# Patient Record
Sex: MALE | Race: WHITE | Employment: FULL TIME | URBAN - METROPOLITAN AREA
[De-identification: names, ages, dates, MRNs, and addresses within clinical notes are randomized per-mention and may not be internally consistent; named-entity substitution may affect disease eponyms.]

---

## 2017-01-30 PROBLEM — G60.9 IDIOPATHIC PERIPHERAL NEUROPATHY: Status: ACTIVE | Noted: 2017-01-30

## 2017-01-30 PROBLEM — Z00.00 MEDICARE ANNUAL WELLNESS VISIT, SUBSEQUENT: Status: ACTIVE | Noted: 2017-01-30

## 2017-04-24 ENCOUNTER — HOSPITAL ENCOUNTER (OUTPATIENT)
Dept: SURGERY | Age: 82
Discharge: HOME | End: 2017-04-24
Payer: MEDICARE

## 2017-04-24 ENCOUNTER — HOSPITAL ENCOUNTER (OUTPATIENT)
Dept: PHYSICAL THERAPY | Age: 82
Discharge: HOME | End: 2017-04-24
Payer: MEDICARE

## 2017-04-24 VITALS
RESPIRATION RATE: 12 BRPM | HEART RATE: 61 BPM | DIASTOLIC BLOOD PRESSURE: 56 MMHG | HEIGHT: 71 IN | OXYGEN SATURATION: 97 % | WEIGHT: 203 LBS | BODY MASS INDEX: 28.42 KG/M2 | TEMPERATURE: 96 F | SYSTOLIC BLOOD PRESSURE: 101 MMHG

## 2017-04-24 LAB
ANION GAP BLD CALC-SCNC: 8 MMOL/L (ref 7–16)
APPEARANCE UR: CLEAR
APTT PPP: 29.4 SEC (ref 23.5–31.7)
BACTERIA SPEC CULT: ABNORMAL
BASOPHILS # BLD AUTO: 0 K/UL (ref 0–0.2)
BASOPHILS # BLD: 1 % (ref 0–2)
BILIRUB UR QL: NEGATIVE
BUN SERPL-MCNC: 22 MG/DL (ref 8–23)
CALCIUM SERPL-MCNC: 9.2 MG/DL (ref 8.3–10.4)
CHLORIDE SERPL-SCNC: 104 MMOL/L (ref 98–107)
CO2 SERPL-SCNC: 29 MMOL/L (ref 21–32)
COLOR UR: YELLOW
CREAT SERPL-MCNC: 0.96 MG/DL (ref 0.8–1.5)
DIFFERENTIAL METHOD BLD: ABNORMAL
EOSINOPHIL # BLD: 0.2 K/UL (ref 0–0.8)
EOSINOPHIL NFR BLD: 3 % (ref 0.5–7.8)
ERYTHROCYTE [DISTWIDTH] IN BLOOD BY AUTOMATED COUNT: 12.9 % (ref 11.9–14.6)
GLUCOSE SERPL-MCNC: 66 MG/DL (ref 65–100)
GLUCOSE UR STRIP.AUTO-MCNC: NEGATIVE MG/DL
HCT VFR BLD AUTO: 43.9 % (ref 41.1–50.3)
HGB BLD-MCNC: 14.9 G/DL (ref 13.6–17.2)
HGB UR QL STRIP: NEGATIVE
IMM GRANULOCYTES # BLD: 0 K/UL (ref 0–0.5)
IMM GRANULOCYTES NFR BLD AUTO: 0.3 % (ref 0–5)
INR PPP: 1.1 (ref 0.9–1.2)
KETONES UR QL STRIP.AUTO: NEGATIVE MG/DL
LEUKOCYTE ESTERASE UR QL STRIP.AUTO: NEGATIVE
LYMPHOCYTES # BLD AUTO: 20 % (ref 13–44)
LYMPHOCYTES # BLD: 1.3 K/UL (ref 0.5–4.6)
MCH RBC QN AUTO: 32.6 PG (ref 26.1–32.9)
MCHC RBC AUTO-ENTMCNC: 33.9 G/DL (ref 31.4–35)
MCV RBC AUTO: 96.1 FL (ref 79.6–97.8)
MONOCYTES # BLD: 1.3 K/UL (ref 0.1–1.3)
MONOCYTES NFR BLD AUTO: 20 % (ref 4–12)
NEUTS SEG # BLD: 3.7 K/UL (ref 1.7–8.2)
NEUTS SEG NFR BLD AUTO: 56 % (ref 43–78)
NITRITE UR QL STRIP.AUTO: NEGATIVE
PH UR STRIP: 6 [PH] (ref 5–9)
PLATELET # BLD AUTO: 130 K/UL (ref 150–450)
PMV BLD AUTO: 10.6 FL (ref 10.8–14.1)
POTASSIUM SERPL-SCNC: 4.9 MMOL/L (ref 3.5–5.1)
PROT UR STRIP-MCNC: NEGATIVE MG/DL
PROTHROMBIN TIME: 11.7 SEC (ref 9.6–12)
RBC # BLD AUTO: 4.57 M/UL (ref 4.23–5.67)
SERVICE CMNT-IMP: ABNORMAL
SODIUM SERPL-SCNC: 141 MMOL/L (ref 136–145)
SP GR UR REFRACTOMETRY: 1.02 (ref 1–1.02)
UROBILINOGEN UR QL STRIP.AUTO: 1 EU/DL (ref 0.2–1)
WBC # BLD AUTO: 6.5 K/UL (ref 4.3–11.1)

## 2017-04-24 PROCEDURE — G8980 MOBILITY D/C STATUS: HCPCS

## 2017-04-24 PROCEDURE — G8979 MOBILITY GOAL STATUS: HCPCS

## 2017-04-24 PROCEDURE — 93005 ELECTROCARDIOGRAM TRACING: CPT | Performed by: ANESTHESIOLOGY

## 2017-04-24 PROCEDURE — G8978 MOBILITY CURRENT STATUS: HCPCS

## 2017-04-24 PROCEDURE — 85025 COMPLETE CBC W/AUTO DIFF WBC: CPT | Performed by: PHYSICIAN ASSISTANT

## 2017-04-24 PROCEDURE — 81003 URINALYSIS AUTO W/O SCOPE: CPT | Performed by: PHYSICIAN ASSISTANT

## 2017-04-24 PROCEDURE — 87641 MR-STAPH DNA AMP PROBE: CPT | Performed by: PHYSICIAN ASSISTANT

## 2017-04-24 PROCEDURE — 85610 PROTHROMBIN TIME: CPT | Performed by: PHYSICIAN ASSISTANT

## 2017-04-24 PROCEDURE — 97161 PT EVAL LOW COMPLEX 20 MIN: CPT

## 2017-04-24 PROCEDURE — 36415 COLL VENOUS BLD VENIPUNCTURE: CPT | Performed by: PHYSICIAN ASSISTANT

## 2017-04-24 PROCEDURE — 80048 BASIC METABOLIC PNL TOTAL CA: CPT | Performed by: PHYSICIAN ASSISTANT

## 2017-04-24 PROCEDURE — 85730 THROMBOPLASTIN TIME PARTIAL: CPT | Performed by: PHYSICIAN ASSISTANT

## 2017-04-24 NOTE — PERIOP NOTES
Patient verified name, , and surgery as listed in [facility]. Type III surgery, walk in assessment complete. Labs per surgeon: cbc,bmp,PT/PTT and UA ; results within limits per anesthesia protocol. Labs per anesthesia protocol: type & screen DOS  EKG:performed per anesthesia protocol, results within protocol. Pt with hx MI 2 days s/p CATRINA  required 1 bare metal stent placed to mid right coronary artery. Pt never followed up with [facility] after discharge from hospital . Pt's PCP (Dr. Shahida Rivas) at [facility] has been prescribing plavix since that time and monitoring pt. Last EKG in EMR dated 14. Informed pt that he will need cardiology eval prior to surgery due to his history and no cardiology follow up since. Contacted Dr. Ewa Melchor' office, left detailed voicemail message for [facility] reporting pt's history and need for cardiology eval prior to DOS. Hibiclens and instructions given per hospital policy. Nasal Swab collected per MD order and instructions for Mupirocin nasal ointment if required. Patient provided with handouts including Guide to Surgery, Pain Management, Hand Hygiene, Blood Transfusion Education, and [facility] Brochure. Patient answered medical/surgical history questions at their best of ability. All prior to admission medications documented in Connect [facility]. Original medication prescription bottle  visualized during patient appointment. Patient instructed to hold all vitamins 7 days prior to surgery and NSAIDS 5 days prior to surgery. Medications to be held prior to surgery plavix    Patient instructed to continue previous medications as prescribed prior to surgery and to take the following medications the day of surgery according to anesthesia guidelines with a small sip of water: aspirin, proscar, atrovastatin. Patient taught back and verbalized understanding.

## 2017-04-24 NOTE — ADVANCED PRACTICE NURSE
Total Joint Surgery Preoperative Chart Review      Patient ID:  Carlee Alva  [id number]  89 y.o.  1935  Surgeon: Dr. Donne Bloch  Date of Surgery: 5/16/2017  Procedure: Total Left Knee Arthroplasty  Primary Care Physician: Sarah Sanchez [phone]  Specialty Physician(s):      Subjective:   Carlee Alva is a 81 y.o. WHITE OR  male who presents for preoperative evaluation for Total Left Knee arthroplasty. This is a preoperative chart review note based on data collected by the nurse at the surgical Pre-Assessment visit. Past Medical History:   Diagnosis Date    Arthritis     osteo    Chronic pain     left hip/back/LLE    Colon cancer ([address] 6/23/2015    Diabetes ([address] 2006    Type 2. diet controlled. Pt does not monitor blood sugar. A1C=5.7 Jan 2017.  Dyslipidemia 6/23/2015    GERD (gastroesophageal reflux disease)     controlled w/med    Hx of bladder cancer 6/23/2015    Hypertension     controlled on medication    NSTEMI (non-ST elevation myocardial infarction) ([address] 01/10/2014    MI. heart cath: preserved LVSF. EF =65%. mild to mod LAD and circumflex disease. 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      Thrombocytopenia, unspecified ([facility] 1/11/2014      Past Surgical History:   Procedure Laterality Date    ABDOMEN SURGERY PROC UNLISTED  1991    EXP Lap    HX COLECTOMY  1997    Colon Resection    HX COLONOSCOPY  2014    HX HEART CATHETERIZATION  01/10/2014    s/p bare metal stent to mid-right CA    HX HEENT  as child    T&A    HX LUMBAR FUSION  1990 x 2  2012, 2013    lumbar fusions- x 4 total surgeries    HX MOHS PROCEDURES Left 1990    HX ORTHOPAEDIC Left 2013    left hip replaced    HX UROLOGICAL  2002    Turp     Family History   Problem Relation Age of Onset    Heart Disease Mother    Carlos Yeager Cancer Father     Lung Disease Sister     Heart Disease Brother     Cancer Brother      pancreatic    Parkinson's Disease Brother     Malignant Hyperthermia Neg Hx     Pseudocholinesterase Deficiency Neg Hx     Delayed Awakening Neg Hx     Post-op Nausea/Vomiting Neg Hx     Emergence Delirium Neg Hx     Post-op Cognitive Dysfunction Neg Hx     Other Neg Hx       Social History   Substance Use Topics    Smoking status: Former Smoker     Packs/day: 1.50     Years: 28.00    Smokeless tobacco: Never Used      Comment: quit 1982    Alcohol use No       Prior to Admission medications    Medication Sig Start Date End Date Taking? Authorizing Provider   aspirin delayed-release 81 mg tablet Take 81 mg by mouth daily. Per anesthesia protocol:instructed to take am of surgery. Indications: myocardial infarction prevention   Yes Historical Provider   clopidogrel (PLAVIX) 75 mg tab Take 1 Tab by mouth daily. Patient taking differently: Take 75 mg by mouth daily. Per anesthesia protocol: pt instructed to stop med 7 days prior to day of surgery. Indications: Myocardial Reinfarction Prevention 1/30/17  Yes Fabiola Flanagan MD   lisinopril (PRINIVIL, ZESTRIL) 5 mg tablet Take 1 Tab by mouth daily. Patient taking differently: Take 5 mg by mouth nightly. 1/30/17  Yes Fabiola Flanagan MD   Dexlansoprazole [facility]) 60 mg CpDB Take 1 Cap by mouth daily. Patient taking differently: Take 60 mg by mouth nightly. Indications: gastroesophageal reflux disease 1/30/17  Yes Fabiola Flanagan MD   finasteride (PROSCAR) 5 mg tablet Take 1 Tab by mouth daily. Patient taking differently: Take 5 mg by mouth daily. Per anesthesia protocol:instructed to take am of surgery. Indications: SYMPTOMATIC BENIGN PROSTATIC HYPERPLASIA 1/30/17  Yes Fabiola Flanagan MD   atorvastatin (LIPITOR) 40 mg tablet Take 1 Tab by mouth daily. Patient taking differently: Take 40 mg by mouth daily.  Per anesthesia protocol:instructed to take am of surgery. Indications: hypercholesterolemia 1/30/17  Yes Roz Mccloud MD   niacin ER (NIASPAN) 500 mg tablet Take 4 Tabs by mouth nightly. 1/30/17  Yes Roz Mccloud MD   metoprolol succinate (TOPROL-XL) 25 mg XL tablet Take 1 Tab by mouth daily. Bhargavi Sanz MD [facility]# [id number] [facility] [id number]  Patient taking differently: Take 25 mg by mouth nightly. 1/30/17  Yes Roz Mccloud MD   cholecalciferol, vitamin D3, (VITAMIN D-3) 2,000 unit Tab Take  by mouth nightly. Yes Historical Provider   triamcinolone acetonide (KENALOG) 0.1 % topical cream Apply  to affected area two (2) times a day. use thin layer 1/30/17   Roz Mccloud MD   hydrocortisone (ANUSOL-HC) 25 mg supp Insert 1 Suppository into rectum every twelve (12) hours. 3/23/16   Roz Mccloud MD     No Known Allergies       Objective:     Physical Exam:   Patient Vitals for the past 24 hrs:   Temp Pulse Resp BP SpO2   04/24/17 1405 96 °F (35.6 °C) 61 12 101/56 97 %   04/24/17 1200 - - - - 99 %       ECG:    EKG Results     None          Data Review:   Labs:   Recent Results (from the past 24 hour(s))   CBC WITH AUTOMATED DIFF    Collection Time: 04/24/17 12:22 PM   Result Value Ref Range    WBC 6.5 4.3 - 11.1 K/uL    RBC 4.57 4.23 - 5.67 M/uL    HGB 14.9 13.6 - 17.2 g/dL    HCT 43.9 41.1 - 50.3 %    MCV 96.1 79.6 - 97.8 FL    MCH 32.6 26.1 - 32.9 PG    MCHC 33.9 31.4 - 35.0 g/dL    RDW 12.9 11.9 - 14.6 %    PLATELET 328 (L) 954 - 450 K/uL    MPV 10.6 (L) 10.8 - 14.1 FL    DF AUTOMATED      NEUTROPHILS 56 43 - 78 %    LYMPHOCYTES 20 13 - 44 %    MONOCYTES 20 (H) 4.0 - 12.0 %    EOSINOPHILS 3 0.5 - 7.8 %    BASOPHILS 1 0.0 - 2.0 %    IMMATURE GRANULOCYTES 0.3 0.0 - 5.0 %    ABS. NEUTROPHILS 3.7 1.7 - 8.2 K/UL    ABS. LYMPHOCYTES 1.3 0.5 - 4.6 K/UL    ABS. MONOCYTES 1.3 0.1 - 1.3 K/UL    ABS. EOSINOPHILS 0.2 0.0 - 0.8 K/UL    ABS. BASOPHILS 0.0 0.0 - 0.2 K/UL    ABS. IMM.  GRANS. 0.0 0.0 - 0.5 K/UL   PROTHROMBIN TIME + INR    Collection Time: 04/24/17 12:22 PM   Result Value Ref Range    Prothrombin time 11.7 9.6 - 12.0 sec    INR 1.1 0.9 - 1.2     PTT    Collection Time: 04/24/17 12:22 PM   Result Value Ref Range    aPTT 29.4 23.5 - 47.1 SEC   METABOLIC PANEL, BASIC    Collection Time: 04/24/17 12:22 PM   Result Value Ref Range    Sodium 141 136 - 145 mmol/L    Potassium 4.9 3.5 - 5.1 mmol/L    Chloride 104 98 - 107 mmol/L    CO2 29 21 - 32 mmol/L    Anion gap 8 7 - 16 mmol/L    Glucose 66 65 - 100 mg/dL    BUN 22 8 - 23 MG/DL    Creatinine 0.96 0.8 - 1.5 MG/DL    GFR est AA >60 >60 ml/min/1.73m2    GFR est non-AA >60 >60 ml/min/1.73m2    Calcium 9.2 8.3 - 10.4 MG/DL   URINALYSIS W/ RFLX MICROSCOPIC    Collection Time: 04/24/17 12:22 PM   Result Value Ref Range    Color YELLOW      Appearance CLEAR      Specific gravity 1.017 1.001 - 1.023      pH (UA) 6.0 5.0 - 9.0      Protein NEGATIVE  NEG mg/dL    Glucose NEGATIVE  mg/dL    Ketone NEGATIVE  NEG mg/dL    Bilirubin NEGATIVE  NEG      Blood NEGATIVE  NEG      Urobilinogen 1.0 0.2 - 1.0 EU/dL    Nitrites NEGATIVE  NEG      Leukocyte Esterase NEGATIVE  NEG     MSSA/MRSA SC BY PCR, NASAL SWAB    Collection Time: 04/24/17  1:50 PM   Result Value Ref Range    Special Requests: NASAL O2      Culture result: (A)       MRSA target DNA not detected, SA target DNA detected. A MRSA negative, SA positive test result does not preclude MRSA nasal colonization.          Problem List:  )  Patient Active Problem List   Diagnosis Code    Essential hypertension I10    Arthritis M19.90    GERD (gastroesophageal reflux disease) K21.9    Unspecified adverse effect of anesthesia T41.45XA    Spinal stenosis, lumbar region, with neurogenic claudication M48.06    Scoliosis (and kyphoscoliosis), idiopathic M41.20    Status post lumbar spinal fusion Z98.1    Osteoarthritis of left hip M16.12    BPH (benign prostatic hyperplasia) N40.0    HLD (hyperlipidemia) E78.5  Former smoker Z87.891    H/O chest pain Z87.898    History of normal resting EKG [id number]    History of colon cancer Z85.038    History of bladder cancer Z85.51    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.649    NSTEMI (non-ST elevation myocardial infarction) ([facility] I21.4    Thrombocytopenia, unspecified (HCC) D69.6    Dyslipidemia E78.5    Hx of bladder cancer Z85.51    Controlled type 2 diabetes mellitus without complication (HCC) A68.0    Chronic pain G89.29    Lumbar radiculopathy M54.16    Chronic pain of left knee M25.562, G89.29    Medicare annual wellness visit, subsequent Z00.00    Idiopathic peripheral neuropathy G60.9       Total Joint Surgery Pre-Assessment Recommendations:           Recommend continuous saturation monitoring hours of sleep, during hospitalization.         Signed By: Ronald Lott NP-C    April 24, 2017

## 2017-04-24 NOTE — PROGRESS NOTES
17 1200   Oxygen Therapy   O2 Sat (%) 99 %   Pulse via Oximetry 66 beats per minute   O2 Device Room air   Pre-Treatment   Breath Sounds Bilateral Clear   Pre FEV1 (liters) 3 liters   % Predicted 107   Incentive Spirometry Treatment   Actual Volume (ml) 3000 ml     Initial respiratory Assessment completed with pt. Pt was interviewed and evaluated in Joint camp prior to surgery. Patient ID:  Brittany Carvajal  [id number]  20 y.o.  1935  Surgeon: Dr. Maritza Arias  Date of Surgery: 2017  Procedure: Total knee Arthroplasty  Primary Care Physician: Frankie Oro [phone]  Specialists:                                  Pt instructed in the use of Incentive Spirometry. Pt instructed to bring Incentive Spirometer back on date of surgery & to start using Is upon return to pt room. Pt taught proper cough technique      History of smokin.5 ppd for 28 years     Quit date:    Secondhand smoke:FATHER      Past procedures with Oxygen desaturation:DELAYED AWAKENING AFTER LONG SURGERY IN     Past Medical History:   Diagnosis Date    Arthritis     osteo    Chronic pain     left hip/back/LLE    Colon cancer ([address] 2015    Diabetes ([address]     Type 2. diet controlled. Pt does not monitor blood sugar. A1C=5.2017.  Dyslipidemia 2015    GERD (gastroesophageal reflux disease)     controlled w/med    Hx of bladder cancer 2015    Hypertension     controlled on medication    NSTEMI (non-ST elevation myocardial infarction) ([address] 01/10/2014    MI. heart cath: preserved LVSF. EF =65%. mild to mod LAD and circumflex disease.  Osteoarthritis of left hip 2013    S/P prosthetic total arthroplasty of the hip 2014    Stented coronary artery 01/10/2014    mid right coronary artery - Medtronic bare metal stent.      Thrombocytopenia, unspecified ([address] 2014 Respiratory history: HX OF ATELECTASIS ON CT 2012                                   DENIES SOB                                  Respiratory meds:                                                         FAMILY PRESENT:                 NO                                        PAST SLEEP STUDY:            NO  HX OF RITA:                             NO                                     RITA assessment:                                               SLEEPS ON SIDE                                                    PHYSICAL EXAM   Body mass index is 28.31 kg/(m^2).    Visit Vitals    /56 (BP 1 Location: Left arm, BP Patient Position: Sitting)    Pulse 61    Temp 96 °F (35.6 °C)    Resp 12    Ht 5' 11\" (1.803 m)    Wt 92.1 kg (203 lb)    SpO2 97%    BMI 28.31 kg/m2     Neck circumference:  44.5    cm    Loud snoring:YES  - ACCORDING WIFE    Witnessed apnea or wakening gasping or choking:,DENIES,    Awakens with headaches:DENIES    Morning or daytime tiredness/ sleepiness:  TIRED -NAPS FOR 30 MIN AFTER EATING BREAKFAST    Dry mouth or sore throat in morning:YES    Freidman stage:3    SACS score:25    STOP/BAN       Sleepiness Scale:     Sitting and reading 1    Watching TV 2    Sitting inactive in a public place 1    As a passenger in a car for an hour without a break 1    Lying down to rest in the afternoon when circumstances Permits 1    Sitting and talking to someone 1    Sitting quietly after lunch without alcohol 1    In a car, while stopped for a few minutes 1    Total :  9                           CPAP:NA        AGGRESSIVE IS       CONT SAT HS        Referrals:    Pt. Phone Number:

## 2017-04-24 NOTE — PROGRESS NOTES
Karen Rodrigues  : [birth year](50 y.o.) [address] at [facility]  [address], [address]  [phone]       Physical Therapy Prehab Plan of Treatment and Evaluation Summary:2017    ICD-10: Treatment Diagnosis:   · Pain in Left Knee (M25.562)  · Stiffness of Left Knee, Not elsewhere classified (M25.662)  · Difficulty in walking, Not elsewhere classified (R26.2)  Precautions/Allergies:   Review of patient's allergies indicates no known allergies. MEDICAL/REFERRING DIAGNOSIS:  Unilateral primary osteoarthritis, left knee [M17.12]  REFERRING PHYSICIAN: Vick Cespedes,*  DATE OF SURGERY: 17   Assessment:   Comments:  Pt. Reports left arlet 2 years ago. He plans to go home with wife and son to assist.  Left ankle dorsiflexion is somewhat limited. PROBLEM LIST (Impacting functional limitations):  Mr. Alex eRddy presents with the following left lower extremity(s) problems:  1. Strength  2. Range of Motion  3. Home Exercise Program  4. Pain   INTERVENTIONS PLANNED:  1. Home Exercise Program  2. Educational Discussion     TREATMENT PLAN: Effective Dates: 2017 TO 2017. Frequency/Duration: Patient to continue to perform home exercise program at least twice per day up until his surgery. GOALS: (Goals have been discussed and agreed upon with patient.)  Discharge Goals: Time Frame: 1 Day  1. Patient will demonstrate independence with a home exercise program designed to increase strength, range of motion and pain control to minimize functional deficits and optimize patient for total joint replacement. Rehabilitation Potential For Stated Goals: Good  Regarding [facility] therapy, I certify that the treatment plan above will be carried out by a therapist or under their direction.   Thank you for this referral,  Ellis Esparza, PT               HISTORY:   Present Symptoms:  Pain Intensity 1:  (8 at worst)  Pain Location 1: Knee   History of Present Injury/Illness (Reason for Referral):  Medical/Referring Diagnosis: Unilateral primary osteoarthritis, left knee [M17.12]   Past Medical History/Comorbidities:   Mr. 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 He also has no past medical history of Aneurysm ([address] Arrhythmia; Asthma; Autoimmune disease ([address] Chronic kidney disease; Chronic obstructive pulmonary disease ([address] Coagulation disorder ([address] Difficult intubation; Endocarditis; Heart failure ([address] Liver disease; Malignant hyperthermia due to anesthesia; Morbid obesity ([address] Nausea & vomiting; Pseudocholinesterase deficiency; Psychiatric disorder; PUD (peptic ulcer disease); Rheumatic fever; Seizures ([address] Sleep apnea; Stroke [facility]); Thromboembolus ([address] or Thyroid disease. Mr. Gutierrez Yung  has a past surgical history that includes abdomen surgery proc unlisted (1991); lumbar fusion (1990 x 2  2012, 2013); urological (2002); heent (as child); mohs procedure (Left, 1990); orthopaedic (Left, 2013); colectomy (1997); and colonoscopy (2014).   Social History/Living Environment:   Home Environment: Private residence  # Steps to Enter: 0  Rails to Enter: No  One/Two Story Residence: One story (3 steps with bilateral rails)  Living Alone: No  Support Systems: Child(linda), Spouse/Significant Other/Partner  Patient Expects to be Discharged to[de-identified] Private residence  Current DME Used/Available at Home: Commode, bedside, Walker, rolling, Cane, straight  Tub or Shower Type: Tub/Shower combination  Work/Activity:   for [employer]  Dominant Side:  RIGHT  Current Medications:  See Pre-assessment nursing note   Number of Personal Factors/Comorbidities that affect the Plan of Care: 1-2: MODERATE COMPLEXITY   EXAMINATION:   ADLs (Current Functional Status):   Ambulation:  [x] Independent  [] Walk Indoors Only  [] Walk Outdoors  [] Use Assistive Device  [] Use Wheelchair Only Dressing:  [x] Independent  Requires Assistance from Someone for:  [] Sock/Shoes  [] Pants  [] Everything   Bathing/Showering:   [x] Independent  [] Requires Assistance from Someone  [] Sponge Bath Only Household Activities:  [x] Routine house and yard work  [] Light Housework Only  [] None   Observation/Orthostatic Postural Assessment:   Exceptions to WDLRounded shoulders, Genu varus left  ROM/Flexibility:   Gross Assessment: Yes  AROM: Within functional limits (right LE)                LLE Assessment  LLE Assessment (WDL): Exception to WDL  LLE AROM  L Knee Flexion: 96  L Knee Extension: 3  L Ankle Dorsiflexion: 0          Strength:   Gross Assessment: Yes  Strength: Generally decreased, functional (right LE)       LLE Strength  L Knee Flexion: 4  L Knee Extension: 4          Functional Mobility:    Gross Assessment: Yes    Gait Description (WDL): Exceptions to WDL  Stand to Sit: Independent, Additional time  Sit to Stand: Independent, Additional time  Distance (ft): 250 Feet (ft)  Ambulation - Level of Assistance: Independent  Speed/Mary: Delayed  Stance: Left decreased  Gait Abnormalities: Antalgic; Other (limited ankle DF)          Balance:    Sitting: Intact  Standing: Intact   Body Structures Involved:  1. Bones  2. Joints  3. Muscles  4. Ligaments Body Functions Affected:  1. Movement Related Activities and Participation Affected:  1. Mobility   Number of elements that affect the Plan of Care: 1-2: LOW COMPLEXITY   CLINICAL PRESENTATION:   Presentation: Stable and uncomplicated: LOW COMPLEXITY   CLINICAL DECISION MAKING:   Outcome Measure:    Tool Used: Lower Extremity Functional Scale (LEFS)  Score:  Initial: 37/80 Most Recent: X/80 (Date: -- )   Interpretation of Score: 20 questions each scored on a 5 point scale with 0 representing \"extreme difficulty or unable to perform\" and 4 representing \"no difficulty\". The lower the score, the greater the functional disability. 80/80 represents no disability. Minimal detectable change is 9 points. Score 80 79-65 64-49 48-33 32-17 16-1 0   Modifier CH CI CJ CK CL CM CN     ? Mobility - Walking and Moving Around:     - CURRENT STATUS: CK - 40%-59% impaired, limited or restricted    - GOAL STATUS: CK - 40%-59% impaired, limited or restricted    - D/C STATUS:  CK - 40%-59% impaired, limited or restricted  Medical Necessity:   · Mr. Sy Cavazos is expected to optimize his lower extremity strength and ROM in preparation for joint replacement surgery. Reason for Services/Other Comments:  · Achieve baseline assesment of musculoskeletal system, functional mobility and home environment. , educate in PT HEP in preparation for surgery, educate in hospital plan of care. Use of outcome tool(s) and clinical judgement create a POC that gives a: Clear prediction of patient's progress: LOW COMPLEXITY   TREATMENT:   Treatment/Session Assessment:  Patient was instructed in PT- HEP to increase strength and ROM in LEs. Answered all questions. · Post session pain:  No complaints  · Compliance with Program/Exercises: compliant most of the time.   Total Treatment Duration:  PT Patient Time In/Time Out  Time In: 1220  Time Out: [address], PT

## 2017-04-24 NOTE — PERIOP NOTES
Lab results within limits per anesthesia protocol; OK for surgery. Recent Results (from the past 12 hour(s))   CBC WITH AUTOMATED DIFF    Collection Time: 04/24/17 12:22 PM   Result Value Ref Range    WBC 6.5 4.3 - 11.1 K/uL    RBC 4.57 4.23 - 5.67 M/uL    HGB 14.9 13.6 - 17.2 g/dL    HCT 43.9 41.1 - 50.3 %    MCV 96.1 79.6 - 97.8 FL    MCH 32.6 26.1 - 32.9 PG    MCHC 33.9 31.4 - 35.0 g/dL    RDW 12.9 11.9 - 14.6 %    PLATELET 888 (L) 567 - 450 K/uL    MPV 10.6 (L) 10.8 - 14.1 FL    DF AUTOMATED      NEUTROPHILS 56 43 - 78 %    LYMPHOCYTES 20 13 - 44 %    MONOCYTES 20 (H) 4.0 - 12.0 %    EOSINOPHILS 3 0.5 - 7.8 %    BASOPHILS 1 0.0 - 2.0 %    IMMATURE GRANULOCYTES 0.3 0.0 - 5.0 %    ABS. NEUTROPHILS 3.7 1.7 - 8.2 K/UL    ABS. LYMPHOCYTES 1.3 0.5 - 4.6 K/UL    ABS. MONOCYTES 1.3 0.1 - 1.3 K/UL    ABS. EOSINOPHILS 0.2 0.0 - 0.8 K/UL    ABS. BASOPHILS 0.0 0.0 - 0.2 K/UL    ABS. IMM.  GRANS. 0.0 0.0 - 0.5 K/UL   PROTHROMBIN TIME + INR    Collection Time: 04/24/17 12:22 PM   Result Value Ref Range    Prothrombin time 11.7 9.6 - 12.0 sec    INR 1.1 0.9 - 1.2     PTT    Collection Time: 04/24/17 12:22 PM   Result Value Ref Range    aPTT 29.4 23.5 - 86.4 SEC   METABOLIC PANEL, BASIC    Collection Time: 04/24/17 12:22 PM   Result Value Ref Range    Sodium 141 136 - 145 mmol/L    Potassium 4.9 3.5 - 5.1 mmol/L    Chloride 104 98 - 107 mmol/L    CO2 29 21 - 32 mmol/L    Anion gap 8 7 - 16 mmol/L    Glucose 66 65 - 100 mg/dL    BUN 22 8 - 23 MG/DL    Creatinine 0.96 0.8 - 1.5 MG/DL    GFR est AA >60 >60 ml/min/1.73m2    GFR est non-AA >60 >60 ml/min/1.73m2    Calcium 9.2 8.3 - 10.4 MG/DL   URINALYSIS W/ RFLX MICROSCOPIC    Collection Time: 04/24/17 12:22 PM   Result Value Ref Range    Color YELLOW      Appearance CLEAR      Specific gravity 1.017 1.001 - 1.023      pH (UA) 6.0 5.0 - 9.0      Protein NEGATIVE  NEG mg/dL    Glucose NEGATIVE  mg/dL    Ketone NEGATIVE  NEG mg/dL    Bilirubin NEGATIVE  NEG      Blood NEGATIVE NEG      Urobilinogen 1.0 0.2 - 1.0 EU/dL    Nitrites NEGATIVE  NEG      Leukocyte Esterase NEGATIVE  NEG

## 2017-04-25 LAB
ATRIAL RATE: 62 BPM
CALCULATED R AXIS, ECG10: 54 DEGREES
CALCULATED T AXIS, ECG11: 50 DEGREES
DIAGNOSIS, 93000: NORMAL
P-R INTERVAL, ECG05: 240 MS
Q-T INTERVAL, ECG07: 392 MS
QRS DURATION, ECG06: 88 MS
QTC CALCULATION (BEZET), ECG08: 397 MS
VENTRICULAR RATE, ECG03: 62 BPM

## 2017-04-25 PROCEDURE — 93005 ELECTROCARDIOGRAM TRACING: CPT | Performed by: ANESTHESIOLOGY

## 2017-04-25 RX ORDER — MUPIROCIN 20 MG/G
OINTMENT TOPICAL 2 TIMES DAILY
COMMUNITY
Start: 2017-05-11 | End: 2017-05-16

## 2017-04-25 NOTE — PERIOP NOTES
Nasal swab results reviewed:   Culture result:  (A)    Final   MRSA target DNA not detected, SA target DNA detected.   A MRSA negative, SA positive test result does not preclude MRSA nasal colonization. Called pt and informed of results    Instructed:Called Mupirocin Rx to [employer].  Pt to apply to ea nostril intranasally twice a day for 5 days beginning :5/11/17

## 2017-04-27 PROBLEM — Z98.61 S/P PTCA (PERCUTANEOUS TRANSLUMINAL CORONARY ANGIOPLASTY): Status: ACTIVE | Noted: 2017-04-27

## 2017-04-27 PROBLEM — I25.10 CORONARY ATHEROSCLEROSIS OF NATIVE CORONARY VESSEL: Status: ACTIVE | Noted: 2017-04-27

## 2017-05-03 PROBLEM — E78.2 MIXED HYPERLIPIDEMIA: Status: ACTIVE | Noted: 2017-05-03

## 2017-05-03 PROBLEM — I25.10 ATHEROSCLEROSIS OF NATIVE CORONARY ARTERY OF NATIVE HEART WITHOUT ANGINA PECTORIS: Status: ACTIVE | Noted: 2017-05-03

## 2017-05-03 PROBLEM — E11.9 TYPE 2 DIABETES MELLITUS WITHOUT COMPLICATION, WITHOUT LONG-TERM CURRENT USE OF INSULIN (HCC): Status: ACTIVE | Noted: 2017-05-03

## 2017-05-04 NOTE — PERIOP NOTES
Cardiac clearance evaluation  5/3/17 with Dr. Leonard Lucio at [facility] documented in [address] [place] [address]. Per Dr. Adelaida Antunez note: Low risk for TKA. Stable CAD. May proceed. May hold plavix 1-2wks for surgery. EKG performed 5/3/17 at [facility] also in EMR. copy placed on chart.

## 2017-05-12 NOTE — H&P
H&P    Patient ID:  Edith Grande  [id number]  27 y.o.  1935  Surgeon:  Santos Sheffield MD  Date of Surgery: * No surgery date entered *  Procedure: Left Knee Total Arthroplasty  Primary Care Physician: Catarino Alicea MD        Subjective:  Edith Grande is a 81 y.o. WHITE [place]  male who presents with Left Knee pain. He has history of Left Knee pain for several years ago. Symptoms worse with walking, squatting, climbing stairs, weight bearing, initiation of activity and bathing and relieved with rest, NSAIDs: How long months, activity modification and steroid injections. Conservative treatment consisting of  activity modification, NSAIDs, analgesics and therapeutic injections into the knee has not helped. The patient  lives with their family. The patients goal after surgery is improved pain and function. Past Medical History:   Diagnosis Date    Arthritis     osteo    Chronic pain     left hip/back/LLE    Colon cancer ([facility] [address] 6/23/2015    Diabetes ([facility] [address] 2006    Type 2. diet controlled. Pt does not monitor blood sugar. A1C=5.7 Jan 2017.  Dyslipidemia 6/23/2015    GERD (gastroesophageal reflux disease)     controlled w/med    Hx of bladder cancer 6/23/2015    Hypertension     controlled on medication    NSTEMI (non-ST elevation myocardial infarction) ([address] 01/10/2014    MI. heart cath: preserved LVSF. EF =65%. mild to mod LAD and circumflex disease. 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      Thrombocytopenia, unspecified ([facility] [address] 1/11/2014      Past Surgical History:   Procedure Laterality Date    ABDOMEN SURGERY PROC UNLISTED  1991    EXP Lap    HX COLECTOMY  1997    Colon Resection    HX COLONOSCOPY  2014    HX CORONARY STENT PLACEMENT      HX HEART CATHETERIZATION  01/10/2014    s/p bare metal stent to mid-right CA    HX HEENT  as child    T&A    HX LUMBAR FUSION  1990 x 2  2012, 2013    lumbar fusions- x 4 total surgeries    HX MOHS PROCEDURES Left 1990    HX ORTHOPAEDIC Left 2013    left hip replaced    HX UROLOGICAL  2002    Turp     Family History   Problem Relation Age of Onset    Heart Disease Mother     Cancer Father     Lung Disease Sister     Heart Disease Brother     Cancer Brother      pancreatic    Parkinson's Disease Brother     Malignant Hyperthermia Neg Hx     Pseudocholinesterase Deficiency Neg Hx     Delayed Awakening Neg Hx     Post-op Nausea/Vomiting Neg Hx     Emergence Delirium Neg Hx     Post-op Cognitive Dysfunction Neg Hx     Other Neg Hx       Social History   Substance Use Topics    Smoking status: Former Smoker     Packs/day: 1.50     Years: 28.00    Smokeless tobacco: Never Used      Comment: quit 1982    Alcohol use No       Prior to Admission medications    Medication Sig Start Date End Date Taking? Authorizing Provider   mupirocin (BACTROBAN) 2 % ointment by Both Nostrils route two (2) times a day. 5/11/17 5/15/17  Historical Provider   aspirin delayed-release 81 mg tablet Take 81 mg by mouth daily. Per anesthesia protocol:instructed to take am of surgery. Indications: myocardial infarction prevention    Historical Provider   triamcinolone acetonide (KENALOG) 0.1 % topical cream Apply  to affected area two (2) times a day. use thin layer 1/30/17   Va Caballero MD   clopidogrel (PLAVIX) 75 mg tab Take 1 Tab by mouth daily. Patient taking differently: Take 75 mg by mouth daily. Per anesthesia protocol: pt instructed to stop med 7 days prior to day of surgery. Indications: Myocardial Reinfarction Prevention 1/30/17   Va Caballero MD   lisinopril (PRINIVIL, ZESTRIL) 5 mg tablet Take 1 Tab by mouth daily. Patient taking differently: Take 5 mg by mouth nightly.  1/30/17   Va Caballero MD   Dexlansoprazole (DEXILANT) 60 mg CpDB Take 1 Cap by mouth daily. Patient taking differently: Take 60 mg by mouth nightly. Indications: gastroesophageal reflux disease 1/30/17   Catarino Alicea MD   finasteride (PROSCAR) 5 mg tablet Take 1 Tab by mouth daily. Patient taking differently: Take 5 mg by mouth daily. Per anesthesia protocol:instructed to take am of surgery. Indications: SYMPTOMATIC BENIGN PROSTATIC HYPERPLASIA 1/30/17   Catarino Alicea MD   atorvastatin (LIPITOR) 40 mg tablet Take 1 Tab by mouth daily. Patient taking differently: Take 40 mg by mouth daily. Per anesthesia protocol:instructed to take am of surgery. Indications: hypercholesterolemia 1/30/17   Catarino Alicea MD   niacin ER (NIASPAN) 500 mg tablet Take 4 Tabs by mouth nightly. 1/30/17   Catarino Alicea MD   metoprolol succinate (TOPROL-XL) 25 mg XL tablet Take 1 Tab by mouth daily. Tye Essex MD Lic# [id number] RAYMUNDO [id number]  Patient taking differently: Take 25 mg by mouth nightly. 1/30/17   Catarino Alicea MD   hydrocortisone (ANUSOL-HC) 25 mg supp Insert 1 Suppository into rectum every twelve (12) hours. 3/23/16   Catarino Alicea MD   cholecalciferol, vitamin D3, (VITAMIN D-3) 2,000 unit Tab Take  by mouth nightly. Historical Provider     No Known Allergies     REVIEW OF SYSTEMS:  CONSTITUTIONAL: Denies fever, decreased appetite, weight loss/gain, night sweats or fatigue. HEENT: Denies vision or hearing changes. denies glasses. denies hearing aids. CARDIAC: Denies CP, palpitations, rheumatic fever, murmur, peripheral edema, carotid artery disease or syncopal episodes. RESPIRATORY: Denies dyspnea on exertion, asthma, COPD or orthopnea. GI: Denies GERD, history of GI bleed or melena, PUD, hepatitis or cirrhosis. : Denies dysuria, hematuria. denies BPH symptoms. HEMATOLOGIC: Denies anemia or blood disorders. ENDOCRINE: Denies thyroid disease. MUSCULOSKELETAL: See HPI. NEUROLOGIC: Denies seizure, peripheral neuropathy or memory loss. PSYCH: Denies depression, anxiety or insomnia. SKIN: Denies rash or open sores. Objective:    PHYSICAL EXAM  GENERAL: No data found. EYES: PERRL. EOM intact. MOUTH:Teeth and Gums normal. NECK: Full ROM. Trachea midline. No thyromegaly or JVD. CARDIOVASCULAR: Regular rate and rhythm. No murmur or gallops. No carotid bruits. Peripheral pulses: radial  +, PT +, DP + bilaterally. LUNGS: CTA bilaterally. No wheezes, rhonchi or rales. GI: positive BS. Abdomen nontender. NEUROLOGIC: Alert and oriented x 3. Bilateral equal strong had grasp and bilateral equal strong plantar flexion and dorsiflexion. GAIT: normal  MUSCULOSKELETAL: ROM: diminished range with pain. Tenderness: Medial joint line and Patella. Crepitus: present. SKIN: No rash, bruising, swelling, redness or warmth. Labs:  No results found for this or any previous visit (from the past 24 hour(s)). Xray Left Knee: Subchondral sclerosis  Joint space narrowing  Bone on bone articulation    Assessment:  Advanced Left Knee Osteoarthritis. Total Left Knee Arthroplasty Indicated.   Patient Active Problem List   Diagnosis Code    Essential hypertension I10    Arthritis M19.90    GERD (gastroesophageal reflux disease) K21.9    Unspecified adverse effect of anesthesia T41.45XA    Spinal stenosis, lumbar region, with neurogenic claudication M48.06    Scoliosis (and kyphoscoliosis), idiopathic M41.20    Status post lumbar spinal fusion Z98.1    Osteoarthritis of left hip M16.12    BPH (benign prostatic hyperplasia) N40.0    HLD (hyperlipidemia) E78.5    Former smoker Z87.891    H/O chest pain Z87.898    History of normal resting EKG [id number]    History of colon cancer Z85.038    History of bladder cancer Z85.51    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.649    NSTEMI (non-ST elevation myocardial infarction) ([facility] [address] I21.4    Thrombocytopenia, unspecified ([facility] [address] D69.6    Dyslipidemia E78.5    Hx of bladder cancer Z85.51    Controlled type 2 diabetes mellitus without complication (HCC) F32.4    Chronic pain G89.29    Lumbar radiculopathy M54.16    Chronic pain of left knee M25.562, G89.29    Medicare annual wellness visit, subsequent Z00.00    Idiopathic peripheral neuropathy G60.9    Coronary atherosclerosis of native coronary vessel I25.10    S/P PTCA (percutaneous transluminal coronary angioplasty) Z98.61    Atherosclerosis of native coronary artery of native heart without angina pectoris I25.10    Type 2 diabetes mellitus without complication, without long-term current use of insulin ([facility]) E11.9    Mixed hyperlipidemia E78.2       Plan:  I have advised the patient of the risks and consequences, including possible complications of performing total joint replacement, as well as not doing this operation. The patient had the opportunity to ask questions and have them answered to their satisfaction.      Signed:  OFELIA Vieira 5/11/2017

## 2017-05-15 ENCOUNTER — ANESTHESIA EVENT (OUTPATIENT)
Dept: SURGERY | Age: 82
DRG: 470 | End: 2017-05-15
Payer: MEDICARE

## 2017-05-16 ENCOUNTER — HOME HEALTH ADMISSION (OUTPATIENT)
Dept: HOME HEALTH SERVICES | Facility: HOME HEALTH | Age: 82
End: 2017-05-16
Payer: MEDICARE

## 2017-05-16 ENCOUNTER — HOSPITAL ENCOUNTER (INPATIENT)
Age: 82
LOS: 1 days | Discharge: HOME HEALTH | DRG: 470 | End: 2017-05-17
Attending: ORTHOPAEDIC SURGERY | Admitting: ORTHOPAEDIC SURGERY
Payer: MEDICARE

## 2017-05-16 ENCOUNTER — ANESTHESIA (OUTPATIENT)
Dept: SURGERY | Age: 82
DRG: 470 | End: 2017-05-16
Payer: MEDICARE

## 2017-05-16 DIAGNOSIS — Z96.652 STATUS POST TOTAL LEFT KNEE REPLACEMENT: Primary | ICD-10-CM

## 2017-05-16 PROBLEM — M17.12 ARTHRITIS OF KNEE, LEFT: Status: ACTIVE | Noted: 2017-05-16

## 2017-05-16 PROBLEM — Z96.659 S/P TOTAL KNEE ARTHROPLASTY: Status: ACTIVE | Noted: 2017-05-16

## 2017-05-16 LAB
ABO + RH BLD: NORMAL
BLOOD GROUP ANTIBODIES SERPL: NORMAL
GLUCOSE BLD STRIP.AUTO-MCNC: 100 MG/DL (ref 65–100)
GLUCOSE BLD STRIP.AUTO-MCNC: 171 MG/DL (ref 65–100)
HGB BLD-MCNC: 12.6 G/DL (ref 13.6–17.2)
SPECIMEN EXP DATE BLD: NORMAL

## 2017-05-16 PROCEDURE — 77030011208: Performed by: ORTHOPAEDIC SURGERY

## 2017-05-16 PROCEDURE — 76210000016 HC OR PH I REC 1 TO 1.5 HR: Performed by: ORTHOPAEDIC SURGERY

## 2017-05-16 PROCEDURE — 74011250636 HC RX REV CODE- 250/636: Performed by: ORTHOPAEDIC SURGERY

## 2017-05-16 PROCEDURE — 77030034849: Performed by: ORTHOPAEDIC SURGERY

## 2017-05-16 PROCEDURE — 74011000250 HC RX REV CODE- 250: Performed by: ANESTHESIOLOGY

## 2017-05-16 PROCEDURE — 74011250636 HC RX REV CODE- 250/636

## 2017-05-16 PROCEDURE — 77030012935 HC DRSG AQUACEL BMS -B: Performed by: ORTHOPAEDIC SURGERY

## 2017-05-16 PROCEDURE — 77030012890

## 2017-05-16 PROCEDURE — 74011250637 HC RX REV CODE- 250/637: Performed by: PHYSICIAN ASSISTANT

## 2017-05-16 PROCEDURE — 77030002933 HC SUT MCRYL J&J -A: Performed by: ORTHOPAEDIC SURGERY

## 2017-05-16 PROCEDURE — 77030035643 HC BLD SAW OSC PRECIS STRY -C: Performed by: ORTHOPAEDIC SURGERY

## 2017-05-16 PROCEDURE — 76010000162 HC OR TIME 1.5 TO 2 HR INTENSV-TIER 1: Performed by: ORTHOPAEDIC SURGERY

## 2017-05-16 PROCEDURE — 77030032490 HC SLV COMPR SCD KNE COVD -B

## 2017-05-16 PROCEDURE — 74011250636 HC RX REV CODE- 250/636: Performed by: ANESTHESIOLOGY

## 2017-05-16 PROCEDURE — 82962 GLUCOSE BLOOD TEST: CPT

## 2017-05-16 PROCEDURE — 74011250637 HC RX REV CODE- 250/637: Performed by: ANESTHESIOLOGY

## 2017-05-16 PROCEDURE — 77030018836 HC SOL IRR NACL ICUM -A: Performed by: ORTHOPAEDIC SURGERY

## 2017-05-16 PROCEDURE — 77030002966 HC SUT PDS J&J -A: Performed by: ORTHOPAEDIC SURGERY

## 2017-05-16 PROCEDURE — 36415 COLL VENOUS BLD VENIPUNCTURE: CPT | Performed by: PHYSICIAN ASSISTANT

## 2017-05-16 PROCEDURE — 74011000258 HC RX REV CODE- 258: Performed by: ORTHOPAEDIC SURGERY

## 2017-05-16 PROCEDURE — 77030027138 HC INCENT SPIROMETER -A

## 2017-05-16 PROCEDURE — 74011000250 HC RX REV CODE- 250: Performed by: ORTHOPAEDIC SURGERY

## 2017-05-16 PROCEDURE — 65270000029 HC RM PRIVATE

## 2017-05-16 PROCEDURE — 86900 BLOOD TYPING SEROLOGIC ABO: CPT | Performed by: PHYSICIAN ASSISTANT

## 2017-05-16 PROCEDURE — 74011000302 HC RX REV CODE- 302: Performed by: ORTHOPAEDIC SURGERY

## 2017-05-16 PROCEDURE — C1776 JOINT DEVICE (IMPLANTABLE): HCPCS | Performed by: ORTHOPAEDIC SURGERY

## 2017-05-16 PROCEDURE — 77030011640 HC PAD GRND REM COVD -A: Performed by: ORTHOPAEDIC SURGERY

## 2017-05-16 PROCEDURE — 77010033678 HC OXYGEN DAILY

## 2017-05-16 PROCEDURE — 76942 ECHO GUIDE FOR BIOPSY: CPT | Performed by: ORTHOPAEDIC SURGERY

## 2017-05-16 PROCEDURE — 74011250636 HC RX REV CODE- 250/636: Performed by: PHYSICIAN ASSISTANT

## 2017-05-16 PROCEDURE — 77030007880 HC KT SPN EPDRL BBMI -B: Performed by: ANESTHESIOLOGY

## 2017-05-16 PROCEDURE — 85018 HEMOGLOBIN: CPT | Performed by: PHYSICIAN ASSISTANT

## 2017-05-16 PROCEDURE — 86580 TB INTRADERMAL TEST: CPT | Performed by: ORTHOPAEDIC SURGERY

## 2017-05-16 PROCEDURE — 77030008467 HC STPLR SKN COVD -B: Performed by: ORTHOPAEDIC SURGERY

## 2017-05-16 PROCEDURE — 77030019557 HC ELECTRD VES SEAL MEDT -F: Performed by: ORTHOPAEDIC SURGERY

## 2017-05-16 PROCEDURE — 76060000034 HC ANESTHESIA 1.5 TO 2 HR: Performed by: ORTHOPAEDIC SURGERY

## 2017-05-16 PROCEDURE — 97161 PT EVAL LOW COMPLEX 20 MIN: CPT

## 2017-05-16 PROCEDURE — 94760 N-INVAS EAR/PLS OXIMETRY 1: CPT

## 2017-05-16 PROCEDURE — 76010010054 HC POST OP PAIN BLOCK: Performed by: ORTHOPAEDIC SURGERY

## 2017-05-16 PROCEDURE — 74011000250 HC RX REV CODE- 250

## 2017-05-16 PROCEDURE — 77030036688 HC BLNKT CLD THER S2SG -B

## 2017-05-16 PROCEDURE — 97165 OT EVAL LOW COMPLEX 30 MIN: CPT

## 2017-05-16 PROCEDURE — 77030013727 HC IRR FAN PULSVC ZIMM -B: Performed by: ORTHOPAEDIC SURGERY

## 2017-05-16 PROCEDURE — 77030020782 HC GWN BAIR PAWS FLX 3M -B: Performed by: ANESTHESIOLOGY

## 2017-05-16 PROCEDURE — 0SRD0JA REPLACEMENT OF LEFT KNEE JOINT WITH SYNTHETIC SUBSTITUTE, UNCEMENTED, OPEN APPROACH: ICD-10-PCS | Performed by: ORTHOPAEDIC SURGERY

## 2017-05-16 PROCEDURE — 77030003665 HC NDL SPN BBMI -A: Performed by: ANESTHESIOLOGY

## 2017-05-16 PROCEDURE — 77030031139 HC SUT VCRL2 J&J -A: Performed by: ORTHOPAEDIC SURGERY

## 2017-05-16 DEVICE — BASEPLATE TIB SZ 6 AP52MM ML77MM KNEE TRITANIUM 4 CRUCFRM: Type: IMPLANTABLE DEVICE | Site: KNEE | Status: FUNCTIONAL

## 2017-05-16 DEVICE — INSERT TIB SZ 6 THK11MM UNIV KNEE CONVENTIONAL POLYETH POST: Type: IMPLANTABLE DEVICE | Site: KNEE | Status: FUNCTIONAL

## 2017-05-16 DEVICE — COMPONENT FEM SZ 6 L KNEE TRITANIUM PERI APATITE POST STBL: Type: IMPLANTABLE DEVICE | Site: KNEE | Status: FUNCTIONAL

## 2017-05-16 RX ORDER — HYDROMORPHONE HYDROCHLORIDE 2 MG/ML
0.5 INJECTION, SOLUTION INTRAMUSCULAR; INTRAVENOUS; SUBCUTANEOUS
Status: DISCONTINUED | OUTPATIENT
Start: 2017-05-16 | End: 2017-05-16 | Stop reason: HOSPADM

## 2017-05-16 RX ORDER — MIDAZOLAM HYDROCHLORIDE 1 MG/ML
2 INJECTION, SOLUTION INTRAMUSCULAR; INTRAVENOUS ONCE
Status: COMPLETED | OUTPATIENT
Start: 2017-05-16 | End: 2017-05-16

## 2017-05-16 RX ORDER — CEFAZOLIN SODIUM IN 0.9 % NACL 2 G/50 ML
2 INTRAVENOUS SOLUTION, PIGGYBACK (ML) INTRAVENOUS ONCE
Status: DISCONTINUED | OUTPATIENT
Start: 2017-05-16 | End: 2017-05-16 | Stop reason: HOSPADM

## 2017-05-16 RX ORDER — DEXAMETHASONE SODIUM PHOSPHATE 4 MG/ML
INJECTION, SOLUTION INTRA-ARTICULAR; INTRALESIONAL; INTRAMUSCULAR; INTRAVENOUS; SOFT TISSUE AS NEEDED
Status: DISCONTINUED | OUTPATIENT
Start: 2017-05-16 | End: 2017-05-16 | Stop reason: HOSPADM

## 2017-05-16 RX ORDER — ACETAMINOPHEN 500 MG
1000 TABLET ORAL EVERY 6 HOURS
Status: DISCONTINUED | OUTPATIENT
Start: 2017-05-17 | End: 2017-05-17 | Stop reason: HOSPADM

## 2017-05-16 RX ORDER — ASPIRIN 325 MG
325 TABLET, DELAYED RELEASE (ENTERIC COATED) ORAL EVERY 12 HOURS
Status: DISCONTINUED | OUTPATIENT
Start: 2017-05-16 | End: 2017-05-17 | Stop reason: HOSPADM

## 2017-05-16 RX ORDER — PROPOFOL 10 MG/ML
INJECTION, EMULSION INTRAVENOUS
Status: DISCONTINUED | OUTPATIENT
Start: 2017-05-16 | End: 2017-05-16 | Stop reason: HOSPADM

## 2017-05-16 RX ORDER — OXYCODONE HYDROCHLORIDE 5 MG/1
5 TABLET ORAL
Status: DISCONTINUED | OUTPATIENT
Start: 2017-05-16 | End: 2017-05-16 | Stop reason: HOSPADM

## 2017-05-16 RX ORDER — HYDROMORPHONE HYDROCHLORIDE 1 MG/ML
1 INJECTION, SOLUTION INTRAMUSCULAR; INTRAVENOUS; SUBCUTANEOUS
Status: DISCONTINUED | OUTPATIENT
Start: 2017-05-16 | End: 2017-05-17 | Stop reason: HOSPADM

## 2017-05-16 RX ORDER — CELECOXIB 200 MG/1
200 CAPSULE ORAL ONCE
Status: COMPLETED | OUTPATIENT
Start: 2017-05-16 | End: 2017-05-16

## 2017-05-16 RX ORDER — FINASTERIDE 5 MG/1
5 TABLET, FILM COATED ORAL DAILY
Status: DISCONTINUED | OUTPATIENT
Start: 2017-05-16 | End: 2017-05-17 | Stop reason: HOSPADM

## 2017-05-16 RX ORDER — ACETAMINOPHEN 10 MG/ML
1000 INJECTION, SOLUTION INTRAVENOUS ONCE
Status: COMPLETED | OUTPATIENT
Start: 2017-05-16 | End: 2017-05-16

## 2017-05-16 RX ORDER — SODIUM CHLORIDE 9 MG/ML
100 INJECTION, SOLUTION INTRAVENOUS CONTINUOUS
Status: DISCONTINUED | OUTPATIENT
Start: 2017-05-16 | End: 2017-05-17 | Stop reason: HOSPADM

## 2017-05-16 RX ORDER — MIDAZOLAM HYDROCHLORIDE 1 MG/ML
2 INJECTION, SOLUTION INTRAMUSCULAR; INTRAVENOUS
Status: DISCONTINUED | OUTPATIENT
Start: 2017-05-16 | End: 2017-05-16 | Stop reason: HOSPADM

## 2017-05-16 RX ORDER — DIPHENHYDRAMINE HCL 25 MG
25 CAPSULE ORAL
Status: DISCONTINUED | OUTPATIENT
Start: 2017-05-16 | End: 2017-05-17 | Stop reason: HOSPADM

## 2017-05-16 RX ORDER — MORPHINE SULFATE 10 MG/ML
INJECTION, SOLUTION INTRAMUSCULAR; INTRAVENOUS AS NEEDED
Status: DISCONTINUED | OUTPATIENT
Start: 2017-05-16 | End: 2017-05-16 | Stop reason: HOSPADM

## 2017-05-16 RX ORDER — OXYCODONE HYDROCHLORIDE 5 MG/1
10 TABLET ORAL
Status: DISCONTINUED | OUTPATIENT
Start: 2017-05-16 | End: 2017-05-17 | Stop reason: HOSPADM

## 2017-05-16 RX ORDER — DEXAMETHASONE SODIUM PHOSPHATE 100 MG/10ML
10 INJECTION INTRAMUSCULAR; INTRAVENOUS ONCE
Status: DISCONTINUED | OUTPATIENT
Start: 2017-05-17 | End: 2017-05-17 | Stop reason: HOSPADM

## 2017-05-16 RX ORDER — ONDANSETRON 2 MG/ML
INJECTION INTRAMUSCULAR; INTRAVENOUS AS NEEDED
Status: DISCONTINUED | OUTPATIENT
Start: 2017-05-16 | End: 2017-05-16 | Stop reason: HOSPADM

## 2017-05-16 RX ORDER — LISINOPRIL 5 MG/1
5 TABLET ORAL DAILY
Status: DISCONTINUED | OUTPATIENT
Start: 2017-05-16 | End: 2017-05-17 | Stop reason: HOSPADM

## 2017-05-16 RX ORDER — ONDANSETRON 2 MG/ML
4 INJECTION INTRAMUSCULAR; INTRAVENOUS
Status: DISCONTINUED | OUTPATIENT
Start: 2017-05-16 | End: 2017-05-17 | Stop reason: HOSPADM

## 2017-05-16 RX ORDER — ROPIVACAINE HYDROCHLORIDE 2 MG/ML
INJECTION, SOLUTION EPIDURAL; INFILTRATION; PERINEURAL AS NEEDED
Status: DISCONTINUED | OUTPATIENT
Start: 2017-05-16 | End: 2017-05-16 | Stop reason: HOSPADM

## 2017-05-16 RX ORDER — DIPHENHYDRAMINE HYDROCHLORIDE 50 MG/ML
12.5 INJECTION, SOLUTION INTRAMUSCULAR; INTRAVENOUS
Status: DISCONTINUED | OUTPATIENT
Start: 2017-05-16 | End: 2017-05-16 | Stop reason: HOSPADM

## 2017-05-16 RX ORDER — SODIUM CHLORIDE, SODIUM LACTATE, POTASSIUM CHLORIDE, CALCIUM CHLORIDE 600; 310; 30; 20 MG/100ML; MG/100ML; MG/100ML; MG/100ML
75 INJECTION, SOLUTION INTRAVENOUS CONTINUOUS
Status: DISCONTINUED | OUTPATIENT
Start: 2017-05-16 | End: 2017-05-16 | Stop reason: HOSPADM

## 2017-05-16 RX ORDER — BUPIVACAINE HYDROCHLORIDE 7.5 MG/ML
INJECTION, SOLUTION INTRASPINAL AS NEEDED
Status: DISCONTINUED | OUTPATIENT
Start: 2017-05-16 | End: 2017-05-16 | Stop reason: HOSPADM

## 2017-05-16 RX ORDER — FENTANYL CITRATE 50 UG/ML
INJECTION, SOLUTION INTRAMUSCULAR; INTRAVENOUS AS NEEDED
Status: DISCONTINUED | OUTPATIENT
Start: 2017-05-16 | End: 2017-05-16 | Stop reason: HOSPADM

## 2017-05-16 RX ORDER — OXYCODONE HYDROCHLORIDE 5 MG/1
10 TABLET ORAL
Status: DISCONTINUED | OUTPATIENT
Start: 2017-05-16 | End: 2017-05-16 | Stop reason: HOSPADM

## 2017-05-16 RX ORDER — ZOLPIDEM TARTRATE 5 MG/1
5 TABLET ORAL
Status: DISCONTINUED | OUTPATIENT
Start: 2017-05-16 | End: 2017-05-17 | Stop reason: HOSPADM

## 2017-05-16 RX ORDER — LANSOPRAZOLE 30 MG/1
30 TABLET, ORALLY DISINTEGRATING, DELAYED RELEASE ORAL
Status: DISCONTINUED | OUTPATIENT
Start: 2017-05-16 | End: 2017-05-17 | Stop reason: HOSPADM

## 2017-05-16 RX ORDER — TRIAMCINOLONE ACETONIDE 1 MG/G
CREAM TOPICAL 2 TIMES DAILY
Status: DISCONTINUED | OUTPATIENT
Start: 2017-05-16 | End: 2017-05-17 | Stop reason: HOSPADM

## 2017-05-16 RX ORDER — METOPROLOL SUCCINATE 25 MG/1
25 TABLET, EXTENDED RELEASE ORAL DAILY
Status: DISCONTINUED | OUTPATIENT
Start: 2017-05-16 | End: 2017-05-17 | Stop reason: HOSPADM

## 2017-05-16 RX ORDER — FLUMAZENIL 0.1 MG/ML
0.2 INJECTION INTRAVENOUS AS NEEDED
Status: DISCONTINUED | OUTPATIENT
Start: 2017-05-16 | End: 2017-05-16 | Stop reason: HOSPADM

## 2017-05-16 RX ORDER — CEFAZOLIN SODIUM 1 G/3ML
INJECTION, POWDER, FOR SOLUTION INTRAMUSCULAR; INTRAVENOUS AS NEEDED
Status: DISCONTINUED | OUTPATIENT
Start: 2017-05-16 | End: 2017-05-16 | Stop reason: HOSPADM

## 2017-05-16 RX ORDER — SODIUM CHLORIDE 0.9 % (FLUSH) 0.9 %
5-10 SYRINGE (ML) INJECTION AS NEEDED
Status: DISCONTINUED | OUTPATIENT
Start: 2017-05-16 | End: 2017-05-17 | Stop reason: HOSPADM

## 2017-05-16 RX ORDER — FENTANYL CITRATE 50 UG/ML
100 INJECTION, SOLUTION INTRAMUSCULAR; INTRAVENOUS ONCE
Status: COMPLETED | OUTPATIENT
Start: 2017-05-16 | End: 2017-05-16

## 2017-05-16 RX ORDER — CELECOXIB 200 MG/1
200 CAPSULE ORAL EVERY 12 HOURS
Status: DISCONTINUED | OUTPATIENT
Start: 2017-05-16 | End: 2017-05-17 | Stop reason: HOSPADM

## 2017-05-16 RX ORDER — NALOXONE HYDROCHLORIDE 0.4 MG/ML
0.1 INJECTION, SOLUTION INTRAMUSCULAR; INTRAVENOUS; SUBCUTANEOUS
Status: DISCONTINUED | OUTPATIENT
Start: 2017-05-16 | End: 2017-05-16 | Stop reason: HOSPADM

## 2017-05-16 RX ORDER — OXYCODONE HYDROCHLORIDE 10 MG/1
10 TABLET ORAL
Qty: 60 TAB | Refills: 0 | Status: SHIPPED | OUTPATIENT
Start: 2017-05-16 | End: 2017-07-17

## 2017-05-16 RX ORDER — NALOXONE HYDROCHLORIDE 0.4 MG/ML
.2-.4 INJECTION, SOLUTION INTRAMUSCULAR; INTRAVENOUS; SUBCUTANEOUS
Status: DISCONTINUED | OUTPATIENT
Start: 2017-05-16 | End: 2017-05-17 | Stop reason: HOSPADM

## 2017-05-16 RX ORDER — SODIUM CHLORIDE 0.9 % (FLUSH) 0.9 %
5-10 SYRINGE (ML) INJECTION EVERY 8 HOURS
Status: DISCONTINUED | OUTPATIENT
Start: 2017-05-16 | End: 2017-05-17 | Stop reason: HOSPADM

## 2017-05-16 RX ORDER — CEFAZOLIN SODIUM IN 0.9 % NACL 2 G/50 ML
2 INTRAVENOUS SOLUTION, PIGGYBACK (ML) INTRAVENOUS EVERY 8 HOURS
Status: COMPLETED | OUTPATIENT
Start: 2017-05-16 | End: 2017-05-17

## 2017-05-16 RX ORDER — AMOXICILLIN 250 MG
2 CAPSULE ORAL DAILY
Status: DISCONTINUED | OUTPATIENT
Start: 2017-05-17 | End: 2017-05-17 | Stop reason: HOSPADM

## 2017-05-16 RX ORDER — MIDAZOLAM HYDROCHLORIDE 1 MG/ML
INJECTION, SOLUTION INTRAMUSCULAR; INTRAVENOUS AS NEEDED
Status: DISCONTINUED | OUTPATIENT
Start: 2017-05-16 | End: 2017-05-16 | Stop reason: HOSPADM

## 2017-05-16 RX ORDER — KETOROLAC TROMETHAMINE 30 MG/ML
INJECTION, SOLUTION INTRAMUSCULAR; INTRAVENOUS AS NEEDED
Status: DISCONTINUED | OUTPATIENT
Start: 2017-05-16 | End: 2017-05-16 | Stop reason: HOSPADM

## 2017-05-16 RX ORDER — LIDOCAINE HYDROCHLORIDE 10 MG/ML
0.1 INJECTION INFILTRATION; PERINEURAL AS NEEDED
Status: DISCONTINUED | OUTPATIENT
Start: 2017-05-16 | End: 2017-05-16 | Stop reason: HOSPADM

## 2017-05-16 RX ORDER — ACETAMINOPHEN 500 MG
1000 TABLET ORAL ONCE
Status: DISCONTINUED | OUTPATIENT
Start: 2017-05-16 | End: 2017-05-16 | Stop reason: HOSPADM

## 2017-05-16 RX ORDER — HYDROCORTISONE ACETATE 25 MG/1
25 SUPPOSITORY RECTAL EVERY 12 HOURS
Status: DISCONTINUED | OUTPATIENT
Start: 2017-05-16 | End: 2017-05-17 | Stop reason: HOSPADM

## 2017-05-16 RX ADMIN — CEFAZOLIN 2 G: 1 INJECTION, POWDER, FOR SOLUTION INTRAMUSCULAR; INTRAVENOUS; PARENTERAL at 17:36

## 2017-05-16 RX ADMIN — OXYCODONE HYDROCHLORIDE 10 MG: 5 TABLET ORAL at 15:06

## 2017-05-16 RX ADMIN — PROPOFOL 25 MCG/KG/MIN: 10 INJECTION, EMULSION INTRAVENOUS at 08:35

## 2017-05-16 RX ADMIN — LANSOPRAZOLE 30 MG: 30 TABLET, ORALLY DISINTEGRATING, DELAYED RELEASE ORAL at 21:11

## 2017-05-16 RX ADMIN — ACETAMINOPHEN 1000 MG: 10 INJECTION, SOLUTION INTRAVENOUS at 17:36

## 2017-05-16 RX ADMIN — CELECOXIB 200 MG: 200 CAPSULE ORAL at 06:51

## 2017-05-16 RX ADMIN — HYDROMORPHONE HYDROCHLORIDE 0.5 MG: 2 INJECTION, SOLUTION INTRAMUSCULAR; INTRAVENOUS; SUBCUTANEOUS at 10:32

## 2017-05-16 RX ADMIN — ONDANSETRON 4 MG: 2 INJECTION INTRAMUSCULAR; INTRAVENOUS at 18:13

## 2017-05-16 RX ADMIN — HYDROMORPHONE HYDROCHLORIDE 0.5 MG: 2 INJECTION, SOLUTION INTRAMUSCULAR; INTRAVENOUS; SUBCUTANEOUS at 10:27

## 2017-05-16 RX ADMIN — FENTANYL CITRATE 50 MCG: 50 INJECTION, SOLUTION INTRAMUSCULAR; INTRAVENOUS at 08:21

## 2017-05-16 RX ADMIN — MIDAZOLAM HYDROCHLORIDE 1 MG: 1 INJECTION, SOLUTION INTRAMUSCULAR; INTRAVENOUS at 08:09

## 2017-05-16 RX ADMIN — BUPIVACAINE HYDROCHLORIDE 1.8 ML: 7.5 INJECTION, SOLUTION INTRASPINAL at 08:26

## 2017-05-16 RX ADMIN — TUBERCULIN PURIFIED PROTEIN DERIVATIVE 5 UNITS: 5 INJECTION, SOLUTION INTRADERMAL at 06:51

## 2017-05-16 RX ADMIN — ONDANSETRON 4 MG: 2 INJECTION INTRAMUSCULAR; INTRAVENOUS at 21:38

## 2017-05-16 RX ADMIN — FENTANYL CITRATE 50 MCG: 50 INJECTION, SOLUTION INTRAMUSCULAR; INTRAVENOUS at 08:09

## 2017-05-16 RX ADMIN — CELECOXIB 200 MG: 200 CAPSULE ORAL at 21:04

## 2017-05-16 RX ADMIN — OXYCODONE HYDROCHLORIDE 10 MG: 5 TABLET ORAL at 17:48

## 2017-05-16 RX ADMIN — REGULAR STRENGTH 325 MG: 325 TABLET ORAL at 21:03

## 2017-05-16 RX ADMIN — CEFAZOLIN SODIUM 2 G: 1 INJECTION, POWDER, FOR SOLUTION INTRAMUSCULAR; INTRAVENOUS at 08:24

## 2017-05-16 RX ADMIN — FENTANYL CITRATE 50 MCG: 50 INJECTION, SOLUTION INTRAMUSCULAR; INTRAVENOUS at 09:49

## 2017-05-16 RX ADMIN — LIDOCAINE HYDROCHLORIDE 0.1 ML: 10 INJECTION, SOLUTION INFILTRATION; PERINEURAL at 06:51

## 2017-05-16 RX ADMIN — MIDAZOLAM HYDROCHLORIDE 1 MG: 1 INJECTION, SOLUTION INTRAMUSCULAR; INTRAVENOUS at 08:20

## 2017-05-16 RX ADMIN — SODIUM CHLORIDE, SODIUM LACTATE, POTASSIUM CHLORIDE, AND CALCIUM CHLORIDE: 600; 310; 30; 20 INJECTION, SOLUTION INTRAVENOUS at 09:25

## 2017-05-16 RX ADMIN — SODIUM CHLORIDE, SODIUM LACTATE, POTASSIUM CHLORIDE, AND CALCIUM CHLORIDE: 600; 310; 30; 20 INJECTION, SOLUTION INTRAVENOUS at 08:20

## 2017-05-16 RX ADMIN — SODIUM CHLORIDE 100 ML/HR: 900 INJECTION, SOLUTION INTRAVENOUS at 15:06

## 2017-05-16 RX ADMIN — ONDANSETRON 4 MG: 2 INJECTION INTRAMUSCULAR; INTRAVENOUS at 12:39

## 2017-05-16 RX ADMIN — MIDAZOLAM HYDROCHLORIDE 1 MG: 1 INJECTION, SOLUTION INTRAMUSCULAR; INTRAVENOUS at 09:25

## 2017-05-16 RX ADMIN — HYDROMORPHONE HYDROCHLORIDE 1 MG: 1 INJECTION, SOLUTION INTRAMUSCULAR; INTRAVENOUS; SUBCUTANEOUS at 13:50

## 2017-05-16 RX ADMIN — SODIUM CHLORIDE, SODIUM LACTATE, POTASSIUM CHLORIDE, AND CALCIUM CHLORIDE 1000 ML: 600; 310; 30; 20 INJECTION, SOLUTION INTRAVENOUS at 06:51

## 2017-05-16 RX ADMIN — SODIUM CHLORIDE 100 ML/HR: 900 INJECTION, SOLUTION INTRAVENOUS at 21:18

## 2017-05-16 RX ADMIN — HYDROMORPHONE HYDROCHLORIDE 1 MG: 1 INJECTION, SOLUTION INTRAMUSCULAR; INTRAVENOUS; SUBCUTANEOUS at 21:12

## 2017-05-16 RX ADMIN — DEXAMETHASONE SODIUM PHOSPHATE 6 MG: 4 INJECTION, SOLUTION INTRA-ARTICULAR; INTRALESIONAL; INTRAMUSCULAR; INTRAVENOUS; SOFT TISSUE at 08:32

## 2017-05-16 RX ADMIN — ONDANSETRON 4 MG: 2 INJECTION INTRAMUSCULAR; INTRAVENOUS at 08:32

## 2017-05-16 NOTE — PROGRESS NOTES
TRANSFER - IN REPORT:    Verbal report received from Fabien Rowe rn(name) on Gardner Li  being received from [facility]) for routine progression of care      Report consisted of patients Situation, Background, Assessment and   Recommendations(SBAR). Information from the following report(s) SBAR, Procedure Summary, Intake/Output, MAR and Recent Results was reviewed with the receiving nurse. Opportunity for questions and clarification was provided. Assessment completed upon patients arrival to unit and care assumed.

## 2017-05-16 NOTE — IP AVS SNAPSHOT
[address] 
[phone] Patient: Rodrigo Catalan MRN: [id number] TYR:2/4/0274 You are allergic to the following No active allergies Immunizations Administered for This Admission Name Date  
 TB Skin Test (PPD) Intradermal 5/16/2017 Recent Documentation Height Weight BMI Smoking Status 1.803 m 92.1 kg 28.31 kg/m2 Former Smoker Emergency Contacts Name Discharge Info Relation Home Work Mobile [place] DISCHARGE CAREGIVER [3] Spouse [3] [phone] About your hospitalization You were admitted on:  May 16, 2017 You last received care in the:  [facility] You were discharged on:  May 17, 2017 Unit phone number:  [phone] Why you were hospitalized Your primary diagnosis was:  S/P Total Knee Arthroplasty Your diagnoses also included: Arthritis Of Knee, Left Providers Seen During Your Hospitalizations Provider Role Specialty Primary office phone James Cotto MD Attending Provider Orthopedic Surgery [phone] Your Primary Care Physician (PCP) Primary Care Physician Office Phone Office Fax Beatriz Godinez [phone] Follow-up Information Follow up With Details Comments Contact Info Rebekah Hernandez MD  As needed [address] [place] [address] [phone] James Cotto MD  As scheduled by office [place] [address] [employer] [facility] [address] [phone] [address]  Will contact you within 48 hrs [address] [place] [address] 
[phone] Current Discharge Medication List  
  
START taking these medications Dose & Instructions Dispensing Information Comments Morning Noon Evening Bedtime oxyCODONE IR 10 mg Tab immediate release tablet Commonly known as:  Union Muster Your next dose is: Today Dose:  10 mg Take 1 Tab by mouth every four (4) hours as needed. Max Daily Amount: 60 mg.  
 Quantity:  60 Tab Refills:  0 CONTINUE these medications which have CHANGED Dose & Instructions Dispensing Information Comments Morning Noon Evening Bedtime  
 atorvastatin 40 mg tablet Commonly known as:  LIPITOR What changed:  additional instructions Your next dose is:  Tomorrow Dose:  40 mg Take 1 Tab by mouth daily. Quantity:  90 Tab Refills:  1  
     
  
   
   
   
  
 clopidogrel 75 mg Tab Commonly known as:  PLAVIX What changed:  additional instructions Your next dose is:  Tomorrow Dose:  75 mg Take 1 Tab by mouth daily. Quantity:  90 Tab Refills:  1 Dexlansoprazole 60 mg Cpdb Commonly known as:  DEXILANT What changed:  when to take this Your next dose is:  Tomorrow Dose:  60 mg Take 1 Cap by mouth daily. Quantity:  90 Cap Refills:  1  
     
  
   
   
   
  
 finasteride 5 mg tablet Commonly known as:  PROSCAR What changed:  additional instructions Your next dose is:  Tomorrow Dose:  5 mg Take 1 Tab by mouth daily. Quantity:  90 Tab Refills:  1  
     
  
   
   
   
  
 lisinopril 5 mg tablet Commonly known as:  Turin Neve What changed:  when to take this Your next dose is:  Tomorrow Dose:  5 mg Take 1 Tab by mouth daily. Quantity:  90 Tab Refills:  1  
     
  
   
   
   
  
 metoprolol succinate 25 mg XL tablet Commonly known as:  TOPROL-XL What changed:   
- when to take this 
- additional instructions Your next dose is:  Tomorrow Dose:  25 mg Take 1 Tab by mouth daily. Eriberto Mcqueen MD Lic# [id number] RAYMUNDO [id number] Quantity:  30 Tab Refills:  1 CONTINUE these medications which have NOT CHANGED Dose & Instructions Dispensing Information Comments Morning Noon Evening Bedtime  
 aspirin delayed-release 81 mg tablet Your next dose is:  Tomorrow Dose:  81 mg Take 81 mg by mouth daily. Per anesthesia protocol:instructed to take am of surgery. Indications: myocardial infarction prevention Refills:  0  
     
  
   
   
   
  
 hydrocortisone 25 mg Supp Commonly known as:  ANUSOL-HC Your next dose is: Today Dose:  25 mg Insert 1 Suppository into rectum every twelve (12) hours. Quantity:  15 Suppository Refills:  5  
     
   
   
  
   
  
 niacin  mg tablet Commonly known as:  Niaspan Your next dose is: Today Dose:  2000 mg Take 4 Tabs by mouth nightly. Quantity:  360 Tab Refills:  1  
     
   
   
   
  
  
 triamcinolone acetonide 0.1 % topical cream  
Commonly known as:  KENALOG Your next dose is: Today Apply  to affected area two (2) times a day. use thin layer Quantity:  60 g Refills:  2 VITAMIN D3 2,000 unit Tab Generic drug:  cholecalciferol (vitamin D3) Your next dose is: Today Take  by mouth nightly. Refills:  0 ASK your doctor about these medications Dose & Instructions Dispensing Information Comments Morning Noon Evening Bedtime  
 mupirocin 2 % ointment Commonly known as:  [facility] Ask about: Should I take this medication? Your next dose is:  Complete after 10 doses!!  
   
 by Both Nostrils route two (2) times a day. Refills:  0 Where to Get Your Medications Information on where to get these meds will be given to you by the nurse or doctor. ! Ask your nurse or doctor about these medications  
  oxyCODONE IR 10 mg Tab immediate release tablet Discharge Instructions [facility] Patient Discharge Instructions Kusum Rg / [id number] : 1935 Admitted 2017 Discharged: 2017 IF YOU HAVE ANY PROBLEMS ONCE YOU ARE AT HOME CALL THE FOLLOWING NUMBERS:  
Main office number: [phone] Take Home Medications · It is important that you take the medication exactly as they are prescribed. · Keep your medication in the bottles provided by the pharmacist and keep a list of the medication names, dosages, and times to be taken in your wallet. · Do not take other medications without consulting your doctor. What to do at [facility] Resume your prehospital diet. If you have excessive nausea or vomitting call your doctor's office Home Physical Therapy is arranged. Use rolling walker when walking. Use Dima Hose stockings until we see you in the office for your follow up appointment with Dr. Salbador Kerns Patients who have had a joint replacement should not drive until you are seen for your follow up appointment by Dr. Salbador Kerns. When to Call - Call if you have a temperature greater then 101 
- Unable to keep food down - Loose control of your bladder or bowel function - Are unable to bear any weight  
- Need a pain medication refill DISCHARGE SUMMARY from Nurse The following personal items collected during your admission are returned to you:  
Dental Appliance: Dental Appliances: Uppers, At bedside Vision: Visual Aid: Glasses Hearing Aid:   na 
Jewelry: Jewelry: Ring (in bag) Clothing:   self Other Valuables: Other Valuables: None (denies valuables today) Valuables sent to safe:   na 
 
PATIENT INSTRUCTIONS: 
 
After general anesthesia or intravenous sedation, for 24 hours or while taking prescription Narcotics: · Limit your activities · Do not drive and operate hazardous machinery · Do not make important personal or business decisions · Do  not drink alcoholic beverages · If you have not urinated within 8 hours after discharge, please contact your surgeon on call. Report the following to your surgeon: 
· Excessive pain, swelling, redness or odor of or around the surgical area · Temperature over 101 · Nausea and vomiting lasting longer than 4 hours or if unable to take medications · Any signs of decreased circulation or nerve impairment to extremity: change in color, persistent  numbness, tingling, coldness or increase pain · Any questions, call office @ [phone] Keep scheduled follow up appointment. If need to change, call office @ [phone]. *  Please give a list of your current medications to your Primary Care Provider. *  Please update this list whenever your medications are discontinued, doses are 
    changed, or new medications (including over-the-counter products) are added. *  Please carry medication information at all times in case of emergency situations. Total Knee Replacement: What to Expect at Home Your Recovery When you leave the hospital, you should be able to move around with a walker or crutches. But you will need someone to help you at home for the next few weeks or until you have more energy and can move around better. If there is no one to help you at home, you may go to a rehabilitation center. You will go home with a bandage and stitches or staples. Change the bandage as your doctor tells you to. Your doctor will remove your stitches or staples 10 to 21 days after your surgery. You may still have some mild pain, and the area may be swollen for 3 to 6 months after surgery. Your knee will continue to improve for 6 to 12 months. You will probably use a walker for 1 to 3 weeks and then use crutches. When you are ready, you can use a cane. You will probably be able to walk on your own in 4 to 8 weeks.  
You will need to do months of physical rehabilitation (rehab) after a knee replacement. Rehab will help you strengthen the muscles of the knee and help you regain movement. After you recover, your artificial knee will allow you to do normal daily activities with less pain or no pain at all. You may be able to hike, dance, ride a bike, and play golf. Talk to your doctor about whether you can do more strenuous activities. Always tell your caregivers that you have an artificial knee. How long it will take to walk on your own, return to normal activities, and go back to work depends on your health and how well your rehabilitation (rehab) program goes. The better you do with your rehab exercises, the quicker you will get your strength and movement back. This care sheet gives you a general idea about how long it will take for you to recover. But each person recovers at a different pace. Follow the steps below to get better as quickly as possible. How can you care for yourself at home? Activity · Rest when you feel tired. You may take a nap, but do not stay in bed all day. When you sit, use a chair with arms. You can use the arms to help you stand up. · Work with your physical therapist to find the best way to exercise. You may be able to take frequent, short walks using crutches or a walker. What you can do as your knee heals will depend on whether your new knee is cemented or uncemented. You may not be able to do certain things for a while if your new knee is uncemented. · After your knee has healed enough, you can do more strenuous activities with caution. ¨ You can golf, but use a golf cart, and do not wear shoes with spikes. ¨ You can bike on a flat road or on a stationary bike. Avoid biking up hills. ¨ Your doctor may suggest that you stay away from activities that put stress on your knee. These include tennis or badminton, squash or racquetball, contact sports like football, jumping (such as in basketball), jogging, or running. ¨ Avoid activities where you might fall. These include horseback riding, skiing, and mountain biking. · Do not sit for more than 1 hour at a time. Get up and walk around for a while before you sit again. If you must sit for a long time, prop up your leg with a chair or footstool. This will help you avoid swelling. · Ask your doctor when you can shower. You may need to take sponge baths until your stitches or staples have been removed. · Ask your doctor when you can drive again. It may take up to 8 weeks after knee replacement surgery before it is safe for you to drive. · When you get into a car, sit on the edge of the seat. Then pull in your legs, and turn to face the front. · You should be able to do many everyday activities 3 to 6 weeks after your surgery. You will probably need to take 4 to 16 weeks off from work. When you can go back to work depends on the type of work you do and how you feel. · Ask your doctor when it is okay for you to have sex. · Do not lift anything heavier than 10 pounds and do not lift weights for 12 weeks. Diet · By the time you leave the hospital, you should be eating your normal diet. If your stomach is upset, try bland, low-fat foods like plain rice, broiled chicken, toast, and yogurt. Your doctor may suggest that you take iron and vitamin supplements. · Drink plenty of fluids (unless your doctor tells you not to). · Eat healthy foods, and watch your portion sizes. Try to stay at your ideal weight. Too much weight puts more stress on your new knee. · You may notice that your bowel movements are not regular right after your surgery. This is common. Try to avoid constipation and straining with bowel movements. You may want to take a fiber supplement every day. If you have not had a bowel movement after a couple of days, ask your doctor about taking a mild laxative. Medicines · Your doctor will tell you if and when you can restart your medicines.  He or she will also give you instructions about taking any new medicines. · If you take blood thinners, such as warfarin (Coumadin), clopidogrel (Plavix), or aspirin, be sure to talk to your doctor. He or she will tell you if and when to start taking those medicines again. Make sure that you understand exactly what your doctor wants you to do. · Your doctor may give you a blood-thinning medicine to prevent blood clots. If you take a blood thinner, be sure you get instructions about how to take your medicine safely. Blood thinners can cause serious bleeding problems. This medicine could be in pill form or as a shot (injection). If a shot is necessary, your doctor will tell you how to do this. · Be safe with medicines. Take pain medicines exactly as directed. ¨ If the doctor gave you a prescription medicine for pain, take it as prescribed. ¨ If you are not taking a prescription pain medicine, ask your doctor if you can take an over-the-counter medicine. ¨ Plan to take your pain medicine 30 minutes before exercises. It is easier to prevent pain before it starts than to stop it once it has started. · If you think your pain medicine is making you sick to your stomach: 
¨ Take your medicine after meals (unless your doctor has told you not to). ¨ Ask your doctor for a different pain medicine. · If your doctor prescribed antibiotics, take them as directed. Do not stop taking them just because you feel better. You need to take the full course of antibiotics. Incision care · You will have a bandage over the cut (incision) and staples or stitches. Take the bandage off when your doctor says it is okay. · Your doctor will remove the staples or stitches 10 days to 3 weeks after the surgery and replace them with strips of tape. Leave the tape on for a week or until it falls off. Exercise · Your rehab program will give you a number of exercises to do to help you get back your knee's range of motion and strength. Always do them as your therapist tells you. Ice and elevation · For pain and swelling, put ice or a cold pack on the area for 10 to 20 minutes at a time. Put a thin cloth between the ice and your skin. Other instructions · Continue to wear your support stockings as your doctor says. These help to prevent blood clots. The length of time that you will have to wear them depends on your activity level and the amount of swelling. · Wear medical alert jewelry that says you may need antibiotics before any procedure, including dental work. You can buy this at most drugs[employer]one Power [employer]. · You have metal pieces in your knee. These may set off some airport metal detectors. Carry a medical alert card that says you have an artificial joint, just in case. Follow-up care is a key part of your treatment and safety. Be sure to make and go to all appointments, and call your doctor if you are having problems. It's also a good idea to know your test results and keep a list of the medicines you take. When should you call for help? Call 911 anytime you think you may need emergency care. For example, call if: 
· You passed out (lost consciousness). · You have severe trouble breathing. · You have sudden chest pain and shortness of breath, or you cough up blood. Call your doctor now or seek immediate medical care if: 
· You have signs of infection, such as: 
¨ Increased pain, swelling, warmth, or redness. ¨ Red streaks leading from the incision. ¨ Pus draining from the incision. ¨ A fever. · You have signs of a blood clot, such as: 
¨ Pain in your calf, back of the knee, thigh, or groin. ¨ Redness and swelling in your leg or groin. · Your incision comes open and begins to bleed, or the bleeding increases. · You have pain that does not get better after you take pain medicine. Watch closely for changes in your health, and be sure to contact your doctor if: · You do not have a bowel movement after taking a laxative. Where can you learn more? Go to http://annabelle-jaky.info/. Enter I686 in the search box to learn more about \"Total Knee Replacement: What to Expect at Home. \" Current as of: August 4, 2016 Content Version: 11.2 © 1740-9789 [employer]. Care instructions adapted under license by [employer] (which disclaims liability or warranty for this information). If you have questions about a medical condition or this instruction, always ask your healthcare professional. [place] [address] any warranty or liability for your use of this information. These are general instructions for a healthy lifestyle: No smoking/ No tobacco products/ Avoid exposure to second hand smoke Surgeon General's Warning:  Quitting smoking now greatly reduces serious risk to your health. Obesity, smoking, and sedentary lifestyle greatly increases your risk for illness A healthy diet, regular physical exercise & weight monitoring are important for maintaining a healthy lifestyle You may be retaining fluid if you have a history of heart failure or if you experience any of the following symptoms:  Weight gain of 3 pounds or more overnight or 5 pounds in a week, increased swelling in our hands or feet or shortness of breath while lying flat in bed. Please call your doctor as soon as you notice any of these symptoms; do not wait until your next office visit. Recognize signs and symptoms of STROKE: 
 
F-face looks uneven A-arms unable to move or move even S-speech slurred or non-existent T-time-call 911 as soon as signs and symptoms begin-DO NOT go Back to bed or wait to see if you get better-TIME IS BRAIN. The discharge information has been reviewed with the patient. The patient verbalized understanding.  
 
 
Information obtained by : 
 I understand that if any problems occur once I am at home I am to contact my physician. I understand and acknowledge receipt of the instructions indicated above. Physician's or R.N.'s Signature                                                                  Date/Time Patient or Representative Signature                                                          Date/Time Discharge Orders Procedure Order Date Status Priority Quantity Spec Type Associated Dx ACTIVITY AFTER DISCHARGE Patient should: Restrict lifting, Restrict driving 49/78/23 0860 Normal Routine 1  Status post total left knee replacement [[id number]] Questions: Patient should:  Restrict lifting Patient should:  Restrict driving DIET REGULAR No added salt 05/16/17 2119 Normal Routine 1  Status post total left knee replacement [[id number]] Questions: Additional options:  No added salt DRESSING, CHANGE SPECIFY 05/16/17 2119 Normal Routine 1  Status post total left knee replacement [[id number]] Comments:  If staples are present they are to be removed and steri strips placed 10-14 days  after surgery as long as wound is healing. If wound does not appear to be healing the patient is to be seen in the office before removing staples. REFERRAL TO HOME HEALTH 05/16/17 2119 Normal Routine 1  Status post total left knee replacement [[id number]] REFERRAL TO PHYSICAL THERAPY 05/16/17 2119 Normal Routine 1  Status post total left knee replacement [[id number]] [facility] Transitions of Care [address] offers a voluntary care coordination program to provide high quality service and care to [facility] fee-for-service beneficiaries. Silvestre Brumfield was designed to help you enhance your health and well-being through the following services: ? Transitions of Care  support for individuals who are transitioning from one care setting to another (example: Hospital to home). ? Chronic and Complex Care Coordination  support for individuals and caregivers of those with serious or chronic illnesses or with more than one chronic (ongoing) condition and those who take a number of different medications. If you meet specific medical criteria, a [address] [place] [address] Rd may call you directly to coordinate your care with your primary care physician and your other care providers. For questions about the [facility] programs, please, contact your physicians office. For general questions or additional information about Accountable Care Organizations: 
Please visit www.medicare.gov/acos. html or call 1-800-MEDICARE (1-486.376.4766) TTY users should call 7-333.364.9464. Introducing [facility] & HE[facility]! Melany Potter introduces SwiftPayMD(TM) by [employer] patient portal. Now you can access parts of your medical record, email your doctor's office, and request medication refills online. 1. In your internet browser, go to [email or web address] 2. Click on the First Time User? Click Here link in the Sign In box. You will see the New Member Sign Up page. 3. Enter your SwiftPayMD(TM) [employer] Access Code exactly as it appears below. You will not need to use this code after youve completed the sign-up process. If you do not sign up before the expiration date, you must request a new code. · SwiftPayMD(TM) by [employer] Access Code: [email or web address] Expires: 7/10/2017  4:18 PM 
 
4. Enter the last four digits of your Social Security Number (xxxx) and Date of Birth (mm/dd/yyyy) as indicated and click Submit. You will be taken to the next sign-up page. 5. Create a [employer] ID. This will be your [employer] login ID and cannot be changed, so think of one that is secure and easy to remember. 6. Create a [employer] password. You can change your password at any time. 7. Enter your Password Reset Question and Answer. This can be used at a later time if you forget your password. 8. Enter your e-mail address. You will receive e-mail notification when new information is available in [address] 9. Click Sign Up. You can now view and download portions of your medical record. 10. Click the Download Summary menu link to download a portable copy of your medical information. If you have questions, please visit the Frequently Asked Questions section of the [employer] website. Remember, [employer] is NOT to be used for urgent needs. For medical emergencies, dial 911. Now available from your iPhone and Android! General Information Please provide this summary of care documentation to your next provider. Patient Signature:  ____________________________________________________________ Date:  ____________________________________________________________  
  
Corrine Degroot Provider Signature:  ____________________________________________________________ Date:  ____________________________________________________________

## 2017-05-16 NOTE — PERIOP NOTES
TRANSFER - OUT REPORT:    Verbal report given to Jose Bales RN on Marisa Cheng  being transferred to [address] [place] [address] for routine post - op       Report consisted of patients Situation, Background, Assessment and   Recommendations(SBAR). Information from the following report(s) OR Summary, Procedure Summary, Intake/Output and MAR was reviewed with the receiving nurse. Opportunity for questions and clarification was provided.       Patient transported with:   O2 @ 2 liters

## 2017-05-16 NOTE — ANESTHESIA PROCEDURE NOTES
Spinal Block    Start time: 5/16/2017 8:24 AM  End time: 5/16/2017 8:26 AM  Performed by: Alek Triplett  Authorized by: Alek Triplett     Pre-procedure:   Indications: primary anesthetic  Preanesthetic Checklist: patient identified, risks and benefits discussed, anesthesia consent, site marked, patient being monitored and timeout performed    Timeout Time: 08:23          Spinal Block:   Patient Position:  Seated  Prep Region:  Lumbar  Prep: chlorhexidine      Location:  L2-3  Technique:  Single shot  Local:  Lidocaine 1%  Local Dose (mL):  3    Needle:   Needle Type:  Quincke  Needle Gauge:  22 G  Attempts:  1      Events: CSF confirmed, no blood with aspiration and no paresthesia        Assessment:  Insertion:  Uncomplicated  Patient tolerance:  Patient tolerated the procedure well with no immediate complications

## 2017-05-16 NOTE — ANESTHESIA POSTPROCEDURE EVALUATION
Post-Anesthesia Evaluation and Assessment    Patient: Brittany Carvajal MRN: [id number]  SSN: [id number]    YOB: 1935  Age: 81 y.o. Sex: male       Cardiovascular Function/Vital Signs  Visit Vitals    /57    Pulse 69    Temp 36.2 °C (97.1 °F)    Resp 16    Ht 5' 11\" (1.803 m)    Wt 92.1 kg (203 lb)    SpO2 98%    BMI 28.31 kg/m2       Patient is status post spinal anesthesia for Procedure(s):  KNEE ARTHROPLASTY TOTAL/ LEFT/ HARRIET. Nausea/Vomiting: None    Postoperative hydration reviewed and adequate. Pain:  Pain Scale 1: Numeric (0 - 10) (05/16/17 1047)  Pain Intensity 1: 0 (05/16/17 1047)   Managed    Neurological Status:   Neuro (WDL): Exceptions to WDL (05/16/17 1017)  Neuro  Neurologic State: Drowsy (05/16/17 1017)  Orientation Level: Oriented to person (05/16/17 1017)  LLE Motor Response: Pharmacologically paralyzed (05/16/17 1017)   At baseline    Mental Status and Level of Consciousness: Arousable    Pulmonary Status:   O2 Device: Nasal cannula (05/16/17 1047)   Adequate oxygenation and airway patent    Complications related to anesthesia: None    Post-anesthesia assessment completed.  No concerns    Signed By: Loreda Brunner, MD     May 16, 2017

## 2017-05-16 NOTE — OP NOTES
[facility]  Total Knee Arthroplasty  Patient:Nikita Metzger   : 1935  Medical Record Number:[id number]      Pre-operative Diagnosis:  Unilateral primary osteoarthritis, left knee [M17.12]  Post-operative Diagnosis: Unilateral primary osteoarthritis, left knee [M17.12]  Location: [place] [address]    Date of Procedure: 2017  Surgeon: Oneal Padilla MD  Assistant: June Stevenson PA-C     Anesthesia: Spinal and  nerve block    Procedure:  Left Posterior Stabilized Cementless Total Knee Arthroplasty     Tourniquet Time: none    EBL: less than 100cc     The complexity of the total joint surgery requires the use of a first assistant for positioning, retraction and assistance in closure. Carlee Alva was brought to the operating room, positioned on the operating room table, and after appropriate identification  was anethestized. A singh catheter was placed preoperatively and IV antibiotics were administered, along with IV tranexamic acid. The limb was prepped and draped in the usual sterile fashion. Prior to the incision being made a timeout was called identifying the patient, procedure ,operative side and surgeon. An anterior longitudinal incision was accomplished just medial to the tibial tubercle and extending approximal 6 centimeters proximal to the superior pole of the patella. A medial parapatellar capsular incision was performed. The medial capsular flap was elevated around to the insertion of the semimembranous tendon. The patella was everted and the knee flexed and externally rotated. The articular surface revealed cartilage loss with exposed bone and bone spurs throughout all three compartments. The medial and lateral menisci were excised. The lateral half of the fat pad excised and the patella femoral ligament was released. The anterior cruciate ligament and the posterior cruciate ligament were resected.   Using extramedullary instrumentation, the tibial cut was accomplished with appropriate posterior slope. Approximately 6 mm of bone was removed from the high side of the tibia. The distal femur was addressed next. A drill hole was made above the intracondylar notch. Using appropriate intramedullary instrumentation, an appropriate valgus distal cut was accomplished. The femur was sized to a 6 component. The anterior and posterior cuts and anterior and posterior chamfer cuts were then made about the distal femur. Osteophytes were removed from the tibial and femoral surfaces. The appropriate cutting blocks was then utilized to perform the notch cut, with appropriate lateral translation accomplished for the patellofemoral groove. The tibia was sized to a 6 component. The tibial base plate was pinned into place with  appropriate external rotation and the stem site prepared. A preliminary range of motion was accomplished with the above size trial components. A 11 millimeter polyethylene insert allowed the patient to obtain full extension as well as appropriate flexion. Additional surgical releases were none. The patient's ligaments were stable in flexion and extension to medial and lateral stressing and alignment was through the appropriate mechanical axis. The patella was then everted. The bone was resected to accomodate a size 33 patella button. A trial reduction revealed appropriate tracking through the patellofemoral groove with no lateral retinacular release accomplished. All trial components were removed and the surfaces were prepared for implantation with irrigation and debridement of the bone interstices. 10 cc of tranexamic acid was place in the femoral canal and the site sealed with a bone plug. The posterior capsule, periosteum and soft tissues were injected for postop pain management. The femoral and tibial components were pressurized in full extension as well as 70 degrees of flexion.   The patella component was pressurized using the patella clamp. A lavage of diluted betadine solution of 17.5 ml Betadine in 500 of 0.9% Normal Saline was allowed to soak in the wound for 3 minutes after implanting of the prosthesis. The wound was irrigated with Saline again before closure. Prior to the final skin closure, full strength betadine was applied to the skin surrounding the skin incision. Ralf Wilsonman's knee was placed through a range of motion and noted to be stable as mentioned above with the trial components. The operative knee was injected prior to closure for post op pain management. The capsular layer was closed using a #1 PDS suture, while the subcutaneous layers were closed using a 2-0 Monocryl interrupted suture. The skin was closed using staples and a sterile bandage was applied. A cryo pad was applied on the operative leg. The sponge count and needle counts were correct. Implants:   Implant Name Type Inv.  Item Serial No.  Lot No. LRB No. Used   COMPNT FEM PS TRIATHLN 6 L PA --  - [id number]  COMPNT FEM PS TRIATHLN 6 L PA --  [id number] [employer] HOW [id number] Left 1   BASEPLT TIB PC TRITNM SZ 6 -- TRIATHLON - [id number]  BASEPLT TIB PC TRITNM SZ 6 -- TRIATHLON [id number] [employer] HOW [id number] Left 1   INSERT TIB PS TRIATHLN 6 11MM --  - [id number]  INSERT TIB PS TRIATHLN 6 11MM --  [id number] [employer] HOW [id number] Left 1                1     Signed By: Diann Zaldivar MD

## 2017-05-16 NOTE — PROGRESS NOTES
Problem: Mobility Impaired (Adult and Pediatric)  Goal: *Acute Goals and Plan of Care (Insert Text)  GOALS (1-4 days):  (1.)Mr. Gutierrez Yung will move from supine to sit and sit to supine in bed with STAND BY ASSIST.  (2.)Mr. Gutierrez Yung will transfer from bed to chair and chair to bed with STAND BY ASSIST using the least restrictive device. (3.)Mr. Gutierrez Yung will ambulate with STAND BY ASSIST for 200 feet with the least restrictive device. (4.)Mr. Gutierrez Yung will ambulate up/down 2 steps with bilateral railing with CONTACT GUARD ASSIST with no device. (5.)Mr. Gutierrez Yung will increase left knee ROM to 5°-80°.  ________________________________________________________________________________________________      PHYSICAL THERAPY JOINT CAMP TKA: INITIAL ASSESSMENT, PM 5/16/2017  INPATIENT: Hospital Day: 1  [place] / [place] / Product Type: Medicare /      NAME/AGE/GENDER: Marya Robb is a 81 y.o. male        PRIMARY DIAGNOSIS:  Unilateral primary osteoarthritis, left knee [M17.12]              Procedure(s) and Anesthesia Type:     * KNEE ARTHROPLASTY TOTAL/ LEFT/ HARRIET - Spinal (Left)  ICD-10: Treatment Diagnosis:        · Pain in Left Knee (M25.562)  · Stiffness of Left Knee, Not elsewhere classified (M25.662)  · Difficulty in walking, Not elsewhere classified (R26.2)       ASSESSMENT:      Mr. Gutierrez Yung presents with limited rom and strength of left LE as well as decreased functional mobility and gait s/p left tka. He plans to go home with HHPT. This section established at most recent assessment   PROBLEM LIST (Impairments causing functional limitations):  1. Decreased Strength  2. Decreased ADL/Functional Activities  3. Decreased Transfer Abilities  4. Decreased Ambulation Ability/Technique  5. Decreased Balance  6. Increased Pain  7. Decreased Activity Tolerance  8.  Decreased Bremer with Home Exercise Program    INTERVENTIONS PLANNED: (Benefits and precautions of physical therapy have been discussed with the patient.)  1. Bed Mobility  2. Gait Training  3. Home Exercise Program (HEP)  4. Therapeutic Exercise/Strengthening  5. Transfer Training  6. Range of Motion: active/assisted/passive  7. Therapeutic Activities  8. Group Therapy      TREATMENT PLAN: Frequency/Duration: Follow patient BID   to address above goals. Rehabilitation Potential For Stated Goals: GOOD      RECOMMENDED REHABILITATION/EQUIPMENT: (at time of discharge pending progress): Continue Skilled Therapy and Home Health: Physical Therapy. HISTORY:   History of Present Injury/Illness (Reason for Referral):  S/p left tka  Past Medical History/Comorbidities:   Mr. 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 He also has no past medical history of Aneurysm ([address] Arrhythmia; Autoimmune disease ([address] Difficult intubation; Heart failure ([address] Liver disease; Malignant hyperthermia due to anesthesia; Morbid obesity ([address] Nausea & vomiting; Pseudocholinesterase deficiency; Psychiatric disorder; PUD (peptic ulcer disease); Seizures ([address] Sleep apnea; or Thromboembolus ([address] Mr. Cameron Price  has a past surgical history that includes abdomen surgery proc unlisted (1991); lumbar fusion (1990 x 2  2012, 2013); urological (2002); heent (as child); mohs procedure (Left, 1990); orthopaedic (Left, 2013); colectomy (1997); colonoscopy (2014); heart catheterization (01/10/2014); and coronary stent placement.   Social History/Living Environment:   Home Environment: Other (comment)  One/Two Story Residence: One story  Living Alone: No  Support Systems: Family member(s)  Patient Expects to be Discharged to[de-identified] Private residence  Current DME Used/Available at Home: None  Prior Level of Function/Work/Activity:  independent   Number of Personal Factors/Comorbidities that affect the Plan of Care: 1-2: MODERATE COMPLEXITY   EXAMINATION:   Most Recent Physical Functioning:      Gross Assessment  AROM: Within functional limits (right LE)  Strength: Generally decreased, functional (right LE)           LLE AROM  L Knee Flexion: 60  L Knee Extension: 15  L Ankle Dorsiflexion: 0            Bed Mobility  Supine to Sit: Additional time;Minimum assistance     Transfers  Sit to Stand: Additional time;Minimum assistance  Stand to Sit: Additional time;Minimum assistance  Bed to Chair: Additional time;Minimum assistance     Balance  Sitting: Intact  Standing: Pull to stand; With support                Weight Bearing Status  Left Side Weight Bearing: As tolerated  Distance (ft): 15 Feet (ft)  Ambulation - Level of Assistance: Additional time;Minimal assistance  Assistive Device: Walker, rolling  Speed/Mary: Delayed  Step Length: Left shortened;Right shortened  Stance: Left decreased  Gait Abnormalities: Antalgic  Interventions: Verbal cues; Tactile cues; Safety awareness training;Manual cues      Braces/Orthotics:      Left Knee Cold  Type: Cryocuff       Body Structures Involved:  1. Bones  2. Joints  3. Muscles  4. Ligaments Body Functions Affected:  1. Movement Related Activities and Participation Affected:  1. Mobility   Number of elements that affect the Plan of Care: 1-2: LOW COMPLEXITY   CLINICAL PRESENTATION:   Presentation: Stable and uncomplicated: LOW COMPLEXITY   CLINICAL DECISION [facility] [place] [address] [address] AM-PAC 6 Clicks   Basic Mobility Inpatient Short Form  How much difficulty does the patient currently have. .. Unable A Lot A Little None   1.   Turning over in bed (including adjusting bedclothes, sheets and blankets)? [ ] 1   [ ] 2   [X] 3   [ ] 4   2. Sitting down on and standing up from a chair with arms ( e.g., wheelchair, bedside commode, etc.)   [ ] 1   [ ] 2   [X] 3   [ ] 4   3. Moving from lying on back to sitting on the side of the bed? [ ] 1   [ ] 2   [X] 3   [ ] 4   How much help from another person does the patient currently need. .. Total A Lot A Little None   4. Moving to and from a bed to a chair (including a wheelchair)? [ ] 1   [ ] 2   [X] 3   [ ] 4   5. Need to walk in hospital room? [ ] 1   [ ] 2   [X] 3   [ ] 4   6. Climbing 3-5 steps with a railing? [ ] 1   [ ] 2   [X] 3   [ ] 4   © 2007, Trustees of [address] [place] [address], under license to [employer]. All rights reserved       Score:  Initial: 18 Most Recent: X (Date: -- )     Interpretation of Tool:  Represents activities that are increasingly more difficult (i.e. Bed mobility, Transfers, Gait). Score 24 23 22-20 19-15 14-10 9-7 6       Modifier CH CI CJ CK CL CM CN         · Mobility - Walking and Moving Around:               - CURRENT STATUS:    CK - 40%-59% impaired, limited or restricted               - GOAL STATUS:           CJ - 20%-39% impaired, limited or restricted               - D/C STATUS:                       ---------------To be determined---------------  [place] / Product Type: Medicare /       Medical Necessity:     · Patient is expected to demonstrate progress in strength, range of motion and balance to decrease assistance required with theraputic exercises and functional mobility. Reason for Services/Other Comments:  · Patient continues to require present interventions due to patient's inability to perform theraputic exercises and functional mobility independently.    Use of outcome tool(s) and clinical judgement create a POC that gives a: Clear prediction of patient's progress: LOW COMPLEXITY                 TREATMENT:   (In addition to Assessment/Re-Assessment sessions the following treatments were rendered)      Pre-treatment Symptoms/Complaints:  Knee soreness  Pain: Initial:      Post Session:  Did not rate      Assessment/Reassessment only, no treatment provided today        Date:    Date:    Date:      ACTIVITY/EXERCISE AM PM AM PM AM PM   GROUP THERAPY  [ ]  [ ]  [ ]  [ ]  [ ]  [ ]   Ankle Pumps               Quad Sets               Gluteal Sets               Hip ABd/ADduction               Straight Leg Raises               Knee Slides               Short Arc Quads               Long Arc Quads               Chair Slides                               B = bilateral; AA = active assistive; A = active; P = passive       Treatment/Session Assessment:         Response to Treatment:  Pt. Did fine. Education:  [X] Home Exercises  [X] Fall Precautions  [ ] Hip Precautions [ ] Going Home Video  [X] Knee/Hip Prosthesis Review  [X] Walker Management/Safety [ ] Adaptive Equipment as Needed         Interdisciplinary Collaboration:   · Occupational Therapist  · Registered Nurse     After treatment position/precautions:   · Up in chair  · Bed/Chair-wheels locked  · Bed in low position  · Call light within reach     Compliance with Program/Exercises: Will assess as treatment progresses. No questions. Recommendations/Intent for next treatment session:  Treatment next visit will focus on increasing Mr. Cabreras independence with bed mobility, transfers, gait training, strength/ROM exercises, modalities for pain, and patient education.        Total Treatment Duration:  PT Patient Time In/Time Out  Time In: 1440  Time Out: [address]

## 2017-05-16 NOTE — PROGRESS NOTES
Care Management Interventions  Mode of Transport at Discharge: Self  Transition of Care Consult (CM Consult): [address]: Yes  Discharge Durable Medical Equipment: No  Physical Therapy Consult: Yes  Occupational Therapy Consult: Yes  Current Support Network: Lives with Spouse  Confirm Follow Up Transport: Family  Plan discussed with Pt/Family/Caregiver: Yes  Freedom of Choice Offered: Yes  Discharge Location  Discharge Placement: Home with home health  Patient is a 81y.o. year old male admitted for Left TKA . Patient lives with His spouse and plans to return home on discharge. Order received to arrange home health. Patient without preference towards agency. Referral sent to [address] Patient denies any equipment needs as he has a walker and bedside commode. Will follow until discharge.   Rock Poe

## 2017-05-16 NOTE — ROUTINE PROCESS
TRANSFER - IN REPORT:    Verbal report received from Ana Younger RN on Jennifer Vogel  being received from [facility] for routine progression of care      Report consisted of patients Situation, Background, Assessment and   Recommendations(SBAR). Information from the following report(s) SBAR was reviewed with the receiving nurse. Opportunity for questions and clarification was provided. Assessment completed upon patients arrival to unit and care assumed.

## 2017-05-16 NOTE — PERIOP NOTES
Son left before I was able to speak with him. Per pt he will return around 9 AM.  Wife, [place], is at home- [phone].

## 2017-05-16 NOTE — ANESTHESIA PREPROCEDURE EVALUATION
Anesthetic History     Increased risk of difficult airway          Review of Systems / Medical History  Patient summary reviewed and pertinent labs reviewed    Pulmonary  Within defined limits              Comments: >20 years of tobacco abuse. None currently   Neuro/Psych   Within defined limits           Cardiovascular    Hypertension          CAD and cardiac stents (2014 BMS - Plavix held x 10d, remains on bASA)    Exercise tolerance: <4 METS  Comments: Limited secondary to pain   GI/Hepatic/Renal     GERD (Asymptomatic): well controlled           Endo/Other    Diabetes: well controlled, type 2    Arthritis     Other Findings              Physical Exam    Airway  Mallampati: III  TM Distance: 4 - 6 cm  Neck ROM: normal range of motion   Mouth opening: Normal    Comments: Large front, capped teeth Cardiovascular    Rhythm: regular  Rate: normal         Dental    Dentition: Caps/crowns and Implants     Pulmonary  Breath sounds clear to auscultation               Abdominal  GI exam deferred       Other Findings            Anesthetic Plan    ASA: 2  Anesthesia type: spinal            Anesthetic plan and risks discussed with: Patient      Discussed general. Plan for LMA if convert to general. Multiple lower back procedures.

## 2017-05-16 NOTE — PERIOP NOTES
TRANSFER - OUT REPORT:    Verbal report given to Gustabo Newman on Antonieta Part  being transferred to preop block for routine progression of care       Report consisted of patients Situation, Background, Assessment and   Recommendations(SBAR). Information from the following report(s) SBAR, Intake/Output and Recent Results was reviewed with the receiving nurse. Lines:   Peripheral IV 05/16/17 Left Hand (Active)   Site Assessment Clean, dry, & intact 5/16/2017  6:34 AM   Phlebitis Assessment 0 5/16/2017  6:34 AM   Infiltration Assessment 0 5/16/2017  6:34 AM   Dressing Status Clean, dry, & intact 5/16/2017  6:34 AM   Dressing Type Tape;Transparent 5/16/2017  6:34 AM   Hub Color/Line Status Pink; Infusing 5/16/2017  6:34 AM   Action Taken Blood drawn 5/16/2017  6:34 AM        Opportunity for questions and clarification was provided.       Patient transported with:   [employer]

## 2017-05-16 NOTE — PROGRESS NOTES
[address]  Face to Face Encounter    Patients Name: Manasa Hassan    YOB: 1935    Ordering Physician: Oziel Oneal    Primary Diagnosis: Unilateral primary osteoarthritis, left knee [M17.12]  S/p left TKA    Date of Face to Face:   5/16/2017                                  Face to Face Encounter findings are related to primary reason for home care:   yes. 1. I certify that the patient needs intermittent care as follows: physical therapy: gait/stair training    2. I certify that this patient is homebound, that is: 1) patient requires the use of a walker device, special transportation, or assistance of another to leave the home; or 2) patient's condition makes leaving the home medically contraindicated; and 3) patient has a normal inability to leave the home and leaving the home requires considerable and taxing effort. Patient may leave the home for infrequent and short duration for medical reasons, and occasional absences for non-medical reasons. Homebound status is due to the following functional limitations: Patient's ambulation limited secondary to severe pain and requires the use of an assistive device and the assistance of a caregiver for safe completion. Patient with strength and ROM deficits limiting ambulation endurance requiring the use of an assistive device and the assistance of a caregiver. Patient deemed temporarily homebound secondary to increased risk for infection when leaving home and going out into the community. 3. I certify that this patient is under my care and that I, or a nurse practitioner or  [id number], or clinical nurse specialist, or certified nurse midwife, working with me, had a Face-to-Face Encounter that meets the physician Face-to-Face Encounter requirements.   The following are the clinical findings from the [address] [place] [address] encounter that support the need for skilled services and is a summary of the encounter:  See hospital chart      Liz Calero, LANA  5/16/2017      THE FOLLOWING TO BE COMPLETED BY THE COMMUNITY PHYSICIAN:    I concur with the findings described above from the F2F encounter that this patient is homebound and in need of a skilled service.     Certifying Physician: _____________________________________      Printed Certifying Physician Name: _____________________________________    Date: _________________

## 2017-05-16 NOTE — PROGRESS NOTES
Problem: Self Care Deficits Care Plan (Adult)  Goal: *Acute Goals and Plan of Care (Insert Text)  GOALS:   DISCHARGE GOALS (in preparation for going home/rehab): 3 days  1. Mr. Gemma Chi will perform one lower body dressing activity with minimal assistance required to demonstrate improved functional mobility and safety. 2. Mr. Gemma Chi will perform one lower body bathing activity with minimal assistance required to demonstrate improved functional mobility and safety. 3. Mr. Gemma Chi will perform toileting/toilet transfer with contact guard assistance to demonstrate improved functional mobility and safety. 4. Mr. Gemma Chi will perform shower transfer with contact guard assistance to demonstrate improved functional mobility and safety. JOINT CAMP OCCUPATIONAL THERAPY TKA: Initial Assessment 5/16/2017  INPATIENT: Hospital Day: 1  [place] / [place] / Product Type: Medicare /      NAME/AGE/GENDER: Kelley Kwong is a 81 y.o. male        PRIMARY DIAGNOSIS:  Unilateral primary osteoarthritis, left knee [M17.12]              Procedure(s) and Anesthesia Type:     * KNEE ARTHROPLASTY TOTAL/ LEFT/ HARRIET - Spinal (Left)  ICD-10: Treatment Diagnosis:        · Pain in Left Knee (M25.562)  · Stiffness of Left Knee, Not elsewhere classified (S73.969)       ASSESSMENT:      Mr. Gemma Chi is s/p left TKA and presents with decreased weight bearing on left LE and decreased independence with functional mobility and activities of daily living. Patient would benefit from skilled Occupational Therapy to maximize independence and safety with self-care task and functional mobility. Pt would also benefit from education on adaptive equipment and safety precautions in preparation for going home or for recommendations for post-hospital rehab program.  Patient plans for further rehab at home with home health services and good family support.   OT reviewed therapy schedule and plan of care with patient. Patient was able to transfer and preform self care skills as charted below. Patient instructed to call for assistance when needing to get up from the bed and all needs in reach. Patient verbalized understanding of call light. This section established at most recent assessment   PROBLEM LIST (Impairments causing functional limitations):  1. Decreased Strength  2. Decreased ADL/Functional Activities  3. Decreased Transfer Abilities  4. Increased Pain  5. Increased Fatigue  6. Decreased Flexibility/Joint Mobility  7. Decreased Knowledge of Precautions    INTERVENTIONS PLANNED: (Benefits and precautions of occupational therapy have been discussed with the patient.)  1. Activities of daily living training  2. Adaptive equipment training  3. Balance training  4. Clothing management  5. Donning&doffing training  6. Theraputic activity      TREATMENT PLAN: Frequency/Duration: Follow patient 1-2 times to address above goals. Rehabilitation Potential For Stated Goals: GOOD      RECOMMENDED REHABILITATION/EQUIPMENT: (at time of discharge pending progress): Continue Skilled Therapy and Home Health: Physical Therapy. OCCUPATIONAL PROFILE AND HISTORY:   History of Present Injury/Illness (Reason for Referral): Pt presents this date s/p (left) TKA. Past Medical History/Comorbidities:   Mr. 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 He also has no past medical history of Aneurysm ([address] Arrhythmia; Autoimmune disease ([address] Difficult intubation;  Heart failure ([facility] Liver disease; Malignant hyperthermia due to anesthesia; Morbid obesity ([facility] Nausea & vomiting; Pseudocholinesterase deficiency; Psychiatric disorder; PUD (peptic ulcer disease); Seizures ([facility] Sleep apnea; or Thromboembolus ([facility] Mr. Albesa Lesch  has a past surgical history that includes abdomen surgery proc unlisted (1991); lumbar fusion (1990 x 2  2012, 2013); urological (2002); heent (as child); mohs procedure (Left, 1990); orthopaedic (Left, 2013); colectomy (1997); colonoscopy (2014); heart catheterization (01/10/2014); and coronary stent placement. Social History/Living Environment:   Home Environment: Other (comment)  One/Two Story Residence: One story  Living Alone: No  Support Systems: Family member(s)  Patient Expects to be Discharged to[de-identified] Private residence  Current DME Used/Available at Home: None  Prior Level of Function/Work/Activity:  Independent       Number of Personal Factors/Comorbidities that affect the Plan of Care: Brief history (0):  LOW COMPLEXITY   ASSESSMENT OF OCCUPATIONAL PERFORMANCE[de-identified]   Most Recent Physical Functioning:   Balance  Sitting: Intact  Standing: Pull to stand; With support        Patient Vitals for the past 6 hrs:       BP BP Patient Position SpO2 O2 Flow Rate (L/min) Pulse   05/16/17 1047 107/57 - 98 % 3 l/min 69   05/16/17 1102 120/61 - 97 % 2 l/min 64   05/16/17 1112 124/68 - 98 % 2 l/min 70   05/16/17 1126 134/59 At rest 98 % - 65   05/16/17 1313 - - 98 % 2 l/min -   05/16/17 1549 108/51 At rest 99 % - 66        Gross Assessment  AROM: Within functional limits (right LE)  Strength: Generally decreased, functional (right LE)            LLE AROM  L Knee Flexion: 60  L Knee Extension: 15  L Ankle Dorsiflexion: 0 Coordination  Fine Motor Skills-Upper: Left Intact; Right Intact  Gross Motor Skills-Upper: Left Intact; Right Intact           Mental Status  Neurologic State: Alert  Orientation Level: Oriented X4  Cognition: Appropriate decision making  Perception: Appears intact  Perseveration: No perseveration noted  Safety/Judgement: Awareness of environment                    Basic ADLs (From Assessment) Complex ADLs (From Assessment)   Basic ADL  Feeding: Independent  Oral Facial Hygiene/Grooming: Supervision  Bathing: Moderate assistance  Upper Body Dressing: Supervision  Lower Body Dressing: Moderate assistance  Toileting: Moderate assistance     Grooming/Bathing/Dressing Activities of Daily Living     Cognitive Retraining  Safety/Judgement: Awareness of environment                 Functional Transfers  Toilet Transfer : Minimum assistance  Shower Transfer: Minimum assistance     Bed/Mat Mobility  Supine to Sit: Additional time;Minimum assistance  Sit to Stand: Additional time;Minimum assistance  Bed to Chair: Additional time;Minimum assistance           Physical Skills Involved:  1. Range of Motion  2. Balance  3. Mobility Cognitive Skills Affected (resulting in the inability to perform in a timely and safe manner): 1. none Psychosocial Skills Affected:  1. Environmental Adaptations   Number of elements that affect the Plan of Care: 3-5:  MODERATE COMPLEXITY   CLINICAL DECISION MAKIN[address] [place] [id number] AM-PAC 6 Clicks   Basic Mobility Inpatient Short Form  How much help from another person does the patient currently need. .. Total A Lot A Little None   1. Putting on and taking off regular lower body clothing?   [ ] 1   [X] 2   [ ] 3   [ ] 4   2. Bathing (including washing, rinsing, drying)? [ ] 1   [X] 2   [ ] 3   [ ] 4   3. Toileting, which includes using toilet, bedpan or urinal?   [ ] 1   [ ] 2   [X] 3   [ ] 4   4. Putting on and taking off regular upper body clothing?   [ ] 1   [ ] 2   [ ] 3   [X] 4   5. Taking care of personal grooming such as brushing teeth? [ ] 1   [ ] 2   [ ] 3   [X] 4   6. Eating meals? [ ] 1   [ ] 2   [ ] 3   [X] 4   © , Trustees of [address] [place] [address] [address] [address], under license to [employer].  All rights reserved   Score:  Initial: 19 Most Recent: X (Date: -- )     Interpretation of Tool:  Represents activities that are increasingly more difficult (i.e. Bed mobility, Transfers, Gait). Score 24 23 22-20 19-15 14-10 9-7 6       Modifier CH CI CJ CK CL CM CN         · Self Care:               - CURRENT STATUS:    CK - 40%-59% impaired, limited or restricted               - GOAL STATUS:           CJ - 20%-39% impaired, limited or restricted               - D/C STATUS:                       ---------------To be determined---------------  [place] / Product Type: Medicare /       Medical Necessity:     · Patient is expected to demonstrate progress in range of motion, balance and functional technique to increase independence with self care. Reason for Services/Other Comments:  · Patient would benefit from skilled Occupational Therapy to maximize independence and safety with self-care task and functional mobility. .   Use of outcome tool(s) and clinical judgement create a POC that gives a: LOW COMPLEXITY                 TREATMENT:   (In addition to Assessment/Re-Assessment sessions the following treatments were rendered)      Pre-treatment Symptoms/Complaints:  No complaint of pain  Pain: Initial:     0 Post Session:  0      Assessment/Reassessment only, no treatment provided today     Treatment/Session Assessment:         Response to Treatment:  Pt up to chair moving well. Education:  [ ] Home Exercises  [X] Fall Precautions  [ ] Hip Precautions [ ] Going Home Video  [X] Knee/Hip Prosthesis Review  [X] Walker Management/Safety [X] Adaptive Equipment as Needed         Interdisciplinary Collaboration:   · Physical Therapist  · Occupational Therapist  · Registered Nurse     After treatment position/precautions:   · Up in chair  · Bed/Chair-wheels locked  · Call light within reach  · RN notified     Compliance with Program/Exercises: compliant all of the time.      Recommendations/Intent for next treatment session:  Treatment next visit will focus on increasing Mr. Rodrigues's independence with bed mobility, transfers, self care training, and patient education.        Total Treatment Duration:  OT Patient Time In/Time Out  Time In: 1450  Time Out: [address], OT

## 2017-05-17 VITALS
SYSTOLIC BLOOD PRESSURE: 128 MMHG | HEART RATE: 72 BPM | TEMPERATURE: 95.5 F | DIASTOLIC BLOOD PRESSURE: 56 MMHG | RESPIRATION RATE: 14 BRPM | WEIGHT: 203 LBS | HEIGHT: 71 IN | OXYGEN SATURATION: 98 % | BODY MASS INDEX: 28.42 KG/M2

## 2017-05-17 LAB
ANION GAP BLD CALC-SCNC: 6 MMOL/L (ref 7–16)
BUN SERPL-MCNC: 18 MG/DL (ref 8–23)
CALCIUM SERPL-MCNC: 8.5 MG/DL (ref 8.3–10.4)
CHLORIDE SERPL-SCNC: 105 MMOL/L (ref 98–107)
CO2 SERPL-SCNC: 27 MMOL/L (ref 21–32)
CREAT SERPL-MCNC: 0.9 MG/DL (ref 0.8–1.5)
EST. AVERAGE GLUCOSE BLD GHB EST-MCNC: 123 MG/DL
GLUCOSE SERPL-MCNC: 137 MG/DL (ref 65–100)
HBA1C MFR BLD: 5.9 % (ref 4.8–6)
HGB BLD-MCNC: 11.5 G/DL (ref 13.6–17.2)
POTASSIUM SERPL-SCNC: 5.1 MMOL/L (ref 3.5–5.1)
SODIUM SERPL-SCNC: 138 MMOL/L (ref 136–145)

## 2017-05-17 PROCEDURE — 94760 N-INVAS EAR/PLS OXIMETRY 1: CPT

## 2017-05-17 PROCEDURE — 83036 HEMOGLOBIN GLYCOSYLATED A1C: CPT | Performed by: ORTHOPAEDIC SURGERY

## 2017-05-17 PROCEDURE — 74011250637 HC RX REV CODE- 250/637: Performed by: PHYSICIAN ASSISTANT

## 2017-05-17 PROCEDURE — 74011250636 HC RX REV CODE- 250/636: Performed by: PHYSICIAN ASSISTANT

## 2017-05-17 PROCEDURE — 97116 GAIT TRAINING THERAPY: CPT

## 2017-05-17 PROCEDURE — 77030012935 HC DRSG AQUACEL BMS -B

## 2017-05-17 PROCEDURE — 97110 THERAPEUTIC EXERCISES: CPT

## 2017-05-17 PROCEDURE — 85018 HEMOGLOBIN: CPT | Performed by: PHYSICIAN ASSISTANT

## 2017-05-17 PROCEDURE — 80048 BASIC METABOLIC PNL TOTAL CA: CPT | Performed by: PHYSICIAN ASSISTANT

## 2017-05-17 PROCEDURE — 97535 SELF CARE MNGMENT TRAINING: CPT

## 2017-05-17 PROCEDURE — 36415 COLL VENOUS BLD VENIPUNCTURE: CPT | Performed by: PHYSICIAN ASSISTANT

## 2017-05-17 PROCEDURE — 97150 GROUP THERAPEUTIC PROCEDURES: CPT

## 2017-05-17 PROCEDURE — 99284 EMERGENCY DEPT VISIT MOD MDM: CPT | Performed by: EMERGENCY MEDICINE

## 2017-05-17 RX ADMIN — REGULAR STRENGTH 325 MG: 325 TABLET ORAL at 09:13

## 2017-05-17 RX ADMIN — ACETAMINOPHEN 1000 MG: 500 TABLET, FILM COATED ORAL at 01:26

## 2017-05-17 RX ADMIN — HYDROMORPHONE HYDROCHLORIDE 1 MG: 1 INJECTION, SOLUTION INTRAMUSCULAR; INTRAVENOUS; SUBCUTANEOUS at 01:32

## 2017-05-17 RX ADMIN — ACETAMINOPHEN 1000 MG: 500 TABLET, FILM COATED ORAL at 05:54

## 2017-05-17 RX ADMIN — ONDANSETRON 4 MG: 2 INJECTION INTRAMUSCULAR; INTRAVENOUS at 05:58

## 2017-05-17 RX ADMIN — FINASTERIDE 5 MG: 5 TABLET, FILM COATED ORAL at 09:13

## 2017-05-17 RX ADMIN — LISINOPRIL 5 MG: 5 TABLET ORAL at 09:13

## 2017-05-17 RX ADMIN — METOPROLOL SUCCINATE 25 MG: 25 TABLET, EXTENDED RELEASE ORAL at 09:14

## 2017-05-17 RX ADMIN — ACETAMINOPHEN 1000 MG: 500 TABLET, FILM COATED ORAL at 12:58

## 2017-05-17 RX ADMIN — CELECOXIB 200 MG: 200 CAPSULE ORAL at 09:14

## 2017-05-17 RX ADMIN — CEFAZOLIN 2 G: 1 INJECTION, POWDER, FOR SOLUTION INTRAMUSCULAR; INTRAVENOUS; PARENTERAL at 01:27

## 2017-05-17 RX ADMIN — SENNOSIDES AND DOCUSATE SODIUM 2 TABLET: 8.6; 5 TABLET ORAL at 09:13

## 2017-05-17 RX ADMIN — OXYCODONE HYDROCHLORIDE 5 MG: 5 TABLET ORAL at 09:14

## 2017-05-17 NOTE — DISCHARGE INSTRUCTIONS
[facility]   Patient Discharge Instructions    Diego Torres / [id number] : 1935    Admitted 2017 Discharged: 2017     IF YOU HAVE ANY PROBLEMS ONCE YOU ARE AT HOME CALL THE FOLLOWING NUMBERS:   Main office number: [phone]    Take Home Medications     · It is important that you take the medication exactly as they are prescribed. · Keep your medication in the bottles provided by the pharmacist and keep a list of the medication names, dosages, and times to be taken in your wallet. · Do not take other medications without consulting your doctor. What to do at [address] your prehospital diet. If you have excessive nausea or vomitting call your doctor's office     Home Physical Therapy is arranged. Use rolling walker when walking. Use Dima Hose stockings until we see you in the office for your follow up appointment with Dr. Brooke Avendano    Patients who have had a joint replacement should not drive until you are seen for your follow up appointment by Dr. Brooke Avendano. When to Call    - Call if you have a temperature greater then 101  - Unable to keep food down  - Loose control of your bladder or bowel function  - Are unable to bear any weight   - Need a pain medication refill       DISCHARGE SUMMARY from Nurse    The following personal items collected during your admission are returned to you:   Dental Appliance: Dental Appliances: Uppers, At bedside  Vision: Visual Aid: Glasses  Hearing Aid:   na  Jewelry: Jewelry: Ring (in bag)  Clothing:   self  Other Valuables:  Other Valuables: None (denies valuables today)  Valuables sent to safe:   na    PATIENT INSTRUCTIONS:    After general anesthesia or intravenous sedation, for 24 hours or while taking prescription Narcotics:  · Limit your activities  · Do not drive and operate hazardous machinery  · Do not make important personal or business decisions  · Do  not drink alcoholic beverages  · If you have not urinated within 8 hours after discharge, please contact your surgeon on call. Report the following to your surgeon:  · Excessive pain, swelling, redness or odor of or around the surgical area  · Temperature over 101  · Nausea and vomiting lasting longer than 4 hours or if unable to take medications  · Any signs of decreased circulation or nerve impairment to extremity: change in color, persistent  numbness, tingling, coldness or increase pain  · Any questions, call office @ [phone]      Keep scheduled follow up appointment. If need to change, call office @ [phone]. *  Please give a list of your current medications to your Primary Care Provider. *  Please update this list whenever your medications are discontinued, doses are      changed, or new medications (including over-the-counter products) are added. *  Please carry medication information at all times in case of emergency situations. Total Knee Replacement: What to Expect at [address] [place] [address]    When you leave the hospital, you should be able to move around with a walker or crutches. But you will need someone to help you at home for the next few weeks or until you have more energy and can move around better. If there is no one to help you at home, you may go to a rehabilitation center. You will go home with a bandage and stitches or staples. Change the bandage as your doctor tells you to. Your doctor will remove your stitches or staples 10 to 21 days after your surgery. You may still have some mild pain, and the area may be swollen for 3 to 6 months after surgery. Your knee will continue to improve for 6 to 12 months. You will probably use a walker for 1 to 3 weeks and then use crutches. When you are ready, you can use a cane. You will probably be able to walk on your own in 4 to 8 weeks. You will need to do months of physical rehabilitation (rehab) after a knee replacement.  Rehab will help you strengthen the muscles of the knee and help you regain movement. After you recover, your artificial knee will allow you to do normal daily activities with less pain or no pain at all. You may be able to hike, dance, ride a bike, and play golf. Talk to your doctor about whether you can do more strenuous activities. Always tell your caregivers that you have an artificial knee. How long it will take to walk on your own, return to normal activities, and go back to work depends on your health and how well your rehabilitation (rehab) program goes. The better you do with your rehab exercises, the quicker you will get your strength and movement back. This care sheet gives you a general idea about how long it will take for you to recover. But each person recovers at a different pace. Follow the steps below to get better as quickly as possible. How can you care for yourself at home? Activity  · Rest when you feel tired. You may take a nap, but do not stay in bed all day. When you sit, use a chair with arms. You can use the arms to help you stand up. · Work with your physical therapist to find the best way to exercise. You may be able to take frequent, short walks using crutches or a walker. What you can do as your knee heals will depend on whether your new knee is cemented or uncemented. You may not be able to do certain things for a while if your new knee is uncemented. · After your knee has healed enough, you can do more strenuous activities with caution. ¨ You can golf, but use a golf cart, and do not wear shoes with spikes. ¨ You can bike on a flat road or on a stationary bike. Avoid biking up hills. ¨ Your doctor may suggest that you stay away from activities that put stress on your knee. These include tennis or badminton, squash or racquetball, contact sports like football, jumping (such as in basketball), jogging, or running. ¨ Avoid activities where you might fall. These include horseback riding, skiing, and mountain biking.   · Do not sit for more than 1 hour at a time. Get up and walk around for a while before you sit again. If you must sit for a long time, prop up your leg with a chair or footstool. This will help you avoid swelling. · Ask your doctor when you can shower. You may need to take sponge baths until your stitches or staples have been removed. · Ask your doctor when you can drive again. It may take up to 8 weeks after knee replacement surgery before it is safe for you to drive. · When you get into a car, sit on the edge of the seat. Then pull in your legs, and turn to face the front. · You should be able to do many everyday activities 3 to 6 weeks after your surgery. You will probably need to take 4 to 16 weeks off from work. When you can go back to work depends on the type of work you do and how you feel. · Ask your doctor when it is okay for you to have sex. · Do not lift anything heavier than 10 pounds and do not lift weights for 12 weeks. Diet  · By the time you leave the hospital, you should be eating your normal diet. If your stomach is upset, try bland, low-fat foods like plain rice, broiled chicken, toast, and yogurt. Your doctor may suggest that you take iron and vitamin supplements. · Drink plenty of fluids (unless your doctor tells you not to). · Eat healthy foods, and watch your portion sizes. Try to stay at your ideal weight. Too much weight puts more stress on your new knee. · You may notice that your bowel movements are not regular right after your surgery. This is common. Try to avoid constipation and straining with bowel movements. You may want to take a fiber supplement every day. If you have not had a bowel movement after a couple of days, ask your doctor about taking a mild laxative. Medicines  · Your doctor will tell you if and when you can restart your medicines. He or she will also give you instructions about taking any new medicines.   · If you take blood thinners, such as warfarin (Coumadin), clopidogrel (Plavix), or aspirin, be sure to talk to your doctor. He or she will tell you if and when to start taking those medicines again. Make sure that you understand exactly what your doctor wants you to do. · Your doctor may give you a blood-thinning medicine to prevent blood clots. If you take a blood thinner, be sure you get instructions about how to take your medicine safely. Blood thinners can cause serious bleeding problems. This medicine could be in pill form or as a shot (injection). If a shot is necessary, your doctor will tell you how to do this. · Be safe with medicines. Take pain medicines exactly as directed. ¨ If the doctor gave you a prescription medicine for pain, take it as prescribed. ¨ If you are not taking a prescription pain medicine, ask your doctor if you can take an over-the-counter medicine. ¨ Plan to take your pain medicine 30 minutes before exercises. It is easier to prevent pain before it starts than to stop it once it has started. · If you think your pain medicine is making you sick to your stomach:  ¨ Take your medicine after meals (unless your doctor has told you not to). ¨ Ask your doctor for a different pain medicine. · If your doctor prescribed antibiotics, take them as directed. Do not stop taking them just because you feel better. You need to take the full course of antibiotics. Incision care  · You will have a bandage over the cut (incision) and staples or stitches. Take the bandage off when your doctor says it is okay. · Your doctor will remove the staples or stitches 10 days to 3 weeks after the surgery and replace them with strips of tape. Leave the tape on for a week or until it falls off. Exercise  · Your rehab program will give you a number of exercises to do to help you get back your knee's range of motion and strength. Always do them as your therapist tells you. Ice and elevation  · For pain and swelling, put ice or a cold pack on the area for 10 to 20 minutes at a time.  Put a thin cloth between the ice and your skin. Other instructions  · Continue to wear your support stockings as your doctor says. These help to prevent blood clots. The length of time that you will have to wear them depends on your activity level and the amount of swelling. · Wear medical alert jewelry that says you may need antibiotics before any procedure, including dental work. You can buy this at most drugstores. · You have metal pieces in your knee. These may set off some airport metal detectors. Carry a medical alert card that says you have an artificial joint, just in case. Follow-up care is a key part of your treatment and safety. Be sure to make and go to all appointments, and call your doctor if you are having problems. It's also a good idea to know your test results and keep a list of the medicines you take. When should you call for help? Call 911 anytime you think you may need emergency care. For example, call if:  · You passed out (lost consciousness). · You have severe trouble breathing. · You have sudden chest pain and shortness of breath, or you cough up blood. Call your doctor now or seek immediate medical care if:  · You have signs of infection, such as:  ¨ Increased pain, swelling, warmth, or redness. ¨ Red streaks leading from the incision. ¨ Pus draining from the incision. ¨ A fever. · You have signs of a blood clot, such as:  ¨ Pain in your calf, back of the knee, thigh, or groin. ¨ Redness and swelling in your leg or groin. · Your incision comes open and begins to bleed, or the bleeding increases. · You have pain that does not get better after you take pain medicine. Watch closely for changes in your health, and be sure to contact your doctor if:  · You do not have a bowel movement after taking a laxative. Where can you learn more? Go to http://annabelle-jaky.info/. Enter M243 in the search box to learn more about \"Total Knee Replacement: What to Expect at Home. \"  Current as of: August 4, 2016  Content Version: 11.2  © 8147-9228 [employer]. Care instructions adapted under license by [employer] (which disclaims liability or warranty for this information). If you have questions about a medical condition or this instruction, always ask your healthcare professional. [address] any warranty or liability for your use of this information. These are general instructions for a healthy lifestyle:    No smoking/ No tobacco products/ Avoid exposure to second hand smoke    Surgeon General's Warning:  Quitting smoking now greatly reduces serious risk to your health. Obesity, smoking, and sedentary lifestyle greatly increases your risk for illness    A healthy diet, regular physical exercise & weight monitoring are important for maintaining a healthy lifestyle    You may be retaining fluid if you have a history of heart failure or if you experience any of the following symptoms:  Weight gain of 3 pounds or more overnight or 5 pounds in a week, increased swelling in our hands or feet or shortness of breath while lying flat in bed. Please call your doctor as soon as you notice any of these symptoms; do not wait until your next office visit. Recognize signs and symptoms of STROKE:    F-face looks uneven    A-arms unable to move or move even    S-speech slurred or non-existent    T-time-call 911 as soon as signs and symptoms begin-DO NOT go       Back to bed or wait to see if you get better-TIME IS BRAIN. The discharge information has been reviewed with the patient. The patient verbalized understanding. Information obtained by :  I understand that if any problems occur once I am at home I am to contact my physician. I understand and acknowledge receipt of the instructions indicated above. Physician's or R.N.'s Signature                                                                  Date/Time                                                                                                                                              Patient or Representative Signature                                                          Date/Time

## 2017-05-17 NOTE — PROGRESS NOTES
Problem: Mobility Impaired (Adult and Pediatric)  Goal: *Acute Goals and Plan of Care (Insert Text)  GOALS (1-4 days):  (1.)Mr. Rubina Cruz will move from supine to sit and sit to supine in bed with STAND BY ASSIST.  (2.)Mr. Rubina Cruz will transfer from bed to chair and chair to bed with STAND BY ASSIST using the least restrictive device. goal met  (3.)Mr. Rubina Cruz will ambulate with STAND BY ASSIST for 200 feet with the least restrictive device. (4.)Mr. Rubina Cruz will ambulate up/down 2 steps with bilateral railing with CONTACT GUARD ASSIST with no device. goal met  (5.)Mr. Rubina Cruz will increase left knee ROM to 5°-80°.  ________________________________________________________________________________________________      PHYSICAL THERAPY JOINT CAMP TKA: Daily Note and PM 5/17/2017  INPATIENT: Hospital Day: 2  [place] / [place] / Product Type: Medicare /      NAME/AGE/GENDER: Jhony Alarcon is a 81 y.o. male        PRIMARY DIAGNOSIS:  Unilateral primary osteoarthritis, left knee [M17.12]              Procedure(s) and Anesthesia Type:     * KNEE ARTHROPLASTY TOTAL/ LEFT/ HARRIET - Spinal (Left)  ICD-10: Treatment Diagnosis:        · Pain in Left Knee (M25.562)  · Stiffness of Left Knee, Not elsewhere classified (M25.662)  · Difficulty in walking, Not elsewhere classified (R26.2)       ASSESSMENT:      Mr. Rubina Cruz presents with limited rom and strength of left LE as well as decreased functional mobility and gait s/p left tka. He plans to go home with HHPT. Pt. Reports doing fine and plans to go home today. No questions or concerns. He is progressing. Rates pain 8/10. He increased gait distance with walker and did stairs without difficulty. He did tka exercises with cues and assistance. This section established at most recent assessment   PROBLEM LIST (Impairments causing functional limitations):  1. Decreased Strength  2. Decreased ADL/Functional Activities  3.  Decreased Transfer Abilities  4. Decreased Ambulation Ability/Technique  5. Decreased Balance  6. Increased Pain  7. Decreased Activity Tolerance  8. Decreased [place] with Home Exercise Program    INTERVENTIONS PLANNED: (Benefits and precautions of physical therapy have been discussed with the patient.)  1. Bed Mobility  2. Gait Training  3. Home Exercise Program (HEP)  4. Therapeutic Exercise/Strengthening  5. Transfer Training  6. Range of Motion: active/assisted/passive  7. Therapeutic Activities  8. Group Therapy      TREATMENT PLAN: Frequency/Duration: Follow patient BID   to address above goals. Rehabilitation Potential For Stated Goals: GOOD      RECOMMENDED REHABILITATION/EQUIPMENT: (at time of discharge pending progress): Continue Skilled Therapy and Home Health: Physical Therapy. HISTORY:   History of Present Injury/Illness (Reason for Referral):  S/p left tka  Past Medical History/Comorbidities:   Mr. Gutierrez Yung  has a past medical history of Arthritis; Chronic pain; Colon cancer ([address] (6/23/2015); Diabetes ([address] (2006); Dyslipidemia (6/23/2015); GERD (gastroesophageal reflux disease); bladder cancer (6/23/2015); Hypertension; NSTEMI (non-ST elevation myocardial infarction) ([address] (01/10/2014); Osteoarthritis of left hip (12/16/2013); S/P prosthetic total arthroplasty of the hip (1/8/2014); Stented coronary artery (01/10/2014); and Thrombocytopenia, unspecified ([address] (1/11/2014). He also has no past medical history of Aneurysm ([address] Arrhythmia; Autoimmune disease ([address] Difficult intubation; Heart failure ([address] Liver disease; Malignant hyperthermia due to anesthesia; Morbid obesity ([address] Nausea & vomiting; Pseudocholinesterase deficiency; Psychiatric disorder; PUD (peptic ulcer disease); Seizures ([address] Sleep apnea; or Thromboembolus ([address]   Mr. Gutierrez Yung  has a past surgical history that includes abdomen surgery proc unlisted (1991); lumbar fusion (1990 x 2  2012, 2013); urological (2002); heent (as child); mohs procedure (Left, ); orthopaedic (Left, ); colectomy (); colonoscopy (); heart catheterization (01/10/2014); and coronary stent placement. Social History/Living Environment:   Home Environment: Other (comment)  One/Two Story Residence: One story  Living Alone: No  Support Systems: Family member(s)  Patient Expects to be Discharged to[de-identified] Private residence  Current DME Used/Available at Home: None  Prior Level of Function/Work/Activity:  independent   Number of Personal Factors/Comorbidities that affect the Plan of Care: 1-2: MODERATE COMPLEXITY   EXAMINATION:   Most Recent Physical Functioning:                  LLE AROM  L Knee Flexion: 62  L Knee Extension: 8  L Ankle Dorsiflexion: 15      LLE Strength  L Knee Flexion: 3  L Knee Extension: 3     Bed Mobility  Supine to Sit: Contact guard assistance     Transfers  Sit to Stand: Stand-by asssistance  Stand to Sit: Stand-by asssistance  Bed to Chair: Stand-by asssistance     Balance  Sitting: Intact  Standing: With support                Weight Bearing Status  Left Side Weight Bearing: As tolerated  Distance (ft): 110 Feet (ft)  Ambulation - Level of Assistance: Stand-by asssistance  Assistive Device: Walker, rolling  Speed/Mary: Delayed  Step Length: Left shortened;Right shortened  Stance: Left decreased  Gait Abnormalities: Antalgic  Number of Stairs Trained: 3  Stairs - Level of Assistance: Contact guard assistance  Rail Use: Both  Interventions: Safety awareness training;Verbal cues      Braces/Orthotics:      Left Knee Cold  Type: Cryocuff       Body Structures Involved:  1. Bones  2. Joints  3. Muscles  4. Ligaments Body Functions Affected:  1. Movement Related Activities and Participation Affected:  1.  Mobility   Number of elements that affect the Plan of Care: 1-2: LOW COMPLEXITY   CLINICAL PRESENTATION:   Presentation: Stable and uncomplicated: LOW COMPLEXITY   CLINICAL DECISION MAKIN \A Chronology of [place] [facility]\"" [address] [address] AM-PAC 3 Clicks   Basic Mobility Inpatient Short Form  How much difficulty does the patient currently have. .. Unable A Lot A Little None   1. Turning over in bed (including adjusting bedclothes, sheets and blankets)? [ ] 1   [ ] 2   [X] 3   [ ] 4   2. Sitting down on and standing up from a chair with arms ( e.g., wheelchair, bedside commode, etc.)   [ ] 1   [ ] 2   [X] 3   [ ] 4   3. Moving from lying on back to sitting on the side of the bed? [ ] 1   [ ] 2   [X] 3   [ ] 4   How much help from another person does the patient currently need. .. Total A Lot A Little None   4. Moving to and from a bed to a chair (including a wheelchair)? [ ] 1   [ ] 2   [X] 3   [ ] 4   5. Need to walk in hospital room? [ ] 1   [ ] 2   [X] 3   [ ] 4   6. Climbing 3-5 steps with a railing? [ ] 1   [ ] 2   [X] 3   [ ] 4   © 2007, Trustees of [address] [place] [address], under license to [employer]. All rights reserved       Score:  Initial: 18 Most Recent: X (Date: -- )     Interpretation of Tool:  Represents activities that are increasingly more difficult (i.e. Bed mobility, Transfers, Gait). Score 24 23 22-20 19-15 14-10 9-7 6       Modifier CH CI CJ CK CL CM CN         · Mobility - Walking and Moving Around:               - CURRENT STATUS:    CK - 40%-59% impaired, limited or restricted               - GOAL STATUS:           CJ - 20%-39% impaired, limited or restricted               - D/C STATUS:                       ---------------To be determined---------------  [place] / [place] / Product Type: Medicare /       Medical Necessity:     · Patient is expected to demonstrate progress in strength, range of motion and balance to decrease assistance required with theraputic exercises and functional mobility.   Reason for Services/Other Comments:  · Patient continues to require present interventions due to patient's inability to perform theraputic exercises and functional mobility independently. Use of outcome tool(s) and clinical judgement create a POC that gives a: Clear prediction of patient's progress: LOW COMPLEXITY                 TREATMENT:   (In addition to Assessment/Re-Assessment sessions the following treatments were rendered)      Pre-treatment Symptoms/Complaints:  Knee soreness  Pain: Initial:      Post Session:  8 with walking      Gait Training (15 Minutes):  Gait training to improve and/or restore physical functioning as related to mobility, strength and balance. Ambulated 110 Feet (ft) with Stand-by asssistance using a Walker, rolling and minimal Safety awareness training;Verbal cues related to their stance phase to promote proper body alignment, promote proper body posture and promote proper body mechanics. Therapeutic Exercise: (45 Minutes):  Exercises per grid below to improve mobility and strength. Required minimal visual, verbal and manual cues to promote proper body alignment, promote proper body posture and promote proper body mechanics. Progressed range and repetitions as indicated.             Date:  5/17  Date:    Date:      ACTIVITY/EXERCISE AM PM AM PM AM PM   GROUP THERAPY  [ ]  [ x]  [ ]  [ ]  [ ]  [ ]   Ankle Pumps 15a  15a            Quad Sets  10a  15a           Gluteal Sets  10a  15a           Hip ABd/ADduction  10a  15a           Straight Leg Raises  10aa  15aa           Knee Slides  10aa  15aa           Short Arc Quads    15aa           Long Arc Quads               Chair Slides    15                           B = bilateral; AA = active assistive; A = active; P = passive       Treatment/Session Assessment:         Response to Treatment:  Pt. Making progress     Education:  [X] Home Exercises  [X] Fall Precautions  [ ] Hip Precautions [ ] Going Home Video  [X] Knee/Hip Prosthesis Review  [X] Walker Management/Safety [ ] Adaptive Equipment as Needed         Interdisciplinary Collaboration:   · Physical Therapy Assistant, Registered Nurse and Rehabilitation Attendant     After treatment position/precautions:   · Up in chair, Bed/Chair-wheels locked, Bed in low position and Call light within reach     Compliance with Program/Exercises: yes. No questions. Recommendations/Intent for next treatment session:  Treatment next visit will focus on increasing Mr. Rodrigues's independence with bed mobility, transfers, gait training, strength/ROM exercises, modalities for pain, and patient education.        Total Treatment Duration:  PT Patient Time In/Time Out  Time In: 1300  Time Out: 250 [facility],

## 2017-05-17 NOTE — PROGRESS NOTES
Follow up from initial assessment    SOB evaluated: None noted    Breath Sounds: Clear    IS patient effort: Excellent  Patient achieved:3500  Ml/sec    Patient resting comfortably, denies any discomfort at this time. Continuous sat monitor ordered QHS   Continuous oximetry ordered and set up.

## 2017-05-17 NOTE — PROGRESS NOTES
05/17/17 1040   Oxygen Therapy   O2 Sat (%) 96 %   Pulse via Oximetry 69 beats per minute   O2 Device Room air   O2 at the bedside if needed. Patient sitting in the chair. Sats appropriate. BS clear. Good effort on IS, achieved 2500 ml/sec. Patient denies use of home O2, denies use of home cpap.

## 2017-05-17 NOTE — PROGRESS NOTES
May 17, 2017         Post Op day: 1 Day Post-Op     Admit Date: 5/16/2017  Admit Diagnosis: Unilateral primary osteoarthritis, left knee [M17.12]        Subjective: Doing well, No complaints, No SOB, No Chest Pain, No Nausea or Vomiting     Objective:   Vital Signs are Stable, No Acute Distress, Alert and Oriented, Dressing is Dry,  Neurovascular exam is normal.     Assessment / Plan :  Patient Active Problem List   Diagnosis Code    Essential hypertension I10    Arthritis M19.90    GERD (gastroesophageal reflux disease) K21.9    Unspecified adverse effect of anesthesia T41.45XA    Spinal stenosis, lumbar region, with neurogenic claudication M48.06    Scoliosis (and kyphoscoliosis), idiopathic M41.20    Status post lumbar spinal fusion Z98.1    Osteoarthritis of left hip M16.12    BPH (benign prostatic hyperplasia) N40.0    HLD (hyperlipidemia) E78.5    Former smoker Z87.891    H/O chest pain Z87.898    History of normal resting EKG [id number]    History of colon cancer Z85.038    History of bladder cancer Z85.51    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.649    NSTEMI (non-ST elevation myocardial infarction) ([facility] I21.4    Thrombocytopenia, unspecified (H[facility]) D69.6    Dyslipidemia E78.5    Hx of bladder cancer Z85.51    Controlled type 2 diabetes mellitus without complication ([facility]) S87.5    Chronic pain G89.29    Lumbar radiculopathy M54.16    Chronic pain of left knee M25.562, G89.29    Medicare annual wellness visit, subsequent Z00.00    Idiopathic peripheral neuropathy G60.9    Coronary atherosclerosis of native coronary vessel I25.10    S/P PTCA (percutaneous transluminal coronary angioplasty) Z98.61    Atherosclerosis of native coronary artery of native heart without angina pectoris I25.10    Type 2 diabetes mellitus without complication, without long-term current use of insulin ([facility]) E11.9    Mixed hyperlipidemia E78.2    Arthritis of knee, left M17.12    S/P total knee arthroplasty [id number]    Patient Vitals for the past 8 hrs:   BP Temp Pulse Resp SpO2   17 0818 145/65 97.4 °F (36.3 °C) 66 14 99 %   17 0400 126/61 96 °F (35.6 °C) 70 12 96 %   17 0040 111/53 96.4 °F (35.8 °C) 73 14 96 %    Temp (24hrs), Av.3 °F (35.7 °C), Min:95.4 °F (35.2 °C), Max:97.4 °F (36.3 °C)    Body mass index is 28.31 kg/(m^2).     Continue PT  Lab Results   Component Value Date/Time    HGB 11.5 2017 03:20 AM    Monitor HGB   Pt seen by and discussed with Supervising Physician   Home today     Signed By: OFELIA Page

## 2017-05-17 NOTE — PROGRESS NOTES
Problem: Self Care Deficits Care Plan (Adult)  Goal: *Acute Goals and Plan of Care (Insert Text)  GOALS:   DISCHARGE GOALS (in preparation for going home/rehab): 3 days  1. Mr. Marcela Reis will perform one lower body dressing activity with minimal assistance required to demonstrate improved functional mobility and safety. met  2. Mr. Marcela Reis will perform one lower body bathing activity with minimal assistance required to demonstrate improved functional mobility and safety. met  3. Mr. Marcela Reis will perform toileting/toilet transfer with contact guard assistance to demonstrate improved functional mobility and safety. met  4. Mr. Marcela Reis will perform shower transfer with contact guard assistance to demonstrate improved functional mobility and safety. met      JOINT CAMP OCCUPATIONAL THERAPY TKA: Daily Note 5/17/2017  INPATIENT: Hospital Day: 2  [place] / Product Type: Medicare /      NAME/AGE/GENDER: Marisa Cheng is a 81 y.o. male        PRIMARY DIAGNOSIS:  Unilateral primary osteoarthritis, left knee [M17.12]              Procedure(s) and Anesthesia Type:     * KNEE ARTHROPLASTY TOTAL/ LEFT/ HARRIET - Spinal (Left)  ICD-10: Treatment Diagnosis:        · Pain in Left Knee (M25.562)  · Stiffness of Left Knee, Not elsewhere classified (Y47.413)       ASSESSMENT:      Mr. Marcela Reis is s/p L TKA and presents with decreased weight bearing on L LE and decreased independence with functional mobility and activities of daily living. Patient completed shower and dressing as charter below in ADL grid and is ambulating with rolling walker and contact guard assist.  Patient has met 4/4 goals and plans to return home with good family support. Family able to provide patient with appropriate level of assistance at this time. OT reviewed safety precautions throughout session and therapy schedule for the remainder of today.   Patient instructed to call for assistance when needing to get up from recliner and all needs in reach. Patient verbalized understanding of call light. This section established at most recent assessment   PROBLEM LIST (Impairments causing functional limitations):  1. Decreased Strength  2. Decreased ADL/Functional Activities  3. Decreased Transfer Abilities  4. Increased Pain  5. Increased Fatigue  6. Decreased Flexibility/Joint Mobility  7. Decreased Knowledge of Precautions    INTERVENTIONS PLANNED: (Benefits and precautions of occupational therapy have been discussed with the patient.)  1. Activities of daily living training  2. Adaptive equipment training  3. Balance training  4. Clothing management  5. Donning&doffing training  6. Theraputic activity      TREATMENT PLAN: Frequency/Duration: Follow patient 1-2 times to address above goals. Rehabilitation Potential For Stated Goals: GOOD      RECOMMENDED REHABILITATION/EQUIPMENT: (at time of discharge pending progress): Continue Skilled Therapy and Home Health: Physical Therapy. OCCUPATIONAL PROFILE AND HISTORY:   History of Present Injury/Illness (Reason for Referral): Pt presents this date s/p (left) TKA. Past Medical History/Comorbidities:   Mr. 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 He also has no past medical history of Aneurysm ([address] Arrhythmia; Autoimmune disease ([address] Difficult intubation; Heart failure ([address] Liver disease; Malignant hyperthermia due to anesthesia; Morbid obesity ([address]  Nausea & vomiting; Pseudocholinesterase deficiency; Psychiatric disorder; PUD (peptic ulcer disease); Seizures ([facility] Sleep apnea; or Thromboembolus ([facility] Mr. Sina Gordon  has a past surgical history that includes abdomen surgery proc unlisted (1991); lumbar fusion (1990 x 2  2012, 2013); urological (2002); heent (as child); mohs procedure (Left, 1990); orthopaedic (Left, 2013); colectomy (1997); colonoscopy (2014); heart catheterization (01/10/2014); and coronary stent placement. Social History/Living Environment:   Home Environment: Other (comment)  One/Two Story Residence: One story  Living Alone: No  Support Systems: Family member(s)  Patient Expects to be Discharged to[de-identified] Private residence  Current DME Used/Available at Home: None  Prior Level of Function/Work/Activity:  Independent       Number of Personal Factors/Comorbidities that affect the Plan of Care: Brief history (0):  LOW COMPLEXITY   ASSESSMENT OF OCCUPATIONAL PERFORMANCE[de-identified]   Most Recent Physical Functioning:   Balance  Sitting: Intact  Standing: With support        Patient Vitals for the past 6 hrs:   BP BP Patient Position SpO2 Pulse   05/17/17 0818 145/65 At rest 99 % 66   05/17/17 1040 - - 96 % -   05/17/17 1057 128/56 Sitting 98 % 72                     LLE AROM  L Knee Extension: 60  L Ankle Dorsiflexion: 15             Mental Status  Neurologic State: Alert  Orientation Level: Oriented X4  Cognition: Follows commands  Perception: Appears intact  Perseveration: No perseveration noted  Safety/Judgement: Awareness of environment; Fall prevention                    Basic ADLs (From Assessment) Complex ADLs (From Assessment)   Basic ADL  Feeding: Independent  Oral Facial Hygiene/Grooming: Supervision  Bathing: Moderate assistance  Upper Body Dressing: Supervision  Lower Body Dressing: Moderate assistance  Toileting:  Moderate assistance     Grooming/Bathing/Dressing Activities of Daily Living   Grooming  Grooming Assistance: Supervision/set up Cognitive Retraining  Safety/Judgement: Awareness of environment; Fall prevention   Upper Body Bathing  Bathing Assistance: Supervision/set-up  Position Performed: Seated in chair  Cues: Verbal cues provided  Adaptive Equipment: Grab bar; Shower chair Feeding  Feeding Assistance: Supervision/set-up   Lower Body Bathing  Bathing Assistance: Supervision/set-up  Perineal  : Supervision/set-up  Position Performed: Seated in chair;Standing  Cues: Verbal cues provided  Adaptive Equipment: Grab bar;Walker  Lower Body : Supervision/set-up  Position Performed: Bending forward method;Seated in chair;Standing  Cues: Verbal cues provided  Adaptive Equipment: Grab bar; Shower chair;Walker Toileting  Toileting Assistance: Supervision/set up   Upper Body Dressing Assistance  Dressing Assistance: Supervision/set-up  Pullover Shirt: Supervision/set-up Functional Transfers  Bathroom Mobility: Contact guard assistance  Toilet Transfer : Contact guard assistance  Shower Transfer: Contact guard assistance  Cues: Verbal cues provided  Adaptive Equipment: Bedside commode;Grab bars; Shower chair with back; Walker (comment)   Lower Body Dressing Assistance  Dressing Assistance: Contact guard assistance  Underpants: Contact guard assistance  Pants With Elastic Waist: Contact guard assistance  Socks: Compensatory technique training ( socks)  Antiembolitic Stockings: Compensatory technique training  Adaptive Equipment Used: Grab bar;Walker Bed/Mat Mobility  Supine to Sit: Contact guard assistance  Sit to Stand: Contact guard assistance  Bed to Chair: Contact guard assistance           Physical Skills Involved:  1. Range of Motion  2. Balance  3. Mobility Cognitive Skills Affected (resulting in the inability to perform in a timely and safe manner): 1. none Psychosocial Skills Affected:  1.  Environmental Adaptations   Number of elements that affect the Plan of Care: 3-5:  MODERATE COMPLEXITY   CLINICAL DECISION MAKIN [facility] Mobility Inpatient Short Form  How much help from another person does the patient currently need. .. Total A Lot A Little None   1. Putting on and taking off regular lower body clothing?   [ ] 1   [X] 2   [ ] 3   [ ] 4   2. Bathing (including washing, rinsing, drying)? [ ] 1   [X] 2   [ ] 3   [ ] 4   3. Toileting, which includes using toilet, bedpan or urinal?   [ ] 1   [ ] 2   [X] 3   [ ] 4   4. Putting on and taking off regular upper body clothing?   [ ] 1   [ ] 2   [ ] 3   [X] 4   5. Taking care of personal grooming such as brushing teeth? [ ] 1   [ ] 2   [ ] 3   [X] 4   6. Eating meals? [ ] 1   [ ] 2   [ ] 3   [X] 4   © 2007, Trustees of [address] [place] [address] [address] [address], under license to [employer]. All rights reserved   Score:  Initial: 19 Most Recent: X (Date: -- )     Interpretation of Tool:  Represents activities that are increasingly more difficult (i.e. Bed mobility, Transfers, Gait). Score 24 23 22-20 19-15 14-10 9-7 6       Modifier CH CI CJ CK CL CM CN         · Self Care:               - CURRENT STATUS:    CK - 40%-59% impaired, limited or restricted               - GOAL STATUS:           CJ - 20%-39% impaired, limited or restricted               - D/C STATUS:                       ---------------To be determined---------------  [place] / Product Type: Medicare /       Medical Necessity:     · Patient is expected to demonstrate progress in range of motion, balance and functional technique to increase independence with self care. Reason for Services/Other Comments:  · Patient would benefit from skilled Occupational Therapy to maximize independence and safety with self-care task and functional mobility. .   Use of outcome tool(s) and clinical judgement create a POC that gives a: LOW COMPLEXITY                 TREATMENT:   (In addition to Assessment/Re-Assessment sessions the following treatments were rendered)      Pre-treatment Symptoms/Complaints:  No complaint of pain  Pain: Initial:   Pain Intensity 1: 0 0 Post Session:  0      Self Care: (45): Procedure(s) (per grid) utilized to improve and/or restore self-care/home management as related to dressing, bathing, toileting and grooming. Required minimal verbal and   cueing to facilitate activities of daily living skills and compensatory activities. Treatment/Session Assessment:         Response to Treatment: tolerated well     Education:  [ ] Home Exercises  [X] Fall Precautions  [ ] Hip Precautions [ ] [address] Video  [X] Knee/Hip Prosthesis Review  [X] Walker Management/Safety [X] Adaptive Equipment as Needed         Interdisciplinary Collaboration:   · Occupational Therapist and Registered Nurse     After treatment position/precautions:   · Up in chair  · Bed/Chair-wheels locked  · Call light within reach  · RN notified     Compliance with Program/Exercises: compliant all of the time. Recommendations/Intent for next treatment session:  Treatment next visit will focus on increasing Mr. Cabreras independence with bed mobility, transfers, self care training, and patient education.        Total Treatment Duration:  OT Patient Time In/Time Out  Time In: 0815  Time Out: 0900     Federico Nesbitt OT

## 2017-05-17 NOTE — PROGRESS NOTES
Post op interview conducted following left total knee arthroplasty dated 5/16/17. Patient has had nausea post op, patient states that they are being treated timely and to his satisfaction.    No anesthetic complicattions noted

## 2017-05-17 NOTE — PROGRESS NOTES
Problem: Mobility Impaired (Adult and Pediatric)  Goal: *Acute Goals and Plan of Care (Insert Text)  GOALS (1-4 days):  (1.)Mr. Titus Diaz will move from supine to sit and sit to supine in bed with STAND BY ASSIST.  (2.)Mr. Titus Diaz will transfer from bed to chair and chair to bed with STAND BY ASSIST using the least restrictive device. (3.)Mr. Titus Diaz will ambulate with STAND BY ASSIST for 200 feet with the least restrictive device. (4.)Mr. Titus Diaz will ambulate up/down 2 steps with bilateral railing with CONTACT GUARD ASSIST with no device. (5.)Mr. Titus Diaz will increase left knee ROM to 5°-80°.  ________________________________________________________________________________________________      PHYSICAL THERAPY JOINT CAMP TKA: Daily Note and AM 5/17/2017  INPATIENT: Hospital Day: 2  [place] / Product Type: Medicare /      NAME/AGE/GENDER: Cyndi Canela is a 81 y.o. male        PRIMARY DIAGNOSIS:  Unilateral primary osteoarthritis, left knee [M17.12]              Procedure(s) and Anesthesia Type:     * KNEE ARTHROPLASTY TOTAL/ LEFT/ HARRIET - Spinal (Left)  ICD-10: Treatment Diagnosis:        · Pain in Left Knee (M25.562)  · Stiffness of Left Knee, Not elsewhere classified (M25.662)  · Difficulty in walking, Not elsewhere classified (R26.2)       ASSESSMENT:      Mr. Titus Diaz presents with limited rom and strength of left LE as well as decreased functional mobility and gait s/p left tka. He plans to go home with HHPT. Pt. Doing well this am.  Rates pain 6/10, no nausea. He did tka exercises with cues and assistance. He ambulated in the room with walker. He plans to go home later today. This section established at most recent assessment   PROBLEM LIST (Impairments causing functional limitations):  1. Decreased Strength  2. Decreased ADL/Functional Activities  3. Decreased Transfer Abilities  4. Decreased Ambulation Ability/Technique  5.  Decreased Balance  6. Increased Pain  7. Decreased Activity Tolerance  8. Decreased [place] with Home Exercise Program    INTERVENTIONS PLANNED: (Benefits and precautions of physical therapy have been discussed with the patient.)  1. Bed Mobility  2. Gait Training  3. Home Exercise Program (HEP)  4. Therapeutic Exercise/Strengthening  5. Transfer Training  6. Range of Motion: active/assisted/passive  7. Therapeutic Activities  8. Group Therapy      TREATMENT PLAN: Frequency/Duration: Follow patient BID   to address above goals. Rehabilitation Potential For Stated Goals: GOOD      RECOMMENDED REHABILITATION/EQUIPMENT: (at time of discharge pending progress): Continue Skilled Therapy and Home Health: Physical Therapy. HISTORY:   History of Present Injury/Illness (Reason for Referral):  S/p left tka  Past Medical History/Comorbidities:   Mr. 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 He also has no past medical history of Aneurysm ([address] Arrhythmia; Autoimmune disease ([address] Difficult intubation; Heart failure ([address] Liver disease; Malignant hyperthermia due to anesthesia; Morbid obesity ([address] Nausea & vomiting; Pseudocholinesterase deficiency; Psychiatric disorder; PUD (peptic ulcer disease); Seizures ([address] Sleep apnea; or Thromboembolus ([address]   Mr. Efrain Galarza  has a past surgical history that includes abdomen surgery proc unlisted (1991); lumbar fusion (1990 x 2  2012, 2013); urological (2002); heent (as child); mohs procedure (Left, 1990); orthopaedic (Left, 2013); colectomy (); colonoscopy (); heart catheterization (01/10/2014); and coronary stent placement. Social History/Living Environment:   Home Environment: Other (comment)  One/Two Story Residence: One story  Living Alone: No  Support Systems: Family member(s)  Patient Expects to be Discharged to[de-identified] Private residence  Current DME Used/Available at Home: None  Prior Level of Function/Work/Activity:  independent   Number of Personal Factors/Comorbidities that affect the Plan of Care: 1-2: MODERATE COMPLEXITY   EXAMINATION:   Most Recent Physical Functioning:                  LLE AROM  L Knee Extension: 60  L Ankle Dorsiflexion: 15            Bed Mobility  Supine to Sit: Minimum assistance     Transfers  Sit to Stand: Minimum assistance  Stand to Sit: Minimum assistance  Bed to Chair: Minimum assistance     Balance  Sitting: Intact  Standing: With support;Pull to stand                Weight Bearing Status  Left Side Weight Bearing: As tolerated  Distance (ft): 15 Feet (ft)  Ambulation - Level of Assistance: Contact guard assistance  Assistive Device: Walker, rolling  Speed/Mary: Delayed  Step Length: Left shortened;Right shortened  Stance: Left decreased  Gait Abnormalities: Antalgic;Decreased step clearance  Interventions: Safety awareness training;Verbal cues      Braces/Orthotics:      Left Knee Cold  Type: Cryocuff       Body Structures Involved:  1. Bones  2. Joints  3. Muscles  4. Ligaments Body Functions Affected:  1. Movement Related Activities and Participation Affected:  1. Mobility   Number of elements that affect the Plan of Care: 1-2: LOW COMPLEXITY   CLINICAL PRESENTATION:   Presentation: Stable and uncomplicated: LOW COMPLEXITY   CLINICAL DECISION MAKIN [facility] [address] [id number] AM-PAC 6 Clicks   Basic Mobility Inpatient Short Form  How much difficulty does the patient currently have. .. Unable A Lot A Little None   1. Turning over in bed (including adjusting bedclothes, sheets and blankets)?    [ ] 1   [ ] 2   [X] 3   [ ] 4   2. Sitting down on and standing up from a chair with arms ( e.g., wheelchair, bedside commode, etc.)   [ ] 1   [ ] 2   [X] 3   [ ] 4   3. Moving from lying on back to sitting on the side of the bed? [ ] 1   [ ] 2   [X] 3   [ ] 4   How much help from another person does the patient currently need. .. Total A Lot A Little None   4. Moving to and from a bed to a chair (including a wheelchair)? [ ] 1   [ ] 2   [X] 3   [ ] 4   5. Need to walk in hospital room? [ ] 1   [ ] 2   [X] 3   [ ] 4   6. Climbing 3-5 steps with a railing? [ ] 1   [ ] 2   [X] 3   [ ] 4   © 2007, Trustees of [address] [place] [address], under license to [employer]. All rights reserved       Score:  Initial: 18 Most Recent: X (Date: -- )     Interpretation of Tool:  Represents activities that are increasingly more difficult (i.e. Bed mobility, Transfers, Gait). Score 24 23 22-20 19-15 14-10 9-7 6       Modifier CH CI CJ CK CL CM CN         · Mobility - Walking and Moving Around:               - CURRENT STATUS:    CK - 40%-59% impaired, limited or restricted               - GOAL STATUS:           CJ - 20%-39% impaired, limited or restricted               - D/C STATUS:                       ---------------To be determined---------------  [place] / Product Type: Medicare /       Medical Necessity:     · Patient is expected to demonstrate progress in strength, range of motion and balance to decrease assistance required with theraputic exercises and functional mobility. Reason for Services/Other Comments:  · Patient continues to require present interventions due to patient's inability to perform theraputic exercises and functional mobility independently.    Use of outcome tool(s) and clinical judgement create a POC that gives a: Clear prediction of patient's progress: LOW COMPLEXITY                 TREATMENT:   (In addition to Assessment/Re-Assessment sessions the following treatments were rendered)      Pre-treatment Symptoms/Complaints:  Knee soreness  Pain: Initial: 6     Post Session:  6      Gait Training (15 Minutes):  Gait training to improve and/or restore physical functioning as related to mobility, strength and balance. Ambulated 15 Feet (ft) with Contact guard assistance using a Walker, rolling and minimal Safety awareness training;Verbal cues related to their stance phase to promote proper body alignment, promote proper body posture and promote proper body mechanics. Therapeutic Exercise: (15 Minutes):  Exercises per grid below to improve mobility and strength. Required minimal visual, verbal and manual cues to promote proper body alignment, promote proper body posture and promote proper body mechanics. Progressed range and repetitions as indicated. Date:  5/17  Date:    Date:      ACTIVITY/EXERCISE AM PM AM PM AM PM   GROUP THERAPY  [ ]  [ ]  [ ]  [ ]  [ ]  [ ]   Ankle Pumps 15a              Quad Sets  10a             Gluteal Sets  10a             Hip ABd/ADduction  10a             Straight Leg Raises  10aa             Knee Slides  10aa             Short Arc Quads               Long Arc Quads               Chair Slides                               B = bilateral; AA = active assistive; A = active; P = passive       Treatment/Session Assessment:         Response to Treatment:  Pt. Did well with no complaints     Education:  [X] Home Exercises  [X] Fall Precautions  [ ] Hip Precautions [ ] Going Home Video  [X] Knee/Hip Prosthesis Review  [X] Walker Management/Safety [ ] Adaptive Equipment as Needed         Interdisciplinary Collaboration:   · Occupational Therapist and Registered Nurse     After treatment position/precautions:   · Up in chair, Bed/Chair-wheels locked, Bed in low position and Call light within reach     Compliance with Program/Exercises: Will assess as treatment progresses. No questions.      Recommendations/Intent for next treatment session:  Treatment next visit will focus on increasing Mr. Rodrigues's independence with bed mobility, transfers, gait training, strength/ROM exercises, modalities for pain, and patient education.        Total Treatment Duration:  PT Patient Time In/Time Out  Time In: 0900  Time Out: Natividad Alexander, PT

## 2017-05-17 NOTE — PROGRESS NOTES
Pt discharge summary and home medication sheet reviewed and signed by pt. Copy given for take home use. RX for po oxycodone given. Pt pain to knee controlled well. Appetite good. Pt voiding we. . Pt b/p as charted after po b/p med. Assessment unchanged. Pt left hospital via w/c with family and staff member. Aquacel dressing given for take home use.

## 2017-05-18 ENCOUNTER — PATIENT OUTREACH (OUTPATIENT)
Dept: CASE MANAGEMENT | Age: 82
End: 2017-05-18

## 2017-05-18 ENCOUNTER — HOSPITAL ENCOUNTER (EMERGENCY)
Age: 82
Discharge: HOME | End: 2017-05-18
Attending: EMERGENCY MEDICINE
Payer: MEDICARE

## 2017-05-18 ENCOUNTER — HOME CARE VISIT (OUTPATIENT)
Dept: HOME HEALTH SERVICES | Facility: HOME HEALTH | Age: 82
End: 2017-05-18

## 2017-05-18 VITALS
HEART RATE: 72 BPM | OXYGEN SATURATION: 98 % | SYSTOLIC BLOOD PRESSURE: 105 MMHG | RESPIRATION RATE: 16 BRPM | TEMPERATURE: 98.3 F | DIASTOLIC BLOOD PRESSURE: 45 MMHG

## 2017-05-18 RX ORDER — SODIUM CHLORIDE 9 MG/ML
250 INJECTION, SOLUTION INTRAVENOUS AS NEEDED
Status: DISCONTINUED | OUTPATIENT
Start: 2017-05-18 | End: 2019-10-24 | Stop reason: HOSPADM

## 2017-05-18 NOTE — ED PROVIDER NOTES
HPI Comments: Patient had left knee replacement on Tuesday, was discharged home yesterday. He has an anterior dressing to his left knee. Shortly after getting home he started having some fluid collection behind the dressing. This has gotten progressively worse. He does have some left knee pain though this has remained steady. He is able to ambulate with some slight discomfort. He denies similar pain in the past and did not take any medicine. He called the orthopedic service and was told to come to the emergency department. Elements of this note were created using speech recognition software. As such, errors of speech recognition may be present. Patient is a 81 y.o. male presenting with post-operative complications. The history is provided by the patient. Post OP Complication          Past Medical History:   Diagnosis Date    Arthritis     osteo    Chronic pain     left hip/back/LLE    Colon cancer ([facility] [address] 6/23/2015    Diabetes ([facility] [address] 2006    Type 2. diet controlled. Pt does not monitor blood sugar. A1C=5.7 Jan 2017.  Dyslipidemia 6/23/2015    GERD (gastroesophageal reflux disease)     controlled w/med    Hx of bladder cancer 6/23/2015    Hypertension     controlled on medication    NSTEMI (non-ST elevation myocardial infarction) ([address] 01/10/2014    MI. heart cath: preserved LVSF. EF =65%. mild to mod LAD and circumflex disease. 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      Thrombocytopenia, unspecified ([facility] [address] 1/11/2014       Past Surgical History:   Procedure Laterality Date    ABDOMEN SURGERY PROC UNLISTED  1991    EXP Lap    HX COLECTOMY  1997    Colon Resection    HX COLONOSCOPY  2014    HX CORONARY STENT PLACEMENT      HX HEART CATHETERIZATION  01/10/2014    s/p bare metal stent to mid-right CA    HX HEENT  as child    T&A    HX LUMBAR FUSION  1990 x 2  2012, 2013    lumbar fusions- x 4 total surgeries    HX MOHS PROCEDURES Left 1990    HX ORTHOPAEDIC Left 2013    left hip replaced    HX UROLOGICAL  2002    Turp         Family History:   Problem Relation Age of Onset    Heart Disease Mother     Cancer Father     Lung Disease Sister     Heart Disease Brother     Cancer Brother      pancreatic    Parkinson's Disease Brother     Malignant Hyperthermia Neg Hx     Pseudocholinesterase Deficiency Neg Hx     Delayed Awakening Neg Hx     Post-op Nausea/Vomiting Neg Hx     Emergence Delirium Neg Hx     Post-op Cognitive Dysfunction Neg Hx     Other Neg Hx        Social History     Social History    Marital status:      Spouse name: N/A    Number of children: N/A    Years of education: N/A     Occupational History    Not on file. Social History Main Topics    Smoking status: Former Smoker     Packs/day: 1.50     Years: 28.00    Smokeless tobacco: Never Used      Comment: quit 1982    Alcohol use No    Drug use: No    Sexual activity: Not on file     Other Topics Concern    Not on file     Social History Narrative         ALLERGIES: Review of patient's allergies indicates no known allergies. Review of Systems   Constitutional: Negative for chills and fever. Gastrointestinal: Negative for nausea and vomiting. All other systems reviewed and are negative. Vitals:    05/18/17 0020 05/18/17 0025   BP: (!) 91/33 101/42   Pulse: 74    Resp: 18 18   Temp: 98.3 °F (36.8 °C)    SpO2: 97%             Physical Exam   Constitutional: He is oriented to person, place, and time. He appears well-developed and well-nourished. HENT:   Head: Normocephalic and atraumatic. Eyes: Conjunctivae are normal. Pupils are equal, round, and reactive to light. Neck: Normal range of motion. Neck supple. Musculoskeletal: He exhibits no edema or tenderness.    Dressing in place anterior left knee with a moderate amount of serous sanguinous fluid   Neurological: He is alert and oriented to person, place, and time. Skin: Skin is warm and dry. Psychiatric: He has a normal mood and affect. His behavior is normal.   Nursing note and vitals reviewed. MDM  Number of Diagnoses or Management Options  Diagnosis management comments: Differential diagnosis: Seroma, hematoma, postoperative infection  2:33 AM Spoke with Dr Warden Veloz, ddiscussed details with him. He recommends placing somewhat of a bulky dressing over his current dressing and wrapping it loosely with an Ace wrap and following up closely with [facility]. .  This was conveyed to the patient, he is agreeable to this plan       Amount and/or Complexity of Data Reviewed  Discuss the patient with other providers: yes    Risk of Complications, Morbidity, and/or Mortality  Presenting problems: moderate  Diagnostic procedures: moderate  Management options: moderate    Patient Progress  Patient progress: improved    ED Course       Procedures

## 2017-05-18 NOTE — DISCHARGE INSTRUCTIONS
Seroma: Care Instructions  Your Care Instructions  After a surgery, fluid can collect under the skin near the cut the doctor made (incision). This soft, puffy area is called a seroma. It can be tender to touch. The incision may even have opened up. Some seromas get better on their own. But when there is a lot of fluid under the skin, a seroma is drained to help the area heal.  If your incision has opened up, it may either be packed with gauze or left open to heal. To prevent infection, make sure to keep the area clean and to take all medicines as prescribed. Follow-up care is a key part of your treatment and safety. Be sure to make and go to all appointments, and call your doctor if you are having problems. It's also a good idea to know your test results and keep a list of the medicines you take. How can you care for yourself at home? · Follow your doctor's instructions for seroma care. If you have a drain tube, your doctor will tell you how to take care of it. · Look at the incision every day. Keep the area clean and dry. · Do not bathe unless you can keep the incision dry. Start with sponge baths. Ask your doctor when it is safe to shower. · Do not scrub or rub the incision. And don't wear clothing that rubs it. · Leave any tape strips (such as Steri-Strips) on your incision. They will fall off on their own, or your doctor may tell you when to take them off. · Do not put lotion or powder on incisions. · Keep your incision out of direct sunlight. · Be safe with medicines. Read and follow all instructions on the label. ¨ If the doctor gave you a prescription medicine for pain, take it as prescribed. ¨ If you are not taking a prescription pain medicine, ask the doctor if you can take an over-the-counter medicine. · Your doctor may give you specific instructions on when you can do your normal activities again, such as driving and going back to work. When should you call for help?   Call 911 anytime you think you may need emergency care. For example, call if:  · You passed out (lost consciousness). · You have severe trouble breathing. Call your doctor now or seek immediate medical care if:  · You have symptoms of infection, such as:  ¨ Increased pain, swelling, warmth, or redness. ¨ Red streaks leading from the incision. ¨ Pus draining from the incision or a yellow or green discharge that is increasing. ¨ A fever. · You bleed through a bandage. · The incision opens up. Watch closely for changes in your health, and be sure to contact your doctor if:  · The incision is not healing as expected. Where can you learn more? Go to http://annabelle-jaky.info/. Enter X992 in the search box to learn more about \"Seroma: Care Instructions. \"  Current as of: May 27, 2016  Content Version: 11.2  © 3089-0339 [employer]. Care instructions adapted under license by [employer] (which disclaims liability or warranty for this information). If you have questions about a medical condition or this instruction, always ask your healthcare professional. [address] any warranty or liability for your use of this information.

## 2017-05-18 NOTE — PROGRESS NOTES
This note will not be viewable in [address] Transition of Care Discharge Follow-Up Questionnaire         Date/Time of Call:   May 18, 2017 12:18PM   What was the patient hospitalized for? Patient hospitalized for Status Post Total Knee Arthroplasty. Does the patient understand his/her diagnosis and/or treatment and what happened during the hospitalization? Patient states understanding of diagnosis and treatment during hospitalization. Did the patient receive discharge instructions? Patient states discharge instructions explained and received before discharge to home. Review any discharge instructions (see notes in [facility]). Ask patient if they understand these. Do they have any questions? Patient states no questions regarding discharge instructions. Were home services ordered (nursing, PT, OT, ST, etc.)? Patient states home health services ordered (PT)       If so, has the first visit occurred? If not, why? (Assist with coordination of services if necessary.) Patient states that Orthopedic Surgeon gave orders to stay off left knee, patient states he will delay PT services until he is able to resume care per doctor's order. Was any DME ordered? No durable medical equipment ordered. If so, has it been received? If not, why?  (Assist with coordination of arranging DME orders if necessary. ) NA         Complete a review of all medications (new, continued and discontinued meds per the D[facility]C instructions and [facility] Care Coordinator reviewed all medications with patient per connect care, one new medication prescribed Roxicodone 10 mg tab immediate release tablet every four hours as needed. Were all new prescriptions filled? If not, why?  (Assist with obtainment of medications if necessary.) Patient states new prescription filled and currently being taken per doctors order.          Does the patient understand the purpose and dosing instructions for all medications? (If patient has questions, provide explanation and education.) Patient states understanding of medication purposes and dosing instructions. Does the patient have any problems in performing ADLs? (If patient is unable to perform ADLs  what is the limiting factor(s)? Do they have a support system that can assist? If no support system is present, discuss possible assistance that they may be able to obtain.) Patient states he is independent with ADL's and ambulation. Patient states he is taking easy and avoiding any injury to left knee. Patient states his spouse and family members will provide assistance to him as needed. Does the patient have all follow-up appointments scheduled? Has transportation been arranged? [facility] Pulmonary follow-up should be within 7 days of discharge; all others should have PCP follow-up within 7 days of discharge; follow-ups with other specialists as appropriate or ordered.) Patient states follow up appointments scheduled with Dr. Natasha Cabello (Orthopedic Surgeon) June 12, 2017. [facility], July 17, 2017 @ 8:15AM   Care Coordinator advised patient to schedule hospital follow up with PCP in 1-2 weeks if patient is able to do so. Patient has no transportation needs. Any other questions or concerns expressed by the patient? Patient states no questions or concerns. Schedule next appointment with KARLIE NGUYEN Coordinator or refer to RN Case Manager/  as appropriate. No further needs identified, patient instructed to call Care Coordinator if further questions or concerns arise.    FAY Call Completed By: Evelyn Thomason LPN  Care Coordinator   [facility]

## 2017-05-19 ENCOUNTER — HOSPITAL ENCOUNTER (OUTPATIENT)
Dept: INFUSION THERAPY | Age: 82
Discharge: HOME | End: 2017-05-19
Payer: MEDICARE

## 2017-05-19 ENCOUNTER — HOSPITAL ENCOUNTER (OUTPATIENT)
Dept: LAB | Age: 82
Discharge: HOME | End: 2017-05-19

## 2017-05-19 ENCOUNTER — PATIENT OUTREACH (OUTPATIENT)
Dept: CASE MANAGEMENT | Age: 82
End: 2017-05-19

## 2017-05-19 VITALS
OXYGEN SATURATION: 96 % | BODY MASS INDEX: 28.42 KG/M2 | RESPIRATION RATE: 18 BRPM | HEART RATE: 91 BPM | DIASTOLIC BLOOD PRESSURE: 57 MMHG | TEMPERATURE: 97.9 F | WEIGHT: 203.8 LBS | SYSTOLIC BLOOD PRESSURE: 199 MMHG

## 2017-05-19 LAB
ERYTHROCYTE [DISTWIDTH] IN BLOOD BY AUTOMATED COUNT: 12.9 % (ref 11.9–14.6)
HCT VFR BLD AUTO: 28 % (ref 41.1–50.3)
HGB BLD-MCNC: 9.3 G/DL (ref 13.6–17.2)
INR PPP: 1.2 (ref 0.9–1.2)
MCH RBC QN AUTO: 32.2 PG (ref 26.1–32.9)
MCHC RBC AUTO-ENTMCNC: 33.2 G/DL (ref 31.4–35)
MCV RBC AUTO: 96.9 FL (ref 79.6–97.8)
PLATELET # BLD AUTO: 140 K/UL (ref 150–450)
PMV BLD AUTO: 10.1 FL (ref 10.8–14.1)
PROTHROMBIN TIME: 12.4 SEC (ref 9.6–12)
RBC # BLD AUTO: 2.89 M/UL (ref 4.23–5.67)
WBC # BLD AUTO: 10.1 K/UL (ref 4.3–11.1)

## 2017-05-19 PROCEDURE — 85027 COMPLETE CBC AUTOMATED: CPT | Performed by: ORTHOPAEDIC SURGERY

## 2017-05-19 PROCEDURE — 36415 COLL VENOUS BLD VENIPUNCTURE: CPT | Performed by: ORTHOPAEDIC SURGERY

## 2017-05-19 PROCEDURE — 36430 TRANSFUSION BLD/BLD COMPNT: CPT

## 2017-05-19 PROCEDURE — P9059 PLASMA, FRZ BETWEEN 8-24HOUR: HCPCS | Performed by: ORTHOPAEDIC SURGERY

## 2017-05-19 PROCEDURE — P9035 PLATELET PHERES LEUKOREDUCED: HCPCS | Performed by: ORTHOPAEDIC SURGERY

## 2017-05-19 PROCEDURE — 74011250636 HC RX REV CODE- 250/636: Performed by: ORTHOPAEDIC SURGERY

## 2017-05-19 PROCEDURE — 77030018667 ADMN ST IV BLD FENW -A

## 2017-05-19 PROCEDURE — 85610 PROTHROMBIN TIME: CPT | Performed by: ORTHOPAEDIC SURGERY

## 2017-05-19 RX ORDER — SODIUM CHLORIDE 9 MG/ML
250 INJECTION, SOLUTION INTRAVENOUS AS NEEDED
Status: DISCONTINUED | OUTPATIENT
Start: 2017-05-19 | End: 2017-05-23 | Stop reason: HOSPADM

## 2017-05-19 RX ORDER — SODIUM CHLORIDE 0.9 % (FLUSH) 0.9 %
10 SYRINGE (ML) INJECTION AS NEEDED
Status: DISCONTINUED | OUTPATIENT
Start: 2017-05-19 | End: 2017-05-23 | Stop reason: HOSPADM

## 2017-05-19 RX ORDER — SODIUM CHLORIDE 9 MG/ML
250 INJECTION, SOLUTION INTRAVENOUS AS NEEDED
Status: DISCONTINUED | OUTPATIENT
Start: 2017-05-19 | End: 2019-10-24 | Stop reason: HOSPADM

## 2017-05-19 RX ADMIN — SODIUM CHLORIDE 250 ML: 900 INJECTION, SOLUTION INTRAVENOUS at 13:48

## 2017-05-19 RX ADMIN — Medication 10 ML: at 15:19

## 2017-05-19 NOTE — PROGRESS NOTES
Date/Time of Call:       05/19/2017 3:02 PM   What was the patient seen in the ED for? Patient was seen in ED for diagnosis of: Seroma    Please note patient received a dressing change and completed ED visit. Does the patient understand his/her diagnosis and/or treatment and what happened during the ED visit? Spoke with wife who stated understanding of treatment and diagnosis. No new issues or concerns. Did the patient receive discharge instructions from the ED? Wife stated discharge instructions were received from the ED. Review any discharge instructions (see notes in [facility]). Ask patient if they understand these. Do they have any questions? Wife and Care Coordinator reviewed DC instructions. Wife stated understanding and no questions asked. Were home services ordered (nursing, PT, OT, ST, etc.)? No HH ordered at d/c. If so, has the first visit occurred? If not, why? (Assist with coordination of services if necessary.) N/A   Was any DME ordered? No DME ordered at d/c. [facility] If so, has it been received? If not, why?  (Assist with coordination of arranging DME orders if necessary.) N/A. Complete a review of all medications (new, continued and discontinued meds per the D/C instructions and medication tab in [address] [place] [address]). Review of medications has been completed by wife and Care Coordinator. Were all new prescriptions filled? If not, why?  (Assist with obtainment of medications if necessary.) N/A   Does the patient understand the purpose and dosing instructions for all medications? (If patient has questions, provide explanation and education.) Wife stated understanding and purpose for instructions for medications. Does the patient have any problems in performing ADLs? (If patient is unable to perform ADLs  what is the limiting factor(s)?   Do they have a support system that can assist? If no support system is present, discuss possible assistance that they may be able to obtain.) Wife states he is independent with all ADLs. Does the patient have all follow-up appointments scheduled? Has transportation been arranged? [facility] Pulmonary follow-up should be within 7 days of discharge; all others should have PCP follow-up within 7 days of discharge; follow-ups with other specialists as appropriate or ordered.) Patient has f/u and post op appt scheduled for this afternoon per wife. States patient has no transportation needs. Any other questions or concerns expressed by the patient? No further questions asked or needs identified at this time. Wife stated gratitude for care and call. FAY Call Completed By: Audrey Parker LPN   Care Coordinator  [facility]          This note will not be viewable in [address]

## 2017-05-19 NOTE — PROGRESS NOTES
1 unit platelets and 2 units FFP infused per order, pt tolerated well, discharged home via wheelchair

## 2017-05-20 LAB
BLD PROD TYP BPU: NORMAL
BPU ID: NORMAL
STATUS OF UNIT,%ST: NORMAL
UNIT DIVISION, %UDIV: 0

## 2017-05-22 ENCOUNTER — HOME CARE VISIT (OUTPATIENT)
Dept: SCHEDULING | Facility: HOME HEALTH | Age: 82
End: 2017-05-22
Payer: MEDICARE

## 2017-05-22 VITALS
SYSTOLIC BLOOD PRESSURE: 132 MMHG | HEART RATE: 76 BPM | RESPIRATION RATE: 17 BRPM | DIASTOLIC BLOOD PRESSURE: 60 MMHG | TEMPERATURE: 96.7 F

## 2017-05-22 PROCEDURE — G0151 HHCP-SERV OF PT,EA 15 MIN: HCPCS

## 2017-05-22 PROCEDURE — 3331090002 HH PPS REVENUE DEBIT

## 2017-05-22 PROCEDURE — 3331090001 HH PPS REVENUE CREDIT

## 2017-05-22 PROCEDURE — 400013 HH SOC

## 2017-05-23 PROCEDURE — 3331090001 HH PPS REVENUE CREDIT

## 2017-05-23 PROCEDURE — 3331090002 HH PPS REVENUE DEBIT

## 2017-05-24 ENCOUNTER — HOME CARE VISIT (OUTPATIENT)
Dept: SCHEDULING | Facility: HOME HEALTH | Age: 82
End: 2017-05-24
Payer: MEDICARE

## 2017-05-24 VITALS
SYSTOLIC BLOOD PRESSURE: 128 MMHG | HEART RATE: 80 BPM | DIASTOLIC BLOOD PRESSURE: 60 MMHG | RESPIRATION RATE: 18 BRPM | TEMPERATURE: 97.8 F

## 2017-05-24 PROCEDURE — 3331090002 HH PPS REVENUE DEBIT

## 2017-05-24 PROCEDURE — 3331090001 HH PPS REVENUE CREDIT

## 2017-05-24 PROCEDURE — G0157 HHC PT ASSISTANT EA 15: HCPCS

## 2017-05-25 PROCEDURE — 3331090002 HH PPS REVENUE DEBIT

## 2017-05-25 PROCEDURE — 3331090001 HH PPS REVENUE CREDIT

## 2017-05-26 ENCOUNTER — PATIENT OUTREACH (OUTPATIENT)
Dept: CASE MANAGEMENT | Age: 82
End: 2017-05-26

## 2017-05-26 ENCOUNTER — HOME CARE VISIT (OUTPATIENT)
Dept: SCHEDULING | Facility: HOME HEALTH | Age: 82
End: 2017-05-26
Payer: MEDICARE

## 2017-05-26 VITALS
RESPIRATION RATE: 18 BRPM | TEMPERATURE: 97.5 F | DIASTOLIC BLOOD PRESSURE: 58 MMHG | SYSTOLIC BLOOD PRESSURE: 128 MMHG | HEART RATE: 72 BPM

## 2017-05-26 PROCEDURE — G0157 HHC PT ASSISTANT EA 15: HCPCS

## 2017-05-26 PROCEDURE — 3331090002 HH PPS REVENUE DEBIT

## 2017-05-26 PROCEDURE — 3331090001 HH PPS REVENUE CREDIT

## 2017-05-26 NOTE — PROGRESS NOTES
Attempted f/u call #1 no answer, left vmail requesting return call. Scheduled f/u 2 for 6/19/17. Toña Lynch LPN/ Care Coordinator  [address] Claudia Matamoros suzy  [address] [facility] [address], [address] [facility] [address] / [place], [address] [facility]  [email or web address]. [facility] note will not be viewable in [address]

## 2017-05-27 PROCEDURE — 3331090001 HH PPS REVENUE CREDIT

## 2017-05-27 PROCEDURE — 3331090002 HH PPS REVENUE DEBIT

## 2017-05-28 PROCEDURE — 3331090002 HH PPS REVENUE DEBIT

## 2017-05-28 PROCEDURE — 3331090001 HH PPS REVENUE CREDIT

## 2017-05-29 ENCOUNTER — HOME CARE VISIT (OUTPATIENT)
Dept: SCHEDULING | Facility: HOME HEALTH | Age: 82
End: 2017-05-29
Payer: MEDICARE

## 2017-05-29 VITALS
TEMPERATURE: 97.2 F | DIASTOLIC BLOOD PRESSURE: 60 MMHG | SYSTOLIC BLOOD PRESSURE: 120 MMHG | RESPIRATION RATE: 16 BRPM | HEART RATE: 76 BPM

## 2017-05-29 PROCEDURE — 3331090001 HH PPS REVENUE CREDIT

## 2017-05-29 PROCEDURE — G0157 HHC PT ASSISTANT EA 15: HCPCS

## 2017-05-29 PROCEDURE — 3331090002 HH PPS REVENUE DEBIT

## 2017-05-30 PROCEDURE — A6258 TRANSPARENT FILM >16<=48 IN: HCPCS

## 2017-05-30 PROCEDURE — 3331090002 HH PPS REVENUE DEBIT

## 2017-05-30 PROCEDURE — A4247 BETADINE/IODINE SWABS/WIPES: HCPCS

## 2017-05-30 PROCEDURE — 3331090001 HH PPS REVENUE CREDIT

## 2017-05-30 PROCEDURE — A4649 SURGICAL SUPPLIES: HCPCS

## 2017-05-31 ENCOUNTER — HOME CARE VISIT (OUTPATIENT)
Dept: SCHEDULING | Facility: HOME HEALTH | Age: 82
End: 2017-05-31
Payer: MEDICARE

## 2017-05-31 VITALS
RESPIRATION RATE: 18 BRPM | HEART RATE: 80 BPM | SYSTOLIC BLOOD PRESSURE: 130 MMHG | TEMPERATURE: 97.7 F | DIASTOLIC BLOOD PRESSURE: 60 MMHG

## 2017-05-31 PROCEDURE — G0157 HHC PT ASSISTANT EA 15: HCPCS

## 2017-05-31 PROCEDURE — 3331090001 HH PPS REVENUE CREDIT

## 2017-05-31 PROCEDURE — 3331090002 HH PPS REVENUE DEBIT

## 2017-06-01 PROCEDURE — 3331090002 HH PPS REVENUE DEBIT

## 2017-06-01 PROCEDURE — 3331090001 HH PPS REVENUE CREDIT

## 2017-06-02 ENCOUNTER — HOME CARE VISIT (OUTPATIENT)
Dept: SCHEDULING | Facility: HOME HEALTH | Age: 82
End: 2017-06-02
Payer: MEDICARE

## 2017-06-02 VITALS
HEART RATE: 76 BPM | RESPIRATION RATE: 18 BRPM | SYSTOLIC BLOOD PRESSURE: 122 MMHG | TEMPERATURE: 97.3 F | DIASTOLIC BLOOD PRESSURE: 58 MMHG

## 2017-06-02 PROCEDURE — 3331090002 HH PPS REVENUE DEBIT

## 2017-06-02 PROCEDURE — G0157 HHC PT ASSISTANT EA 15: HCPCS

## 2017-06-02 PROCEDURE — 3331090001 HH PPS REVENUE CREDIT

## 2017-06-03 PROCEDURE — 3331090001 HH PPS REVENUE CREDIT

## 2017-06-03 PROCEDURE — 3331090002 HH PPS REVENUE DEBIT

## 2017-06-04 PROCEDURE — 3331090001 HH PPS REVENUE CREDIT

## 2017-06-04 PROCEDURE — 3331090002 HH PPS REVENUE DEBIT

## 2017-06-05 ENCOUNTER — HOME CARE VISIT (OUTPATIENT)
Dept: SCHEDULING | Facility: HOME HEALTH | Age: 82
End: 2017-06-05
Payer: MEDICARE

## 2017-06-05 VITALS
DIASTOLIC BLOOD PRESSURE: 58 MMHG | TEMPERATURE: 97.4 F | RESPIRATION RATE: 18 BRPM | SYSTOLIC BLOOD PRESSURE: 124 MMHG | HEART RATE: 78 BPM

## 2017-06-05 PROCEDURE — 3331090001 HH PPS REVENUE CREDIT

## 2017-06-05 PROCEDURE — G0157 HHC PT ASSISTANT EA 15: HCPCS

## 2017-06-05 PROCEDURE — 3331090002 HH PPS REVENUE DEBIT

## 2017-06-06 PROCEDURE — 3331090002 HH PPS REVENUE DEBIT

## 2017-06-06 PROCEDURE — 3331090001 HH PPS REVENUE CREDIT

## 2017-06-07 PROCEDURE — 3331090001 HH PPS REVENUE CREDIT

## 2017-06-07 PROCEDURE — 3331090002 HH PPS REVENUE DEBIT

## 2017-06-08 ENCOUNTER — HOME CARE VISIT (OUTPATIENT)
Dept: SCHEDULING | Facility: HOME HEALTH | Age: 82
End: 2017-06-08
Payer: MEDICARE

## 2017-06-08 ENCOUNTER — HOME CARE VISIT (OUTPATIENT)
Dept: HOME HEALTH SERVICES | Facility: HOME HEALTH | Age: 82
End: 2017-06-08
Payer: MEDICARE

## 2017-06-08 VITALS
RESPIRATION RATE: 17 BRPM | SYSTOLIC BLOOD PRESSURE: 122 MMHG | TEMPERATURE: 96.8 F | HEART RATE: 68 BPM | DIASTOLIC BLOOD PRESSURE: 60 MMHG

## 2017-06-08 PROCEDURE — G0157 HHC PT ASSISTANT EA 15: HCPCS

## 2017-06-08 PROCEDURE — 3331090002 HH PPS REVENUE DEBIT

## 2017-06-08 PROCEDURE — 3331090001 HH PPS REVENUE CREDIT

## 2017-06-09 PROCEDURE — 3331090002 HH PPS REVENUE DEBIT

## 2017-06-09 PROCEDURE — 3331090001 HH PPS REVENUE CREDIT

## 2017-06-10 PROCEDURE — 3331090002 HH PPS REVENUE DEBIT

## 2017-06-10 PROCEDURE — 3331090001 HH PPS REVENUE CREDIT

## 2017-06-11 PROCEDURE — 3331090001 HH PPS REVENUE CREDIT

## 2017-06-11 PROCEDURE — 3331090002 HH PPS REVENUE DEBIT

## 2017-06-12 ENCOUNTER — HOME CARE VISIT (OUTPATIENT)
Dept: SCHEDULING | Facility: HOME HEALTH | Age: 82
End: 2017-06-12
Payer: MEDICARE

## 2017-06-12 VITALS
OXYGEN SATURATION: 97 % | SYSTOLIC BLOOD PRESSURE: 120 MMHG | HEART RATE: 88 BPM | TEMPERATURE: 96.8 F | RESPIRATION RATE: 18 BRPM | DIASTOLIC BLOOD PRESSURE: 60 MMHG

## 2017-06-12 PROCEDURE — 3331090002 HH PPS REVENUE DEBIT

## 2017-06-12 PROCEDURE — 3331090001 HH PPS REVENUE CREDIT

## 2017-06-12 PROCEDURE — G0151 HHCP-SERV OF PT,EA 15 MIN: HCPCS

## 2017-06-16 ENCOUNTER — APPOINTMENT (OUTPATIENT)
Dept: GENERAL RADIOLOGY | Age: 82
End: 2017-06-16
Attending: EMERGENCY MEDICINE
Payer: MEDICARE

## 2017-06-16 ENCOUNTER — HOSPITAL ENCOUNTER (OUTPATIENT)
Age: 82
Setting detail: OBSERVATION
Discharge: HOME | End: 2017-06-18
Attending: EMERGENCY MEDICINE | Admitting: INTERNAL MEDICINE
Payer: MEDICARE

## 2017-06-16 ENCOUNTER — APPOINTMENT (OUTPATIENT)
Dept: GENERAL RADIOLOGY | Age: 82
End: 2017-06-16
Attending: INTERNAL MEDICINE
Payer: MEDICARE

## 2017-06-16 DIAGNOSIS — E86.0 DEHYDRATION: ICD-10-CM

## 2017-06-16 DIAGNOSIS — I95.1 ORTHOSTATIC HYPOTENSION: Primary | ICD-10-CM

## 2017-06-16 DIAGNOSIS — R19.7 DIARRHEA, UNSPECIFIED TYPE: ICD-10-CM

## 2017-06-16 LAB
ALBUMIN SERPL BCP-MCNC: 3.9 G/DL (ref 3.2–4.6)
ALBUMIN/GLOB SERPL: 1.2 {RATIO} (ref 1.2–3.5)
ALP SERPL-CCNC: 123 U/L (ref 50–136)
ALT SERPL-CCNC: 25 U/L (ref 12–65)
ANION GAP BLD CALC-SCNC: 11 MMOL/L (ref 7–16)
AST SERPL W P-5'-P-CCNC: 31 U/L (ref 15–37)
ATRIAL RATE: 98 BPM
BACTERIA SPEC CULT: NEGATIVE
BACTERIA SPEC CULT: NORMAL
BASOPHILS # BLD AUTO: 0 K/UL (ref 0–0.2)
BASOPHILS # BLD: 1 % (ref 0–2)
BILIRUB SERPL-MCNC: 0.5 MG/DL (ref 0.2–1.1)
BUN SERPL-MCNC: 22 MG/DL (ref 8–23)
CALCIUM SERPL-MCNC: 9.5 MG/DL (ref 8.3–10.4)
CALCULATED P AXIS, ECG09: 79 DEGREES
CALCULATED R AXIS, ECG10: 84 DEGREES
CALCULATED T AXIS, ECG11: 64 DEGREES
CHLORIDE SERPL-SCNC: 104 MMOL/L (ref 98–107)
CO2 SERPL-SCNC: 24 MMOL/L (ref 21–32)
CREAT SERPL-MCNC: 1.4 MG/DL (ref 0.8–1.5)
DIAGNOSIS, 93000: NORMAL
DIFFERENTIAL METHOD BLD: ABNORMAL
EOSINOPHIL # BLD: 0.3 K/UL (ref 0–0.8)
EOSINOPHIL NFR BLD: 4 % (ref 0.5–7.8)
ERYTHROCYTE [DISTWIDTH] IN BLOOD BY AUTOMATED COUNT: 13.7 % (ref 11.9–14.6)
GLOBULIN SER CALC-MCNC: 3.3 G/DL (ref 2.3–3.5)
GLUCOSE SERPL-MCNC: 122 MG/DL (ref 65–100)
HCT VFR BLD AUTO: 40 % (ref 41.1–50.3)
HGB BLD-MCNC: 13.5 G/DL (ref 13.6–17.2)
IMM GRANULOCYTES # BLD: 0 K/UL (ref 0–0.5)
IMM GRANULOCYTES NFR BLD AUTO: 0.6 % (ref 0–5)
LACTATE SERPL-SCNC: 1.3 MMOL/L (ref 0.4–2)
LYMPHOCYTES # BLD AUTO: 17 % (ref 13–44)
LYMPHOCYTES # BLD: 1.1 K/UL (ref 0.5–4.6)
MCH RBC QN AUTO: 32 PG (ref 26.1–32.9)
MCHC RBC AUTO-ENTMCNC: 33.8 G/DL (ref 31.4–35)
MCV RBC AUTO: 94.8 FL (ref 79.6–97.8)
MONOCYTES # BLD: 1 K/UL (ref 0.1–1.3)
MONOCYTES NFR BLD AUTO: 15 % (ref 4–12)
NEUTS SEG # BLD: 4 K/UL (ref 1.7–8.2)
NEUTS SEG NFR BLD AUTO: 62 % (ref 43–78)
P-R INTERVAL, ECG05: 202 MS
PLATELET # BLD AUTO: 227 K/UL (ref 150–450)
PMV BLD AUTO: 9.5 FL (ref 10.8–14.1)
POTASSIUM SERPL-SCNC: 4.3 MMOL/L (ref 3.5–5.1)
PROT SERPL-MCNC: 7.2 G/DL (ref 6.3–8.2)
Q-T INTERVAL, ECG07: 314 MS
QRS DURATION, ECG06: 76 MS
QTC CALCULATION (BEZET), ECG08: 402 MS
RBC # BLD AUTO: 4.22 M/UL (ref 4.23–5.67)
SERVICE CMNT-IMP: NORMAL
SODIUM SERPL-SCNC: 139 MMOL/L (ref 136–145)
VENTRICULAR RATE, ECG03: 99 BPM
WBC # BLD AUTO: 6.4 K/UL (ref 4.3–11.1)

## 2017-06-16 PROCEDURE — 74022 RADEX COMPL AQT ABD SERIES: CPT

## 2017-06-16 PROCEDURE — 93005 ELECTROCARDIOGRAM TRACING: CPT | Performed by: EMERGENCY MEDICINE

## 2017-06-16 PROCEDURE — 96360 HYDRATION IV INFUSION INIT: CPT | Performed by: EMERGENCY MEDICINE

## 2017-06-16 PROCEDURE — 85025 COMPLETE CBC W/AUTO DIFF WBC: CPT | Performed by: EMERGENCY MEDICINE

## 2017-06-16 PROCEDURE — 99285 EMERGENCY DEPT VISIT HI MDM: CPT | Performed by: EMERGENCY MEDICINE

## 2017-06-16 PROCEDURE — 74011250636 HC RX REV CODE- 250/636: Performed by: INTERNAL MEDICINE

## 2017-06-16 PROCEDURE — 74011250637 HC RX REV CODE- 250/637: Performed by: EMERGENCY MEDICINE

## 2017-06-16 PROCEDURE — 74011250636 HC RX REV CODE- 250/636: Performed by: EMERGENCY MEDICINE

## 2017-06-16 PROCEDURE — 99218 HC RM OBSERVATION: CPT

## 2017-06-16 PROCEDURE — 83605 ASSAY OF LACTIC ACID: CPT | Performed by: INTERNAL MEDICINE

## 2017-06-16 PROCEDURE — 96372 THER/PROPH/DIAG INJ SC/IM: CPT

## 2017-06-16 PROCEDURE — 80053 COMPREHEN METABOLIC PANEL: CPT | Performed by: EMERGENCY MEDICINE

## 2017-06-16 PROCEDURE — 87040 BLOOD CULTURE FOR BACTERIA: CPT | Performed by: INTERNAL MEDICINE

## 2017-06-16 PROCEDURE — 96361 HYDRATE IV INFUSION ADD-ON: CPT | Performed by: EMERGENCY MEDICINE

## 2017-06-16 PROCEDURE — 87493 C DIFF AMPLIFIED PROBE: CPT | Performed by: EMERGENCY MEDICINE

## 2017-06-16 PROCEDURE — 96361 HYDRATE IV INFUSION ADD-ON: CPT

## 2017-06-16 PROCEDURE — 74011250637 HC RX REV CODE- 250/637: Performed by: INTERNAL MEDICINE

## 2017-06-16 RX ORDER — FINASTERIDE 5 MG/1
5 TABLET, FILM COATED ORAL DAILY
Status: DISCONTINUED | OUTPATIENT
Start: 2017-06-17 | End: 2017-06-18 | Stop reason: HOSPADM

## 2017-06-16 RX ORDER — SODIUM CHLORIDE 9 MG/ML
100 INJECTION, SOLUTION INTRAVENOUS CONTINUOUS
Status: DISCONTINUED | OUTPATIENT
Start: 2017-06-16 | End: 2017-06-18 | Stop reason: HOSPADM

## 2017-06-16 RX ORDER — OXYCODONE HYDROCHLORIDE 5 MG/1
5 TABLET ORAL
Status: DISCONTINUED | OUTPATIENT
Start: 2017-06-16 | End: 2017-06-18 | Stop reason: HOSPADM

## 2017-06-16 RX ORDER — ONDANSETRON 2 MG/ML
4 INJECTION INTRAMUSCULAR; INTRAVENOUS
Status: DISCONTINUED | OUTPATIENT
Start: 2017-06-16 | End: 2017-06-18 | Stop reason: HOSPADM

## 2017-06-16 RX ORDER — SODIUM CHLORIDE 0.9 % (FLUSH) 0.9 %
5-10 SYRINGE (ML) INJECTION EVERY 8 HOURS
Status: DISCONTINUED | OUTPATIENT
Start: 2017-06-16 | End: 2017-06-18 | Stop reason: HOSPADM

## 2017-06-16 RX ORDER — PANTOPRAZOLE SODIUM 40 MG/1
40 TABLET, DELAYED RELEASE ORAL
Status: DISCONTINUED | OUTPATIENT
Start: 2017-06-17 | End: 2017-06-18 | Stop reason: HOSPADM

## 2017-06-16 RX ORDER — HYOSCYAMINE SULFATE 0.12 MG/1
0.12 TABLET SUBLINGUAL
Status: COMPLETED | OUTPATIENT
Start: 2017-06-16 | End: 2017-06-16

## 2017-06-16 RX ORDER — SODIUM CHLORIDE 0.9 % (FLUSH) 0.9 %
5-10 SYRINGE (ML) INJECTION AS NEEDED
Status: DISCONTINUED | OUTPATIENT
Start: 2017-06-16 | End: 2017-06-18 | Stop reason: HOSPADM

## 2017-06-16 RX ORDER — HEPARIN SODIUM 5000 [USP'U]/ML
5000 INJECTION, SOLUTION INTRAVENOUS; SUBCUTANEOUS EVERY 12 HOURS
Status: DISCONTINUED | OUTPATIENT
Start: 2017-06-16 | End: 2017-06-18 | Stop reason: HOSPADM

## 2017-06-16 RX ORDER — ACETAMINOPHEN 325 MG/1
650 TABLET ORAL
Status: DISCONTINUED | OUTPATIENT
Start: 2017-06-16 | End: 2017-06-18 | Stop reason: HOSPADM

## 2017-06-16 RX ORDER — CLOPIDOGREL BISULFATE 75 MG/1
75 TABLET ORAL DAILY
Status: DISCONTINUED | OUTPATIENT
Start: 2017-06-17 | End: 2017-06-18 | Stop reason: HOSPADM

## 2017-06-16 RX ADMIN — HEPARIN SODIUM 5000 UNITS: 5000 INJECTION, SOLUTION INTRAVENOUS; SUBCUTANEOUS at 22:06

## 2017-06-16 RX ADMIN — OXYCODONE HYDROCHLORIDE 5 MG: 5 TABLET ORAL at 22:36

## 2017-06-16 RX ADMIN — SODIUM CHLORIDE 100 ML/HR: 900 INJECTION, SOLUTION INTRAVENOUS at 21:38

## 2017-06-16 RX ADMIN — Medication 10 ML: at 21:39

## 2017-06-16 RX ADMIN — SODIUM CHLORIDE 1000 ML: 900 INJECTION, SOLUTION INTRAVENOUS at 18:34

## 2017-06-16 RX ADMIN — SODIUM CHLORIDE 1000 ML: 900 INJECTION, SOLUTION INTRAVENOUS at 15:08

## 2017-06-16 RX ADMIN — HYOSCYAMINE SULFATE 0.12 MG: 0.12 TABLET SUBLINGUAL at 17:51

## 2017-06-16 NOTE — ED PROVIDER NOTES
HPI Comments: Patient recently had a knee surgery and since the surgery has had diarrhea 3-4 times per day. States eating makes it worse. Denies any nausea or vomiting. Denies any abdominal pain. He has noted no blood in his stools. Patient is a 81 y.o. male presenting with diarrhea. The history is provided by the patient. Diarrhea    This is a new problem. The current episode started more than 1 week ago (1 month). The problem occurs constantly. The problem has not changed since onset. The patient is experiencing no pain. Associated symptoms include diarrhea. Pertinent negatives include no fever, no hematochezia, no melena, no nausea, no vomiting, no dysuria and no frequency. The pain is worsened by eating. The pain is relieved by nothing. Past workup includes no CT scan. Past Medical History:   Diagnosis Date    Arthritis     osteo    Chronic pain     left hip/back/LLE    Colon cancer ([facility] [address] 6/23/2015    Diabetes ([facility] [address] 2006    Type 2. diet controlled. Pt does not monitor blood sugar. A1C=5.7 Jan 2017.  Dyslipidemia 6/23/2015    GERD (gastroesophageal reflux disease)     controlled w/med    Hx of bladder cancer 6/23/2015    Hypertension     controlled on medication    NSTEMI (non-ST elevation myocardial infarction) ([address] 01/10/2014    MI. heart cath: preserved LVSF. EF =65%. mild to mod LAD and circumflex disease. 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      Thrombocytopenia, unspecified ([facility] [address] 1/11/2014       Past Surgical History:   Procedure Laterality Date    ABDOMEN SURGERY PROC UNLISTED  1991    EXP Lap    HX COLECTOMY  1997    Colon Resection    HX COLONOSCOPY  2014    HX CORONARY STENT PLACEMENT      HX HEART CATHETERIZATION  01/10/2014    s/p bare metal stent to mid-right CA    HX HEENT  as child    T&A    HX LUMBAR FUSION  1990 x 2  2012, 2013    lumbar fusions- x 4 total surgeries    HX MOHS PROCEDURES Left 1990    HX ORTHOPAEDIC Left 2013    left hip replaced    HX UROLOGICAL  2002    Turp         Family History:   Problem Relation Age of Onset    Heart Disease Mother     Cancer Father     Lung Disease Sister     Heart Disease Brother     Cancer Brother      pancreatic    Parkinson's Disease Brother     Malignant Hyperthermia Neg Hx     Pseudocholinesterase Deficiency Neg Hx     Delayed Awakening Neg Hx     Post-op Nausea/Vomiting Neg Hx     Emergence Delirium Neg Hx     Post-op Cognitive Dysfunction Neg Hx     Other Neg Hx        Social History     Social History    Marital status:      Spouse name: N/A    Number of children: N/A    Years of education: N/A     Occupational History    Not on file. Social History Main Topics    Smoking status: Former Smoker     Packs/day: 1.50     Years: 28.00    Smokeless tobacco: Never Used      Comment: quit 1982    Alcohol use No    Drug use: No    Sexual activity: Not on file     Other Topics Concern    Not on file     Social History Narrative         ALLERGIES: Review of patient's allergies indicates no known allergies. Review of Systems   Constitutional: Negative for chills and fever. Gastrointestinal: Positive for diarrhea. Negative for hematochezia, melena, nausea and vomiting. Genitourinary: Negative for dysuria and frequency. All other systems reviewed and are negative. Vitals:    06/16/17 1426   BP: 103/51   Pulse: (!) 126   Resp: 18   Temp: 97.6 °F (36.4 °C)   SpO2: 98%   Weight: 81.6 kg (180 lb)   Height: 6' (1.829 m)            Physical Exam   Constitutional: He is oriented to person, place, and time. He appears well-developed and well-nourished. No distress. HENT:   Head: Normocephalic and atraumatic. Neck: Normal range of motion. Neck supple. Cardiovascular: Normal rate and regular rhythm.     Pulmonary/Chest: Effort normal and breath sounds normal. No respiratory distress. He has no wheezes. He has no rales. Abdominal: Soft. He exhibits no distension. There is no tenderness. There is no rebound and no guarding. Musculoskeletal: Normal range of motion. He exhibits no edema. Neurological: He is alert and oriented to person, place, and time. No cranial nerve deficit. Skin: Skin is warm and dry. No rash noted. He is not diaphoretic. No erythema. Psychiatric: He has a normal mood and affect. His behavior is normal.   Nursing note and vitals reviewed. MDM  Number of Diagnoses or Management Options  Dehydration:   Diarrhea, unspecified type:   Orthostatic hypotension:   Diagnosis management comments: Pt was able to give us a stool sample. Guaiac neg. CDiff neg. Pt having diarrhea at least every 30 min. Unable to keep up fluid wise and BP dropped significantly and HR went up with orthostatic check after fluids. Levsin did not change diarrhea.          Amount and/or Complexity of Data Reviewed  Clinical lab tests: ordered and reviewed  Discuss the patient with other providers: yes  Independent visualization of images, tracings, or specimens: yes    Risk of Complications, Morbidity, and/or Mortality  Presenting problems: high  Diagnostic procedures: moderate  Management options: high    Patient Progress  Patient progress: stable    ED Course       Procedures

## 2017-06-16 NOTE — IP AVS SNAPSHOT
[address] 
[phone] Patient: Alfonso Scheuermann MRN: [id number] CMB:0/5/9975 You are allergic to the following No active allergies Recent Documentation Height Weight BMI Smoking Status 1.829 m 79.8 kg 23.87 kg/m2 Former Smoker Unresulted Labs Order Current Status CULTURE, BLOOD Preliminary result CULTURE, BLOOD Preliminary result CULTURE, STOOL Preliminary result Emergency Contacts Name Discharge Info Relation Home Work Mobile Zehra Rodrigues DISCHARGE CAREGIVER [3] Spouse [3] [phone] Dharmesh Freeman  Son [22] [phone] About your hospitalization You were admitted on:  June 16, 2017 You last received care in the:  [facility] 6 MED SURG You were discharged on:  June 18, 2017 Unit phone number:  [phone] Why you were hospitalized Your primary diagnosis was:  Shawn (Acute Kidney Injury) (Hcc) Your diagnoses also included:  Hypomagnesemia, Weakness Generalized, Diarrhea Providers Seen During Your Hospitalizations Provider Role Specialty Primary office phone Iza Fowler MD Attending Provider Emergency Medicine [phone] Zeferino Gardner MD Attending Provider Internal Medicine [phone] Your Primary Care Physician (PCP) Primary Care Physician Office Phone Office Fax Khoa Amayaann [phone] Follow-up Information Follow up With Details Comments Contact Info Vinayak Worley MD   [address] [place] [id number] 
[phone] Vinayak Worley MD   [address] [place] [address] 
[phone] Vinayak Worley MD In 1 week  [address] [place] [address] [phone] Your Appointments Monday July 17, 2017  8:15 AM EDT SHORT with Иван Yañez MD  
[facility] [address] [place] [address]  
[phone] Current Discharge Medication List  
  
CONTINUE these medications which have CHANGED Dose & Instructions Dispensing Information Comments Morning Noon Evening Bedtime  
 atorvastatin 40 mg tablet Commonly known as:  LIPITOR What changed:  additional instructions Your last dose was: Your next dose is:    
   
   
 Dose:  40 mg Take 1 Tab by mouth daily. Quantity:  90 Tab Refills:  1 Dexlansoprazole 60 mg Cpdb Commonly known as:  DEXILANT What changed:  when to take this Your last dose was: Your next dose is:    
   
   
 Dose:  60 mg Take 1 Cap by mouth daily. Quantity:  90 Cap Refills:  1  
     
   
   
   
  
 finasteride 5 mg tablet Commonly known as:  PROSCAR What changed:  additional instructions Your last dose was: Your next dose is:    
   
   
 Dose:  5 mg Take 1 Tab by mouth daily. Quantity:  90 Tab Refills:  1  
     
   
   
   
  
 metoprolol succinate 25 mg XL tablet Commonly known as:  TOPROL-XL What changed:   
- when to take this 
- additional instructions Your last dose was: Your next dose is:    
   
   
 Dose:  25 mg Take 1 Tab by mouth daily. Sarah Miller MD Lic# [id number] [facility] [id number] Quantity:  30 Tab Refills:  1 CONTINUE these medications which have NOT CHANGED Dose & Instructions Dispensing Information Comments Morning Noon Evening Bedtime  
 acetaminophen 500 mg tablet Commonly known as:  TYLENOL Your last dose was: Your next dose is:    
   
   
 Dose:  1000 mg Take 1,000 mg by mouth every six (6) hours as needed for Pain. Refills:  0 aspirin delayed-release 81 mg tablet Your last dose was: Your next dose is:    
   
   
 Dose:  81 mg Take 81 mg by mouth daily. Per anesthesia protocol:instructed to take am of surgery. Indications: myocardial infarction prevention Refills:  0  
     
   
   
   
  
 clopidogrel 75 mg Tab Commonly known as:  PLAVIX Your last dose was: Your next dose is:    
   
   
 Dose:  75 mg Take 1 Tab by mouth daily. Quantity:  90 Tab Refills:  1 diphenhydrAMINE 50 mg capsule Commonly known as:  BENADRYL Your last dose was: Your next dose is:    
   
   
 Dose:  50 mg Take 50 mg by mouth nightly as needed for Sleep. Refills:  0 HYDROcodone-acetaminophen  mg tablet Commonly known as:  Gayla Mandril Your last dose was: Your next dose is:    
   
   
 Dose:  1 Tab Take 1 Tab by mouth as needed for Pain. every 4-6 hours Refills:  0  
     
   
   
   
  
 hydrocortisone 25 mg Supp Commonly known as:  ANUSOL-HC Your last dose was: Your next dose is:    
   
   
 Dose:  25 mg Insert 1 Suppository into rectum every twelve (12) hours. Quantity:  15 Suppository Refills:  5  
     
   
   
   
  
 niacin  mg tablet Commonly known as:  Niaspan Your last dose was: Your next dose is:    
   
   
 Dose:  2000 mg Take 4 Tabs by mouth nightly. Quantity:  360 Tab Refills:  1  
     
   
   
   
  
 oxyCODONE IR 10 mg Tab immediate release tablet Commonly known as:  Olivia Bob Your last dose was: Your next dose is:    
   
   
 Dose:  10 mg Take 1 Tab by mouth every four (4) hours as needed. Max Daily Amount: 60 mg.  
 Quantity:  60 Tab Refills:  0  
     
   
   
   
  
 triamcinolone acetonide 0.1 % topical cream  
Commonly known as:  KENALOG Your last dose was: Your next dose is: Apply  to affected area two (2) times a day. use thin layer Quantity:  60 g Refills:  2 TYLENOL PM EXTRA STRENGTH  mg Tab Generic drug:  diphenhydrAMINE-acetaminophen Your last dose was: Your next dose is:    
   
   
 Dose:   mg Take 50-1,000 mg by mouth as needed (pain for sleep). Refills:  0  
     
   
   
   
  
 VITAMIN D3 2,000 unit Tab Generic drug:  cholecalciferol (vitamin D3) Your last dose was: Your next dose is: Take  by mouth nightly. Refills:  0  
     
   
   
   
  
 zolpidem 10 mg tablet Commonly known as:  AMBIEN Your last dose was: Your next dose is:    
   
   
 Dose:  5-10 mg Take 5-10 mg by mouth nightly as needed for Sleep. Refills:  0 STOP taking these medications   
 lisinopril 5 mg tablet Commonly known as:  Ravi Taylor Discharge Instructions DISCHARGE SUMMARY from Nurse The following personal items are in your possession at time of discharge: 
 
Dental Appliances: Uppers Visual Aid: None Home Medications: None Jewelry: Watch, With patient Clothing: At bedside Other Valuables: Cell Phone Personal Items Sent to Safe: none PATIENT INSTRUCTIONS: 
 
 
F-face looks uneven A-arms unable to move or move unevenly S-speech slurred or non-existent T-time-call 911 as soon as signs and symptoms begin-DO NOT go Back to bed or wait to see if you get better-TIME IS BRAIN. Warning Signs of HEART ATTACK Call 911 if you have these symptoms: ? Chest discomfort. Most heart attacks involve discomfort in the center of the chest that lasts more than a few minutes, or that goes away and comes back. It can feel like uncomfortable pressure, squeezing, fullness, or pain. ? Discomfort in other areas of the upper body. Symptoms can include pain or discomfort in one or both arms, the back, neck, jaw, or stomach. ? Shortness of breath with or without chest discomfort. ? Other signs may include breaking out in a cold sweat, nausea, or lightheadedness. Don't wait more than five minutes to call [address]! Fast action can save your life. Calling 911 is almost always the fastest way to get lifesaving treatment. Emergency Medical Services staff can begin treatment when they arrive  up to an hour sooner than if someone gets to the hospital by car. The discharge information has been reviewed with the {PATIENT PARENT GUARDIAN:33091}. The {PATIENT PARENT GUARDIAN:37796} verbalized understanding. Discharge medications reviewed with the {Dishcarge meds reviewed UXPL:11777} and appropriate educational materials and side effects teaching were provided. STOP LISINOPRIL 
HOLD TOPROL XL IF SYSTOLIC BP <389 or HR <96 Discharge Orders None ACO Transitions of Care Introducing [employer] [facility] offers a voluntary care coordination program to provide high quality service and care to [facility] fee-for-service beneficiaries. Merlinda Fells was designed to help you enhance your health and well-being through the following services: ? Transitions of Care  support for individuals who are transitioning from one care setting to another (example: Hospital to home). ?  Chronic and Complex Care Coordination  support for individuals and caregivers of those with serious or chronic illnesses or with more than one chronic (ongoing) condition and those who take a number of different medications. If you meet specific medical criteria, a Critical access hospital[address] may call you directly to coordinate your care with your primary care physician and your other care providers. For questions about the [facility] programs, please, contact your physicians office. For general questions or additional information about Accountable Care Organizations: 
Please visit www.medicare.gov/acos. html or call 1-800-MEDICARE (9-959.274.5024) TTY users should call 3-455.525.7210. [employer] Announcement We are excited to announce that we are making your provider's discharge notes available to you in [employer]. You will see these notes when they are completed and signed by the physician that discharged you from your recent hospital stay. If you have any questions or concerns about any information you see in [employer], please call the Health Information Department where you were seen or reach out to your Primary Care Provider for more information about your plan of care. Introducing [facility] HEALTH [facility]! Sandra Hubbard introduces [employer] patient portal. Now you can access parts of your medical record, email your doctor's office, and request medication refills online. 1. In your internet browser, go to [email or web address] 2. Click on the First Time User? Click Here link in the Sign In box. You will see the New Member Sign Up page. 3. Enter your [employer] Access Code exactly as it appears below. You will not need to use this code after youve completed the sign-up process. If you do not sign up before the expiration date, you must request a new code. · [employer] Access Code: [email or web address] Expires: 7/10/2017  4:18 PM 
 
4. Enter the last four digits of your Social Security Number (xxxx) and Date of Birth (mm/dd/yyyy) as indicated and click Submit. You will be taken to the next sign-up page. 5. Create a [employer] ID.  This will be your [employer] login ID and cannot be changed, so think of one that is secure and easy to remember. 6. Create a [employer] password. You can change your password at any time. 7. Enter your Password Reset Question and Answer. This can be used at a later time if you forget your password. 8. Enter your e-mail address. You will receive e-mail notification when new information is available in [address] 9. Click Sign Up. You can now view and download portions of your medical record. 10. Click the Download Summary menu link to download a portable copy of your medical information. If you have questions, please visit the Frequently Asked Questions section of the [employer] website. Remember, [employer] is NOT to be used for urgent needs. For medical emergencies, dial 911. Now available from your iPhone and Android! General Information Please provide this summary of care documentation to your next provider. Patient Signature:  ____________________________________________________________ Date:  ____________________________________________________________  
  
Nikky Perry Provider Signature:  ____________________________________________________________ Date:  ____________________________________________________________

## 2017-06-16 NOTE — ED TRIAGE NOTES
Patient arrives to the ER via POV escorted by family member. Patient states he had knee replacement surgery one month ago. Patient states since surgery he has had diarrhea. Patient states the only two medications he is on is tylenol and Ambien are the only changes in medications other than the narcotics which stopped last week. Patient denies vomiting other than right after his surgery. Patient states he can't keep thing in him enough for nourishment due to feeling like it is coming out in diarrhea. Patient states increase in fatigue and weakness.

## 2017-06-16 NOTE — ED NOTES
Report given to Huey Hinojosa RN for transfer care of patient. Pt in NAD at this time. Vitals cycling.

## 2017-06-16 NOTE — ED NOTES
Pt resting on stretcher. Gets up frequently to have bowel movements. Vitals stable. Pt is in NAD at this time.

## 2017-06-17 PROBLEM — R53.1 WEAKNESS GENERALIZED: Status: ACTIVE | Noted: 2017-06-17

## 2017-06-17 PROBLEM — N17.9 AKI (ACUTE KIDNEY INJURY) (HCC): Status: ACTIVE | Noted: 2017-06-17

## 2017-06-17 PROBLEM — R19.7 DIARRHEA: Status: ACTIVE | Noted: 2017-06-17

## 2017-06-17 PROBLEM — E83.42 HYPOMAGNESEMIA: Status: ACTIVE | Noted: 2017-06-17

## 2017-06-17 LAB
ALBUMIN SERPL BCP-MCNC: 3.1 G/DL (ref 3.2–4.6)
ALBUMIN/GLOB SERPL: 1.1 {RATIO} (ref 1.2–3.5)
ALP SERPL-CCNC: 91 U/L (ref 50–136)
ALT SERPL-CCNC: 19 U/L (ref 12–65)
ANION GAP BLD CALC-SCNC: 11 MMOL/L (ref 7–16)
AST SERPL W P-5'-P-CCNC: 21 U/L (ref 15–37)
BILIRUB DIRECT SERPL-MCNC: 0.2 MG/DL
BILIRUB SERPL-MCNC: 0.5 MG/DL (ref 0.2–1.1)
BUN SERPL-MCNC: 16 MG/DL (ref 8–23)
CALCIUM SERPL-MCNC: 8.6 MG/DL (ref 8.3–10.4)
CHLORIDE SERPL-SCNC: 108 MMOL/L (ref 98–107)
CO2 SERPL-SCNC: 24 MMOL/L (ref 21–32)
CREAT SERPL-MCNC: 0.85 MG/DL (ref 0.8–1.5)
ERYTHROCYTE [DISTWIDTH] IN BLOOD BY AUTOMATED COUNT: 13.5 % (ref 11.9–14.6)
EST. AVERAGE GLUCOSE BLD GHB EST-MCNC: 97 MG/DL
GLOBULIN SER CALC-MCNC: 2.8 G/DL (ref 2.3–3.5)
GLUCOSE BLD STRIP.AUTO-MCNC: 104 MG/DL (ref 65–100)
GLUCOSE BLD STRIP.AUTO-MCNC: 80 MG/DL (ref 65–100)
GLUCOSE BLD STRIP.AUTO-MCNC: 97 MG/DL (ref 65–100)
GLUCOSE SERPL-MCNC: 81 MG/DL (ref 65–100)
HBA1C MFR BLD: 5 % (ref 4.8–6)
HCT VFR BLD AUTO: 33.6 % (ref 41.1–50.3)
HGB BLD-MCNC: 11.1 G/DL (ref 13.6–17.2)
MAGNESIUM SERPL-MCNC: 1.5 MG/DL (ref 1.8–2.4)
MCH RBC QN AUTO: 31.6 PG (ref 26.1–32.9)
MCHC RBC AUTO-ENTMCNC: 33 G/DL (ref 31.4–35)
MCV RBC AUTO: 95.7 FL (ref 79.6–97.8)
PLATELET # BLD AUTO: 159 K/UL (ref 150–450)
PMV BLD AUTO: 9.3 FL (ref 10.8–14.1)
POTASSIUM SERPL-SCNC: 4.1 MMOL/L (ref 3.5–5.1)
PROT SERPL-MCNC: 5.9 G/DL (ref 6.3–8.2)
RBC # BLD AUTO: 3.51 M/UL (ref 4.23–5.67)
SODIUM SERPL-SCNC: 143 MMOL/L (ref 136–145)
TSH SERPL DL<=0.005 MIU/L-ACNC: 0.91 UIU/ML (ref 0.36–3.74)
WBC # BLD AUTO: 4.6 K/UL (ref 4.3–11.1)
WBC #/AREA STL HPF: NORMAL /HPF (ref 0–4)

## 2017-06-17 PROCEDURE — 80048 BASIC METABOLIC PNL TOTAL CA: CPT | Performed by: INTERNAL MEDICINE

## 2017-06-17 PROCEDURE — 87045 FECES CULTURE AEROBIC BACT: CPT | Performed by: INTERNAL MEDICINE

## 2017-06-17 PROCEDURE — 85027 COMPLETE CBC AUTOMATED: CPT | Performed by: INTERNAL MEDICINE

## 2017-06-17 PROCEDURE — 36415 COLL VENOUS BLD VENIPUNCTURE: CPT | Performed by: INTERNAL MEDICINE

## 2017-06-17 PROCEDURE — 96366 THER/PROPH/DIAG IV INF ADDON: CPT

## 2017-06-17 PROCEDURE — 89055 LEUKOCYTE ASSESSMENT FECAL: CPT | Performed by: INTERNAL MEDICINE

## 2017-06-17 PROCEDURE — 99218 HC RM OBSERVATION: CPT

## 2017-06-17 PROCEDURE — 74011250636 HC RX REV CODE- 250/636

## 2017-06-17 PROCEDURE — 96361 HYDRATE IV INFUSION ADD-ON: CPT

## 2017-06-17 PROCEDURE — 82962 GLUCOSE BLOOD TEST: CPT

## 2017-06-17 PROCEDURE — 80076 HEPATIC FUNCTION PANEL: CPT | Performed by: INTERNAL MEDICINE

## 2017-06-17 PROCEDURE — 84443 ASSAY THYROID STIM HORMONE: CPT | Performed by: INTERNAL MEDICINE

## 2017-06-17 PROCEDURE — 83735 ASSAY OF MAGNESIUM: CPT | Performed by: INTERNAL MEDICINE

## 2017-06-17 PROCEDURE — 74011250637 HC RX REV CODE- 250/637: Performed by: INTERNAL MEDICINE

## 2017-06-17 PROCEDURE — 74011250636 HC RX REV CODE- 250/636: Performed by: INTERNAL MEDICINE

## 2017-06-17 PROCEDURE — 83036 HEMOGLOBIN GLYCOSYLATED A1C: CPT | Performed by: INTERNAL MEDICINE

## 2017-06-17 PROCEDURE — 96365 THER/PROPH/DIAG IV INF INIT: CPT

## 2017-06-17 PROCEDURE — 96372 THER/PROPH/DIAG INJ SC/IM: CPT

## 2017-06-17 RX ORDER — DIPHENHYDRAMINE HCL 25 MG
25 CAPSULE ORAL
Status: DISCONTINUED | OUTPATIENT
Start: 2017-06-17 | End: 2017-06-18 | Stop reason: HOSPADM

## 2017-06-17 RX ORDER — MAGNESIUM SULFATE HEPTAHYDRATE 40 MG/ML
4 INJECTION, SOLUTION INTRAVENOUS ONCE
Status: COMPLETED | OUTPATIENT
Start: 2017-06-17 | End: 2017-06-17

## 2017-06-17 RX ORDER — ZOLPIDEM TARTRATE 5 MG/1
5 TABLET ORAL
Status: DISCONTINUED | OUTPATIENT
Start: 2017-06-17 | End: 2017-06-18 | Stop reason: HOSPADM

## 2017-06-17 RX ADMIN — Medication 10 ML: at 05:21

## 2017-06-17 RX ADMIN — HEPARIN SODIUM 5000 UNITS: 5000 INJECTION, SOLUTION INTRAVENOUS; SUBCUTANEOUS at 22:24

## 2017-06-17 RX ADMIN — HEPARIN SODIUM 5000 UNITS: 5000 INJECTION, SOLUTION INTRAVENOUS; SUBCUTANEOUS at 09:11

## 2017-06-17 RX ADMIN — CLOPIDOGREL BISULFATE 75 MG: 75 TABLET ORAL at 09:11

## 2017-06-17 RX ADMIN — Medication 10 ML: at 22:26

## 2017-06-17 RX ADMIN — ZOLPIDEM TARTRATE 5 MG: 5 TABLET ORAL at 22:24

## 2017-06-17 RX ADMIN — MAGNESIUM SULFATE HEPTAHYDRATE 4 G: 40 INJECTION, SOLUTION INTRAVENOUS at 13:37

## 2017-06-17 RX ADMIN — ACETAMINOPHEN 650 MG: 325 TABLET ORAL at 01:10

## 2017-06-17 RX ADMIN — Medication 10 ML: at 15:32

## 2017-06-17 RX ADMIN — PANTOPRAZOLE SODIUM 40 MG: 40 TABLET, DELAYED RELEASE ORAL at 05:21

## 2017-06-17 RX ADMIN — FINASTERIDE 5 MG: 5 TABLET, FILM COATED ORAL at 09:11

## 2017-06-17 NOTE — PROGRESS NOTES
Problem: Nutrition Deficit  Goal: *Optimize nutritional status  Nutrition  Reason for assessment: Referral received from nursing admission Malnutrition Screening Tool for recently lost 14-23# without trying and eating poorly due to decreased appetite. Assessment:   Diet order(s): [facility]  Food/Nutrition Patient History:  Patient with a h/o colon cancer, bladder cancer and diabetes. Patient reports that he has lost ~20 lbs over the past months with a poor appetite and diarrhea. Reports that he has been using Boost and Ovaltine to help with his nutrition. Patient denies any other po difficulties at this time. Weights per EMR cannot be verified as accurate due to unknown weight source (pt stated vs estimated vs measured). Weight Loss Metrics 6/17/2017 5/19/2017 5/16/2017 5/15/2017   Today's Wt 176 lb 203 lb 12.8 oz   203 lb   BMI 23.87 kg/m2 28.42 kg/m2 28.31 kg/m2         Weight Loss Metrics 5/3/2017 4/24/2017 3/13/2017 1/30/2017   Today's Wt 203 lb 3.2 oz 203 lb 204 lb 9.6 oz 202 lb   BMI 28.34 kg/m2 28.31 kg/m2 29.36 kg/m2 28.98 kg/m2   Note that the weight on 6/17/17 is a patient stated weight. RD ordered a weight to accurately assess patient's weight loss. Anthropometrics:Height: 6' (182.9 cm),  Weight: 79.8 kg (176 lb), Weight Source: Patient stated, Body mass index is 23.87 kg/(m^2). BMI class of normal weight. Macronutrient needs:  EER:  2071-9997 kcal /day (20-25 kcal/kg actual BW)  EPR:  81-97 grams protein/day (1-1.2 grams/kg IBW)  Intake/Comparative Standards: Average intake for past 2 recorded meal(s): 10%. This potentially meets ~11% of kcal and ~11% of protein needs     Nutrition Diagnosis: Inadequate oral intake related to decreased appetite as evidenced by patient not meeting estimated needs and pt report of 20 lb weight loss over there past month     Intervention:  Meals and snacks: Continue current diet. Nutrition Supplement Therapy: Boost TID      Gordy Olea.  Kevin Wheeler [address], [address] [place] [address],  [phone]

## 2017-06-17 NOTE — PROGRESS NOTES
Primary Nurse Lorenzo Dewitt and Kaitlyn Huerta RN performed a dual skin assessment on this patient. No impairment noted. Vickey score is 22.

## 2017-06-17 NOTE — PROGRESS NOTES
TRANSFER - IN REPORT:    Verbal report received from Valerio Amaya RN(name) on Edith Grande  being received from ER(unit) for routine progression of care      Report consisted of patients Situation, Background, Assessment and   Recommendations(SBAR). Information from the following report(s) SBAR was reviewed with the receiving nurse. Opportunity for questions and clarification was provided. Assessment completed upon patients arrival to unit and care assumed.

## 2017-06-17 NOTE — ED NOTES
TRANSFER - OUT REPORT:    Verbal report given to Antonia(name) on Cherylynn Rubinstein  being transferred to 601(unit) for routine progression of care       Report consisted of patients Situation, Background, Assessment and   Recommendations(SBAR). Information from the following report(s) ED Summary was reviewed with the receiving nurse. Lines:       Opportunity for questions and clarification was provided.       Patient transported with:   [employer]

## 2017-06-17 NOTE — H&P
[address]   HISTORY AND PHYSICAL       Name:  Pete Rg   MR#:  [id number]   :  1935   Account #:  [de-identified]   Date of Adm:  2017       TIME OF ADMISSION: 7 p.m. CHIEF COMPLAINT: Generalized weakness and diarrhea. HISTORY OF PRESENT ILLNESS: This is an 69-year-old male patient   who has a past medical history of coronary artery disease,   status post stent placement, osteoarthritis, status post recent   left knee replacement, diet controlled diabetes, and previous   history of colon cancer who came into the emergency room with a   chief complaint of generalized weakness and diarrhea. According to the patient, he underwent left knee replacement in   May 16th. He was released from the hospital the day after   admission and according to him, he had to come back to the   emergency room the next day because he was bleeding from the   surgical site. Eventually, he was transfused with plasma and he   is not completely sure if he also received packed red blood   cells. According to him since the surgery, he has felt weak. He   has lost his appetite some, but he feels nauseous. He lacked   energy to do his regular activities and he has had chronic,   watery diarrhea. According to him, he has 3-4 daily episodes of   watery diarrhea. He denied hematochezia or melena. He also   complains of chills and according to him, sometimes he has felt   febrile, but he has never checked his temperature. Because his   symptoms have been getting worse, he decided to come into the   emergency room today. When he came into the emergency room, his   blood pressure was borderline with a systolic of 94 and he was   tachycardic with a heart rate of 126. His temperature was   normal, and his O2 saturation was within normal limits. His   physical examination was basically unremarkable.  His initial   blood workup showed normal white blood cell count, normal   hemoglobin, his creatinine was elevated to 1.4, being his   baseline around 0.9. His stool was tested for C diff and it was   negative. He received 2 liters of normal saline in the emergency   room and he was presented to the hospitalist team for admission. REVIEW OF SYSTEMS: A review of 14 systems was performed and it   was negative except for the findings that are reported in the   HPI. PAST MEDICAL HISTORY   1. Osteoarthritis. 2. Chronic back pain, status post lumbar fusion and spine   stimulator implantation. 3. Diet-controlled diabetes. 4. History of colon cancer, status post resection. 5. Dyslipidemia. 6. History of gastroesophageal reflux disease. 7. History of bladder cancer. 8. History of hypertension. 9. Non-ST elevation MI in 2014. He required stent placement at   that time. PAST SURGICAL HISTORY   1. Colon resection for colon cancer. 2. History of lumbar fusion. 3. History of spinal stimulator for chronic back pain. 4. GERD. 5. Skin cancer resection. 6. Left hip replacement. 7. Left knee replacement in 05/2017. 8. Previous colonoscopy. 9. Left heart catheterization with stent placement in 2014. He   received a drug-eluting stent in the right coronary artery at   that time. SOCIAL HISTORY: Former smoker, but quit years ago. Denies   alcohol use or illicit drug use. FAMILY HISTORY: Positive for pancreatic cancer, heart disease,   malignant hyperthermia. ALLERGIES: NO KNOWN DRUG ALLERGIES. PHYSICAL EXAMINATION   VITAL SIGNS: Blood pressure 127/57, heart rate of 102,   respiratory rate of 18, O2 saturation of 95%. EYES: PERRLA. EARS, NOSE, MOUTH, AND THROAT: There is no evidence of   pharyngeal erythema, edema, or purulent exudates. RESPIRATORY: Clear breath sounds bilaterally. CARDIOVASCULAR: Regular rate and rhythm with no murmurs. GASTROINTESTINAL: Abdomen is soft and nontender with positive   bowel sounds. GENITOURINARY: Unremarkable.    MUSCULOSKELETAL: No evidence of trauma. SKIN: No evidence of active skin lesions. NEUROLOGIC: The patient is alert and oriented x3, with no   evidence of focal weakness. PSYCHIATRIC: Normal mood. HEMATOLOGIC/LYMPHATIC/IMMUNOLOGIC: No evidence of active   bleeding. No abnormal lymphadenopathy. LABORATORY AND IMAGING RESULTS: White blood cell count 6.4,   hemoglobin 13.5, platelet count 209. Sodium 139, potassium 4.3,   creatinine 1.4. Urinalysis unremarkable. C diff in stool was   negative. Chest x-ray pending to be done. Abdominal x-ray, no evidence of acute findings. EKG seen and analyzed by me: Sinus tachycardia, no evidence of   acute ischemia. ASSESSMENT: This is an 50-year-old male patient who came into   the emergency room with unspecific symptoms of malaise and   chronic diarrhea. He has mild acute kidney injury. He will be   placed under observation and his initial length of stay is   calculated to be less than 2 midnights. PLAN   1. Acute kidney injury. The patient had today a creatinine of   1.4, and his baseline is around 0.9. This is most likely   secondary to dehydration and the use of lisinopril. He has   received 2 liters of normal saline in the emergency room and I   will continue using normal saline at 100 mL/hr. I will put on   hold his regular dose of lisinopril until his kidney function is   completely recovered. 2. Chronic diarrhea. The patient has been complaining of   diarrhea since he was discharged May the 17th. Unclear etiology   of this problem. His stool was negative for C difficile. I will   also order white blood cell count and stool culture in stool. 3. Generalized weakness. Unclear etiology. Considering that this   patient has had a recent surgical intervention with prosthetic   material implantation and considering that he already had a   previous spinal stimulator in place, I think that he is at high   risk of developing bacteremia.  That could explain his unspecific   symptoms and for that reason, I have ordered 2 sets of blood   cultures. If his blood cultures return positive, he will need IV   antibiotics and an echocardiogram.   4. History of hypertension. The patient is hypotensive at this   time. will put on hold his regular dose of Toprol and   lisinopril. We will monitor his vital signs with telemetry   because he has a previous history of coronary artery disease. 5. History of coronary artery disease. Stable. The patient is using   Plavix 75 mg per day. I will continue using this medication. The   last time he had a left heart catheterization with a drug-  eluting stent placement was in 2004. 6. Diabetes type 2. Diet controlled. I will monitor his blood   sugar level on a regular basis. 7. Deep vein thrombosis prophylaxis. Subcutaneous heparin.         MD Alberto Fierro / REMA   D:  06/16/2017   19:44   T:  06/16/2017   20:34   Job #:  [id number]

## 2017-06-17 NOTE — PROGRESS NOTES
Hospitalist Progress Note    2017  Admit Date: 2017  2:46 PM   NAME: Rodrigo Catalan   :  1935   MRN:  [id number]   Attending: Shade Choudhury, *  PCP:  Rebekah Hernandez MD  Treatment Team: Attending Provider: Shade Choudhury MD; Utilization Review: Ace Duenas RN      SUBJECTIVE:   Feeling better today, no new issues     Past Medical History:   Diagnosis Date    Arthritis     osteo    Chronic pain     left hip/back/LLE    [facility] ([facility] 2015    Diabetes ([facility]     Type 2. diet controlled. Pt does not monitor blood sugar. A1C=5.2017.  Dyslipidemia 2015    GERD (gastroesophageal reflux disease)     controlled w/med    Hx of bladder cancer 2015    Hypertension     controlled on medication    NSTEMI (non-ST elevation myocardial infarction) ([facility] 01/10/2014    MI. heart cath: preserved LVSF. EF =65%. mild to mod LAD and circumflex disease.  Osteoarthritis of left hip 2013    S/P prosthetic total arthroplasty of the hip 2014    Stented coronary artery 01/10/2014    mid right coronary artery - [employer] bare metal stent.  Thrombocytopenia, unspecified ([facility] 2014     Recent Results (from the past 24 hour(s))   CBC WITH AUTOMATED DIFF    Collection Time: 17  2:35 PM   Result Value Ref Range    WBC 6.4 4.3 - 11.1 K/uL    RBC 4.22 (L) 4.23 - 5.67 M/uL    HGB 13.5 (L) 13.6 - 17.2 g/dL    HCT 40.0 (L) 41.1 - 50.3 %    MCV 94.8 79.6 - 97.8 FL    MCH 32.0 26.1 - 32.9 PG    MCHC 33.8 31.4 - 35.0 g/dL    RDW 13.7 11.9 - 14.6 %    PLATELET 205 399 - 347 K/uL    MPV 9.5 (L) 10.8 - 14.1 FL    DF AUTOMATED      NEUTROPHILS 62 43 - 78 %    LYMPHOCYTES 17 13 - 44 %    MONOCYTES 15 (H) 4.0 - 12.0 %    EOSINOPHILS 4 0.5 - 7.8 %    BASOPHILS 1 0.0 - 2.0 %    IMMATURE GRANULOCYTES 0.6 0.0 - 5.0 %    ABS. NEUTROPHILS 4.0 1.7 - 8.2 K/UL    ABS. LYMPHOCYTES 1.1 0.5 - 4.6 K/UL    ABS. MONOCYTES 1.0 0.1 - 1.3 K/UL    ABS.  EOSINOPHILS 0.3 0.0 - 0.8 K/UL    ABS. BASOPHILS 0.0 0.0 - 0.2 K/UL    ABS. IMM. GRANS. 0.0 0.0 - 0.5 K/UL   METABOLIC PANEL, COMPREHENSIVE    Collection Time: 06/16/17  2:35 PM   Result Value Ref Range    Sodium 139 136 - 145 mmol/L    Potassium 4.3 3.5 - 5.1 mmol/L    Chloride 104 98 - 107 mmol/L    CO2 24 21 - 32 mmol/L    Anion gap 11 7 - 16 mmol/L    Glucose 122 (H) 65 - 100 mg/dL    BUN 22 8 - 23 MG/DL    Creatinine 1.40 0.8 - 1.5 MG/DL    GFR est AA >60 >60 ml/min/1.73m2    GFR est non-AA 52 (L) >60 ml/min/1.73m2    Calcium 9.5 8.3 - 10.4 MG/DL    Bilirubin, total 0.5 0.2 - 1.1 MG/DL    ALT (SGPT) 25 12 - 65 U/L    AST (SGOT) 31 15 - 37 U/L    Alk. phosphatase 123 50 - 136 U/L    Protein, total 7.2 6.3 - 8.2 g/dL    Albumin 3.9 3.2 - 4.6 g/dL    Globulin 3.3 2.3 - 3.5 g/dL    A-G Ratio 1.2 1.2 - 3.5     EKG, 12 LEAD, INITIAL    Collection Time: 06/16/17  3:38 PM   Result Value Ref Range    Ventricular Rate 99 BPM    Atrial Rate 98 BPM    P-R Interval 202 ms    QRS Duration 76 ms    Q-T Interval 314 ms    QTC Calculation (Bezet) 402 ms    Calculated P Axis 79 degrees    Calculated R Axis 84 degrees    Calculated T Axis 64 degrees    Diagnosis       !! AGE AND GENDER SPECIFIC ECG ANALYSIS !! Normal sinus rhythm  with first degree avb  Normal ECG  When compared with ECG of 24-APR-2017 13:54,  SD interval has decreased  Vent. rate has increased BY  37 BPM  Confirmed by [facility]  MD ()XAVI ([id number]) on 6/16/2017 4:33:36 PM     C.  DIFFICILE/EPI PCR    Collection Time: 06/16/17  3:45 PM   Result Value Ref Range    Special Requests: NO SPECIAL REQUESTS      Culture result: NEGATIVE       Culture result:        Toxigenic C. diff NEGATIVE/ [id number] PRESUMPTIVE NEGATIVE   LACTIC ACID, PLASMA    Collection Time: 06/16/17  7:30 PM   Result Value Ref Range    Lactic acid 1.3 0.4 - 2.0 MMOL/L   CULTURE, BLOOD    Collection Time: 06/16/17  8:10 PM   Result Value Ref Range    Special Requests: LEFT ANTECUBITAL      Culture result: NO GROWTH AFTER 10 HOURS     CULTURE, BLOOD    Collection Time: 06/16/17  8:10 PM   Result Value Ref Range    Special Requests: RIGHT WRIST      Culture result: NO GROWTH AFTER 10 HOURS     CBC W/O DIFF    Collection Time: 06/17/17  5:26 AM   Result Value Ref Range    WBC 4.6 4.3 - 11.1 K/uL    RBC 3.51 (L) 4.23 - 5.67 M/uL    HGB 11.1 (L) 13.6 - 17.2 g/dL    HCT 33.6 (L) 41.1 - 50.3 %    MCV 95.7 79.6 - 97.8 FL    MCH 31.6 26.1 - 32.9 PG    MCHC 33.0 31.4 - 35.0 g/dL    RDW 13.5 11.9 - 14.6 %    PLATELET 652 252 - 281 K/uL    MPV 9.3 (L) 10.8 - 98.0 FL   METABOLIC PANEL, BASIC    Collection Time: 06/17/17  5:26 AM   Result Value Ref Range    Sodium 143 136 - 145 mmol/L    Potassium 4.1 3.5 - 5.1 mmol/L    Chloride 108 (H) 98 - 107 mmol/L    CO2 24 21 - 32 mmol/L    Anion gap 11 7 - 16 mmol/L    Glucose 81 65 - 100 mg/dL    BUN 16 8 - 23 MG/DL    Creatinine 0.85 0.8 - 1.5 MG/DL    GFR est AA >60 >60 ml/min/1.73m2    GFR est non-AA >60 >60 ml/min/1.73m2    Calcium 8.6 8.3 - 10.4 MG/DL   MAGNESIUM    Collection Time: 06/17/17  5:26 AM   Result Value Ref Range    Magnesium 1.5 (L) 1.8 - 2.4 mg/dL   HEPATIC FUNCTION PANEL    Collection Time: 06/17/17  5:26 AM   Result Value Ref Range    Protein, total 5.9 (L) 6.3 - 8.2 g/dL    Albumin 3.1 (L) 3.2 - 4.6 g/dL    Globulin 2.8 2.3 - 3.5 g/dL    A-G Ratio 1.1 (L) 1.2 - 3.5      Bilirubin, total 0.5 0.2 - 1.1 MG/DL    Bilirubin, direct 0.2 <0.4 MG/DL    Alk.  phosphatase 91 50 - 136 U/L    AST (SGOT) 21 15 - 37 U/L    ALT (SGPT) 19 12 - 65 U/L   TSH 3RD GENERATION    Collection Time: 06/17/17  5:26 AM   Result Value Ref Range    TSH 0.905 0.358 - 3.740 uIU/mL   HEMOGLOBIN A1C WITH EAG    Collection Time: 06/17/17  5:26 AM   Result Value Ref Range    Hemoglobin A1c 5.0 4.8 - 6.0 %    Est. average glucose 97 mg/dL   GLUCOSE, POC    Collection Time: 06/17/17  6:45 AM   Result Value Ref Range    Glucose (POC) 80 65 - 100 mg/dL   WBC, STOOL    Collection Time: 06/17/17 10:15 AM   Result Value Ref Range    White blood cells, stool NO WBCS SEEN 0 - 4 /HPF   GLUCOSE, POC    Collection Time: 06/17/17 11:19 AM   Result Value Ref Range    Glucose (POC) 104 (H) 65 - 100 mg/dL     No Known Allergies  Current Facility-Administered Medications   Medication Dose Route Frequency Provider Last Rate Last Dose    magnesium sulfate 4 g/100 mL IVPB  4 g IntraVENous ONCE Coral Bowen, PA        sodium chloride (NS) flush 5-10 mL  5-10 mL IntraVENous Q8H Royce Min DO   10 mL at 06/17/17 0521    sodium chloride (NS) flush 5-10 mL  5-10 mL IntraVENous PRN Pamela Min,         acetaminophen (TYLENOL) tablet 650 mg  650 mg Oral Q6H PRN Willy Choi MD   650 mg at 06/17/17 0110    clopidogrel (PLAVIX) tablet 75 mg  75 mg Oral DAILY Willy Choi MD   75 mg at 06/17/17 0911    pantoprazole (PROTONIX) tablet 40 mg  40 mg Oral ACB Willy Choi MD   40 mg at 06/17/17 5675    finasteride (PROSCAR) tablet 5 mg  5 mg Oral DAILY Willy Choi MD   5 mg at 06/17/17 0911    0.9% sodium chloride infusion  100 mL/hr IntraVENous CONTINUOUS Willy Choi  mL/hr at 06/16/17 2138 100 mL/hr at 06/16/17 2138    oxyCODONE IR (ROXICODONE) tablet 5 mg  5 mg Oral Q6H PRN Willy Choi MD   5 mg at 06/16/17 2236    [facility] injection 4 mg  4 mg IntraVENous Q6H PRN Willy Choi MD        heparin (porcine) injection 5,000 Units  5,000 Units SubCUTAneous Q12H Willy Choi MD   5,000 Units at 06/17/17 0911     Facility-Administered Medications Ordered in Other Encounters   Medication Dose Route Frequency Provider Last Rate Last Dose    0.9% sodium chloride infusion 250 mL  250 mL IntraVENous PRN OFELIA Hoffman        0.9% sodium chloride infusion 250 mL  250 mL IntraVENous PRN OFELIA Hoffman           Review of Systems negative with exception of pertinent positives noted above  PHYSICAL EXAM     Visit Vitals    /63    Pulse 86    Temp 98.1 °F (36.7 °C)    Resp 16    Ht 6' (1.829 m)    Wt 81.6 kg (180 lb)    SpO2 97%    BMI 24.41 kg/m2      Temp (24hrs), Av.9 °F (36.6 °C), Min:97.5 °F (36.4 °C), Max:98.4 °F (36.9 °C)    Oxygen Therapy  O2 Sat (%): 97 % (17 1054)  O2 Device: Room air (17 1902)    Intake/Output Summary (Last 24 hours) at 17 1150  Last data filed at 17 0829   Gross per 24 hour   Intake              320 ml   Output                0 ml   Net              320 ml      General: No acute distress    Lungs: CTA  Heart:  RRR  Abdomen: Soft,+BS  Extremities: No edema   Patient discussed with and assessment and plan made in collaboration with  supervising physician, Dr. Jairon Horton Problems    Diagnosis Date Noted    [facility] 2017    Hypomagnesemia 2017    Weakness generalized 2017    Diarrhea 2017     Plan:  Continue IVF hydration   Replace Mg  Follow stool studies and blood cultures   DVT Prophylaxis: heparin   Plan of Care Discussed with: Supervising MD Giovany Story., PA

## 2017-06-18 VITALS
OXYGEN SATURATION: 98 % | TEMPERATURE: 98.1 F | SYSTOLIC BLOOD PRESSURE: 121 MMHG | DIASTOLIC BLOOD PRESSURE: 78 MMHG | BODY MASS INDEX: 23.84 KG/M2 | HEART RATE: 80 BPM | HEIGHT: 72 IN | RESPIRATION RATE: 16 BRPM | WEIGHT: 176 LBS

## 2017-06-18 LAB
ANION GAP BLD CALC-SCNC: 11 MMOL/L (ref 7–16)
BUN SERPL-MCNC: 8 MG/DL (ref 8–23)
CALCIUM SERPL-MCNC: 8.5 MG/DL (ref 8.3–10.4)
CHLORIDE SERPL-SCNC: 111 MMOL/L (ref 98–107)
CO2 SERPL-SCNC: 23 MMOL/L (ref 21–32)
CREAT SERPL-MCNC: 0.69 MG/DL (ref 0.8–1.5)
GLUCOSE BLD STRIP.AUTO-MCNC: 111 MG/DL (ref 65–100)
GLUCOSE BLD STRIP.AUTO-MCNC: 90 MG/DL (ref 65–100)
GLUCOSE SERPL-MCNC: 82 MG/DL (ref 65–100)
MAGNESIUM SERPL-MCNC: 1.9 MG/DL (ref 1.8–2.4)
POTASSIUM SERPL-SCNC: 3.7 MMOL/L (ref 3.5–5.1)
SODIUM SERPL-SCNC: 145 MMOL/L (ref 136–145)

## 2017-06-18 PROCEDURE — 83735 ASSAY OF MAGNESIUM: CPT | Performed by: INTERNAL MEDICINE

## 2017-06-18 PROCEDURE — 82962 GLUCOSE BLOOD TEST: CPT

## 2017-06-18 PROCEDURE — 97116 GAIT TRAINING THERAPY: CPT

## 2017-06-18 PROCEDURE — 97161 PT EVAL LOW COMPLEX 20 MIN: CPT

## 2017-06-18 PROCEDURE — G8978 MOBILITY CURRENT STATUS: HCPCS

## 2017-06-18 PROCEDURE — 96372 THER/PROPH/DIAG INJ SC/IM: CPT

## 2017-06-18 PROCEDURE — 99218 HC RM OBSERVATION: CPT

## 2017-06-18 PROCEDURE — 74011250637 HC RX REV CODE- 250/637: Performed by: INTERNAL MEDICINE

## 2017-06-18 PROCEDURE — 74011250636 HC RX REV CODE- 250/636: Performed by: INTERNAL MEDICINE

## 2017-06-18 PROCEDURE — 80048 BASIC METABOLIC PNL TOTAL CA: CPT | Performed by: INTERNAL MEDICINE

## 2017-06-18 PROCEDURE — G8979 MOBILITY GOAL STATUS: HCPCS

## 2017-06-18 PROCEDURE — 36415 COLL VENOUS BLD VENIPUNCTURE: CPT | Performed by: INTERNAL MEDICINE

## 2017-06-18 RX ADMIN — Medication 10 ML: at 05:05

## 2017-06-18 RX ADMIN — PANTOPRAZOLE SODIUM 40 MG: 40 TABLET, DELAYED RELEASE ORAL at 05:05

## 2017-06-18 RX ADMIN — HEPARIN SODIUM 5000 UNITS: 5000 INJECTION, SOLUTION INTRAVENOUS; SUBCUTANEOUS at 09:08

## 2017-06-18 RX ADMIN — CLOPIDOGREL BISULFATE 75 MG: 75 TABLET ORAL at 08:38

## 2017-06-18 RX ADMIN — FINASTERIDE 5 MG: 5 TABLET, FILM COATED ORAL at 08:38

## 2017-06-18 NOTE — DISCHARGE SUMMARY
Hospitalist Discharge Summary   Patient ID:  Rodrigo Catalan  [id number]  21 y.o.  1935  Admit date: 6/16/2017  2:46 PM  Discharge date and time: 6/18/2017  Attending: Shade Choudhury, *  PCP:  Rebekah Hernandez MD  Treatment Team: Attending Provider: Shade Choudhury MD; Utilization Review: Ace Duenas RN  Principal Diagnosis CHERYL (acute kidney injury) [facility])   Principal Problem:    CHERYL (acute kidney injury) ([facility] [address] [address] (6/17/2017)    Active Problems:    Hypomagnesemia (6/17/2017)      Weakness generalized (6/17/2017)      Diarrhea (6/17/2017)       * Admission Diagnoses: cheryl  * Discharge Diagnoses:    Hospital Problems as of 6/18/2017  Date Reviewed: 5/3/2017          Codes Class Noted - Resolved POA    * (Principal)[facility] ICD-10-CM: N17.9  ICD-9-CM: 584.9  6/17/2017 - Present Unknown        Hypomagnesemia ICD-10-CM: M54.84  ICD-9-CM: 275.2  6/17/2017 - Present Unknown        Weakness generalized ICD-10-CM: R53.1  ICD-9-CM: 780.79  6/17/2017 - Present Unknown        Diarrhea ICD-10-CM: R19.7  ICD-9-CM: 787.91  6/17/2017 - Present Unknown            Hospital Course:  Please refer to the admission H&P for details of presentation. In summary, Rodrigo Catalan is a 81 y.o. male admitted with CHERYL, generalized weakness and diarrhea. He was placed on IVF hydration with resolution of CHERYL. Diarrhea has improved and stool studies were negative. Blood cultures were also negative. Mr Olive Champagne is feeling much better and is eager for discharge home today. Given his recent BP readings, Lisinopril will be discontinued and he has been instructed to hold Toprol XL for SBP<110 or HR<60. The patient has been discussed with supervising physician, Dr Abbey Navarro, and is considered stable for discharge today with close follow up as detailed below.      Diagnostic Study/Procedure results summary copied from within [facility] EMR:  ACUTE ABDOMINAL SERIES  HISTORY: Severe acute diarrhea x1 month. Nausea and vomiting. FINDINGS: There is no subdiaphragmatic free intraperitoneal air. Hardware is  present in the lumbar spine and left hip. There is a relative paucity of bowel  gas. A surgical anastomosis is present in the region of the rectum. IMPRESSION: No acute findings    Labs: Results:       Chemistry Recent Labs      06/18/17   0504  06/17/17   0526  06/16/17   1435   GLU  82  81  122*   NA  145  143  139   K  3.7  4.1  4.3   CL  111*  108*  104   CO2  23  24  24   BUN  8  16  22   CREA  0.69*  0.85  1.40   CA  8.5  8.6  9.5   AGAP  11  11  11   TBILI   --   0.5  0.5   ALT   --   19  25   AP   --   91  123   TP   --   5.9*  7.2   ALB   --   3.1*  3.9   GLOB   --   2.8  3.3   AGRAT   --   1.1*  1.2      CBC w/Diff Recent Labs      06/17/17   0526  06/16/17   1435   WBC  4.6  6.4   RBC  3.51*  4.22*   HGB  11.1*  13.5*   HCT  33.6*  40.0*   PLT  159  227   GRANS   --   62   LYMPH   --   17   EOS   --   4                      Liver Enzymes Recent Labs      06/17/17   0526   TP  5.9*   ALB  3.1*   AP  91   SGOT  21      Thyroid Studies Lab Results   Component Value Date/Time    TSH 0.905 06/17/2017 05:26 AM            Discharge Exam:  Visit Vitals    /84 (BP 1 Location: Right arm, BP Patient Position: At rest)    Pulse 85    Temp 97.3 °F (36.3 °C)    Resp 16    Ht 6' (1.829 m)    Wt 79.8 kg (176 lb)    SpO2 97%    BMI 23.87 kg/m2     General appearance: alert, cooperative, no distress, appears stated age  Lungs: clear to auscultation bilaterally  Heart: regular rate and rhythm  Abdomen: soft, non-tender.  Bowel sounds normal.   Extremities: no cyanosis or edema      Disposition: home  Discharge Condition: stable  Patient Instructions:   Current Discharge Medication List      CONTINUE these medications which have NOT CHANGED    Details   zolpidem (AMBIEN) 10 mg tablet Take 5-10 mg by mouth nightly as needed for Sleep.      acetaminophen (TYLENOL) 500 mg tablet Take 1,000 mg by mouth every six (6) hours as needed for Pain. HYDROcodone-acetaminophen (NORCO)  mg tablet Take 1 Tab by mouth as needed for Pain. every 4-6 hours      clopidogrel (PLAVIX) 75 mg tab Take 1 Tab by mouth daily. Qty: 80 Tab, Refills: 1    Associated Diagnoses: Coronary artery disease involving native heart without angina pectoris, unspecified vessel or lesion type      Dexlansoprazole (DEXILANT) 60 mg CpDB Take 1 Cap by mouth daily. Qty: 90 Cap, Refills: 1    Associated Diagnoses: Gastroesophageal reflux disease without esophagitis      finasteride (PROSCAR) 5 mg tablet Take 1 Tab by mouth daily. Qty: 90 Tab, Refills: 1    Associated Diagnoses: Benign prostatic hyperplasia, presence of lower urinary tract symptoms unspecified, unspecified morphology      atorvastatin (LIPITOR) 40 mg tablet Take 1 Tab by mouth daily. Qty: 90 Tab, Refills: 1    Associated Diagnoses: Dyslipidemia      niacin ER (NIASPAN) 500 mg tablet Take 4 Tabs by mouth nightly. Qty: 360 Tab, Refills: 1    Associated Diagnoses: Dyslipidemia      metoprolol succinate (TOPROL-XL) 25 mg XL tablet Take 1 Tab by mouth daily. Valente Avelar MD Lic# [id number] [facility] [id number]  Qty: 30 Tab, Refills: 1    Associated Diagnoses: Essential hypertension      hydrocortisone (ANUSOL-HC) 25 mg supp Insert 1 Suppository into rectum every twelve (12) hours. Qty: 15 Suppository, Refills: 5    Associated Diagnoses: Hemorrhoids, unspecified hemorrhoid type      cholecalciferol, vitamin D3, (VITAMIN D-3) 2,000 unit Tab Take  by mouth nightly. diphenhydrAMINE-acetaminophen (TYLENOL PM EXTRA STRENGTH)  mg tab Take 50-1,000 mg by mouth as needed (pain for sleep). diphenhydrAMINE (BENADRYL) 50 mg capsule Take 50 mg by mouth nightly as needed for Sleep. oxyCODONE IR (ROXICODONE) 10 mg tab immediate release tablet Take 1 Tab by mouth every four (4) hours as needed.  Max Daily Amount: 60 mg.  Qty: 60 Tab, Refills: 0      aspirin delayed-release 81 mg tablet Take 81 mg by mouth daily. Per anesthesia protocol:instructed to take am of surgery. Indications: myocardial infarction prevention      triamcinolone acetonide (KENALOG) 0.1 % topical cream Apply  to affected area two (2) times a day.  use thin layer  Qty: 60 g, Refills: 2    Associated Diagnoses: Other eczema         STOP taking these medications       lisinopril (PRINIVIL, ZESTRIL) 5 mg tablet Comments:   Reason for Stopping:               Activity: Activity as tolerated  Diet: Cardiac Diet    FULL CODE   Surrogate decision maker: spouse, [place]   Pneumonia and flu vaccine to be administered at discharge per hospital protocol   Follow-up  ·   Dr Zeb Carter in 1 week with follow up BMP  Time spent to discharge patient <30 minutes    Signed:  OFELIA Jose  6/18/2017  1:16 PM

## 2017-06-18 NOTE — PROGRESS NOTES
Problem: Mobility Impaired (Adult and Pediatric)  Goal: *Acute Goals and Plan of Care (Insert Text)  Discharge Goals:  (1.)Mr. Anneliese Medellin will tolerate 25+ minutes of therapeutic activity/exercise while maintaining stable vitals to improve functional strength, ROM, and activity tolerance within 3 day(s). (2.)Mr. Anneliese Medellin will ambulate with modified independence for 250+ feet with the least restrictive device within 3 day(s). (3.)Mr. Anneliese Medellin will safely ascend and descend 3 steps to reach his bed/bathroom within 7 day(s). (4.)Mr. Mr. Anneliese Medellin will demonstrate good standing dynamic balance to safely perform functional mobility within 7 day(s). PHYSICAL THERAPY: INITIAL ASSESSMENT, TREATMENT DAY: DAY OF ASSESSMENT, AM    6/18/2017  OBSERVATION: Hospital Day: 3  [place] / [place] / Product Type: Medicare /      NAME/AGE/GENDER: Edith Grande is a 81 y.o. male        PRIMARY DIAGNOSIS: hudson HUDSON (acute kidney injury) ([facility] [address] HUDSON (acute kidney injury) ([facility] [address]        ICD-10: Treatment Diagnosis:       · Generalized Muscle Weakness (M62.81)  · Difficulty in walking, Not elsewhere classified (R26.2)   Precaution/Allergies:  Review of patient's allergies indicates no known allergies. ASSESSMENT:      Mr. Anneliese Medellin is an 81year old male with history significant for recent L TKA (WBAT) admitted with HUDSON. Patient seen this AM for initial physical therapy evaluation: presents supine in bed, oriented x4, and endorses 0/10 pain. Patient lives with his spouse in a two story residence with 3 steps to reach second level. At baseline, patient is modified independent- independent with ADLs, ambulates with a SPC, and is currently transitioning from [address] services to outpatient PT services to address L TKA.  Today, generalized functional weakness appreciated in B UE/LE (4/5), L knee flexion/extension not full but functional and progressing well for 1 month post op, and sensation is intact to light touch C4-T1 and L3-S1 B. Patient performed bed mobility with supervision, transfers with SBA, and ambulation x100ft with CGA. Demonstrated a slow, step-through, antalgic gait pattern on L LE with mild increased trunk sway and fair dynamic balance throughout. Required verbal cues for gait mechanics, activity pacing, and safe cane progression/negotiation. Educated patient on home safety, activity pacing, and activity; verbalized understanding. Mr. Devante Schafer presents with decreased functional activity tolerance and balance/gait status from baseline. Will follow in acute care. Recommend continuing HHPT vs. Outpatient PT for L TKA. This section established at most recent assessment   PROBLEM LIST (Impairments causing functional limitations):  1. Decreased Strength  2. Decreased ADL/Functional Activities  3. Decreased Transfer Abilities  4. Decreased Ambulation Ability/Technique  5. Decreased Balance  6. Decreased Activity Tolerance  7. Increased Fatigue  8. Decreased Knowledge of Precautions  9. Decreased [place] with Home Exercise Program    INTERVENTIONS PLANNED: (Benefits and precautions of physical therapy have been discussed with the patient.)  1. Balance Exercise  2. Bed Mobility  3. Family Education  4. Gait Training  5. Home Exercise Program (HEP)  6. Range of Motion (ROM)  7. Therapeutic Activites  8. Therapeutic Exercise/Strengthening  9. Transfer Training  10. Group Therapy      TREATMENT PLAN: Frequency/Duration: 3 times a week for until stated goals are met  Rehabilitation Potential For Stated Goals: GOOD      RECOMMENDED REHABILITATION/EQUIPMENT: (at time of discharge pending progress): Continue Skilled Therapy. HISTORY:   History of Present Injury/Illness (Reason for Referral):  Difficulty in walking, weakness  Past Medical History/Comorbidities:   Mr. 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 He also has no past medical history of Aneurysm ([address] Arrhythmia; Autoimmune disease ([address] Difficult intubation; Heart failure ([address] Liver disease; Malignant hyperthermia due to anesthesia; Morbid obesity ([address] Nausea & vomiting; Pseudocholinesterase deficiency; Psychiatric disorder; PUD (peptic ulcer disease); Seizures ([address] Sleep apnea; or Thromboembolus ([address] Mr. Carrie Emmanuel  has a past surgical history that includes abdomen surgery proc unlisted (1991); lumbar fusion (1990 x 2  2012, 2013); urological (2002); heent (as child); mohs procedure (Left, 1990); orthopaedic (Left, 2013); colectomy (1997); colonoscopy (2014); heart catheterization (01/10/2014); and coronary stent placement. Social History/Living Environment:   Home Environment: Private residence  # Steps to Enter: 0  One/Two Story Residence: Two story  # of Interior Steps: 3  Living Alone: No  Support Systems: Spouse/Significant Other/Partner  Patient Expects to be Discharged to[employer] residence  Current DME Used/Available at Home: Walker, rolling, Cane, straight  Tub or Shower Type: Tub/Shower combination  Prior Level of Function/Work/Activity:  Patient lives with his spouse in a two story residence with 3 steps to reach second level. At baseline, patient is modified independent- independent with ADLs, ambulates with a SPC, and is currently transitioning from [address] services to outpatient PT services to address L TKA.    Number of Personal Factors/Comorbidities that affect the Plan of Care: 1-2: MODERATE COMPLEXITY   EXAMINATION:   Most Recent Physical Functioning:   Gross Assessment:  AROM: Generally decreased, functional (L LE; recent TKA)  Strength: Generally decreased, functional (L LE; generalized )  Coordination: Within functional limits (B UE/LE functionally)  Sensation: Intact (Light touch C4-T1 and L3-S1 B)               Posture:  Posture (WDL): Within defined limits  Balance:  Sitting: Intact  Standing: Impaired  Standing - Static: Fair  Standing - Dynamic : Fair Bed Mobility:  Rolling: Supervision  Supine to Sit: Supervision  Sit to Supine: Supervision  Scooting: Supervision  Wheelchair Mobility:     Transfers:  Sit to Stand: Stand-by asssistance  Stand to Sit: Stand-by asssistance  Bed to Chair: Contact guard assistance  Gait:     Base of Support: Shift to right;Center of gravity altered  Speed/Mary: Shuffled; Slow  Step Length: Left shortened;Right shortened  Swing Pattern: Left asymmetrical  Stance: Left decreased  Gait Abnormalities: Antalgic;Decreased step clearance; Step to gait;Trunk sway increased  Distance (ft): 100 Feet (ft)  Assistive Device: Cane, straight  Ambulation - Level of Assistance: Contact guard assistance  Interventions: Safety awareness training; Tactile cues; Verbal cues       Body Structures Involved:  1. Bones  2. Muscles Body Functions Affected:  1. Neuromusculoskeletal  2. Movement Related Activities and Participation Affected:  1. Mobility   Number of elements that affect the Plan of Care: 4+: HIGH COMPLEXITY   CLINICAL PRESENTATION:   Presentation: Stable and uncomplicated: LOW COMPLEXITY   CLINICAL DECISION MAKING:   [facility] 6 Clicks   Basic Mobility Inpatient Short Form  How much difficulty does the patient currently have. .. Unable A Lot A Little None   1. Turning over in bed (including adjusting bedclothes, sheets and blankets)? [ ] 1   [ ] 2   [ ] 3   [X] 4   2. Sitting down on and standing up from a chair with arms ( e.g., wheelchair, bedside commode, etc.)   [ ] 1   [ ] 2   [X] 3   [ ] 4   3.   Moving from lying on back to sitting on the side of the bed? [ ] 1   [ ] 2   [ ] 3   [X] 4   How much help from another person does the patient currently need. .. Total A Lot A Little None   4. Moving to and from a bed to a chair (including a wheelchair)? [ ] 1   [ ] 2   [X] 3   [ ] 4   5. Need to walk in hospital room? [ ] 1   [ ] 2   [X] 3   [ ] 4   6. Climbing 3-5 steps with a railing? [ ] 1   [ ] 2   [X] 3   [ ] 4   © 2007, Trustees of [address] [place] [address] [address] [id number], under license to [employer]. All rights reserved    Score:  Initial: 20 Most Recent: 20 (Date: 6/18/17 )     Interpretation of Tool:  Represents activities that are increasingly more difficult (i.e. Bed mobility, Transfers, Gait). Score 24 23 22-20 19-15 14-10 9-7 6       Modifier CH CI CJ CK CL CM CN         · Mobility - Walking and Moving Around:               - CURRENT STATUS:    CJ - 20%-39% impaired, limited or restricted               - GOAL STATUS:           CI - 1%-19% impaired, limited or restricted               - D/C STATUS:                       ---------------To be determined---------------  [place] / [place] / Product Type: Medicare /       Medical Necessity:     · Patient demonstrates good rehab potential due to higher previous functional level. Reason for Services/Other Comments:  · Patient continues to require skilled intervention due to decreased functional activity tolerance and balance/gait status from baseline. .   Use of outcome tool(s) and clinical judgement create a POC that gives a: Clear prediction of patient's progress: LOW COMPLEXITY                 TREATMENT:       Pre-treatment Symptoms/Complaints:    Pain: Initial:   Pain Intensity 1: 0  Post Session:  0/10      Initial Evaluation  Gait Training (  8 Minutes):  Gait training to improve and/or restore physical functioning as related to mobility, strength, balance and coordination.   Ambulated 100 Feet (ft) with Contact guard assistance using a Cane, straight and minimal Safety awareness training; Tactile cues; Verbal cues related to their gait mechanics, activity pacing, dynamic standing balance, and safe cane pacing/negotiaiton. Braces/Orthotics/Lines/Etc:   · IV  · O2 Device: Room air  Treatment/Session Assessment:    · Response to Treatment:  See above. · Interdisciplinary Collaboration:  · Physical Therapist  · Registered Nurse  · After treatment position/precautions:  · Supine in bed  · Bed in low position  · Call light within reach  · Need met  · Compliance with Program/Exercises: Will assess as treatment progresses. · Recommendations/Intent for next treatment session: \"Next visit will focus on advancements to more challenging activities and reduction in assistance provided\".   ·   Total Treatment Duration:  PT Patient Time In/Time Out  Time In: 1113  Time Out: [address] [facility],[address], [facility]

## 2017-06-18 NOTE — DISCHARGE INSTRUCTIONS
DISCHARGE SUMMARY from Nurse    The following personal items are in your possession at time of discharge:    Dental Appliances: Uppers  Visual Aid: None     Home Medications: None  Jewelry: Watch, With patient  Clothing: At bedside  Other Valuables: Cell Phone  Personal Items Sent to Safe: none          PATIENT INSTRUCTIONS:    After general anesthesia or intravenous sedation, for 24 hours or while taking prescription Narcotics:  · Limit your activities  · Do not drive and operate hazardous machinery  · Do not make important personal or business decisions  · Do  not drink alcoholic beverages  · If you have not urinated within 8 hours after discharge, please contact your surgeon on call. Report the following to your surgeon:  · Excessive pain, swelling, redness or odor of or around the surgical area  · Temperature over 100.5  · Nausea and vomiting lasting longer than 4 hours or if unable to take medications  · Any signs of decreased circulation or nerve impairment to extremity: change in color, persistent  numbness, tingling, coldness or increase pain  · Any questions        What to do at Home:  Recommended activity: {discharge activity:74176}, ***    If you experience any of the following symptoms fever greater than 100.5, bloody stools, dizzness please follow up with ***. *  Please give a list of your current medications to your Primary Care Provider. *  Please update this list whenever your medications are discontinued, doses are      changed, or new medications (including over-the-counter products) are added. *  Please carry medication information at all times in case of emergency situations. These are general instructions for a healthy lifestyle:    No smoking/ No tobacco products/ Avoid exposure to second hand smoke    Surgeon General's Warning:  Quitting smoking now greatly reduces serious risk to your health.     Obesity, smoking, and sedentary lifestyle greatly increases your risk for illness    A healthy diet, regular physical exercise & weight monitoring are important for maintaining a healthy lifestyle    You may be retaining fluid if you have a history of heart failure or if you experience any of the following symptoms:  Weight gain of 3 pounds or more overnight or 5 pounds in a week, increased swelling in our hands or feet or shortness of breath while lying flat in bed. Please call your doctor as soon as you notice any of these symptoms; do not wait until your next office visit. Recognize signs and symptoms of STROKE:    F-face looks uneven    A-arms unable to move or move unevenly    S-speech slurred or non-existent    T-time-call 911 as soon as signs and symptoms begin-DO NOT go       Back to bed or wait to see if you get better-TIME IS BRAIN. Warning Signs of HEART ATTACK     Call 911 if you have these symptoms:   Chest discomfort. Most heart attacks involve discomfort in the center of the chest that lasts more than a few minutes, or that goes away and comes back. It can feel like uncomfortable pressure, squeezing, fullness, or pain.  Discomfort in other areas of the upper body. Symptoms can include pain or discomfort in one or both arms, the back, neck, jaw, or stomach.  Shortness of breath with or without chest discomfort.  Other signs may include breaking out in a cold sweat, nausea, or lightheadedness. Don't wait more than five minutes to call 911 - MINUTES MATTER! Fast action can save your life. Calling 911 is almost always the fastest way to get lifesaving treatment. Emergency Medical Services staff can begin treatment when they arrive -- up to an hour sooner than if someone gets to the hospital by car. The discharge information has been reviewed with the {PATIENT PARENT GUARDIAN:06776}. The {PATIENT PARENT GUARDIAN:05449} verbalized understanding.     Discharge medications reviewed with the {Dishcarge meds reviewed WUUR:90475} and appropriate educational materials and side effects teaching were provided.           STOP LISINOPRIL  HOLD TOPROL XL IF SYSTOLIC BP <208 or HR <26

## 2017-06-18 NOTE — PROGRESS NOTES
Medicare Outpatient Observation Notice provided to the patient on 6/17/17. Oral explanation was provided and all questions answered. Signed document placed in the medical record under media tab. Copy to patient. Pt screened for D/C planning needs and denied any supportive care needs at this time. Will follow plan of care and assist if needs arise.

## 2017-06-19 ENCOUNTER — PATIENT OUTREACH (OUTPATIENT)
Dept: CASE MANAGEMENT | Age: 82
End: 2017-06-19

## 2017-06-19 LAB
BACTERIA SPEC CULT: NORMAL
SERVICE CMNT-IMP: NORMAL

## 2017-06-19 NOTE — PROGRESS NOTES
Date/Time of Call:   06/19/17 2:30 PM   What was the patient hospitalized for? Patient was hospitalized for CHERYL   Does the patient understand his/her diagnosis and/or treatment and what happened during the hospitalization? Patient verbalized understanding of treatment and diagnosis. Did the patient receive discharge instructions? Yes patient states d/c instructions received. Review any discharge instructions (see notes in [facility]). Ask patient if they understand these. Do they have any questions? Patient discussed discharge plan and instruction as she understood it to be with Care Coordinator. Which I have documented in the pertinent areas throughout this note. Were home services ordered (nursing, PT, OT, ST, etc.)? No HH services ordered at d/c. D/c [facility] 6/12/17   If so, has the first visit occurred? If not, why? (Assist with coordination of services if necessary.) N/A   Was any DME ordered? No DME ordered at d/c. If so, has it been received? If not, why?  (Assist with coordination of arranging DME orders if necessary.) N/A   Complete a review of all medications (new, continued and discontinued meds per the D/C instructions and medication tab in [facility]). Care coordinator and patient reviewed all medications per [facility] at home meds instructed for use. Were all new prescriptions filled? If not, why?  (Assist with obtainment of medications if necessary.) N/A   Does the patient understand the purpose and dosing instructions for all medications? (If patient has questions, provide explanation and education.) Indicated by patient to care coordinator, the purpose and dosing instructions for all medications were understood. Does the patient have any problems in performing ADLs? (If patient is unable to perform ADLs  what is the limiting factor(s)?   Do they have a support system that can assist? If no support system is present, discuss possible assistance that they may be able to obtain.) Patient states he is independent with all ADLs. Does the patient have all follow-up appointments scheduled? Has transportation been arranged? [facility] follow-up should be within 7 days of discharge; all others should have PCP follow-up within 7 days of discharge; follow-ups with other specialists as appropriate or ordered.)  F/U appt. is scheduled for 6/26/17 and patient states he has transportation. Any other questions or concerns expressed by the patient? Patient did not express any other questions or concerns. Patient stated gratitude for care and call. No further needs identified. FAY Call Completed By: Devendra Fernández [address] Coordinator          This note will not be viewable in [address] This note will not be viewable in [address]

## 2017-06-21 LAB
BACTERIA SPEC CULT: NORMAL
BACTERIA SPEC CULT: NORMAL
SERVICE CMNT-IMP: NORMAL
SERVICE CMNT-IMP: NORMAL

## 2017-07-05 ENCOUNTER — PATIENT OUTREACH (OUTPATIENT)
Dept: CASE MANAGEMENT | Age: 82
End: 2017-07-05

## 2017-07-11 ENCOUNTER — HOSPITAL ENCOUNTER (OUTPATIENT)
Dept: PHYSICAL THERAPY | Age: 82
Discharge: HOME | End: 2017-07-11
Payer: MEDICARE

## 2017-07-11 PROCEDURE — G8979 MOBILITY GOAL STATUS: HCPCS

## 2017-07-11 PROCEDURE — 97162 PT EVAL MOD COMPLEX 30 MIN: CPT

## 2017-07-11 PROCEDURE — G8978 MOBILITY CURRENT STATUS: HCPCS

## 2017-07-11 NOTE — PROGRESS NOTES
Harmony Perkins  : [facility] [place] [address] [facility], [address], [facility] [address]  [phone]   [phone]           OUTPATIENT PHYSICAL THERAPY:Initial Assessment 2017    ICD-10: Treatment Diagnosis: Pain in left knee (M25.562); Stiffness of left knee, not elsewhere classified (M25.662); Other abnormalities of gait and mobility R26.89  Precautions/Allergies:   Review of patient's allergies indicates no known allergies. Fall Risk Score: 2 (? 5 = High Risk)  MD Orders: eval and treat MEDICAL/REFERRING DIAGNOSIS:  s/p left tka   DATE OF ONSET: 17  REFERRING PHYSICIAN: Aubrie Wynn,*  RETURN PHYSICIAN APPOINTMENT: 17     INITIAL ASSESSMENT:  Mr. Nestor Perez presents with pain, stiffness, and decreased gait and mobility s/p L TKA on 17. Benna Him Patient would benefit from PT to address these problems to improve patient's independence and safety with mobility and daily activities. Thank you. PROBLEM LIST (Impacting functional limitations):  1. Decreased Strength  2. Decreased ADL/Functional Activities  3. Decreased Transfer Abilities  4. Decreased Ambulation Ability/Technique  5. Decreased Balance  6. Increased Pain  7. Decreased Activity Tolerance  8. Decreased Flexibility/Joint Mobility  9. Decreased [place] with Home Exercise Program INTERVENTIONS PLANNED:  1. Balance Exercise  2. Bed Mobility  3. Cold  4. Gait Training  5. Heat  6. Home Exercise Program (HEP)  7. Manual Therapy  8. Neuromuscular Re-education/Strengthening  9. Range of Motion (ROM)  10. Therapeutic Activites  11. Therapeutic Exercise/Strengthening  12. Transfer Training   TREATMENT PLAN:  Effective Dates: 2017 TO 2017. Frequency/Duration: 2-3 times a week for 6-8 weeks  GOALS: (Goals have been discussed and agreed upon with patient.)  Short-Term Functional Goals: Time Frame: 2-4 weeks  1.  Patient will demonstrate independence and compliance with home exercise program to improve ROM and strength for daily activities. 2. Patient will report decreased L knee pain to less than or equal to 5/10 to improve patient's tolerance of daily activities. 3.   Discharge Goals: Time Frame: 6-8 weeks  1. Patient will report decreased L Knee pain to less than or equal to 2/10 to improve patient's tolerance of daily activities. 2. Patient will ambulate with least assistive device over level and unlevel surfaces without evidence of imbalance to improve safety for daily activities. 3. Patient will demonstrate improved L knee ROM to 0-120 degrees to improve mobility for daily activities. 4. Patient will increase L lower extremity strength to greater than or equal to 4+/5 to improve strength for functional mobility activities. Rehabilitation Potential For Stated Goals: Good  Regarding [facility], I certify that the treatment plan above will be carried out by a therapist or under their direction. Thank you for this referral,  Vedia Dance, PT     Referring Physician Signature: Arlander Fothergill,*              Date                    The information in this section was collected on 7/11/17 (except where otherwise noted). HISTORY:   History of Present Injury/Illness (Reason for Referral):  5/16/17 L TKA. Home from hospital with HHPT for PT. Has a stationary bike, recumbent at home and is working on 1/2 revolutions. L knee has been achy and mildly swollen. I went to ER day after released from hospital because bandage was filling with blood. ER just wrapped it. Then went to hospital downtown due to prolonged diarrhea. Was in hospital 3 days, and said it was something with my kidneys. Feeling better now and eating better. Doing exercises from home health PT. 7/10 pain L knee all of the time, and stiff. Taking tylenol and no other pain meds. No falls. Have used a single point cane for years.  Doing all normal activities except getting on riding . Had L CATRINA in 2014, and has had 4 back surgeries. 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 Past Medical History/Comorbidities:   Mr. 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 He also has no past medical history of Aneurysm ([address] Arrhythmia; Autoimmune disease ([address] Difficult intubation; Heart failure ([address] Liver disease; Malignant hyperthermia due to anesthesia; Morbid obesity ([address] Nausea & vomiting; Pseudocholinesterase deficiency; Psychiatric disorder; PUD (peptic ulcer disease); Seizures ([address] Sleep apnea; or Thromboembolus ([address] Mr. Annette Rutledge  has a past surgical history that includes abdomen surgery proc unlisted (1991); lumbar fusion (1990 x 2  2012, 2013); urological (2002); heent (as child); mohs procedure (Left, 1990); orthopaedic (Left, 2013); colectomy (1997); colonoscopy (2014); heart catheterization (01/10/2014); coronary stent placement; and knee replacement (Left, 2017). Social History/Living Environment:     3 steps at home. 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 Dominant Side:         RIGHT   Personal Factors:          Sex:  male        Age:  81 y.o. Current Medications:       Current Outpatient Prescriptions:     diphenhydrAMINE-acetaminophen (TYLENOL PM EXTRA STRENGTH)  mg tab, Take 50-1,000 mg by mouth as needed (pain for sleep). , Disp: , Rfl:     zolpidem (AMBIEN) 10 mg tablet, Take 5-10 mg by mouth nightly as needed for Sleep., Disp: , Rfl:     acetaminophen (TYLENOL) 500 mg tablet, Take 1,000 mg by mouth every six (6) hours as needed for Pain., Disp: , Rfl:     diphenhydrAMINE (BENADRYL) 50 mg capsule, Take 50 mg by mouth nightly as needed for Sleep., Disp: , Rfl:     HYDROcodone-acetaminophen (NORCO)  mg tablet, Take 1 Tab by mouth as needed for Pain. every 4-6 hours, Disp: , Rfl:     oxyCODONE IR (ROXICODONE) 10 mg tab immediate release tablet, Take 1 Tab by mouth every four (4) hours as needed. Max Daily Amount: 60 mg., Disp: 60 Tab, Rfl: 0    aspirin delayed-release 81 mg tablet, Take 81 mg by mouth daily. Per anesthesia protocol:instructed to take am of surgery. Indications: myocardial infarction prevention, Disp: , Rfl:     triamcinolone acetonide (KENALOG) 0.1 % topical cream, Apply  to affected area two (2) times a day. use thin layer, Disp: 60 g, Rfl: 2    clopidogrel (PLAVIX) 75 mg tab, Take 1 Tab by mouth daily. , Disp: 90 Tab, Rfl: 1    Dexlansoprazole (DEXILANT) 60 mg CpDB, Take 1 Cap by mouth daily. (Patient taking differently: Take 60 mg by mouth nightly. Indications: gastroesophageal reflux disease), Disp: 90 Cap, Rfl: 1    finasteride (PROSCAR) 5 mg tablet, Take 1 Tab by mouth daily. (Patient taking differently: Take 5 mg by mouth daily. Per anesthesia protocol:instructed to take am of surgery. Indications: SYMPTOMATIC BENIGN PROSTATIC HYPERPLASIA), Disp: 90 Tab, Rfl: 1    atorvastatin (LIPITOR) 40 mg tablet, Take 1 Tab by mouth daily. (Patient taking differently: Take 40 mg by mouth daily. Per anesthesia protocol:instructed to take am of surgery.   Indications: hypercholesterolemia), Disp: 90 Tab, Rfl: 1    niacin ER (NIASPAN) 500 mg tablet, Take 4 Tabs by mouth nightly., Disp: 360 Tab, Rfl: 1    metoprolol succinate (TOPROL-XL) 25 mg XL tablet, Take 1 Tab by mouth daily. Carito Addison MD Lic# [id number] RAYMUNDO [id number] (Patient taking differently: Take 25 mg by mouth nightly.), Disp: 30 Tab, Rfl: 1    hydrocortisone (ANUSOL-HC) 25 mg supp, Insert 1 Suppository into rectum every twelve (12) hours. , Disp: 15 Suppository, Rfl: 5    cholecalciferol, vitamin D3, (VITAMIN D-3) 2,000 unit Tab, Take  by mouth nightly.  , Disp: , Rfl:   No current facility-administered medications for this encounter. Facility-Administered Medications Ordered in Other Encounters:     0.9% sodium chloride infusion 250 mL, 250 mL, IntraVENous, PRN, OFELIA Paredes    0.9% sodium chloride infusion 250 mL, 250 mL, IntraVENous, PRN, OFELIA Paredes   Date Last Reviewed:  7/11/17   Number of Personal Factors/Comorbidities that affect the Plan of Care: 1-2: MODERATE COMPLEXITY   EXAMINATION:   Observation/Orthostatic Postural Assessment:          L knee scar healing from TKA, mild swelling  Palpation:          Decreased skin mobility inferior to L patella; mild swelling noted throughout L knee  ROM:          R knee WNL; L knee  degrees  Strength:          R LE 4+/5, L LE 4/5  Functional Mobility:         Gait/Ambulation:  Patient ambulates with straight cane demonstrating an antalgic gait pattern. Transfers:  independent        Bed Mobility:  independent        Stairs:  NT  Sensation:         intact     Body Structures Involved:  1. Joints  2. Muscles  3. Ligaments Body Functions Affected:  1. Sensory/Pain  2. Neuromusculoskeletal  3. Movement Related Activities and Participation Affected:  1. Mobility  2. Domestic Life  3. Community, Social and [place]   Number of elements (examined above) that affect the Plan of Care: 3: MODERATE COMPLEXITY   CLINICAL PRESENTATION:   Presentation: Evolving clinical presentation with changing clinical characteristics: MODERATE COMPLEXITY   CLINICAL DECISION MAKING:   Outcome Measure:    Tool Used: Lower Extremity Functional Scale (LEFS)  Score:  Initial: 32/80 Most Recent: X/80 (Date: -- )   Interpretation of Score: 20 questions each scored on a 5 point scale with 0 representing \"extreme difficulty or unable to perform\" and 4 representing \"no difficulty\". The lower the score, the greater the functional disability. 80/80 represents no disability. Minimal detectable change is 9 points. Score 80 79-63 62-48 47-32 31-16 15-1 0   Modifier CH CI CJ CK CL CM CN     ? Mobility - Walking and Moving Around:     - CURRENT STATUS: CK - 40%-59% impaired, limited or restricted    - GOAL STATUS: CJ - 20%-39% impaired, limited or restricted    - D/C STATUS:  ---------------To be determined---------------      Medical Necessity:   · Patient is expected to demonstrate progress in strength, range of motion and functional technique to increase independence with daily activities. Reason for Services/Other Comments:  · Patient continues to demonstrate capacity to improve strength, ROM, pain, gait which will increase independence and increase safety. Use of outcome tool(s) and clinical judgement create a POC that gives a: Questionable prediction of patient's progress: MODERATE COMPLEXITY            TREATMENT:   (In addition to Assessment/Re-Assessment sessions the following treatments were rendered)  Pre-treatment Symptoms/Complaints:  Stiffness/achiness L knee  Pain: Initial: Pain Intensity 1: 7  Pain Location 1: Knee  Pain Orientation 1: Left  Post Session:  6/10 L knee       Therapeutic Exercise: ( ):  Exercises per grid below to improve mobility, strength and balance. Required minimal verbal cues to promote proper body alignment, promote proper body posture and promote proper body mechanics. Progressed resistance, range, repetitions and complexity of movement as indicated.    Date:  7/11/17 Date:   Date:     Activity/Exercise Parameters Parameters Parameters   nustep Level 3 x 8 minutes     Straight leg raise L LE x 15 reps     Long sitting calf stretch with towel L LE 3 x 30 seconds     Heel slides X 10 reps  L LE                         Manual therapy: massage to L knee anteriorly and posteriorly, and scar, to improve tissue mobility for ROM. Modalities: ice pack to L knee x 10 minutes at end of session. Treatment/Session Assessment:    · Response to Treatment:  Patient tolerated session well. Skin intact. · .  · Compliance with Program/Exercises: Will assess as treatment progresses. · Recommendations/Intent for next treatment session: \"Next visit will focus on advancements to more challenging activities\".   Total Treatment Duration:  PT Patient Time In/Time Out  Time In: 1015  Time Out: [address] [facility] [place]

## 2017-07-11 NOTE — PROGRESS NOTES
Ambulatory/Rehab Services H2 Model Falls Risk Assessment    Risk Factor Pts. ·   Confusion/Disorientation/Impulsivity  []    4 ·   Symptomatic Depression  []   2 ·   Altered Elimination  []   1 ·   Dizziness/Vertigo  []   1 ·   Gender (Male)  [x]   1 ·   Any administered antiepileptics (anticonvulsants):  []   2 ·   Any administered benzodiazepines:  []   1 ·   Visual Impairment (specify):  []   1 ·   Portable Oxygen Use  []   1 ·   Orthostatic ? BP  []   1 ·   History of Recent Falls (within 3 mos.)  []   5     Ability to Rise from Chair (choose one) Pts. ·   Ability to rise in a single movement  []   0 ·   Pushes up, successful in one attempt  [x]   1 ·   Multiple attempts, but successful  []   3 ·   Unable to rise without assistance  []   4   Total: (5 or greater = High Risk) 2     Falls Prevention Plan:   []                Physical Limitations to Exercise (specify):   []                Mobility Assistance Device (type):   []                Exercise/Equipment Adaptation (specify):    ©2010 [facility] of [address] Patent #[id number].  Federal Law prohibits the replication, distribution or use without written permission from [facility] [place]

## 2017-07-13 ENCOUNTER — HOSPITAL ENCOUNTER (OUTPATIENT)
Dept: PHYSICAL THERAPY | Age: 82
Discharge: HOME | End: 2017-07-13
Payer: MEDICARE

## 2017-07-13 PROCEDURE — 97110 THERAPEUTIC EXERCISES: CPT

## 2017-07-13 PROCEDURE — 97140 MANUAL THERAPY 1/> REGIONS: CPT

## 2017-07-13 NOTE — PROGRESS NOTES
Sebastián Geiger  : 1935 [facility]  [address], [address] [address], [place] [address].  [phone]   [phone]           OUTPATIENT PHYSICAL THERAPY:Daily Note 2017    ICD-10: Treatment Diagnosis: Pain in left knee (M25.562); Stiffness of left knee, not elsewhere classified (M25.662); Other abnormalities of gait and mobility R26.89  Precautions/Allergies:   Review of patient's allergies indicates no known allergies. Fall Risk Score: 2 (? 5 = High Risk)  MD Orders: eval and treat MEDICAL/REFERRING DIAGNOSIS:  s/p left tka   DATE OF ONSET: 17  REFERRING PHYSICIAN: Patricia Murray,*  RETURN PHYSICIAN APPOINTMENT: 17     INITIAL ASSESSMENT:  Mr. Conor Argueta presents with pain, stiffness, and decreased gait and mobility s/p L TKA on 17. Sammi Escamilla Patient would benefit from PT to address these problems to improve patient's independence and safety with mobility and daily activities. Thank you. PROBLEM LIST (Impacting functional limitations):  1. Decreased Strength  2. Decreased ADL/Functional Activities  3. Decreased Transfer Abilities  4. Decreased Ambulation Ability/Technique  5. Decreased Balance  6. Increased Pain  7. Decreased Activity Tolerance  8. Decreased Flexibility/Joint Mobility  9. Decreased [place] with Home Exercise Program INTERVENTIONS PLANNED:  1. Balance Exercise  2. Bed Mobility  3. Cold  4. Gait Training  5. Heat  6. Home Exercise Program (HEP)  7. Manual Therapy  8. Neuromuscular Re-education/Strengthening  9. Range of Motion (ROM)  10. Therapeutic Activites  11. Therapeutic Exercise/Strengthening  12. Transfer Training   TREATMENT PLAN:  Effective Dates: 2017 TO 2017. Frequency/Duration: 2-3 times a week for 6-8 weeks  GOALS: (Goals have been discussed and agreed upon with patient.)  Short-Term Functional Goals: Time Frame: 2-4 weeks  1.  Patient will demonstrate independence and compliance with home exercise program to improve ROM and strength for daily activities. 2. Patient will report decreased L knee pain to less than or equal to 5/10 to improve patient's tolerance of daily activities. 3.   Discharge Goals: Time Frame: 6-8 weeks  1. Patient will report decreased L Knee pain to less than or equal to 2/10 to improve patient's tolerance of daily activities. 2. Patient will ambulate with least assistive device over level and unlevel surfaces without evidence of imbalance to improve safety for daily activities. 3. Patient will demonstrate improved L knee ROM to 0-120 degrees to improve mobility for daily activities. 4. Patient will increase L lower extremity strength to greater than or equal to 4+/5 to improve strength for functional mobility activities. Rehabilitation Potential For Stated Goals: Good  Regarding [facility] therapy, I certify that the treatment plan above will be carried out by a therapist or under their direction. Thank you for this referral,  Mi Medellin, PT     Referring Physician Signature: Ari Euceda,*              Date                    The information in this section was collected on 7/11/17 (except where otherwise noted). HISTORY:   History of Present Injury/Illness (Reason for Referral):  5/16/17 L TKA. Home from hospital with HHPT for PT. Has a stationary bike, recumbent at home and is working on 1/2 revolutions. L knee has been achy and mildly swollen. I went to ER day after released from hospital because bandage was filling with blood. ER just wrapped it. Then went to hospital downtown due to prolonged diarrhea. Was in hospital 3 days, and said it was something with my kidneys. Feeling better now and eating better. Doing exercises from home health PT. 7/10 pain L knee all of the time, and stiff. Taking tylenol and no other pain meds. No falls. Have used a single point cane for years.  Doing all normal activities except getting on riding lawn mower. Had L CATRINA in 2014, and has had 4 back surgeries. 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 Past Medical History/Comorbidities:   Mr. Jaci Washington  has a past medical history of Arthritis; Chronic pain; Colon cancer ([facility] [address] (6/23/2015); Diabetes ([facility] [address] (2006); Dyslipidemia (6/23/2015); GERD (gastroesophageal reflux disease); bladder cancer (6/23/2015); Hypertension; NSTEMI (non-ST elevation myocardial infarction) ([address] (01/10/2014); Osteoarthritis of left hip (12/16/2013); S/P prosthetic total arthroplasty of the hip (1/8/2014); Stented coronary artery (01/10/2014); and Thrombocytopenia, unspecified ([facility] [address] (1/11/2014). He also has no past medical history of Aneurysm ([facility] [address] Arrhythmia; Autoimmune disease ([address] Difficult intubation; Heart failure ([address] Liver disease; Malignant hyperthermia due to anesthesia; Morbid obesity ([address] Nausea & vomiting; Pseudocholinesterase deficiency; Psychiatric disorder; PUD (peptic ulcer disease); Seizures ([address] Sleep apnea; or Thromboembolus ([address] Mr. Jaci Washington  has a past surgical history that includes abdomen surgery proc unlisted (1991); lumbar fusion (1990 x 2  2012, 2013); urological (2002); heent (as child); mohs procedure (Left, 1990); orthopaedic (Left, 2013); colectomy (1997); colonoscopy (2014); heart catheterization (01/10/2014); coronary stent placement; and knee replacement (Left, 2017). Social History/Living Environment:     3 steps at home. 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 Dominant Side:         RIGHT   Personal Factors:          Sex:  male        Age:  81 y.o. Current Medications:       Current Outpatient Prescriptions:     diphenhydrAMINE-acetaminophen (TYLENOL PM EXTRA STRENGTH)  mg tab, Take 50-1,000 mg by mouth as needed (pain for sleep). , Disp: , Rfl:     zolpidem (AMBIEN) 10 mg tablet, Take 5-10 mg by mouth nightly as needed for Sleep., Disp: , Rfl:     acetaminophen (TYLENOL) 500 mg tablet, Take 1,000 mg by mouth every six (6) hours as needed for Pain., Disp: , Rfl:     diphenhydrAMINE (BENADRYL) 50 mg capsule, Take 50 mg by mouth nightly as needed for Sleep., Disp: , Rfl:     HYDROcodone-acetaminophen (NORCO)  mg tablet, Take 1 Tab by mouth as needed for Pain. every 4-6 hours, Disp: , Rfl:     oxyCODONE IR (ROXICODONE) 10 mg tab immediate release tablet, Take 1 Tab by mouth every four (4) hours as needed. Max Daily Amount: 60 mg., Disp: 60 Tab, Rfl: 0    aspirin delayed-release 81 mg tablet, Take 81 mg by mouth daily. Per anesthesia protocol:instructed to take am of surgery. Indications: myocardial infarction prevention, Disp: , Rfl:     triamcinolone acetonide (KENALOG) 0.1 % topical cream, Apply  to affected area two (2) times a day. use thin layer, Disp: 60 g, Rfl: 2    clopidogrel (PLAVIX) 75 mg tab, Take 1 Tab by mouth daily. , Disp: 90 Tab, Rfl: 1    Dexlansoprazole (DEXILANT) 60 mg CpDB, Take 1 Cap by mouth daily. (Patient taking differently: Take 60 mg by mouth nightly. Indications: gastroesophageal reflux disease), Disp: 90 Cap, Rfl: 1    finasteride (PROSCAR) 5 mg tablet, Take 1 Tab by mouth daily. (Patient taking differently: Take 5 mg by mouth daily. Per anesthesia protocol:instructed to take am of surgery. Indications: SYMPTOMATIC BENIGN PROSTATIC HYPERPLASIA), Disp: 90 Tab, Rfl: 1    atorvastatin (LIPITOR) 40 mg tablet, Take 1 Tab by mouth daily. (Patient taking differently: Take 40 mg by mouth daily. Per anesthesia protocol:instructed to take am of surgery.   Indications: hypercholesterolemia), Disp: 90 Tab, Rfl: 1    niacin ER (NIASPAN) 500 mg tablet, Take 4 Tabs by mouth nightly., Disp: 360 Tab, Rfl: 1    metoprolol succinate (TOPROL-XL) 25 mg XL tablet, Take 1 Tab by mouth daily. Brian Alejandra MD Lic# [id number] RAYMUNDO [id number] (Patient taking differently: Take 25 mg by mouth nightly.), Disp: 30 Tab, Rfl: 1    hydrocortisone (ANUSOL-HC) 25 mg supp, Insert 1 Suppository into rectum every twelve (12) hours. , Disp: 15 Suppository, Rfl: 5    cholecalciferol, vitamin D3, (VITAMIN D-3) 2,000 unit Tab, Take  by mouth nightly.  , Disp: , Rfl:   No current facility-administered medications for this encounter. Facility-Administered Medications Ordered in Other Encounters:     0.9% sodium chloride infusion 250 mL, 250 mL, IntraVENous, PRN, OFELIA Bro    0.9% sodium chloride infusion 250 mL, 250 mL, IntraVENous, PRN, OFELIA Bro   Date Last Reviewed:  7/11/17   Number of Personal Factors/Comorbidities that affect the Plan of Care: 1-2: MODERATE COMPLEXITY   EXAMINATION:   Observation/Orthostatic Postural Assessment:          L knee scar healing from TKA, mild swelling  Palpation:          Decreased skin mobility inferior to L patella; mild swelling noted throughout L knee  ROM:          R knee WNL; L knee  degrees  Strength:          R LE 4+/5, L LE 4/5  Functional Mobility:         Gait/Ambulation:  Patient ambulates with straight cane demonstrating an antalgic gait pattern. Transfers:  independent        Bed Mobility:  independent        Stairs:  NT  Sensation:         intact     Body Structures Involved:  1. Joints  2. Muscles  3. Ligaments Body Functions Affected:  1. Sensory/Pain  2. Neuromusculoskeletal  3. Movement Related Activities and Participation Affected:  1. Mobility  2. Domestic Life  3. Community, Social and [place] [place]   Number of elements (examined above) that affect the Plan of Care: 3: MODERATE COMPLEXITY   CLINICAL PRESENTATION:   Presentation: Evolving clinical presentation with changing clinical characteristics: MODERATE COMPLEXITY   CLINICAL DECISION MAKING:   Outcome Measure:    Tool Used: Lower Extremity Functional Scale (LEFS)  Score:  Initial: 32/80 Most Recent: X/80 (Date: -- )   Interpretation of Score: 20 questions each scored on a 5 point scale with 0 representing \"extreme difficulty or unable to perform\" and 4 representing \"no difficulty\". The lower the score, the greater the functional disability. 80/80 represents no disability. Minimal detectable change is 9 points. Score 80 79-63 62-48 47-32 31-16 15-1 0   Modifier CH CI CJ CK CL CM CN     ? Mobility - Walking and Moving Around:     - CURRENT STATUS: CK - 40%-59% impaired, limited or restricted    - GOAL STATUS: CJ - 20%-39% impaired, limited or restricted    - D/C STATUS:  ---------------To be determined---------------      Medical Necessity:   · Patient is expected to demonstrate progress in strength, range of motion and functional technique to increase independence with daily activities. Reason for Services/Other Comments:  · Patient continues to demonstrate capacity to improve strength, ROM, pain, gait which will increase independence and increase safety. Use of outcome tool(s) and clinical judgement create a POC that gives a: Questionable prediction of patient's progress: MODERATE COMPLEXITY            TREATMENT:   (In addition to Assessment/Re-Assessment sessions the following treatments were rendered)  Pre-treatment Symptoms/Complaints:  Stiffness/achiness L knee  Pain: Initial:    Post Session:  6/10 L knee       Therapeutic Exercise: (  25 minutes):  Exercises per grid below to improve mobility, strength and balance. Required minimal verbal cues to promote proper body alignment, promote proper body posture and promote proper body mechanics. Progressed resistance, range, repetitions and complexity of movement as indicated.    Date:  7/11/17 Date:  7-13-17 Date:     Activity/Exercise Parameters Parameters Parameters   nustep Level 3 x 8 minutes L3 8 minutes    Straight leg raise L LE x 15 reps L x20    Long sitting calf stretch with towel L LE 3 x 30 seconds --    Heel slides X 10 reps  L LE x20 in sitting    Long arc quad  x20    Sit to stand  x20    Hip ABD  x20    Hip ext  x20      Manual therapy: (10 minutes) massage to L knee anteriorly and posteriorly, and scar, to improve tissue mobility for ROM. Passive physiologic flexion and extension grade III-IV    Modalities:(10 minutes) ice pack to L knee x 10 minutes at end of session. Treatment/Session Assessment:    · Response to Treatment:  Patient tolerated session well. Skin intact. · .  · Compliance with Program/Exercises: Will assess as treatment progresses. · Recommendations/Intent for next treatment session: \"Next visit will focus on advancements to more challenging activities\".   Total Treatment Duration:  PT Patient Time In/Time Out  Time In: 0900  Time Out: 0945    Jacques Reaves PT

## 2017-07-14 ENCOUNTER — HOSPITAL ENCOUNTER (OUTPATIENT)
Dept: PHYSICAL THERAPY | Age: 82
Discharge: HOME | End: 2017-07-14
Payer: MEDICARE

## 2017-07-14 PROCEDURE — 97140 MANUAL THERAPY 1/> REGIONS: CPT

## 2017-07-14 PROCEDURE — 97110 THERAPEUTIC EXERCISES: CPT

## 2017-07-14 NOTE — PROGRESS NOTES
Konrad Corley  : 1935 [facility]  [address], [address] [address],[address], [address] [facility]  [phone]   [phone]           OUTPATIENT PHYSICAL THERAPY:Daily Note 2017    ICD-10: Treatment Diagnosis: Pain in left knee (M25.562); Stiffness of left knee, not elsewhere classified (M25.662); Other abnormalities of gait and mobility R26.89  Precautions/Allergies:   Review of patient's allergies indicates no known allergies. Fall Risk Score: 2 (? 5 = High Risk)  MD Orders: eval and treat MEDICAL/REFERRING DIAGNOSIS:  s/p left tka   DATE OF ONSET: 17  REFERRING PHYSICIAN: John Martin,*  RETURN PHYSICIAN APPOINTMENT: 17     INITIAL ASSESSMENT:  Mr. Author Pink presents with pain, stiffness, and decreased gait and mobility s/p L TKA on 17. Anne Haider Patient would benefit from PT to address these problems to improve patient's independence and safety with mobility and daily activities. Thank you. PROBLEM LIST (Impacting functional limitations):  1. Decreased Strength  2. Decreased ADL/Functional Activities  3. Decreased Transfer Abilities  4. Decreased Ambulation Ability/Technique  5. Decreased Balance  6. Increased Pain  7. Decreased Activity Tolerance  8. Decreased Flexibility/Joint Mobility  9. Decreased [place] with Home Exercise Program INTERVENTIONS PLANNED:  1. Balance Exercise  2. Bed Mobility  3. Cold  4. Gait Training  5. Heat  6. Home Exercise Program (HEP)  7. Manual Therapy  8. Neuromuscular Re-education/Strengthening  9. Range of Motion (ROM)  10. Therapeutic Activites  11. Therapeutic Exercise/Strengthening  12. Transfer Training   TREATMENT PLAN:  Effective Dates: 2017 TO 2017. Frequency/Duration: 2-3 times a week for 6-8 weeks  GOALS: (Goals have been discussed and agreed upon with patient.)  Short-Term Functional Goals: Time Frame: 2-4 weeks  1.  Patient will demonstrate independence and compliance with home exercise program to improve ROM and strength for daily activities. 2. Patient will report decreased L knee pain to less than or equal to 5/10 to improve patient's tolerance of daily activities. 3.   Discharge Goals: Time Frame: 6-8 weeks  1. Patient will report decreased L Knee pain to less than or equal to 2/10 to improve patient's tolerance of daily activities. 2. Patient will ambulate with least assistive device over level and unlevel surfaces without evidence of imbalance to improve safety for daily activities. 3. Patient will demonstrate improved L knee ROM to 0-120 degrees to improve mobility for daily activities. 4. Patient will increase L lower extremity strength to greater than or equal to 4+/5 to improve strength for functional mobility activities. Rehabilitation Potential For Stated Goals: Good  Regarding [facility] therapy, I certify that the treatment plan above will be carried out by a therapist or under their direction. Thank you for this referral,  Henna Covarrubias PT     Referring Physician Signature: Seema Braga,*              Date                    The information in this section was collected on 7/11/17 (except where otherwise noted). HISTORY:   History of Present Injury/Illness (Reason for Referral):  5/16/17 L TKA. Home from hospital with HHPT for PT. Has a stationary bike, recumbent at home and is working on 1/2 revolutions. L knee has been achy and mildly swollen. I went to ER day after released from hospital because bandage was filling with blood. ER just wrapped it. Then went to hospital downtown due to prolonged diarrhea. Was in hospital 3 days, and said it was something with my kidneys. Feeling better now and eating better. Doing exercises from home health PT. 7/10 pain L knee all of the time, and stiff. Taking tylenol and no other pain meds. No falls. Have used a single point cane for years.  Doing all normal activities except getting on riding . Had L CATRINA in 2014, and has had 4 back surgeries. 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 Past Medical History/Comorbidities:   Mr. Reece Teixeira  has a past medical history of Arthritis; Chronic pain; Colon cancer ([facility] [address] (6/23/2015); Diabetes ([address] (2006); Dyslipidemia (6/23/2015); GERD (gastroesophageal reflux disease); bladder cancer (6/23/2015); Hypertension; NSTEMI (non-ST elevation myocardial infarction) ([address] (01/10/2014); Osteoarthritis of left hip (12/16/2013); S/P prosthetic total arthroplasty of the hip (1/8/2014); Stented coronary artery (01/10/2014); and Thrombocytopenia, unspecified ([facility] [address] (1/11/2014). He also has no past medical history of Aneurysm ([address] Arrhythmia; Autoimmune disease ([address] Difficult intubation; Heart failure ([address] Liver disease; Malignant hyperthermia due to anesthesia; Morbid obesity ([address] Nausea & vomiting; Pseudocholinesterase deficiency; Psychiatric disorder; PUD (peptic ulcer disease); Seizures ([address] Sleep apnea; or Thromboembolus ([address] Mr. Reece Teixeira  has a past surgical history that includes abdomen surgery proc unlisted (1991); lumbar fusion (1990 x 2  2012, 2013); urological (2002); heent (as child); mohs procedure (Left, 1990); orthopaedic (Left, 2013); colectomy (1997); colonoscopy (2014); heart catheterization (01/10/2014); coronary stent placement; and knee replacement (Left, 2017). Social History/Living Environment:     3 steps at home. 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 Dominant Side:         RIGHT   Personal Factors:          Sex:  male        Age:  81 y.o. Current Medications:       Current Outpatient Prescriptions:     diphenhydrAMINE-acetaminophen (TYLENOL PM EXTRA STRENGTH)  mg tab, Take 50-1,000 mg by mouth as needed (pain for sleep). , Disp: , Rfl:     zolpidem (AMBIEN) 10 mg tablet, Take 5-10 mg by mouth nightly as needed for Sleep., Disp: , Rfl:     acetaminophen (TYLENOL) 500 mg tablet, Take 1,000 mg by mouth every six (6) hours as needed for Pain., Disp: , Rfl:     diphenhydrAMINE (BENADRYL) 50 mg capsule, Take 50 mg by mouth nightly as needed for Sleep., Disp: , Rfl:     HYDROcodone-acetaminophen (NORCO)  mg tablet, Take 1 Tab by mouth as needed for Pain. every 4-6 hours, Disp: , Rfl:     oxyCODONE IR (ROXICODONE) 10 mg tab immediate release tablet, Take 1 Tab by mouth every four (4) hours as needed. Max Daily Amount: 60 mg., Disp: 60 Tab, Rfl: 0    aspirin delayed-release 81 mg tablet, Take 81 mg by mouth daily. Per anesthesia protocol:instructed to take am of surgery. Indications: myocardial infarction prevention, Disp: , Rfl:     triamcinolone acetonide (KENALOG) 0.1 % topical cream, Apply  to affected area two (2) times a day. use thin layer, Disp: 60 g, Rfl: 2    clopidogrel (PLAVIX) 75 mg tab, Take 1 Tab by mouth daily. , Disp: 90 Tab, Rfl: 1    Dexlansoprazole (DEXILANT) 60 mg CpDB, Take 1 Cap by mouth daily. (Patient taking differently: Take 60 mg by mouth nightly. Indications: gastroesophageal reflux disease), Disp: 90 Cap, Rfl: 1    finasteride (PROSCAR) 5 mg tablet, Take 1 Tab by mouth daily. (Patient taking differently: Take 5 mg by mouth daily. Per anesthesia protocol:instructed to take am of surgery. Indications: SYMPTOMATIC BENIGN PROSTATIC HYPERPLASIA), Disp: 90 Tab, Rfl: 1    atorvastatin (LIPITOR) 40 mg tablet, Take 1 Tab by mouth daily. (Patient taking differently: Take 40 mg by mouth daily. Per anesthesia protocol:instructed to take am of surgery.   Indications: hypercholesterolemia), Disp: 90 Tab, Rfl: 1    niacin ER (NIASPAN) 500 mg tablet, Take 4 Tabs by mouth nightly., Disp: 360 Tab, Rfl: 1    metoprolol succinate (TOPROL-XL) 25 mg XL tablet, Take 1 Tab by mouth daily. Lan Conner MD Lic# [id number] RAYMUNDO [id number] (Patient taking differently: Take 25 mg by mouth nightly.), Disp: 30 Tab, Rfl: 1    hydrocortisone (ANUSOL-HC) 25 mg supp, Insert 1 Suppository into rectum every twelve (12) hours. , Disp: 15 Suppository, Rfl: 5    cholecalciferol, vitamin D3, (VITAMIN D-3) 2,000 unit Tab, Take  by mouth nightly.  , Disp: , Rfl:   No current facility-administered medications for this encounter. Facility-Administered Medications Ordered in Other Encounters:     0.9% sodium chloride infusion 250 mL, 250 mL, IntraVENous, PRN, Lesly Holder, PA    0.9% sodium chloride infusion 250 mL, 250 mL, IntraVENous, PRN, OFELIA Durham   Date Last Reviewed:  7/14/17   Number of Personal Factors/Comorbidities that affect the Plan of Care: 1-2: MODERATE COMPLEXITY   EXAMINATION:   Observation/Orthostatic Postural Assessment:          L knee scar healing from TKA, mild swelling  Palpation:          Decreased skin mobility inferior to L patella; mild swelling noted throughout L knee  ROM:          R knee WNL; L knee  degrees  Strength:          R LE 4+/5, L LE 4/5  Functional Mobility:         Gait/Ambulation:  Patient ambulates with straight cane demonstrating an antalgic gait pattern. Transfers:  independent        Bed Mobility:  independent        Stairs:  NT  Sensation:         intact     Body Structures Involved:  1. Joints  2. Muscles  3. Ligaments Body Functions Affected:  1. Sensory/Pain  2. Neuromusculoskeletal  3. Movement Related Activities and Participation Affected:  1. Mobility  2. Domestic Life  3. Community, Social and [place]   Number of elements (examined above) that affect the Plan of Care: 3: MODERATE COMPLEXITY   CLINICAL PRESENTATION:   Presentation: Evolving clinical presentation with changing clinical characteristics: MODERATE COMPLEXITY   CLINICAL DECISION MAKING:   Outcome Measure:    Tool Used: Lower Extremity Functional Scale (LEFS)  Score:  Initial: 32/80 Most Recent: X/80 (Date: -- )   Interpretation of Score: 20 questions each scored on a 5 point scale with 0 representing \"extreme difficulty or unable to perform\" and 4 representing \"no difficulty\". The lower the score, the greater the functional disability. 80/80 represents no disability. Minimal detectable change is 9 points. Score 80 79-63 62-48 47-32 31-16 15-1 0   Modifier CH CI CJ CK CL CM CN     ? Mobility - Walking and Moving Around:     - CURRENT STATUS: CK - 40%-59% impaired, limited or restricted    - GOAL STATUS: CJ - 20%-39% impaired, limited or restricted    - D/C STATUS:  ---------------To be determined---------------      Medical Necessity:   · Patient is expected to demonstrate progress in strength, range of motion and functional technique to increase independence with daily activities. Reason for Services/Other Comments:  · Patient continues to demonstrate capacity to improve strength, ROM, pain, gait which will increase independence and increase safety. Use of outcome tool(s) and clinical judgement create a POC that gives a: Questionable prediction of patient's progress: MODERATE COMPLEXITY            TREATMENT:   (In addition to Assessment/Re-Assessment sessions the following treatments were rendered)  Pre-treatment Symptoms/Complaints:  Stiffness/achiness L knee. Doing fine with HEP. Pain: Initial:   6/10 L knee Post Session:  5/10 L knee       Therapeutic Exercise: (30 Minutes 30 minutes):  Exercises per grid below to improve mobility, strength and balance. Required minimal verbal cues to promote proper body alignment, promote proper body posture and promote proper body mechanics. Progressed resistance, range, repetitions and complexity of movement as indicated.    Date:  7/11/17 Date:  7-13-17 Date:  7/14/17   Activity/Exercise Parameters Parameters Parameters   nustep Level 3 x 8 minutes L3 8 minutes L3 x 8 minutes   Straight leg raise L LE x 15 reps L x20 L x20   Long sitting calf stretch with towel L LE 3 x 30 seconds --    Heel slides X 10 reps  L LE x20 in sitting x20 in sitting   Long arc quad  x20 x20   Sit to stand  x20 x20 Hip ABD  x20 x20 sidelying   Hip ext  x20 x20 prone     Manual therapy: (10 minutes) massage to L knee anteriorly and posteriorly, and scar, to improve tissue mobility for ROM. Passive physiologic flexion and extension grade III-IV    Modalities:(10 minutes) ice pack to L knee x 10 minutes at end of session. Treatment/Session Assessment:    · Response to Treatment:  Patient tolerated session well. Skin intact after ice. Need to continue to progress ROM for mobility and gait. · .  · Compliance with Program/Exercises: compliant  · Recommendations/Intent for next treatment session: \"Next visit will focus on advancements to more challenging activities\".   Total Treatment Duration:  PT Patient Time In/Time Out  Time In: 0829  Time Out: Jerel Ramírez [address] Josafat Ivan, PT

## 2017-07-18 ENCOUNTER — HOSPITAL ENCOUNTER (OUTPATIENT)
Dept: PHYSICAL THERAPY | Age: 82
Discharge: HOME | End: 2017-07-18
Payer: MEDICARE

## 2017-07-18 PROCEDURE — 97016 VASOPNEUMATIC DEVICE THERAPY: CPT

## 2017-07-18 PROCEDURE — 97140 MANUAL THERAPY 1/> REGIONS: CPT

## 2017-07-18 PROCEDURE — 97110 THERAPEUTIC EXERCISES: CPT

## 2017-07-18 NOTE — PROGRESS NOTES
Bill Lyle  : 1935 [facility]  [address], [address] [address], [facility] [address]  [phone]   [phone]           OUTPATIENT PHYSICAL THERAPY:Daily Note 2017    ICD-10: Treatment Diagnosis: Pain in left knee (M25.562); Stiffness of left knee, not elsewhere classified (M25.662); Other abnormalities of gait and mobility R26.89  Precautions/Allergies:   Review of patient's allergies indicates no known allergies. Fall Risk Score: 2 (? 5 = High Risk)  MD Orders: eval and treat MEDICAL/REFERRING DIAGNOSIS:  s/p left tka   DATE OF ONSET: 17  REFERRING PHYSICIAN: Victoria Espinoza,*  RETURN PHYSICIAN APPOINTMENT: 17     INITIAL ASSESSMENT:  Mr. Aster Ramon presents with pain, stiffness, and decreased gait and mobility s/p L TKA on 17. Shell Vu Patient would benefit from PT to address these problems to improve patient's independence and safety with mobility and daily activities. Thank you. PROBLEM LIST (Impacting functional limitations):  1. Decreased Strength  2. Decreased ADL/Functional Activities  3. Decreased Transfer Abilities  4. Decreased Ambulation Ability/Technique  5. Decreased Balance  6. Increased Pain  7. Decreased Activity Tolerance  8. Decreased Flexibility/Joint Mobility  9. Decreased Todd with Home Exercise Program INTERVENTIONS PLANNED:  1. Balance Exercise  2. Bed Mobility  3. Cold  4. Gait Training  5. Heat  6. Home Exercise Program (HEP)  7. Manual Therapy  8. Neuromuscular Re-education/Strengthening  9. Range of Motion (ROM)  10. Therapeutic Activites  11. Therapeutic Exercise/Strengthening  12. Transfer Training   TREATMENT PLAN:  Effective Dates: 2017 TO 2017. Frequency/Duration: 2-3 times a week for 6-8 weeks  GOALS: (Goals have been discussed and agreed upon with patient.)  Short-Term Functional Goals: Time Frame: 2-4 weeks  1.  Patient will demonstrate independence and compliance with home exercise program to improve ROM and strength for daily activities. 2. Patient will report decreased L knee pain to less than or equal to 5/10 to improve patient's tolerance of daily activities. 3.   Discharge Goals: Time Frame: 6-8 weeks  1. Patient will report decreased L Knee pain to less than or equal to 2/10 to improve patient's tolerance of daily activities. 2. Patient will ambulate with least assistive device over level and unlevel surfaces without evidence of imbalance to improve safety for daily activities. 3. Patient will demonstrate improved L knee ROM to 0-120 degrees to improve mobility for daily activities. 4. Patient will increase L lower extremity strength to greater than or equal to 4+/5 to improve strength for functional mobility activities. Rehabilitation Potential For Stated Goals: Good  Regarding [facility], I certify that the treatment plan above will be carried out by a therapist or under their direction. Thank you for this referral,  Kalie Wolf PTA     Referring Physician Signature: Shashank Cameron,*              Date                    The information in this section was collected on 7/11/17 (except where otherwise noted). HISTORY:   History of Present Injury/Illness (Reason for Referral):  5/16/17 L TKA. Home from hospital with HHPT for PT. Has a stationary bike, recumbent at home and is working on 1/2 revolutions. L knee has been achy and mildly swollen. I went to ER day after released from hospital because bandage was filling with blood. ER just wrapped it. Then went to hospital downtown due to prolonged diarrhea. Was in hospital 3 days, and said it was something with my kidneys. Feeling better now and eating better. Doing exercises from home health PT. 7/10 pain L knee all of the time, and stiff. Taking tylenol and no other pain meds. No falls. Have used a single point cane for years.  Doing all normal activities except getting on riding lawn mower. Had L CATRINA in 2014, and has had 4 back surgeries. 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 Past Medical History/Comorbidities:   Mr. 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 He also has no past medical history of Aneurysm ([facility] [address] Arrhythmia; Autoimmune disease ([address] Difficult intubation; Heart failure ([address] Liver disease; Malignant hyperthermia due to anesthesia; Morbid obesity ([address] Nausea & vomiting; Pseudocholinesterase deficiency; Psychiatric disorder; PUD (peptic ulcer disease); Seizures ([address] Sleep apnea; or Thromboembolus ([address] Mr. Lali Marc  has a past surgical history that includes abdomen surgery proc unlisted (1991); lumbar fusion (1990 x 2  2012, 2013); urological (2002); heent (as child); mohs procedure (Left, 1990); orthopaedic (Left, 2013); colectomy (1997); colonoscopy (2014); heart catheterization (01/10/2014); coronary stent placement; and knee replacement (Left, 2017). Social History/Living Environment:     3 steps at home. 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 Dominant Side:         RIGHT   Personal Factors:          Sex:  male        Age:  81 y.o. Current Medications:       Current Outpatient Prescriptions:     raNITIdine (ZANTAC) 150 mg tablet, Take 1 Tab by mouth two (2) times a day., Disp: 60 Tab, Rfl: 1    diphenhydrAMINE-acetaminophen (TYLENOL PM EXTRA STRENGTH)  mg tab, Take 50-1,000 mg by mouth as needed (pain for sleep). , Disp: , Rfl:     zolpidem (AMBIEN) 10 mg tablet, Take 5-10 mg by mouth nightly as needed for Sleep., Disp: , Rfl:     acetaminophen (TYLENOL) 500 mg tablet, Take 1,000 mg by mouth every six (6) hours as needed for Pain., Disp: , Rfl:     diphenhydrAMINE (BENADRYL) 50 mg capsule, Take 50 mg by mouth nightly as needed for Sleep., Disp: , Rfl:     aspirin delayed-release 81 mg tablet, Take 81 mg by mouth daily. Per anesthesia protocol:instructed to take am of surgery. Indications: myocardial infarction prevention, Disp: , Rfl:     triamcinolone acetonide (KENALOG) 0.1 % topical cream, Apply  to affected area two (2) times a day. use thin layer, Disp: 60 g, Rfl: 2    clopidogrel (PLAVIX) 75 mg tab, Take 1 Tab by mouth daily. , Disp: 90 Tab, Rfl: 1    finasteride (PROSCAR) 5 mg tablet, Take 1 Tab by mouth daily. (Patient taking differently: Take 5 mg by mouth daily. Per anesthesia protocol:instructed to take am of surgery. Indications: SYMPTOMATIC BENIGN PROSTATIC HYPERPLASIA), Disp: 90 Tab, Rfl: 1    atorvastatin (LIPITOR) 40 mg tablet, Take 1 Tab by mouth daily. (Patient taking differently: Take 40 mg by mouth daily. Indications: hypercholesterolemia), Disp: 90 Tab, Rfl: 1    niacin ER (NIASPAN) 500 mg tablet, Take 4 Tabs by mouth nightly., Disp: 360 Tab, Rfl: 1    metoprolol succinate (TOPROL-XL) 25 mg XL tablet, Take 1 Tab by mouth daily. Letty Marshall MD Lic# [id number] RAYMUNDO [id number] (Patient taking differently: Take 25 mg by mouth nightly.), Disp: 30 Tab, Rfl: 1    hydrocortisone (ANUSOL-HC) 25 mg supp, Insert 1 Suppository into rectum every twelve (12) hours. , Disp: 15 Suppository, Rfl: 5    cholecalciferol, vitamin D3, (VITAMIN D-3) 2,000 unit Tab, Take  by mouth nightly.  , Disp: , Rfl:   No current facility-administered medications for this encounter.      Facility-Administered Medications Ordered in Other Encounters:     0.9% sodium chloride infusion 250 mL, 250 mL, IntraVENous, PRN, OFELIA Henry    0.9% sodium chloride infusion 250 mL, 250 mL, IntraVENous, PRN, OFELIA Centeno   Date Last Reviewed:  7/14/17   Number of Personal Factors/Comorbidities that affect the Plan of Care: 1-2: MODERATE COMPLEXITY   EXAMINATION:   Observation/Orthostatic Postural Assessment:          L knee scar healing from TKA, mild swelling  Palpation:          Decreased skin mobility inferior to L patella; mild swelling noted throughout L knee  ROM:          R knee WNL; L knee 8-116 degrees 7-18-17  Strength:          R LE 4+/5, L LE 4/5  Functional Mobility:         Gait/Ambulation:  Patient ambulates with straight cane demonstrating an antalgic gait pattern. Transfers:  independent        Bed Mobility:  independent        Stairs:  NT  Sensation:         intact     Body Structures Involved:  1. Joints  2. Muscles  3. Ligaments Body Functions Affected:  1. Sensory/Pain  2. Neuromusculoskeletal  3. Movement Related Activities and Participation Affected:  1. Mobility  2. Domestic Life  3. Community, Social and [place]   Number of elements (examined above) that affect the Plan of Care: 3: MODERATE COMPLEXITY   CLINICAL PRESENTATION:   Presentation: Evolving clinical presentation with changing clinical characteristics: MODERATE COMPLEXITY   CLINICAL DECISION MAKING:   Outcome Measure: Tool Used: Lower Extremity Functional Scale (LEFS)  Score:  Initial: 32/80 Most Recent: X/80 (Date: -- )   Interpretation of Score: 20 questions each scored on a 5 point scale with 0 representing \"extreme difficulty or unable to perform\" and 4 representing \"no difficulty\". The lower the score, the greater the functional disability. 80/80 represents no disability. Minimal detectable change is 9 points. Score 80 79-63 62-48 47-32 31-16 15-1 0   Modifier CH CI CJ CK CL CM CN     ?  Mobility - Walking and Moving Around:     - CURRENT STATUS: CK - 40%-59% impaired, limited or restricted    - GOAL STATUS: CJ - 20%-39% impaired, limited or restricted    - D/C STATUS:  ---------------To be determined---------------      Medical Necessity:   · Patient is expected to demonstrate progress in strength, range of motion and functional technique to increase independence with daily activities. Reason for Services/Other Comments:  · Patient continues to demonstrate capacity to improve strength, ROM, pain, gait which will increase independence and increase safety. Use of outcome tool(s) and clinical judgement create a POC that gives a: Questionable prediction of patient's progress: MODERATE COMPLEXITY            TREATMENT:   (In addition to Assessment/Re-Assessment sessions the following treatments were rendered)  Pre-treatment Symptoms/Complaints: It is going ok  Pain: Initial:   6/10 L knee Post Session:  5/10 L knee       Therapeutic Exercise: (  30 minutes):  Exercises per grid below to improve mobility, strength and balance. Required minimal verbal cues to promote proper body alignment, promote proper body posture and promote proper body mechanics. Progressed resistance, range, repetitions and complexity of movement as indicated. Date:  7/11/17 Date:  7-13-17 Date:  7/18/17   Activity/Exercise Parameters Parameters Parameters   nustep Level 3 x 8 minutes L3 8 minutes L3 x 8 minutes   Straight leg raise L LE x 15 reps L x20 L x20 #1   Long sitting calf stretch with towel L LE 3 x 30 seconds -- yes   Heel slides X 10 reps  L LE x20 in sitting x20 in sitting   Long arc quad  x20 x20 #2   Sit to stand  x20 x20   Step ups   X 15 time's leading Left LE   Nautilus Press   #60 x 20 reps          Hip ABD  x20 x20 sidelying #2   Hip ext  x20 x20 prone     Manual therapy: (10 minutes) massage to L knee anteriorly and posteriorly, and scar, to improve tissue mobility for ROM. Passive physiologic flexion and extension grade III-IV. Scar tissue massage. Modalities:(10 minutes) ice pack to L knee x 10 minutes at end of session.     Treatment/Session Assessment:    · Response to Treatment:  Patient tolerated session well. Added a few more exercises today, patient did well overall. Increased ROM today in Left Knee. Continue plan of care. · .  · Compliance with Program/Exercises: compliant  · Recommendations/Intent for next treatment session: \"Next visit will focus on advancements to more challenging activities\".   Total Treatment Duration:  PT Patient Time In/Time Out  Time In: 6813  Time Out: [address] [place], [place]

## 2017-07-20 ENCOUNTER — HOSPITAL ENCOUNTER (OUTPATIENT)
Dept: PHYSICAL THERAPY | Age: 82
Discharge: HOME | End: 2017-07-20
Payer: MEDICARE

## 2017-07-20 PROCEDURE — 97110 THERAPEUTIC EXERCISES: CPT

## 2017-07-20 PROCEDURE — 97140 MANUAL THERAPY 1/> REGIONS: CPT

## 2017-07-20 NOTE — PROGRESS NOTES
Alessandra Nugent  : [address] [facility] [address] [address], [address] [place]  [phone]   [phone]           OUTPATIENT PHYSICAL THERAPY:Daily Note 2017    ICD-10: Treatment Diagnosis: Pain in left knee (M25.562); Stiffness of left knee, not elsewhere classified (M25.662); Other abnormalities of gait and mobility R26.89  Precautions/Allergies:   Review of patient's allergies indicates no known allergies. Fall Risk Score: 2 (? 5 = High Risk)  MD Orders: eval and treat MEDICAL/REFERRING DIAGNOSIS:  s/p left tka   DATE OF ONSET: 17  REFERRING PHYSICIAN: Charles Andrade,*  RETURN PHYSICIAN APPOINTMENT: 17     INITIAL ASSESSMENT:  Mr. Alee Grande presents with pain, stiffness, and decreased gait and mobility s/p L TKA on 17. [place] Patient would benefit from PT to address these problems to improve patient's independence and safety with mobility and daily activities. Thank you. PROBLEM LIST (Impacting functional limitations):  1. Decreased Strength  2. Decreased ADL/Functional Activities  3. Decreased Transfer Abilities  4. Decreased Ambulation Ability/Technique  5. Decreased Balance  6. Increased Pain  7. Decreased Activity Tolerance  8. Decreased Flexibility/Joint Mobility  9. Decreased [place] with Home Exercise Program INTERVENTIONS PLANNED:  1. Balance Exercise  2. Bed Mobility  3. Cold  4. Gait Training  5. Heat  6. Home Exercise Program (HEP)  7. Manual Therapy  8. Neuromuscular Re-education/Strengthening  9. Range of Motion (ROM)  10. Therapeutic Activites  11. Therapeutic Exercise/Strengthening  12. Transfer Training   TREATMENT PLAN:  Effective Dates: 2017 TO 2017. Frequency/Duration: 2-3 times a week for 6-8 weeks  GOALS: (Goals have been discussed and agreed upon with patient.)  Short-Term Functional Goals: Time Frame: 2-4 weeks  1.  Patient will demonstrate independence and compliance with home exercise program to improve ROM and strength for daily activities. 2. Patient will report decreased L knee pain to less than or equal to 5/10 to improve patient's tolerance of daily activities. 3.   Discharge Goals: Time Frame: 6-8 weeks  1. Patient will report decreased L Knee pain to less than or equal to 2/10 to improve patient's tolerance of daily activities. 2. Patient will ambulate with least assistive device over level and unlevel surfaces without evidence of imbalance to improve safety for daily activities. 3. Patient will demonstrate improved L knee ROM to 0-120 degrees to improve mobility for daily activities. 4. Patient will increase L lower extremity strength to greater than or equal to 4+/5 to improve strength for functional mobility activities. Rehabilitation Potential For Stated Goals: Good  Regarding [facility] therapy, I certify that the treatment plan above will be carried out by a therapist or under their direction. Thank you for this referral,  Jeane Corbin PTA     Referring Physician Signature: Gerri Wright,*              Date                    The information in this section was collected on 7/11/17 (except where otherwise noted). HISTORY:   History of Present Injury/Illness (Reason for Referral):  5/16/17 L TKA. Home from hospital with HHPT for PT. Has a stationary bike, recumbent at home and is working on 1/2 revolutions. L knee has been achy and mildly swollen. I went to ER day after released from hospital because bandage was filling with blood. ER just wrapped it. Then went to hospital downtown due to prolonged diarrhea. Was in hospital 3 days, and said it was something with my kidneys. Feeling better now and eating better. Doing exercises from home health PT. 7/10 pain L knee all of the time, and stiff. Taking tylenol and no other pain meds. No falls. Have used a single point cane for years.  Doing all normal activities except getting on riding lawn mower. Had L CATRINA in 2014, and has had 4 back surgeries. 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 Past Medical History/Comorbidities:   Mr. 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 He also has no past medical history of Aneurysm ([address] Arrhythmia; Autoimmune disease ([address] Difficult intubation; Heart failure ([address] Liver disease; Malignant hyperthermia due to anesthesia; Morbid obesity ([address] Nausea & vomiting; Pseudocholinesterase deficiency; Psychiatric disorder; PUD (peptic ulcer disease); Seizures ([address] Sleep apnea; or Thromboembolus ([address] Mr. Siobhan Bess  has a past surgical history that includes abdomen surgery proc unlisted (1991); lumbar fusion (1990 x 2  2012, 2013); urological (2002); heent (as child); mohs procedure (Left, 1990); orthopaedic (Left, 2013); colectomy (1997); colonoscopy (2014); heart catheterization (01/10/2014); coronary stent placement; and knee replacement (Left, 2017). Social History/Living Environment:     3 steps at home. 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 Dominant Side:         RIGHT   Personal Factors:          Sex:  male        Age:  81 y.o. Current Medications:       Current Outpatient Prescriptions:     raNITIdine (ZANTAC) 150 mg tablet, Take 1 Tab by mouth two (2) times a day., Disp: 60 Tab, Rfl: 1    diphenhydrAMINE-acetaminophen (TYLENOL PM EXTRA STRENGTH)  mg tab, Take 50-1,000 mg by mouth as needed (pain for sleep). , Disp: , Rfl:     zolpidem (AMBIEN) 10 mg tablet, Take 5-10 mg by mouth nightly as needed for Sleep., Disp: , Rfl:     acetaminophen (TYLENOL) 500 mg tablet, Take 1,000 mg by mouth every six (6) hours as needed for Pain., Disp: , Rfl:     diphenhydrAMINE (BENADRYL) 50 mg capsule, Take 50 mg by mouth nightly as needed for Sleep., Disp: , Rfl:     aspirin delayed-release 81 mg tablet, Take 81 mg by mouth daily. Per anesthesia protocol:instructed to take am of surgery. Indications: myocardial infarction prevention, Disp: , Rfl:     triamcinolone acetonide (KENALOG) 0.1 % topical cream, Apply  to affected area two (2) times a day. use thin layer, Disp: 60 g, Rfl: 2    clopidogrel (PLAVIX) 75 mg tab, Take 1 Tab by mouth daily. , Disp: 90 Tab, Rfl: 1    finasteride (PROSCAR) 5 mg tablet, Take 1 Tab by mouth daily. (Patient taking differently: Take 5 mg by mouth daily. Per anesthesia protocol:instructed to take am of surgery. Indications: SYMPTOMATIC BENIGN PROSTATIC HYPERPLASIA), Disp: 90 Tab, Rfl: 1    atorvastatin (LIPITOR) 40 mg tablet, Take 1 Tab by mouth daily. (Patient taking differently: Take 40 mg by mouth daily. Indications: hypercholesterolemia), Disp: 90 Tab, Rfl: 1    niacin ER (NIASPAN) 500 mg tablet, Take 4 Tabs by mouth nightly., Disp: 360 Tab, Rfl: 1    metoprolol succinate (TOPROL-XL) 25 mg XL tablet, Take 1 Tab by mouth daily. Mode Marshall MD Lic# [id number] [facility] [id number] (Patient taking differently: Take 25 mg by mouth nightly.), Disp: 30 Tab, Rfl: 1    hydrocortisone (ANUSOL-HC) 25 mg supp, Insert 1 Suppository into rectum every twelve (12) hours. , Disp: 15 Suppository, Rfl: 5    cholecalciferol, vitamin D3, (VITAMIN D-3) 2,000 unit Tab, Take  by mouth nightly.  , Disp: , Rfl:   No current facility-administered medications for this encounter.      Facility-Administered Medications Ordered in Other Encounters:     0.9% sodium chloride infusion 250 mL, 250 mL, IntraVENous, PRN, OFELIA Alexander    0.9% sodium chloride infusion 250 mL, 250 mL, IntraVENous, PRN, OFELIA [place]   Date Last Reviewed:  7/14/17   Number of Personal Factors/Comorbidities that affect the Plan of Care: 1-2: MODERATE COMPLEXITY   EXAMINATION:   Observation/Orthostatic Postural Assessment:          L knee scar healing from TKA, mild swelling  Palpation:          Decreased skin mobility inferior to L patella; mild swelling noted throughout L knee  ROM:          R knee WNL; L knee 8-118 degrees 7-20-17  Strength:          R LE 4+/5, L LE 4/5  Functional Mobility:         Gait/Ambulation:  Patient ambulates with straight cane demonstrating an antalgic gait pattern. Transfers:  independent        Bed Mobility:  independent        Stairs:  NT  Sensation:         intact     Body Structures Involved:  1. Joints  2. Muscles  3. Ligaments Body Functions Affected:  1. Sensory/Pain  2. Neuromusculoskeletal  3. Movement Related Activities and Participation Affected:  1. Mobility  2. Domestic Life  3. Community, Social and [place]   Number of elements (examined above) that affect the Plan of Care: 3: MODERATE COMPLEXITY   CLINICAL PRESENTATION:   Presentation: Evolving clinical presentation with changing clinical characteristics: MODERATE COMPLEXITY   CLINICAL DECISION MAKING:   Outcome Measure: Tool Used: Lower Extremity Functional Scale (LEFS)  Score:  Initial: 32/80 Most Recent: X/80 (Date: -- )   Interpretation of Score: 20 questions each scored on a 5 point scale with 0 representing \"extreme difficulty or unable to perform\" and 4 representing \"no difficulty\". The lower the score, the greater the functional disability. 80/80 represents no disability. Minimal detectable change is 9 points. Score 80 79-63 62-48 47-32 31-16 15-1 0   Modifier CH CI CJ CK CL CM CN     ?  Mobility - Walking and Moving Around:     - CURRENT STATUS: CK - 40%-59% impaired, limited or restricted    - GOAL STATUS: CJ - 20%-39% impaired, limited or restricted    - D/C STATUS:  ---------------To be determined---------------      Medical Necessity:   · Patient is expected to demonstrate progress in strength, range of motion and functional technique to increase independence with daily activities. Reason for Services/Other Comments:  · Patient continues to demonstrate capacity to improve strength, ROM, pain, gait which will increase independence and increase safety. Use of outcome tool(s) and clinical judgement create a POC that gives a: Questionable prediction of patient's progress: MODERATE COMPLEXITY            TREATMENT:      (In addition to Assessment/Re-Assessment sessions the following treatments were rendered)  Pre-treatment Symptoms/Complaints:  I am doing ok, my wife was in the ER last night and they are admitting her today. Pain: Initial:   5/10 L knee Post Session:  4/10 L knee       Therapeutic Exercise: (  30 minutes):  Exercises per grid below to improve mobility, strength and balance. Required minimal verbal cues to promote proper body alignment, promote proper body posture and promote proper body mechanics. Progressed resistance, range, repetitions and complexity of movement as indicated. Date:  7/11/17 Date:  7-13-17 Date:  7/20/17   Activity/Exercise Parameters Parameters Parameters   nustep Level 3 x 8 minutes L3 8 minutes L5 x 8 minutes   Straight leg raise L LE x 15 reps L x20 L x20 #1   Long sitting calf stretch with towel L LE 3 x 30 seconds -- yes   Heel slides X 10 reps  L LE x20 in sitting x20 in sitting   Long arc quad  x20 x20 #2   Knee extension into T band   Blue x 20 reps   Sit to stand  x20 x20   Mini squats   X 15 reps //   Step ups   X 15 time's leading Left LE   Nautilus Press   #60 x 20 reps    Nautilus Curl   50# x 20 reps   Hip ABD  x20 x20 sidelying #2   Hip ext  x20 x20 prone     Manual therapy: (10 minutes) massage to L knee anteriorly and posteriorly, and scar, to improve tissue mobility for ROM.  Passive physiologic flexion and extension grade III-IV. Scar tissue massage. Modalities:(10 minutes) ice pack to L knee x 10 minutes at end of session. Treatment/Session Assessment:    · Response to Treatment:  Patient tolerated session well. Increased ROM today in Left Knee. Continue plan of care. · .  · Compliance with Program/Exercises: compliant  · Recommendations/Intent for next treatment session: \"Next visit will focus on advancements to more challenging activities\".   Total Treatment Duration:  PT Patient Time In/Time Out  Time In: 1000  Time Out: [facility], \[facility] [place] [facility]\""

## 2017-07-21 ENCOUNTER — HOSPITAL ENCOUNTER (OUTPATIENT)
Dept: PHYSICAL THERAPY | Age: 82
Discharge: HOME | End: 2017-07-21
Payer: MEDICARE

## 2017-07-21 PROCEDURE — 97140 MANUAL THERAPY 1/> REGIONS: CPT

## 2017-07-21 PROCEDURE — 97110 THERAPEUTIC EXERCISES: CPT

## 2017-07-21 NOTE — PROGRESS NOTES
Konrad Corley  : 1935 [facility]  [facility] [place] [address], [address] [facility]  [phone]   [phone]           OUTPATIENT PHYSICAL THERAPY:Daily Note 2017    ICD-10: Treatment Diagnosis: Pain in left knee (M25.562); Stiffness of left knee, not elsewhere classified (M25.662); Other abnormalities of gait and mobility R26.89  Precautions/Allergies:   Review of patient's allergies indicates no known allergies. Fall Risk Score: 2 (? 5 = High Risk)  MD Orders: eval and treat MEDICAL/REFERRING DIAGNOSIS:  s/p left tka   DATE OF ONSET: 17  REFERRING PHYSICIAN: John Martin,*  RETURN PHYSICIAN APPOINTMENT: 17     INITIAL ASSESSMENT:  Mr. Author Pink presents with pain, stiffness, and decreased gait and mobility s/p L TKA on 17. Anne Haider Patient would benefit from PT to address these problems to improve patient's independence and safety with mobility and daily activities. Thank you. PROBLEM LIST (Impacting functional limitations):  1. Decreased Strength  2. Decreased ADL/Functional Activities  3. Decreased Transfer Abilities  4. Decreased Ambulation Ability/Technique  5. Decreased Balance  6. Increased Pain  7. Decreased Activity Tolerance  8. Decreased Flexibility/Joint Mobility  9. Decreased [place] with Home Exercise Program INTERVENTIONS PLANNED:  1. Balance Exercise  2. Bed Mobility  3. Cold  4. Gait Training  5. Heat  6. Home Exercise Program (HEP)  7. Manual Therapy  8. Neuromuscular Re-education/Strengthening  9. Range of Motion (ROM)  10. Therapeutic Activites  11. Therapeutic Exercise/Strengthening  12. Transfer Training   TREATMENT PLAN:  Effective Dates: 2017 TO 2017. Frequency/Duration: 2-3 times a week for 6-8 weeks  GOALS: (Goals have been discussed and agreed upon with patient.)  Short-Term Functional Goals: Time Frame: 2-4 weeks  1.  Patient will demonstrate independence and compliance with home exercise program to improve ROM and strength for daily activities. 2. Patient will report decreased L knee pain to less than or equal to 5/10 to improve patient's tolerance of daily activities. 3.   Discharge Goals: Time Frame: 6-8 weeks  1. Patient will report decreased L Knee pain to less than or equal to 2/10 to improve patient's tolerance of daily activities. 2. Patient will ambulate with least assistive device over level and unlevel surfaces without evidence of imbalance to improve safety for daily activities. 3. Patient will demonstrate improved L knee ROM to 0-120 degrees to improve mobility for daily activities. 4. Patient will increase L lower extremity strength to greater than or equal to 4+/5 to improve strength for functional mobility activities. Rehabilitation Potential For Stated Goals: Good  Regarding [facility], I certify that the treatment plan above will be carried out by a therapist or under their direction. Thank you for this referral,  Sudheer Chandler PT     Referring Physician Signature: Aubrie Wynn,*              Date                    The information in this section was collected on 7/11/17 (except where otherwise noted). HISTORY:   History of Present Injury/Illness (Reason for Referral):  5/16/17 L TKA. Home from hospital with HHPT for PT. Has a stationary bike, recumbent at home and is working on 1/2 revolutions. L knee has been achy and mildly swollen. I went to ER day after released from hospital because bandage was filling with blood. ER just wrapped it. Then went to hospital downtown due to prolonged diarrhea. Was in hospital 3 days, and said it was something with my kidneys. Feeling better now and eating better. Doing exercises from home health PT. 7/10 pain L knee all of the time, and stiff. Taking tylenol and no other pain meds. No falls. Have used a single point cane for years.  Doing all normal activities except getting on riding . Had L CATRINA in 2014, and has had 4 back surgeries. 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 Past Medical History/Comorbidities:   Mr. 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 He also has no past medical history of Aneurysm ([facility] [address] Arrhythmia; Autoimmune disease ([address] Difficult intubation; Heart failure ([address] Liver disease; Malignant hyperthermia due to anesthesia; Morbid obesity ([address] Nausea & vomiting; Pseudocholinesterase deficiency; Psychiatric disorder; PUD (peptic ulcer disease); Seizures ([address] Sleep apnea; or Thromboembolus ([address] Mr. Car Mcclellan  has a past surgical history that includes abdomen surgery proc unlisted (1991); lumbar fusion (1990 x 2  2012, 2013); urological (2002); heent (as child); mohs procedure (Left, 1990); orthopaedic (Left, 2013); colectomy (1997); colonoscopy (2014); heart catheterization (01/10/2014); coronary stent placement; and knee replacement (Left, 2017). Social History/Living Environment:     3 steps at home. 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 Dominant Side:         RIGHT   Personal Factors:          Sex:  male        Age:  81 y.o. Current Medications:       Current Outpatient Prescriptions:     raNITIdine (ZANTAC) 150 mg tablet, Take 1 Tab by mouth two (2) times a day., Disp: 60 Tab, Rfl: 1    diphenhydrAMINE-acetaminophen (TYLENOL PM EXTRA STRENGTH)  mg tab, Take 50-1,000 mg by mouth as needed (pain for sleep). , Disp: , Rfl:     zolpidem (AMBIEN) 10 mg tablet, Take 5-10 mg by mouth nightly as needed for Sleep., Disp: , Rfl:     acetaminophen (TYLENOL) 500 mg tablet, Take 1,000 mg by mouth every six (6) hours as needed for Pain., Disp: , Rfl:     diphenhydrAMINE (BENADRYL) 50 mg capsule, Take 50 mg by mouth nightly as needed for Sleep., Disp: , Rfl:     aspirin delayed-release 81 mg tablet, Take 81 mg by mouth daily. Per anesthesia protocol:instructed to take am of surgery. Indications: myocardial infarction prevention, Disp: , Rfl:     triamcinolone acetonide (KENALOG) 0.1 % topical cream, Apply  to affected area two (2) times a day. use thin layer, Disp: 60 g, Rfl: 2    clopidogrel (PLAVIX) 75 mg tab, Take 1 Tab by mouth daily. , Disp: 90 Tab, Rfl: 1    finasteride (PROSCAR) 5 mg tablet, Take 1 Tab by mouth daily. (Patient taking differently: Take 5 mg by mouth daily. Per anesthesia protocol:instructed to take am of surgery. Indications: SYMPTOMATIC BENIGN PROSTATIC HYPERPLASIA), Disp: 90 Tab, Rfl: 1    atorvastatin (LIPITOR) 40 mg tablet, Take 1 Tab by mouth daily. (Patient taking differently: Take 40 mg by mouth daily. Indications: hypercholesterolemia), Disp: 90 Tab, Rfl: 1    niacin ER (NIASPAN) 500 mg tablet, Take 4 Tabs by mouth nightly., Disp: 360 Tab, Rfl: 1    metoprolol succinate (TOPROL-XL) 25 mg XL tablet, Take 1 Tab by mouth daily. Merrill Colin MD Lic# [id number] [facility] [id number] (Patient taking differently: Take 25 mg by mouth nightly.), Disp: 30 Tab, Rfl: 1    hydrocortisone (ANUSOL-HC) 25 mg supp, Insert 1 Suppository into rectum every twelve (12) hours. , Disp: 15 Suppository, Rfl: 5    cholecalciferol, vitamin D3, (VITAMIN D-3) 2,000 unit Tab, Take  by mouth nightly.  , Disp: , Rfl:   No current facility-administered medications for this encounter.      Facility-Administered Medications Ordered in Other Encounters:     0.9% sodium chloride infusion 250 mL, 250 mL, IntraVENous, PRN, OFELIA Schmitz    0.9% sodium chloride infusion 250 mL, 250 mL, [place], PRN, OFELIA Pacheco   Date Last Reviewed:  7/21/17   Number of Personal Factors/Comorbidities that affect the Plan of Care: 1-2: MODERATE COMPLEXITY   EXAMINATION:   Observation/Orthostatic Postural Assessment:          L knee scar healing from TKA, mild swelling  Palpation:          Decreased skin mobility inferior to L patella; mild swelling noted throughout L knee  ROM:          R knee WNL; L knee 8-116 degrees 7-18-17  Strength:          R LE 4+/5, L LE 4/5  Functional Mobility:         Gait/Ambulation:  Patient ambulates with straight cane demonstrating an antalgic gait pattern. Transfers:  independent        Bed Mobility:  independent        Stairs:  NT  Sensation:         intact     Body Structures Involved:  1. Joints  2. Muscles  3. Ligaments Body Functions Affected:  1. Sensory/Pain  2. Neuromusculoskeletal  3. Movement Related Activities and Participation Affected:  1. Mobility  2. Domestic Life  3. Community, Social and [place]   Number of elements (examined above) that affect the Plan of Care: 3: MODERATE COMPLEXITY   CLINICAL PRESENTATION:   Presentation: Evolving clinical presentation with changing clinical characteristics: MODERATE COMPLEXITY   CLINICAL DECISION MAKING:   Outcome Measure: Tool Used: Lower Extremity Functional Scale (LEFS)  Score:  Initial: 32/80 Most Recent: X/80 (Date: -- )   Interpretation of Score: 20 questions each scored on a 5 point scale with 0 representing \"extreme difficulty or unable to perform\" and 4 representing \"no difficulty\". The lower the score, the greater the functional disability. 80/80 represents no disability. Minimal detectable change is 9 points. Score 80 79-63 62-48 47-32 31-16 15-1 0   Modifier CH CI CJ CK CL CM CN     ?  Mobility - Walking and Moving Around:     - CURRENT STATUS: CK - 40%-59% impaired, limited or restricted    - GOAL STATUS: CJ - 20%-39% impaired, limited or restricted    - D/C STATUS:  ---------------To be determined---------------      Medical Necessity:   · Patient is expected to demonstrate progress in strength, range of motion and functional technique to increase independence with daily activities. Reason for Services/Other Comments:  · Patient continues to demonstrate capacity to improve strength, ROM, pain, gait which will increase independence and increase safety. Use of outcome tool(s) and clinical judgement create a POC that gives a: Questionable prediction of patient's progress: MODERATE COMPLEXITY            TREATMENT:   (In addition to Assessment/Re-Assessment sessions the following treatments were rendered)  Pre-treatment Symptoms/Complaints: \"I'm stiff but I'm walking better. \"  Pain: Initial:   4/10 L knee Post Session:  3/10 L knee       Therapeutic Exercise: (  30 minutes):  Exercises per grid below to improve mobility, strength and balance. Required minimal verbal cues to promote proper body alignment, promote proper body posture and promote proper body mechanics. Progressed resistance, range, repetitions and complexity of movement as indicated.    Date:  7/11/17 Date:  7-13-17 Date:  7/18/17 Date   7/21/17   Activity/Exercise Parameters Parameters Parameters    nustep Level 3 x 8 minutes L3 8 minutes L3 x 8 minutes L3 x 8 minutes   Straight leg raise L LE x 15 reps L x20 L x20 #1 L 2 x 10 #2   slantboard calf stretch    3 x 20 sec   Long sitting calf stretch with towel L LE 3 x 30 seconds -- yes    Heel slides X 10 reps  L LE x20 in sitting x20 in sitting x20 in sitting, x 10 in supine   Long arc quad  x20 x20 #2 x20 #2   Sit to stand  x20 x20 x20   Step ups   X 15 time's leading Left LE X 15 time's leading Left LE 6 inch step   Nautilus Press   #60 x 20 reps  #60 x 15 reps B; x 15 reps L LE at 35#           Hip ABD  x20 x20 sidelying #2 x20 L LE in sidelying #2   Hip ext  x20 x20 prone x20  L LE  In prone     Manual therapy: (10 minutes) tissue mobilization to L knee anteriorly and posteriorly, and scar, to improve tissue mobility for ROM. Passive physiologic flexion and extension grade III-IV. Modalities:(10 minutes) ice pack to L knee x 10 minutes at end of session. Treatment/Session Assessment:    · Response to Treatment:  Patient tolerated session well. Swelling and ROM are improving. Patient is compliant with HEP. ROM L knee 5-115 degrees today. Skin intact at end of session. Continue plan of care. · .  · Compliance with Program/Exercises: compliant  · Recommendations/Intent for next treatment session: \"Next visit will focus on advancements to more challenging activities\".   Total Treatment Duration:  PT Patient Time In/Time Out  Time In: 1030  Time [facility]: [address] [facility] Anita Munoz

## 2017-07-25 ENCOUNTER — HOSPITAL ENCOUNTER (OUTPATIENT)
Dept: PHYSICAL THERAPY | Age: 82
Discharge: HOME | End: 2017-07-25
Payer: MEDICARE

## 2017-07-25 PROCEDURE — 97110 THERAPEUTIC EXERCISES: CPT

## 2017-07-25 PROCEDURE — 97140 MANUAL THERAPY 1/> REGIONS: CPT

## 2017-07-25 NOTE — PROGRESS NOTES
Jw Moya  : [address] [facility] [place] [address], [address], [facility] [address] [facility]  [phone]   [phone]           OUTPATIENT PHYSICAL THERAPY:Daily Note 2017    ICD-10: Treatment Diagnosis: Pain in left knee (M25.562); Stiffness of left knee, not elsewhere classified (M25.662); Other abnormalities of gait and mobility R26.89  Precautions/Allergies:   Review of patient's allergies indicates no known allergies. Fall Risk Score: 2 (? 5 = High Risk)  MD Orders: eval and treat MEDICAL/REFERRING DIAGNOSIS:  s/p left tka   DATE OF ONSET: 17  REFERRING PHYSICIAN: Corrine Simmons,*  RETURN PHYSICIAN APPOINTMENT: 17     INITIAL ASSESSMENT:  Mr. Bridgette Simpson presents with pain, stiffness, and decreased gait and mobility s/p L TKA on 17. Aly Jorgensen Patient would benefit from PT to address these problems to improve patient's independence and safety with mobility and daily activities. Thank you. PROBLEM LIST (Impacting functional limitations):  1. Decreased Strength  2. Decreased ADL/Functional Activities  3. Decreased Transfer Abilities  4. Decreased Ambulation Ability/Technique  5. Decreased Balance  6. Increased Pain  7. Decreased Activity Tolerance  8. Decreased Flexibility/Joint Mobility  9. Decreased Mathews with Home Exercise Program INTERVENTIONS PLANNED:  1. Balance Exercise  2. Bed Mobility  3. Cold  4. Gait Training  5. Heat  6. Home Exercise Program (HEP)  7. Manual Therapy  8. Neuromuscular Re-education/Strengthening  9. Range of Motion (ROM)  10. Therapeutic Activites  11. Therapeutic Exercise/Strengthening  12. Transfer Training   TREATMENT PLAN:  Effective Dates: 2017 TO 2017. Frequency/Duration: 2-3 times a week for 6-8 weeks  GOALS: (Goals have been discussed and agreed upon with patient.)  Short-Term Functional Goals: Time Frame: 2-4 weeks  1.  Patient will demonstrate independence and compliance with home exercise program to improve ROM and strength for daily activities. 2. Patient will report decreased L knee pain to less than or equal to 5/10 to improve patient's tolerance of daily activities. 3.   Discharge Goals: Time Frame: 6-8 weeks  1. Patient will report decreased L Knee pain to less than or equal to 2/10 to improve patient's tolerance of daily activities. 2. Patient will ambulate with least assistive device over level and unlevel surfaces without evidence of imbalance to improve safety for daily activities. 3. Patient will demonstrate improved L knee ROM to 0-120 degrees to improve mobility for daily activities. 4. Patient will increase L lower extremity strength to greater than or equal to 4+/5 to improve strength for functional mobility activities. Rehabilitation Potential For Stated Goals: Good  Regarding [facility] therapy, I certify that the treatment plan above will be carried out by a therapist or under their direction. Thank you for this referral,  Aisha Apgar, PTA     Referring Physician Signature: Corrine Simmons,*              Date                    The information in this section was collected on 17 (except where otherwise noted). HISTORY:   History of Present Injury/Illness (Reason for Referral):  17 L TKA. Home from hospital with HHPT for PT. Has a stationary bike, recumbent at home and is working on 1/2 revolutions. L knee has been achy and mildly swollen. I went to ER day after released from hospital because bandage was filling with blood. ER just wrapped it. Then went to hospital downtown due to prolonged diarrhea. Was in hospital 3 days, and said it was something with my kidneys. Feeling better now and eating better. Doing exercises from home health PT. 7/10 pain L knee all of the time, and stiff. Taking tylenol and no other pain meds. No falls. Have used a single point cane for years.  Doing all normal activities except getting on riding lawn mower. Had L CATRINA in 2014, and has had 4 back surgeries. 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 Past Medical History/Comorbidities:   Mr. 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 He also has no past medical history of Aneurysm ([address] Arrhythmia; Autoimmune disease ([address] Difficult intubation; Heart failure ([address] Liver disease; Malignant hyperthermia due to anesthesia; Morbid obesity ([address] Nausea & vomiting; Pseudocholinesterase deficiency; Psychiatric disorder; PUD (peptic ulcer disease); Seizures ([address] Sleep apnea; or Thromboembolus ([address] Mr. Reva Heck  has a past surgical history that includes abdomen surgery proc unlisted (1991); lumbar fusion (1990 x 2  2012, 2013); urological (2002); heent (as child); mohs procedure (Left, 1990); orthopaedic (Left, 2013); colectomy (1997); colonoscopy (2014); heart catheterization (01/10/2014); coronary stent placement; and knee replacement (Left, 2017). Social History/Living Environment:     3 steps at home. Prior Level of Function/Work/Activity:  Driving for [employer]. Dominant Side:         RIGHT   Personal Factors:          Sex:  male        Age:  81 y.o. Current Medications:       Current Outpatient Prescriptions:     raNITIdine (ZANTAC) 150 mg tablet, Take 1 Tab by mouth two (2) times a day., Disp: 60 Tab, Rfl: 1    diphenhydrAMINE-acetaminophen (TYLENOL PM EXTRA STRENGTH)  mg tab, Take 50-1,000 mg by mouth as needed (pain for sleep). , Disp: , Rfl:     zolpidem (AMBIEN) 10 mg tablet, Take 5-10 mg by mouth nightly as needed for Sleep., Disp: , Rfl:     acetaminophen (TYLENOL) 500 mg tablet, Take 1,000 mg by mouth every six (6) hours as needed for Pain., Disp: , Rfl:     diphenhydrAMINE (BENADRYL) 50 mg capsule, Take 50 mg by mouth nightly as needed for Sleep., Disp: , Rfl:     aspirin delayed-release 81 mg tablet, Take 81 mg by mouth daily. Per anesthesia protocol:instructed to take am of surgery. Indications: myocardial infarction prevention, Disp: , Rfl:     triamcinolone acetonide (KENALOG) 0.1 % topical cream, Apply  to affected area two (2) times a day. use thin layer, Disp: 60 g, Rfl: 2    clopidogrel (PLAVIX) 75 mg tab, Take 1 Tab by mouth daily. , Disp: 90 Tab, Rfl: 1    finasteride (PROSCAR) 5 mg tablet, Take 1 Tab by mouth daily. (Patient taking differently: Take 5 mg by mouth daily. Per anesthesia protocol:instructed to take am of surgery. Indications: SYMPTOMATIC BENIGN PROSTATIC HYPERPLASIA), Disp: 90 Tab, Rfl: 1    atorvastatin (LIPITOR) 40 mg tablet, Take 1 Tab by mouth daily. (Patient taking differently: Take 40 mg by mouth daily. Indications: hypercholesterolemia), Disp: 90 Tab, Rfl: 1    niacin ER (NIASPAN) 500 mg tablet, Take 4 Tabs by mouth nightly., Disp: 360 Tab, Rfl: 1    metoprolol succinate (TOPROL-XL) 25 mg XL tablet, Take 1 Tab by mouth daily. Ema Chang MD Lic# [id number] RAYMUNDO [id number] (Patient taking differently: Take 25 mg by mouth nightly.), Disp: 30 Tab, Rfl: 1    hydrocortisone (ANUSOL-HC) 25 mg supp, Insert 1 Suppository into rectum every twelve (12) hours. , Disp: 15 Suppository, Rfl: 5    cholecalciferol, vitamin D3, (VITAMIN D-3) 2,000 unit Tab, Take  by mouth nightly.  , Disp: , Rfl:   No current facility-administered medications for this encounter.      Facility-Administered Medications Ordered in Other Encounters:     0.9% sodium chloride infusion 250 mL, 250 mL, IntraVENous, PRN, OFELIA Paredes    0.9% sodium chloride infusion 250 mL, 250 mL, IntraVENous, PRN, OFELIA Ortiz   Date Last Reviewed:  7/21/17   Number of Personal Factors/Comorbidities that affect the Plan of Care: 1-2: MODERATE COMPLEXITY   EXAMINATION:   Observation/Orthostatic Postural Assessment:          L knee scar healing from TKA, mild swelling  Palpation:          Decreased skin mobility inferior to L patella; mild swelling noted throughout L knee  ROM:          R knee WNL; L knee 5-118 degrees 7-18-17  Strength:          R LE 4+/5, L LE 4/5  Functional Mobility:         Gait/Ambulation:  Patient ambulates with straight cane demonstrating an antalgic gait pattern. Transfers:  independent        Bed Mobility:  independent        Stairs:  NT  Sensation:         intact     Body Structures Involved:  1. Joints  2. Muscles  3. Ligaments Body Functions Affected:  1. Sensory/Pain  2. Neuromusculoskeletal  3. Movement Related Activities and Participation Affected:  1. Mobility  2. Domestic Life  3. Community, Social and [place]   Number of elements (examined above) that affect the Plan of Care: 3: MODERATE COMPLEXITY   CLINICAL PRESENTATION:   Presentation: Evolving clinical presentation with changing clinical characteristics: MODERATE COMPLEXITY   CLINICAL DECISION MAKING:   Outcome Measure: Tool Used: Lower Extremity Functional Scale (LEFS)  Score:  Initial: 32/80 Most Recent: X/80 (Date: -- )   Interpretation of Score: 20 questions each scored on a 5 point scale with 0 representing \"extreme difficulty or unable to perform\" and 4 representing \"no difficulty\". The lower the score, the greater the functional disability. 80/80 represents no disability. Minimal detectable change is 9 points. Score 80 79-63 62-48 47-32 31-16 15-1 0   Modifier CH CI CJ CK CL CM CN     ?  Mobility - Walking and Moving Around:     - CURRENT STATUS: CK - 40%-59% impaired, limited or restricted    - GOAL STATUS: CJ - 20%-39% impaired, limited or restricted    - D/C STATUS:  ---------------To be determined---------------      Medical Necessity:   · Patient is expected to demonstrate progress in strength, range of motion and functional technique to increase independence with daily activities. Reason for Services/Other Comments:  · Patient continues to demonstrate capacity to improve strength, ROM, pain, gait which will increase independence and increase safety. Use of outcome tool(s) and clinical judgement create a POC that gives a: Questionable prediction of patient's progress: MODERATE COMPLEXITY            TREATMENT:   (In addition to Assessment/Re-Assessment sessions the following treatments were rendered)  Pre-treatment Symptoms/Complaints: \"I over did it yesterday. \"  Pain: Initial:   6/10 L knee Post Session: 4/10 L knee       Therapeutic Exercise: (  30 minutes):  Exercises per grid below to improve mobility, strength and balance. Required minimal verbal cues to promote proper body alignment, promote proper body posture and promote proper body mechanics. Progressed resistance, range, repetitions and complexity of movement as indicated. Date:  7/18/17 Date   7/25/17   Activity/Exercise Parameters    nustep L3 x 8 minutes L3 x 8 minutes   Straight leg raise L x20 #1 L 2 x 10 #2   slantboard calf stretch  3 x 20 sec   Long sitting calf stretch with towel yes    Heel slides x20 in sitting x20 in sitting, x 10 in supine   Long arc quad x20 #2 x20 #3   Sit to stand x20 x20   Step ups X 15 time's leading Left LE X 15 time's leading Left LE 6 inch step   Nautilus Press #60 x 20 reps  #60 x 15 reps B; x 15 reps L LE at 35#              Hip ext x20 prone x20  L LE  In prone     Manual therapy: (10 minutes) tissue mobilization to L knee anteriorly and posteriorly, and scar, to improve tissue mobility for ROM. Passive physiologic flexion and extension grade III-IV. Soft Tissue to lower Quad, medial knee secondary to increased tightness.   Modalities:(10 minutes) ice pack to L knee x 10 minutes at end of session. Treatment/Session Assessment:    · Response to Treatment:  Patient tolerated session well. Swelling and ROM are improving. Patient is compliant with HEP. ROM L knee 5-118 degrees today. Skin intact at end of session. Continue plan of care. · .  · Compliance with Program/Exercises: compliant  · Recommendations/Intent for next treatment session: \"Next visit will focus on advancements to more challenging activities\".   Total Treatment Duration:  PT Patient Time In/Time Out  Time In: 0900  Time Out: [address] [facility]

## 2017-07-27 ENCOUNTER — HOSPITAL ENCOUNTER (OUTPATIENT)
Dept: PHYSICAL THERAPY | Age: 82
Discharge: HOME | End: 2017-07-27
Payer: MEDICARE

## 2017-07-27 PROCEDURE — 97110 THERAPEUTIC EXERCISES: CPT

## 2017-07-27 PROCEDURE — 97140 MANUAL THERAPY 1/> REGIONS: CPT

## 2017-07-27 NOTE — PROGRESS NOTES
Bill Lyle  : [address] [facility] [place] [address], [facility] [address]  [phone]   [phone]           OUTPATIENT PHYSICAL THERAPY:Daily Note 2017    ICD-10: Treatment Diagnosis: Pain in left knee (M25.562); Stiffness of left knee, not elsewhere classified (M25.662); Other abnormalities of gait and mobility R26.89  Precautions/Allergies:   Review of patient's allergies indicates no known allergies. Fall Risk Score: 2 (? 5 = High Risk)  MD Orders: eval and treat MEDICAL/REFERRING DIAGNOSIS:  s/p left tka   DATE OF ONSET: 17  REFERRING PHYSICIAN: Victoria Epsinoza,*  RETURN PHYSICIAN APPOINTMENT: 17     INITIAL ASSESSMENT:  Mr. Aster Ramon presents with pain, stiffness, and decreased gait and mobility s/p L TKA on 17. Shell Vu Patient would benefit from PT to address these problems to improve patient's independence and safety with mobility and daily activities. Thank you. PROBLEM LIST (Impacting functional limitations):  1. Decreased Strength  2. Decreased ADL/Functional Activities  3. Decreased Transfer Abilities  4. Decreased Ambulation Ability/Technique  5. Decreased Balance  6. Increased Pain  7. Decreased Activity Tolerance  8. Decreased Flexibility/Joint Mobility  9. Decreased [place] with Home Exercise Program INTERVENTIONS PLANNED:  1. Balance Exercise  2. Bed Mobility  3. Cold  4. Gait Training  5. Heat  6. Home Exercise Program (HEP)  7. Manual Therapy  8. Neuromuscular Re-education/Strengthening  9. Range of Motion (ROM)  10. Therapeutic Activites  11. Therapeutic Exercise/Strengthening  12. Transfer Training   TREATMENT PLAN:  Effective Dates: 2017 TO 2017. Frequency/Duration: 2-3 times a week for 6-8 weeks  GOALS: (Goals have been discussed and agreed upon with patient.)  Short-Term Functional Goals: Time Frame: 2-4 weeks  1.  Patient will demonstrate independence and compliance with home exercise program to improve ROM and strength for daily activities. MET  2. Patient will report decreased L knee pain to less than or equal to 5/10 to improve patient's tolerance of daily activities. 3.   Discharge Goals: Time Frame: 6-8 weeks  1. Patient will report decreased L Knee pain to less than or equal to 2/10 to improve patient's tolerance of daily activities. 2. Patient will ambulate with least assistive device over level and unlevel surfaces without evidence of imbalance to improve safety for daily activities. 3. Patient will demonstrate improved L knee ROM to 0-120 degrees to improve mobility for daily activities. 4. Patient will increase L lower extremity strength to greater than or equal to 4+/5 to improve strength for functional mobility activities. Rehabilitation Potential For Stated Goals: Good  Regarding [facility] therapy, I certify that the treatment plan above will be carried out by a therapist or under their direction. Thank you for this referral,  Oscar Juarez PT     Referring Physician Signature: Shedrick Meigs,*              Date                    The information in this section was collected on 7/11/17 (except where otherwise noted). HISTORY:   History of Present Injury/Illness (Reason for Referral):  5/16/17 L TKA. Home from hospital with HHPT for PT. Has a stationary bike, recumbent at home and is working on 1/2 revolutions. L knee has been achy and mildly swollen. I went to ER day after released from hospital because bandage was filling with blood. ER just wrapped it. Then went to hospital downtown due to prolonged diarrhea. Was in hospital 3 days, and said it was something with my kidneys. Feeling better now and eating better. Doing exercises from home health PT. 7/10 pain L knee all of the time, and stiff. Taking tylenol and no other pain meds. No falls. Have used a single point cane for years.  Doing all normal activities except getting on riding . Had L CATRINA in 2014, and has had 4 back surgeries. 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 Past Medical History/Comorbidities:   Mr. Aster Ramon  has a past medical history of Arthritis; Chronic pain; Colon cancer ([facility] [address] (6/23/2015); Diabetes ([address] (2006); Dyslipidemia (6/23/2015); GERD (gastroesophageal reflux disease); bladder cancer (6/23/2015); Hypertension; NSTEMI (non-ST elevation myocardial infarction) ([address] (01/10/2014); Osteoarthritis of left hip (12/16/2013); S/P prosthetic total arthroplasty of the hip (1/8/2014); Stented coronary artery (01/10/2014); and Thrombocytopenia, unspecified ([facility] [address] (1/11/2014). He also has no past medical history of Aneurysm ([address] Arrhythmia; Autoimmune disease ([address] Difficult intubation; Heart failure ([address] Liver disease; Malignant hyperthermia due to anesthesia; Morbid obesity ([address] Nausea & vomiting; Pseudocholinesterase deficiency; Psychiatric disorder; PUD (peptic ulcer disease); Seizures ([address] Sleep apnea; or Thromboembolus ([address] Mr. Aster Ramon  has a past surgical history that includes abdomen surgery proc unlisted (1991); lumbar fusion (1990 x 2  2012, 2013); urological (2002); heent (as child); mohs procedure (Left, 1990); orthopaedic (Left, 2013); colectomy (1997); colonoscopy (2014); heart catheterization (01/10/2014); coronary stent placement; and knee replacement (Left, 2017). Social History/Living Environment:     3 steps at home. 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 Dominant Side:         RIGHT   Personal Factors:          Sex:  male        Age:  81 y.o. Current Medications:       Current Outpatient Prescriptions:     raNITIdine (ZANTAC) 150 mg tablet, Take 1 Tab by mouth two (2) times a day., Disp: 60 Tab, Rfl: 1    diphenhydrAMINE-acetaminophen (TYLENOL PM EXTRA STRENGTH)  mg tab, Take 50-1,000 mg by mouth as needed (pain for sleep). , Disp: , Rfl:     zolpidem (AMBIEN) 10 mg tablet, Take 5-10 mg by mouth nightly as needed for Sleep., Disp: , Rfl:     acetaminophen (TYLENOL) 500 mg tablet, Take 1,000 mg by mouth every six (6) hours as needed for Pain., Disp: , Rfl:     diphenhydrAMINE (BENADRYL) 50 mg capsule, Take 50 mg by mouth nightly as needed for Sleep., Disp: , Rfl:     aspirin delayed-release 81 mg tablet, Take 81 mg by mouth daily. Per anesthesia protocol:instructed to take am of surgery. Indications: myocardial infarction prevention, Disp: , Rfl:     triamcinolone acetonide (KENALOG) 0.1 % topical cream, Apply  to affected area two (2) times a day. use thin layer, Disp: 60 g, Rfl: 2    clopidogrel (PLAVIX) 75 mg tab, Take 1 Tab by mouth daily. , Disp: 90 Tab, Rfl: 1    finasteride (PROSCAR) 5 mg tablet, Take 1 Tab by mouth daily. (Patient taking differently: Take 5 mg by mouth daily. Per anesthesia protocol:instructed to take am of surgery. Indications: SYMPTOMATIC BENIGN PROSTATIC HYPERPLASIA), Disp: 90 Tab, Rfl: 1    atorvastatin (LIPITOR) 40 mg tablet, Take 1 Tab by mouth daily. (Patient taking differently: Take 40 mg by mouth daily. Indications: hypercholesterolemia), Disp: 90 Tab, Rfl: 1    niacin ER (NIASPAN) 500 mg tablet, Take 4 Tabs by mouth nightly., Disp: 360 Tab, Rfl: 1    metoprolol succinate (TOPROL-XL) 25 mg XL tablet, Take 1 Tab by mouth daily. Kenny Otoole MD Lic# [id number] [facility] [id number] (Patient taking differently: Take 25 mg by mouth nightly.), Disp: 30 Tab, Rfl: 1    hydrocortisone (ANUSOL-HC) 25 mg supp, Insert 1 Suppository into rectum every twelve (12) hours. , Disp: 15 Suppository, Rfl: 5    cholecalciferol, vitamin D3, (VITAMIN D-3) 2,000 unit Tab, Take  by mouth nightly.  , Disp: , Rfl:   No current facility-administered medications for this encounter.      Facility-Administered Medications Ordered in Other Encounters:     0.9% sodium chloride infusion 250 mL, 250 mL, IntraVENous, PRN, OFELIA Paula    0.9% sodium chloride infusion 250 mL, 250 mL, IntraVENous, PRN, OFELIA Alexander   Date Last Reviewed:  7/27/17   Number of Personal Factors/Comorbidities that affect the Plan of Care: 1-2: MODERATE COMPLEXITY   EXAMINATION:   Observation/Orthostatic Postural Assessment:          L knee scar healing from TKA, mild swelling  Palpation:          Decreased skin mobility inferior to L patella; mild swelling noted throughout L knee  ROM:          R knee WNL; L knee 5-118 degrees 7-18-17  Strength:          R LE 4+/5, L LE 4/5  Functional Mobility:         Gait/Ambulation:  Patient ambulates with straight cane demonstrating an antalgic gait pattern. Transfers:  independent        Bed Mobility:  independent        Stairs:  NT  Sensation:         intact     Body Structures Involved:  1. Joints  2. Muscles  3. Ligaments Body Functions Affected:  1. Sensory/Pain  2. Neuromusculoskeletal  3. Movement Related Activities and Participation Affected:  1. Mobility  2. Domestic Life  3. Community, Social and [place]   Number of elements (examined above) that affect the Plan of Care: 3: MODERATE COMPLEXITY   CLINICAL PRESENTATION:   Presentation: Evolving clinical presentation with changing clinical characteristics: MODERATE COMPLEXITY   CLINICAL DECISION MAKING:   Outcome Measure: Tool Used: Lower Extremity Functional Scale (LEFS)  Score:  Initial: 32/80 Most Recent: X/80 (Date: -- )   Interpretation of Score: 20 questions each scored on a 5 point scale with 0 representing \"extreme difficulty or unable to perform\" and 4 representing \"no difficulty\". The lower the score, the greater the functional disability. 80/80 represents no disability. Minimal detectable change is 9 points. Score 80 79-63 62-48 47-32 31-16 15-1 0   Modifier CH CI CJ CK CL CM CN     ?  Mobility - Walking and Moving Around:     - CURRENT STATUS: CK - 40%-59% impaired, limited or restricted    - GOAL STATUS: CJ - 20%-39% impaired, limited or restricted    - D/C STATUS:  ---------------To be determined---------------      Medical Necessity:   · Patient is expected to demonstrate progress in strength, range of motion and functional technique to increase independence with daily activities. Reason for Services/Other Comments:  · Patient continues to demonstrate capacity to improve strength, ROM, pain, gait which will increase independence and increase safety. Use of outcome tool(s) and clinical judgement create a POC that gives a: Questionable prediction of patient's progress: MODERATE COMPLEXITY            TREATMENT:   (In addition to Assessment/Re-Assessment sessions the following treatments were rendered)  Pre-treatment Symptoms/Complaints: \"Just stiff but getting better. \"  Pain: Initial:   1/10 L knee Post Session: 1/10 L knee       Therapeutic Exercise: (  30 minutes):  Exercises per grid below to improve mobility, strength and balance. Required minimal verbal cues to promote proper body alignment, promote proper body posture and promote proper body mechanics. Progressed resistance, range, repetitions and complexity of movement as indicated.    Date:  7/18/17 Date   7/25/17 Date   7/27/17   Activity/Exercise Parameters     nustep L3 x 8 minutes L3 x 8 minutes L3 x 8 minutes   Straight leg raise L x20 #1 L 2 x 10 #2 L 2 x 10 #2   slantboard calf stretch  3 x 20 sec 3 x 20 sec   Long sitting calf stretch with towel yes     Heel slides x20 in sitting x20 in sitting, x 10 in supine x20 in sitting, x 10 in supine   Long arc quad x20 #2 x20 #3 x20 #3   Sit to stand x20 x20 x20   Step ups X 15 time's leading Left LE X 15 time's leading Left LE 6 inch step X 15 time's leading Left LE 6 inch step   Nautilus Press #60 x 20 reps  #60 x 15 reps B; x 15 reps L LE at 35#  #60 x 20 reps B; x 20 reps L LE at 35#    Step downs   From 4 inch step with L LE on step x 15 reps         Hip ext x20 prone x20  L LE  In prone x20  L LE  In prone     Manual therapy: (10 minutes) tissue mobilization to L knee anteriorly and posteriorly, and scar, to improve tissue mobility for ROM. Passive physiologic flexion and extension grade III-IV. Soft Tissue to lower Quad, medial knee secondary to increased tightness. Modalities:(10 minutes) ice pack to L knee x 10 minutes at end of session. Treatment/Session Assessment:    · Response to Treatment:  Patient tolerated session well. Swelling and ROM are improving. Patient is compliant with HEP. ROM L knee 3-116 degrees today. Skin intact at end of session. Continue plan of care. · .  · Compliance with Program/Exercises: compliant  · Recommendations/Intent for next treatment session: \"Next visit will focus on advancements to more challenging activities\".   Total Treatment Duration:  PT Patient Time In/Time Out  Time In: 0943  Time Out: 2201 Jacinto Barrera PT

## 2017-07-28 ENCOUNTER — HOSPITAL ENCOUNTER (OUTPATIENT)
Dept: PHYSICAL THERAPY | Age: 82
Discharge: HOME | End: 2017-07-28
Payer: MEDICARE

## 2017-07-28 PROCEDURE — G8979 MOBILITY GOAL STATUS: HCPCS

## 2017-07-28 PROCEDURE — 97140 MANUAL THERAPY 1/> REGIONS: CPT

## 2017-07-28 PROCEDURE — 97110 THERAPEUTIC EXERCISES: CPT

## 2017-07-28 PROCEDURE — G8978 MOBILITY CURRENT STATUS: HCPCS

## 2017-07-28 NOTE — PROGRESS NOTES
Radha Garcia  : [address] [facility] [place] [address] [facility], [address], [address] [facility]  [phone]   [phone]           OUTPATIENT PHYSICAL THERAPY:Daily Note and Progress Report 2017    ICD-10: Treatment Diagnosis: Pain in left knee (M25.562); Stiffness of left knee, not elsewhere classified (M25.662); Other abnormalities of gait and mobility R26.89  Precautions/Allergies:   Review of patient's allergies indicates no known allergies. Fall Risk Score: 2 (? 5 = High Risk)  MD Orders: eval and treat MEDICAL/REFERRING DIAGNOSIS:  s/p left tka   DATE OF ONSET: 17  REFERRING PHYSICIAN: Darwin Arzola,*  RETURN PHYSICIAN APPOINTMENT: 17     PROGRESS NOTE 17:  Mr. Lali Marc has attended 9 PT sessions from 17 to 17 for pain, stiffness, and decreased gait and mobility s/p L TKA on 17. Patient's strength and ROM are improving. Patient is demonstrating improving gait pattern as well. Patient would benefit from continuing PT to address these problems to improve patient's independence and safety with mobility and daily activities. Thank you. PROBLEM LIST (Impacting functional limitations):  1. Decreased Strength  2. Decreased ADL/Functional Activities  3. Decreased Transfer Abilities  4. Decreased Ambulation Ability/Technique  5. Decreased Balance  6. Increased Pain  7. Decreased Activity Tolerance  8. Decreased Flexibility/Joint Mobility  9. Decreased [place] with Home Exercise Program INTERVENTIONS PLANNED:  1. Balance Exercise  2. Bed Mobility  3. Cold  4. Gait Training  5. Heat  6. Home Exercise Program (HEP)  7. Manual Therapy  8. Neuromuscular Re-education/Strengthening  9. Range of Motion (ROM)  10. Therapeutic Activites  11. Therapeutic Exercise/Strengthening  12. Transfer Training   TREATMENT PLAN:  Effective Dates: 2017 TO 2017.   Frequency/Duration: 2-3 times a week for 6-8 weeks  GOALS: (Goals have been discussed and agreed upon with patient.)  Short-Term Functional Goals: Time Frame: 2-4 weeks  1. Patient will demonstrate independence and compliance with home exercise program to improve ROM and strength for daily activities. MET  2. Patient will report decreased L knee pain to less than or equal to 5/10 to improve patient's tolerance of daily activities. met  3. Discharge Goals: Time Frame: 6-8 weeks  1. Patient will report decreased L Knee pain to less than or equal to 2/10 to improve patient's tolerance of daily activities. Progressing and ongoing  2. Patient will ambulate with least assistive device over level and unlevel surfaces without evidence of imbalance to improve safety for daily activities. Progressing and ongoing  3. Patient will demonstrate improved L knee ROM to 0-120 degrees to improve mobility for daily activities. Progressing and ongoing  4. Patient will increase L lower extremity strength to greater than or equal to 4+/5 to improve strength for functional mobility activities. Progressing and ongoing  Rehabilitation Potential For Stated Goals: Good              The information in this section was collected on 7/11/17 (except where otherwise noted). HISTORY:   History of Present Injury/Illness (Reason for Referral):  5/16/17 L TKA. Home from hospital with HHPT for PT. Has a stationary bike, recumbent at home and is working on 1/2 revolutions. L knee has been achy and mildly swollen. I went to ER day after released from hospital because bandage was filling with blood. ER just wrapped it. Then went to hospital downtown due to prolonged diarrhea. Was in hospital 3 days, and said it was something with my kidneys. Feeling better now and eating better. Doing exercises from home health PT. 7/10 pain L knee all of the time, and stiff. Taking tylenol and no other pain meds. No falls. Have used a single point cane for years. Doing all normal activities except getting on riding . Had L CATRINA in 2014, and has had 4 back surgeries. 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 Past Medical History/Comorbidities:   Mr. 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 He also has no past medical history of Aneurysm ([address] Arrhythmia; Autoimmune disease ([address] Difficult intubation; Heart failure ([address] Liver disease; Malignant hyperthermia due to anesthesia; Morbid obesity ([address] Nausea & vomiting; Pseudocholinesterase deficiency; Psychiatric disorder; PUD (peptic ulcer disease); Seizures ([address] Sleep apnea; or Thromboembolus ([address] Mr. Robbert Goodpasture  has a past surgical history that includes abdomen surgery proc unlisted (1991); lumbar fusion (1990 x 2  2012, 2013); urological (2002); heent (as child); mohs procedure (Left, 1990); orthopaedic (Left, 2013); colectomy (1997); colonoscopy (2014); heart catheterization (01/10/2014); coronary stent placement; and knee replacement (Left, 2017). Social History/Living Environment:     3 steps at home. 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 Dominant Side:         RIGHT   Personal Factors:          Sex:  male        Age:  81 y.o. Current Medications:       Current Outpatient Prescriptions:     raNITIdine (ZANTAC) 150 mg tablet, Take 1 Tab by mouth two (2) times a day., Disp: 60 Tab, Rfl: 1    diphenhydrAMINE-acetaminophen (TYLENOL PM EXTRA STRENGTH)  mg tab, Take 50-1,000 mg by mouth as needed (pain for sleep). , Disp: , Rfl:     zolpidem (AMBIEN) 10 mg tablet, Take 5-10 mg by mouth nightly as needed for Sleep., Disp: , Rfl:     acetaminophen (TYLENOL) 500 mg tablet, Take 1,000 mg by mouth every six (6) hours as needed for Pain., Disp: , Rfl:     diphenhydrAMINE (BENADRYL) 50 mg capsule, Take 50 mg by mouth nightly as needed for Sleep., Disp: , Rfl:     aspirin delayed-release 81 mg tablet, Take 81 mg by mouth daily. Per anesthesia protocol:instructed to take am of surgery. Indications: myocardial infarction prevention, Disp: , Rfl:     triamcinolone acetonide (KENALOG) 0.1 % topical cream, Apply  to affected area two (2) times a day. use thin layer, Disp: 60 g, Rfl: 2    clopidogrel (PLAVIX) 75 mg tab, Take 1 Tab by mouth daily. , Disp: 90 Tab, Rfl: 1    finasteride (PROSCAR) 5 mg tablet, Take 1 Tab by mouth daily. (Patient taking differently: Take 5 mg by mouth daily. Per anesthesia protocol:instructed to take am of surgery. Indications: SYMPTOMATIC BENIGN PROSTATIC HYPERPLASIA), Disp: 90 Tab, Rfl: 1    atorvastatin (LIPITOR) 40 mg tablet, Take 1 Tab by mouth daily. (Patient taking differently: Take 40 mg by mouth daily. Indications: hypercholesterolemia), Disp: 90 Tab, Rfl: 1    niacin ER (NIASPAN) 500 mg tablet, Take 4 Tabs by mouth nightly., Disp: 360 Tab, Rfl: 1    metoprolol succinate (TOPROL-XL) 25 mg XL tablet, Take 1 Tab by mouth daily. Apurva Moore MD Lic# [id number] RAYMUNDO [id number] (Patient taking differently: Take 25 mg by mouth nightly.), Disp: 30 Tab, Rfl: 1    hydrocortisone (ANUSOL-HC) 25 mg supp, Insert 1 Suppository into rectum every twelve (12) hours. , Disp: 15 Suppository, Rfl: 5    cholecalciferol, vitamin D3, (VITAMIN D-3) 2,000 unit Tab, Take  by mouth nightly.  , Disp: , Rfl:   No current facility-administered medications for this encounter.      Facility-Administered Medications Ordered in Other Encounters:     0.9% sodium chloride infusion 250 mL, 250 mL, IntraVENous, PRN, OFELIA Rhodes    0.9% sodium chloride infusion 250 mL, 250 mL, IntraVENous, Aiyana JUAREZ PA   Date Last Reviewed:  7/27/17   Number of Personal Factors/Comorbidities that affect the Plan of Care: 1-2: MODERATE COMPLEXITY   EXAMINATION:   Observation/Orthostatic Postural Assessment:          L knee scar healing from TKA, mild swelling  Palpation:          Decreased skin mobility inferior to L patella; mild swelling noted throughout L knee  ROM:          R knee WNL; L knee 5-118 degrees 7-18-17  Strength:          R LE 4+/5, L LE 4/5  Functional Mobility:         Gait/Ambulation:  Patient ambulates with straight cane demonstrating an antalgic gait pattern. Transfers:  independent        Bed Mobility:  independent        Stairs:  NT  Sensation:         intact     Body Structures Involved:  1. Joints  2. Muscles  3. Ligaments Body Functions Affected:  1. Sensory/Pain  2. Neuromusculoskeletal  3. Movement Related Activities and Participation Affected:  1. Mobility  2. Domestic Life  3. Community, Social and [place]   Number of elements (examined above) that affect the Plan of Care: 3: MODERATE COMPLEXITY   CLINICAL PRESENTATION:   Presentation: Evolving clinical presentation with changing clinical characteristics: MODERATE COMPLEXITY   CLINICAL DECISION MAKING:   Outcome Measure: Tool Used: Lower Extremity Functional Scale (LEFS)  Score:  Initial: 32/80 Most Recent: 53/80 (Date: 7/28/17 )   Interpretation of Score: 20 questions each scored on a 5 point scale with 0 representing \"extreme difficulty or unable to perform\" and 4 representing \"no difficulty\". The lower the score, the greater the functional disability. 80/80 represents no disability. Minimal detectable change is 9 points. Score 80 79-63 62-48 47-32 31-16 15-1 0   Modifier CH CI CJ CK CL CM CN     ?  Mobility - Walking and Moving Around:     - CURRENT STATUS: CJ - 20%-39% impaired, limited or restricted    - GOAL STATUS: CJ - 20%-39% impaired, limited or restricted    - D/C STATUS:  ---------------To be determined---------------      Medical Necessity:   · Patient is expected to demonstrate progress in strength, range of motion and functional technique to increase independence with daily activities. Reason for Services/Other Comments:  · Patient continues to demonstrate capacity to improve strength, ROM, pain, gait which will increase independence and increase safety. Use of outcome tool(s) and clinical judgement create a POC that gives a: Questionable prediction of patient's progress: MODERATE COMPLEXITY            TREATMENT:   (In addition to Assessment/Re-Assessment sessions the following treatments were rendered)  Pre-treatment Symptoms/Complaints: \"Doing fine. \"  Pain: Initial:   1/10 L knee Post Session: 1/10 L knee       Therapeutic Exercise: (  30 minutes):  Exercises per grid below to improve mobility, strength and balance. Required minimal verbal cues to promote proper body alignment, promote proper body posture and promote proper body mechanics. Progressed resistance, range, repetitions and complexity of movement as indicated.    Date:  7/18/17 Date   7/25/17 Date   7/27/17 Date   7/28/17   Activity/Exercise Parameters      nustep L3 x 8 minutes L3 x 8 minutes L3 x 8 minutes L4 x 8 minutes   Straight leg raise L x20 #1 L 2 x 10 #2 L 2 x 10 #2 L LE  2 x 10 #2   slantboard calf stretch  3 x 20 sec 3 x 20 sec 3 x 20 sec   Long sitting calf stretch with towel yes      Heel slides x20 in sitting x20 in sitting, x 10 in supine x20 in sitting, x 10 in supine  x 15 in supine   Long arc quad x20 #2 x20 #3 x20 #3 x20 #4   Sit to stand x20 x20 x20    Step ups X 15 time's leading Left LE X 15 time's leading Left LE 6 inch step X 15 time's leading Left LE 6 inch step X 20 time's leading Left LE 6 inch step   Nautilus Press #60 x 20 reps  #60 x 15 reps B; x 15 reps L LE at 35#  #60 x 20 reps B; x 20 reps L LE at 35#  #65 x 20 reps B; x 20 reps L LE at 40#    Step downs   From 4 inch step with L LE on step x 15 reps From 4 inch step with L LE on step x 20 reps   Terminal knee extension    Black band x 20 reps L LE   Hip ext x20 prone x20  L LE  In prone x20  L LE  In prone    walking    Worked on walking in clinic: emphasizing L LE heel strike and equal, normal step length B, and good, upright posture, no cane     Manual therapy: (10 minutes) tissue mobilization to L knee anteriorly and posteriorly, and scar, to improve tissue mobility for ROM. Passive physiologic flexion and extension grade III-IV. Soft Tissue to lower Quad, medial knee secondary to increased tightness. Modalities:(10 minutes) ice pack to L knee x 10 minutes at end of session. Treatment/Session Assessment:    · Response to Treatment:  Patient tolerated session well. Swelling and ROM are improving. Patient is compliant with HEP. ROM L knee 3-118 degrees today. Skin intact at end of session. Worked on normalizing gait pattern today, emphasizing good posture, step length and heel strike. Continue plan of care. · .  · Compliance with Program/Exercises: compliant  · Recommendations/Intent for next treatment session: \"Next visit will focus on advancements to more challenging activities\".   Total Treatment Duration:  PT Patient Time In/Time Out  Time In: 0920  Time Out: 1010    Edin Schultz, PT

## 2017-08-01 ENCOUNTER — HOSPITAL ENCOUNTER (OUTPATIENT)
Dept: PHYSICAL THERAPY | Age: 82
Discharge: HOME | End: 2017-08-01
Payer: MEDICARE

## 2017-08-01 PROCEDURE — 97110 THERAPEUTIC EXERCISES: CPT

## 2017-08-01 PROCEDURE — 97140 MANUAL THERAPY 1/> REGIONS: CPT

## 2017-08-01 NOTE — PROGRESS NOTES
Belen Angeles  : 1935 [facility]  [facility] [address], [place] [address].  [phone]   [phone]           OUTPATIENT PHYSICAL THERAPY:Daily Note and Progress Report 2017    ICD-10: Treatment Diagnosis: Pain in left knee (M25.562); Stiffness of left knee, not elsewhere classified (M25.662); Other abnormalities of gait and mobility R26.89  Precautions/Allergies:   Review of patient's allergies indicates no known allergies. Fall Risk Score: 2 (? 5 = High Risk)  MD Orders: eval and treat MEDICAL/REFERRING DIAGNOSIS:  s/p left tka   DATE OF ONSET: 17  REFERRING PHYSICIAN: Frankey Barrios,*  RETURN PHYSICIAN APPOINTMENT: 17     PROGRESS NOTE 17:  Mr. Kyra Mosquera has attended 9 PT sessions from 17 to 17 for pain, stiffness, and decreased gait and mobility s/p L TKA on 17. Patient's strength and ROM are improving. Patient is demonstrating improving gait pattern as well. Patient would benefit from continuing PT to address these problems to improve patient's independence and safety with mobility and daily activities. Thank you. PROBLEM LIST (Impacting functional limitations):  1. Decreased Strength  2. Decreased ADL/Functional Activities  3. Decreased Transfer Abilities  4. Decreased Ambulation Ability/Technique  5. Decreased Balance  6. Increased Pain  7. Decreased Activity Tolerance  8. Decreased Flexibility/Joint Mobility  9. Decreased [place] with Home Exercise Program INTERVENTIONS PLANNED:  1. Balance Exercise  2. Bed Mobility  3. Cold  4. Gait Training  5. Heat  6. Home Exercise Program (HEP)  7. Manual Therapy  8. Neuromuscular Re-education/Strengthening  9. Range of Motion (ROM)  10. Therapeutic Activites  11. Therapeutic Exercise/Strengthening  12. Transfer Training   TREATMENT PLAN:  Effective Dates: 2017 TO 2017.   Frequency/Duration: 2-3 times a week for 6-8 weeks  GOALS: (Goals have been discussed and agreed upon with patient.)  Short-Term Functional Goals: Time Frame: 2-4 weeks  1. Patient will demonstrate independence and compliance with home exercise program to improve ROM and strength for daily activities. MET  2. Patient will report decreased L knee pain to less than or equal to 5/10 to improve patient's tolerance of daily activities. met  3. Discharge Goals: Time Frame: 6-8 weeks  1. Patient will report decreased L Knee pain to less than or equal to 2/10 to improve patient's tolerance of daily activities. Progressing and ongoing  2. Patient will ambulate with least assistive device over level and unlevel surfaces without evidence of imbalance to improve safety for daily activities. Progressing and ongoing  3. Patient will demonstrate improved L knee ROM to 0-120 degrees to improve mobility for daily activities. Progressing and ongoing  4. Patient will increase L lower extremity strength to greater than or equal to 4+/5 to improve strength for functional mobility activities. Progressing and ongoing  Rehabilitation Potential For Stated Goals: Good              The information in this section was collected on 7/11/17 (except where otherwise noted). HISTORY:   History of Present Injury/Illness (Reason for Referral):  5/16/17 L TKA. Home from hospital with HHPT for PT. Has a stationary bike, recumbent at home and is working on 1/2 revolutions. L knee has been achy and mildly swollen. I went to ER day after released from hospital because bandage was filling with blood. ER just wrapped it. Then went to hospital downtown due to prolonged diarrhea. Was in hospital 3 days, and said it was something with my kidneys. Feeling better now and eating better. Doing exercises from home health PT. 7/10 pain L knee all of the time, and stiff. Taking tylenol and no other pain meds. No falls. Have used a single point cane for years. Doing all normal activities except getting on riding . Had L CATRINA in 2014, and has had 4 back surgeries. 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 Past Medical History/Comorbidities:   Mr. Mary Ann Yanez  has a past medical history of Arthritis; Chronic pain; Colon cancer ([facility] [address] (6/23/2015); Diabetes ([address] (2006); Dyslipidemia (6/23/2015); GERD (gastroesophageal reflux disease); bladder cancer (6/23/2015); Hypertension; NSTEMI (non-ST elevation myocardial infarction) ([address] (01/10/2014); Osteoarthritis of left hip (12/16/2013); S/P prosthetic total arthroplasty of the hip (1/8/2014); Stented coronary artery (01/10/2014); and Thrombocytopenia, unspecified ([facility] [address] (1/11/2014). He also has no past medical history of Aneurysm ([address] Arrhythmia; Autoimmune disease ([address] Difficult intubation; Heart failure ([address] Liver disease; Malignant hyperthermia due to anesthesia; Morbid obesity ([address] Nausea & vomiting; Pseudocholinesterase deficiency; Psychiatric disorder; PUD (peptic ulcer disease); Seizures ([address] Sleep apnea; or Thromboembolus ([address] Mr. Mary Ann Yanez  has a past surgical history that includes abdomen surgery proc unlisted (1991); lumbar fusion (1990 x 2  2012, 2013); urological (2002); heent (as child); mohs procedure (Left, 1990); orthopaedic (Left, 2013); colectomy (1997); colonoscopy (2014); heart catheterization (01/10/2014); coronary stent placement; and knee replacement (Left, 2017). Social History/Living Environment:     3 steps at home. 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 Dominant Side:         RIGHT   Personal Factors:          Sex:  male        Age:  81 y.o. Current Medications:       Current Outpatient Prescriptions:     raNITIdine (ZANTAC) 150 mg tablet, Take 1 Tab by mouth two (2) times a day., Disp: 60 Tab, Rfl: 1    diphenhydrAMINE-acetaminophen (TYLENOL PM EXTRA STRENGTH)  mg tab, Take 50-1,000 mg by mouth as needed (pain for sleep). , Disp: , Rfl:     zolpidem (AMBIEN) 10 mg tablet, Take 5-10 mg by mouth nightly as needed for Sleep., Disp: , Rfl:     acetaminophen (TYLENOL) 500 mg tablet, Take 1,000 mg by mouth every six (6) hours as needed for Pain., Disp: , Rfl:     diphenhydrAMINE (BENADRYL) 50 mg capsule, Take 50 mg by mouth nightly as needed for Sleep., Disp: , Rfl:     aspirin delayed-release 81 mg tablet, Take 81 mg by mouth daily. Per anesthesia protocol:instructed to take am of surgery. Indications: myocardial infarction prevention, Disp: , Rfl:     triamcinolone acetonide (KENALOG) 0.1 % topical cream, Apply  to affected area two (2) times a day. use thin layer, Disp: 60 g, Rfl: 2    clopidogrel (PLAVIX) 75 mg tab, Take 1 Tab by mouth daily. , Disp: 90 Tab, Rfl: 1    finasteride (PROSCAR) 5 mg tablet, Take 1 Tab by mouth daily. (Patient taking differently: Take 5 mg by mouth daily. Per anesthesia protocol:instructed to take am of surgery. Indications: SYMPTOMATIC BENIGN PROSTATIC HYPERPLASIA), Disp: 90 Tab, Rfl: 1    atorvastatin (LIPITOR) 40 mg tablet, Take 1 Tab by mouth daily. (Patient taking differently: Take 40 mg by mouth daily. Indications: hypercholesterolemia), Disp: 90 Tab, Rfl: 1    niacin ER (NIASPAN) 500 mg tablet, Take 4 Tabs by mouth nightly., Disp: 360 Tab, Rfl: 1    metoprolol succinate (TOPROL-XL) 25 mg XL tablet, Take 1 Tab by mouth daily. Ayesha Kate MD Lic# [id number] [facility] [id number] (Patient taking differently: Take 25 mg by mouth nightly.), Disp: 30 Tab, Rfl: 1    hydrocortisone (ANUSOL-HC) 25 mg supp, Insert 1 Suppository into rectum every twelve (12) hours. , Disp: 15 Suppository, Rfl: 5    cholecalciferol, vitamin D3, (VITAMIN D-3) 2,000 unit Tab, Take  by mouth nightly.  , Disp: , Rfl:   No current facility-administered medications for this encounter.      Facility-Administered Medications Ordered in Other Encounters:     0.9% sodium chloride infusion 250 mL, 250 mL, IntraVENous, PRN, OFELIA Lowe    0.9% sodium chloride infusion 250 mL, 250 mL, IntraVENous, Keaton JUAREZ, PA   Date Last Reviewed:  7/27/17   Number of Personal Factors/Comorbidities that affect the Plan of Care: 1-2: MODERATE COMPLEXITY   EXAMINATION:   Observation/Orthostatic Postural Assessment:          L knee scar healing from TKA, mild swelling  Palpation:          Decreased skin mobility inferior to L patella; mild swelling noted throughout L knee  ROM:          R knee WNL; L knee 5-118 degrees 7-18-17  Strength:          R LE 4+/5, L LE 4/5  Functional Mobility:         Gait/Ambulation:  Patient ambulates with straight cane demonstrating an antalgic gait pattern. Transfers:  independent        Bed Mobility:  independent        Stairs:  NT  Sensation:         intact     Body Structures Involved:  1. Joints  2. Muscles  3. Ligaments Body Functions Affected:  1. Sensory/Pain  2. Neuromusculoskeletal  3. Movement Related Activities and Participation Affected:  1. Mobility  2. Domestic Life  3. Community, Social and [place]   Number of elements (examined above) that affect the Plan of Care: 3: MODERATE COMPLEXITY   CLINICAL PRESENTATION:   Presentation: Evolving clinical presentation with changing clinical characteristics: MODERATE COMPLEXITY   CLINICAL DECISION MAKING:   Outcome Measure: Tool Used: Lower Extremity Functional Scale (LEFS)  Score:  Initial: 32/80 Most Recent: 53/80 (Date: 7/28/17 )   Interpretation of Score: 20 questions each scored on a 5 point scale with 0 representing \"extreme difficulty or unable to perform\" and 4 representing \"no difficulty\". The lower the score, the greater the functional disability. 80/80 represents no disability. Minimal detectable change is 9 points. Score 80 79-63 62-48 47-32 31-16 15-1 0   Modifier CH CI CJ CK CL CM CN     ?  Mobility - Walking and Moving Around:     - CURRENT STATUS: CJ - 20%-39% impaired, limited or restricted    - GOAL STATUS: CJ - 20%-39% impaired, limited or restricted    - D/C STATUS:  ---------------To be determined---------------      Medical Necessity:   · Patient is expected to demonstrate progress in strength, range of motion and functional technique to increase independence with daily activities. Reason for Services/Other Comments:  · Patient continues to demonstrate capacity to improve strength, ROM, pain, gait which will increase independence and increase safety. Use of outcome tool(s) and clinical judgement create a POC that gives a: Questionable prediction of patient's progress: MODERATE COMPLEXITY            TREATMENT:   (In addition to Assessment/Re-Assessment sessions the following treatments were rendered)  Pre-treatment Symptoms/Complaints: \"About the same but good. \"  Pain: Initial:   1/10 L knee Post Session: 1/10 L knee       Therapeutic Exercise: (  30 minutes):  Exercises per grid below to improve mobility, strength and balance. Required minimal verbal cues to promote proper body alignment, promote proper body posture and promote proper body mechanics. Progressed resistance, range, repetitions and complexity of movement as indicated.    Date   7/27/17 Date  8-1-17   Activity/Exercise     nustep L3 x 8 minutes L4 x 8 minutes   Straight leg raise L 2 x 10 #2 L LE  2 x 10 #2   slantboard calf stretch 3 x 20 sec 3 x 20 sec   Hamstring stretch  Manually with ankle pumps 3 bouts   Heel slides x20 in sitting, x 10 in supine  x 15 in supine   Long arc quad x20 #3 x20 #4   Sit to stand x20 x   Step ups X 15 time's leading Left LE 6 inch step X 20 time's leading Left LE 6 inch step   Nautilus Press #60 x 20 reps B; x 20 reps L LE at 35#  #65 x 20 reps B; x 20 reps L LE at 40#    Step downs From 4 inch step with L LE on step x 15 reps From 4 inch step with L LE on step x 20 reps   Terminal knee extension  Black band x 20 reps L LE   Nautilus Leg Curl  #50 x 20 reps   Nautilus Leg Curl     walking  Worked on walking in clinic: emphasizing L LE heel strike and equal, normal step length B, and good, upright posture, no cane     Manual therapy: (10 minutes) tissue mobilization to L knee anteriorly and posteriorly, and scar, to improve tissue mobility for ROM. Passive physiologic flexion and extension grade III-IV. Soft Tissue to lower Quad, medial knee secondary to increased tightness. Modalities:(10 minutes) ice pack to L knee x 10 minutes at end of session. Treatment/Session Assessment:    · Response to Treatment:  Patient tolerated session well. Swelling and ROM are improving. Patient is compliant with HEP. ROM L knee 3-120 degrees today. Skin intact at end of session. Patient walking in clinic without cane, he had it with him. · .  · Compliance with Program/Exercises: compliant  · Recommendations/Intent for next treatment session: \"Next visit will focus on advancements to more challenging activities\".   Total Treatment Duration:  PT Patient Time In/Time Out  Time In: 0945  Time Out: Lyudmila 5, PTA

## 2017-08-03 ENCOUNTER — HOSPITAL ENCOUNTER (OUTPATIENT)
Dept: PHYSICAL THERAPY | Age: 82
Discharge: HOME | End: 2017-08-03
Payer: MEDICARE

## 2017-08-03 PROCEDURE — 97140 MANUAL THERAPY 1/> REGIONS: CPT

## 2017-08-03 PROCEDURE — 97110 THERAPEUTIC EXERCISES: CPT

## 2017-08-03 NOTE — PROGRESS NOTES
Stephen Mon  : [facility] [place] [address], [address], [address] [facility]  [phone]   [phone]           OUTPATIENT PHYSICAL THERAPY:Daily Note 8/3/2017    ICD-10: Treatment Diagnosis: Pain in left knee (M25.562); Stiffness of left knee, not elsewhere classified (M25.662); Other abnormalities of gait and mobility R26.89  Precautions/Allergies:   Review of patient's allergies indicates no known allergies. Fall Risk Score: 2 (? 5 = High Risk)  MD Orders: eval and treat MEDICAL/REFERRING DIAGNOSIS:  s/p left tka   DATE OF ONSET: 17  REFERRING PHYSICIAN: Fabien Miller,*  RETURN PHYSICIAN APPOINTMENT: 17     PROGRESS NOTE 17:  Mr. José Miguel Barroso has attended 9 PT sessions from 17 to 17 for pain, stiffness, and decreased gait and mobility s/p L TKA on 17. Patient's strength and ROM are improving. Patient is demonstrating improving gait pattern as well. Patient would benefit from continuing PT to address these problems to improve patient's independence and safety with mobility and daily activities. Thank you. PROBLEM LIST (Impacting functional limitations):  1. Decreased Strength  2. Decreased ADL/Functional Activities  3. Decreased Transfer Abilities  4. Decreased Ambulation Ability/Technique  5. Decreased Balance  6. Increased Pain  7. Decreased Activity Tolerance  8. Decreased Flexibility/Joint Mobility  9. Decreased [place] with Home Exercise Program INTERVENTIONS PLANNED:  1. Balance Exercise  2. Bed Mobility  3. Cold  4. Gait Training  5. Heat  6. Home Exercise Program (HEP)  7. Manual Therapy  8. Neuromuscular Re-education/Strengthening  9. Range of Motion (ROM)  10. Therapeutic Activites  11. Therapeutic Exercise/Strengthening  12. Transfer Training   TREATMENT PLAN:  Effective Dates: 2017 TO 2017.   Frequency/Duration: 2-3 times a week for 6-8 weeks  GOALS: (Goals have been discussed and agreed upon with patient.)  Short-Term Functional Goals: Time Frame: 2-4 weeks  1. Patient will demonstrate independence and compliance with home exercise program to improve ROM and strength for daily activities. MET  2. Patient will report decreased L knee pain to less than or equal to 5/10 to improve patient's tolerance of daily activities. met  3. Discharge Goals: Time Frame: 6-8 weeks  1. Patient will report decreased L Knee pain to less than or equal to 2/10 to improve patient's tolerance of daily activities. Progressing and ongoing  2. Patient will ambulate with least assistive device over level and unlevel surfaces without evidence of imbalance to improve safety for daily activities. Progressing and ongoing  3. Patient will demonstrate improved L knee ROM to 0-120 degrees to improve mobility for daily activities. Progressing and ongoing  4. Patient will increase L lower extremity strength to greater than or equal to 4+/5 to improve strength for functional mobility activities. Progressing and ongoing  Rehabilitation Potential For Stated Goals: Good              The information in this section was collected on 7/11/17 (except where otherwise noted). HISTORY:   History of Present Injury/Illness (Reason for Referral):  5/16/17 L TKA. Home from hospital with HHPT for PT. Has a stationary bike, recumbent at home and is working on 1/2 revolutions. L knee has been achy and mildly swollen. I went to ER day after released from hospital because bandage was filling with blood. ER just wrapped it. Then went to hospital downtown due to prolonged diarrhea. Was in hospital 3 days, and said it was something with my kidneys. Feeling better now and eating better. Doing exercises from home health PT. 7/10 pain L knee all of the time, and stiff. Taking tylenol and no other pain meds. No falls. Have used a single point cane for years. Doing all normal activities except getting on riding .  Had L CATRINA in 2014, and has had 4 back surgeries. 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 Past Medical History/Comorbidities:   Mr. Yeyo Bush  has a past medical history of Arthritis; Chronic pain; Colon cancer ([facility] [address] (6/23/2015); Diabetes ([address] (2006); Dyslipidemia (6/23/2015); GERD (gastroesophageal reflux disease); bladder cancer (6/23/2015); Hypertension; NSTEMI (non-ST elevation myocardial infarction) ([address] (01/10/2014); Osteoarthritis of left hip (12/16/2013); S/P prosthetic total arthroplasty of the hip (1/8/2014); Stented coronary artery (01/10/2014); and Thrombocytopenia, unspecified ([facility] [address] (1/11/2014). He also has no past medical history of Aneurysm ([address] Arrhythmia; Autoimmune disease ([address] Difficult intubation; Heart failure ([address] Liver disease; Malignant hyperthermia due to anesthesia; Morbid obesity ([address] Nausea & vomiting; Pseudocholinesterase deficiency; Psychiatric disorder; PUD (peptic ulcer disease); Seizures ([address] Sleep apnea; or Thromboembolus ([address] Mr. Yeyo Bush  has a past surgical history that includes abdomen surgery proc unlisted (1991); lumbar fusion (1990 x 2  2012, 2013); urological (2002); heent (as child); mohs procedure (Left, 1990); orthopaedic (Left, 2013); colectomy (1997); colonoscopy (2014); heart catheterization (01/10/2014); coronary stent placement; and knee replacement (Left, 2017). Social History/Living Environment:     3 steps at home. 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 Dominant Side:         RIGHT   Personal Factors:          Sex:  male        Age:  81 y.o. Current Medications:       Current Outpatient Prescriptions:     raNITIdine (ZANTAC) 150 mg tablet, Take 1 Tab by mouth two (2) times a day., Disp: 60 Tab, Rfl: 1    diphenhydrAMINE-acetaminophen (TYLENOL PM EXTRA STRENGTH)  mg tab, Take 50-1,000 mg by mouth as needed (pain for sleep). , Disp: , Rfl:     zolpidem (AMBIEN) 10 mg tablet, Take 5-10 mg by mouth nightly as needed for Sleep., Disp: , Rfl:     acetaminophen (TYLENOL) 500 mg tablet, Take 1,000 mg by mouth every six (6) hours as needed for Pain., Disp: , Rfl:     diphenhydrAMINE (BENADRYL) 50 mg capsule, Take 50 mg by mouth nightly as needed for Sleep., Disp: , Rfl:     aspirin delayed-release 81 mg tablet, Take 81 mg by mouth daily. Per anesthesia protocol:instructed to take am of surgery. Indications: myocardial infarction prevention, Disp: , Rfl:     triamcinolone acetonide (KENALOG) 0.1 % topical cream, Apply  to affected area two (2) times a day. use thin layer, Disp: 60 g, Rfl: 2    clopidogrel (PLAVIX) 75 mg tab, Take 1 Tab by mouth daily. , Disp: 90 Tab, Rfl: 1    finasteride (PROSCAR) 5 mg tablet, Take 1 Tab by mouth daily. (Patient taking differently: Take 5 mg by mouth daily. Per anesthesia protocol:instructed to take am of surgery. Indications: SYMPTOMATIC BENIGN PROSTATIC HYPERPLASIA), Disp: 90 Tab, Rfl: 1    atorvastatin (LIPITOR) 40 mg tablet, Take 1 Tab by mouth daily. (Patient taking differently: Take 40 mg by mouth daily. Indications: hypercholesterolemia), Disp: 90 Tab, Rfl: 1    niacin ER (NIASPAN) 500 mg tablet, Take 4 Tabs by mouth nightly., Disp: 360 Tab, Rfl: 1    metoprolol succinate (TOPROL-XL) 25 mg XL tablet, Take 1 Tab by mouth daily. Sapphire Levy MD Lic# [id number] [facility] [id number] (Patient taking differently: Take 25 mg by mouth nightly.), Disp: 30 Tab, Rfl: 1    hydrocortisone (ANUSOL-HC) 25 mg supp, Insert 1 Suppository into rectum every twelve (12) hours. , Disp: 15 Suppository, Rfl: 5    cholecalciferol, vitamin D3, (VITAMIN D-3) 2,000 unit Tab, Take  by mouth nightly.  , Disp: , Rfl:   No current facility-administered medications for this encounter.      Facility-Administered Medications Ordered in Other Encounters:     0.9% sodium chloride infusion 250 mL, 250 mL, IntraVENous, PRN, Jose Enrique Patella, PA    0.9% sodium chloride infusion 250 mL, 250 mL, IntraVENous, PRN, Denette Po OFELIA Barajas   Date Last Reviewed:  8/3/17   Number of Personal Factors/Comorbidities that affect the Plan of Care: 1-2: MODERATE COMPLEXITY   EXAMINATION:   Observation/Orthostatic Postural Assessment:          L knee scar healing from TKA, mild swelling  Palpation:          Decreased skin mobility inferior to L patella; mild swelling noted throughout L knee  ROM:          R knee WNL; L knee 5-118 degrees 7-18-17  Strength:          R LE 4+/5, L LE 4/5  Functional Mobility:         Gait/Ambulation:  Patient ambulates with straight cane demonstrating an antalgic gait pattern. Transfers:  independent        Bed Mobility:  independent        Stairs:  NT  Sensation:         intact     Body Structures Involved:  1. Joints  2. Muscles  3. Ligaments Body Functions Affected:  1. Sensory/Pain  2. Neuromusculoskeletal  3. Movement Related Activities and Participation Affected:  1. Mobility  2. Domestic Life  3. Community, Social and [place]   Number of elements (examined above) that affect the Plan of Care: 3: MODERATE COMPLEXITY   CLINICAL PRESENTATION:   Presentation: Evolving clinical presentation with changing clinical characteristics: MODERATE COMPLEXITY   CLINICAL DECISION MAKING:   Outcome Measure: Tool Used: Lower Extremity Functional Scale (LEFS)  Score:  Initial: 32/80 Most Recent: 53/80 (Date: 7/28/17 )   Interpretation of Score: 20 questions each scored on a 5 point scale with 0 representing \"extreme difficulty or unable to perform\" and 4 representing \"no difficulty\". The lower the score, the greater the functional disability. 80/80 represents no disability. Minimal detectable change is 9 points. Score 80 79-63 62-48 47-32 31-16 15-1 0   Modifier CH CI CJ CK CL CM CN     ?  Mobility - Walking and Moving Around:     - CURRENT STATUS: CJ - 20%-39% impaired, limited or restricted    - GOAL STATUS: CJ - 20%-39% impaired, limited or restricted    - D/C STATUS:  ---------------To be determined---------------      Medical Necessity:   · Patient is expected to demonstrate progress in strength, range of motion and functional technique to increase independence with daily activities. Reason for Services/Other Comments:  · Patient continues to demonstrate capacity to improve strength, ROM, pain, gait which will increase independence and increase safety. Use of outcome tool(s) and clinical judgement create a POC that gives a: Questionable prediction of patient's progress: MODERATE COMPLEXITY            TREATMENT:   (In addition to Assessment/Re-Assessment sessions the following treatments were rendered)  Pre-treatment Symptoms/Complaints: \"Doing okay, but knee pain woke me up at 5:00 in the morning again. \"  Pain: Initial:   5/10 L knee Post Session: 4/10 L knee       Therapeutic Exercise: (   30 minutes):  Exercises per grid below to improve mobility, strength and balance. Required minimal verbal cues to promote proper body alignment, promote proper body posture and promote proper body mechanics. Progressed resistance, range, repetitions and complexity of movement as indicated.    Date   7/27/17 Date  8-1-17 Date   8/3/17   Activity/Exercise      nustep L3 x 8 minutes L4 x 8 minutes L4 x 8 minutes   Straight leg raise L 2 x 10 #2 L LE  2 x 10 #2 L LE  2 x 10 #2   slantboard calf stretch 3 x 20 sec 3 x 20 sec 3 x 20 sec   Hamstring stretch  Manually with ankle pumps 3 bouts Manually with ankle pumps 3 bouts   Heel slides x20 in sitting, x 10 in supine  x 15 in supine  x 15 in supine   Long arc quad x20 #3 x20 #4 x20 #4   Sit to stand x20 x    Step ups X 15 time's leading Left LE 6 inch step X 20 time's leading Left LE 6 inch step X 20 time's leading Left LE 6 inch step   Nautilus Press #60 x 20 reps B; x 20 reps L LE at 35#  #65 x 20 reps B; x 20 reps L LE at 40#  #65 x 20 reps B; x 20 reps L LE at 40#    Step downs From 4 inch step with L LE on step x 15 reps From 4 inch step with L LE on step x 20 reps From 4 inch step with L LE on step x 20 reps   Terminal knee extension  Black band x 20 reps L LE Black band x 20 reps L LE   Nautilus Leg Curl  #50 x 20 reps #50 x 20 reps   walking  Worked on walking in clinic: emphasizing L LE heel strike and equal, normal step length B, and good, upright posture, no cane Worked on walking in clinic: emphasizing L LE heel strike and equal, normal step length B, and good, upright posture, no cane     Manual therapy: (10 minutes) tissue mobilization to L knee anteriorly and posteriorly, and scar, to improve tissue mobility for ROM. Passive physiologic flexion and extension grade III-IV. Soft Tissue to lower Quad, medial knee secondary to increased tightness. Modalities:(10 minutes) ice pack to L knee x 10 minutes at end of session to decrease pain and swelling. Treatment/Session Assessment:    · Response to Treatment:  Patient tolerated session well. Swelling and ROM are improving. Patient is compliant with HEP. ROM L knee 3-120 degrees today. Extension ROM to 0 degrees with overpressure. Skin intact at end of session. Next week is last week of appointments and patient states he is able to do all of his normal activities. · .Compliance with Program/Exercises: compliant  · Recommendations/Intent for next treatment session: \"Next visit will focus on advancements to more challenging activities\".   Total Treatment Duration:  PT Patient Time In/Time Out  Time In: 0945  Time Out: 529 Parveen Soriano PT

## 2017-08-04 ENCOUNTER — HOSPITAL ENCOUNTER (OUTPATIENT)
Dept: PHYSICAL THERAPY | Age: 82
Discharge: HOME | End: 2017-08-04
Payer: MEDICARE

## 2017-08-04 PROCEDURE — 97110 THERAPEUTIC EXERCISES: CPT

## 2017-08-04 PROCEDURE — 97140 MANUAL THERAPY 1/> REGIONS: CPT

## 2017-08-04 NOTE — PROGRESS NOTES
Dmitriy Crane  : [facility] [place] [address], [address], [place].  [phone]   [phone]           OUTPATIENT PHYSICAL THERAPY:Daily Note 2017    ICD-10: Treatment Diagnosis: Pain in left knee (M25.562); Stiffness of left knee, not elsewhere classified (M25.662); Other abnormalities of gait and mobility R26.89  Precautions/Allergies:   Review of patient's allergies indicates no known allergies. Fall Risk Score: 2 (? 5 = High Risk)  MD Orders: eval and treat MEDICAL/REFERRING DIAGNOSIS:  s/p left tka   DATE OF ONSET: 17  REFERRING PHYSICIAN: Greg Schaeffer,*  RETURN PHYSICIAN APPOINTMENT: 17     PROGRESS NOTE 17:  Mr. Randal Lama has attended 9 PT sessions from 17 to 17 for pain, stiffness, and decreased gait and mobility s/p L TKA on 17. Patient's strength and ROM are improving. Patient is demonstrating improving gait pattern as well. Patient would benefit from continuing PT to address these problems to improve patient's independence and safety with mobility and daily activities. Thank you. PROBLEM LIST (Impacting functional limitations):  1. Decreased Strength  2. Decreased ADL/Functional Activities  3. Decreased Transfer Abilities  4. Decreased Ambulation Ability/Technique  5. Decreased Balance  6. Increased Pain  7. Decreased Activity Tolerance  8. Decreased Flexibility/Joint Mobility  9. Decreased [place] with Home Exercise Program INTERVENTIONS PLANNED:  1. Balance Exercise  2. Bed Mobility  3. Cold  4. Gait Training  5. Heat  6. Home Exercise Program (HEP)  7. Manual Therapy  8. Neuromuscular Re-education/Strengthening  9. Range of Motion (ROM)  10. Therapeutic Activites  11. Therapeutic Exercise/Strengthening  12. Transfer Training   TREATMENT PLAN:  Effective Dates: 2017 TO 2017.   Frequency/Duration: 2-3 times a week for 6-8 weeks  GOALS: (Goals have been discussed and agreed upon with patient.)  Short-Term Functional Goals: Time Frame: 2-4 weeks  1. Patient will demonstrate independence and compliance with home exercise program to improve ROM and strength for daily activities. MET  2. Patient will report decreased L knee pain to less than or equal to 5/10 to improve patient's tolerance of daily activities. met  3. Discharge Goals: Time Frame: 6-8 weeks  1. Patient will report decreased L Knee pain to less than or equal to 2/10 to improve patient's tolerance of daily activities. Progressing and ongoing  2. Patient will ambulate with least assistive device over level and unlevel surfaces without evidence of imbalance to improve safety for daily activities. Progressing and ongoing  3. Patient will demonstrate improved L knee ROM to 0-120 degrees to improve mobility for daily activities. Progressing and ongoing  4. Patient will increase L lower extremity strength to greater than or equal to 4+/5 to improve strength for functional mobility activities. Progressing and ongoing  Rehabilitation Potential For Stated Goals: Good              The information in this section was collected on 7/11/17 (except where otherwise noted). HISTORY:   History of Present Injury/Illness (Reason for Referral):  5/16/17 L TKA. Home from hospital with HHPT for PT. Has a stationary bike, recumbent at home and is working on 1/2 revolutions. L knee has been achy and mildly swollen. I went to ER day after released from hospital because bandage was filling with blood. ER just wrapped it. Then went to hospital downtown due to prolonged diarrhea. Was in hospital 3 days, and said it was something with my kidneys. Feeling better now and eating better. Doing exercises from home health PT. 7/10 pain L knee all of the time, and stiff. Taking tylenol and no other pain meds. No falls. Have used a single point cane for years. Doing all normal activities except getting on riding .  Had L CATRINA in 2014, and has had 4 back surgeries. 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 Past Medical History/Comorbidities:   Mr. 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 He also has no past medical history of Aneurysm ([address] Arrhythmia; Autoimmune disease ([address] Difficult intubation; Heart failure ([address] Liver disease; Malignant hyperthermia due to anesthesia; Morbid obesity ([address] Nausea & vomiting; Pseudocholinesterase deficiency; Psychiatric disorder; PUD (peptic ulcer disease); Seizures ([address] Sleep apnea; or Thromboembolus ([address] Mr. Robbert Goodpasture  has a past surgical history that includes abdomen surgery proc unlisted (1991); lumbar fusion (1990 x 2  2012, 2013); urological (2002); heent (as child); mohs procedure (Left, 1990); orthopaedic (Left, 2013); colectomy (1997); colonoscopy (2014); heart catheterization (01/10/2014); coronary stent placement; and knee replacement (Left, 2017). Social History/Living Environment:     3 steps at home. 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 Dominant Side:         RIGHT   Personal Factors:          Sex:  male        Age:  81 y.o. Current Medications:       Current Outpatient Prescriptions:     raNITIdine (ZANTAC) 150 mg tablet, Take 1 Tab by mouth two (2) times a day., Disp: 60 Tab, Rfl: 1    diphenhydrAMINE-acetaminophen (TYLENOL PM EXTRA STRENGTH)  mg tab, Take 50-1,000 mg by mouth as needed (pain for sleep). , Disp: , Rfl:     zolpidem (AMBIEN) 10 mg tablet, Take 5-10 mg by mouth nightly as needed for Sleep., Disp: , Rfl:     acetaminophen (TYLENOL) 500 mg tablet, Take 1,000 mg by mouth every six (6) hours as needed for Pain., Disp: , Rfl:     diphenhydrAMINE (BENADRYL) 50 mg capsule, Take 50 mg by mouth nightly as needed for Sleep., Disp: , Rfl:     aspirin delayed-release 81 mg tablet, Take 81 mg by mouth daily. Per anesthesia protocol:instructed to take am of surgery. Indications: myocardial infarction prevention, Disp: , Rfl:     triamcinolone acetonide (KENALOG) 0.1 % topical cream, Apply  to affected area two (2) times a day. use thin layer, Disp: 60 g, Rfl: 2    clopidogrel (PLAVIX) 75 mg tab, Take 1 Tab by mouth daily. , Disp: 90 Tab, Rfl: 1    finasteride (PROSCAR) 5 mg tablet, Take 1 Tab by mouth daily. (Patient taking differently: Take 5 mg by mouth daily. Per anesthesia protocol:instructed to take am of surgery. Indications: SYMPTOMATIC BENIGN PROSTATIC HYPERPLASIA), Disp: 90 Tab, Rfl: 1    atorvastatin (LIPITOR) 40 mg tablet, Take 1 Tab by mouth daily. (Patient taking differently: Take 40 mg by mouth daily. Indications: hypercholesterolemia), Disp: 90 Tab, Rfl: 1    niacin ER (NIASPAN) 500 mg tablet, Take 4 Tabs by mouth nightly., Disp: 360 Tab, Rfl: 1    metoprolol succinate (TOPROL-XL) 25 mg XL tablet, Take 1 Tab by mouth daily. Nicolasa Noriega MD Lic# [id number] [facility] [id number] (Patient taking differently: Take 25 mg by mouth nightly.), Disp: 30 Tab, Rfl: 1    hydrocortisone (ANUSOL-HC) 25 mg supp, Insert 1 Suppository into rectum every twelve (12) hours. , Disp: 15 Suppository, Rfl: 5    cholecalciferol, vitamin D3, (VITAMIN D-3) 2,000 unit Tab, Take  by mouth nightly.  , Disp: , Rfl:   No current facility-administered medications for this encounter.      Facility-Administered Medications Ordered in Other Encounters:     0.9% sodium chloride infusion 250 mL, 250 mL, IntraVENous, PRN, OFELIA Wilburn    0.9% sodium chloride infusion 250 mL, 250 mL, IntraVENous, PRN, Keith Castillo OFELIA Barajas   Date Last Reviewed:  8/4/17   Number of Personal Factors/Comorbidities that affect the Plan of Care: 1-2: MODERATE COMPLEXITY   EXAMINATION:   Observation/Orthostatic Postural Assessment:          L knee scar healing from TKA, mild swelling  Palpation:          Decreased skin mobility inferior to L patella; mild swelling noted throughout L knee  ROM:          R knee WNL; L knee 5-118 degrees 7-18-17  Strength:          R LE 4+/5, L LE 4/5  Functional Mobility:         Gait/Ambulation:  Patient ambulates with straight cane demonstrating an antalgic gait pattern. Transfers:  independent        Bed Mobility:  independent        Stairs:  NT  Sensation:         intact     Body Structures Involved:  1. Joints  2. Muscles  3. Ligaments Body Functions Affected:  1. Sensory/Pain  2. Neuromusculoskeletal  3. Movement Related Activities and Participation Affected:  1. Mobility  2. Domestic Life  3. Community, Social and [place]   Number of elements (examined above) that affect the Plan of Care: 3: MODERATE COMPLEXITY   CLINICAL PRESENTATION:   Presentation: Evolving clinical presentation with changing clinical characteristics: MODERATE COMPLEXITY   CLINICAL DECISION MAKING:   Outcome Measure: Tool Used: Lower Extremity Functional Scale (LEFS)  Score:  Initial: 32/80 Most Recent: 53/80 (Date: 7/28/17 )   Interpretation of Score: 20 questions each scored on a 5 point scale with 0 representing \"extreme difficulty or unable to perform\" and 4 representing \"no difficulty\". The lower the score, the greater the functional disability. 80/80 represents no disability. Minimal detectable change is 9 points. Score 80 79-63 62-48 47-32 31-16 15-1 0   Modifier CH CI CJ CK CL CM CN     ?  Mobility - Walking and Moving Around:     - CURRENT STATUS: CJ - 20%-39% impaired, limited or restricted    - GOAL STATUS: CJ - 20%-39% impaired, limited or restricted    - D/C STATUS:  ---------------To be determined---------------      Medical Necessity:   · Patient is expected to demonstrate progress in strength, range of motion and functional technique to increase independence with daily activities. Reason for Services/Other Comments:  · Patient continues to demonstrate capacity to improve strength, ROM, pain, gait which will increase independence and increase safety. Use of outcome tool(s) and clinical judgement create a POC that gives a: Questionable prediction of patient's progress: MODERATE COMPLEXITY            TREATMENT:   (In addition to Assessment/Re-Assessment sessions the following treatments were rendered)  Pre-treatment Symptoms/Complaints: Antione Moya is hurting more this morning. I couldn't sleep well last night because it hurts. I was fine after therapy yesterday. \"  Pain: Initial:   8/10 L knee Post Session: 5-6/10 L knee       Therapeutic Exercise: (   30 minutes):  Exercises per grid below to improve mobility, strength and balance. Required minimal verbal cues to promote proper body alignment, promote proper body posture and promote proper body mechanics. Progressed resistance, range, repetitions and complexity of movement as indicated.    Date   7/27/17 Date  8-1-17 Date   8/3/17 Date   8/4/17   Activity/Exercise       nustep L3 x 8 minutes L4 x 8 minutes L4 x 8 minutes L4 x 8 minutes   Straight leg raise L 2 x 10 #2 L LE  2 x 10 #2 L LE  2 x 10 #2 L LE  2 x 10 #2   slantboard calf stretch 3 x 20 sec 3 x 20 sec 3 x 20 sec 3 x 20 sec   Hamstring stretch  Manually with ankle pumps 3 bouts Manually with ankle pumps 3 bouts Manually with ankle pumps 3 bouts   Heel slides x20 in sitting, x 10 in supine  x 15 in supine  x 15 in supine  x 15 in supine   Long arc quad x20 #3 x20 #4 x20 #4 x20 #4   Sit to stand x20 x     Step ups X 15 time's leading Left LE 6 inch step X 20 time's leading Left LE 6 inch step X 20 time's leading Left LE 6 inch step X 20 time's leading Left LE 6 inch step   Nautilus Press #60 x 20 reps B; x 20 reps L LE at 35#  #65 x 20 reps B; x 20 reps L LE at 40#  #65 x 20 reps B; x 20 reps L LE at 40#  #65 x 20 reps B; x 20 reps L LE at 40#   Step downs From 4 inch step with L LE on step x 15 reps From 4 inch step with L LE on step x 20 reps From 4 inch step with L LE on step x 20 reps From 4 inch step with L LE on step x 20 reps   Terminal knee extension  Black band x 20 reps L LE Black band x 20 reps L LE Black band x 20 reps L LE   Nautilus Leg Curl  #50 x 20 reps #50 x 20 reps #50 x 20 reps   walking  Worked on walking in clinic: emphasizing L LE heel strike and equal, normal step length B, and good, upright posture, no cane Worked on walking in clinic: emphasizing L LE heel strike and equal, normal step length B, and good, upright posture, no cane Worked on walking in clinic: emphasizing L LE heel strike and equal, normal step length B, and good, upright posture, no cane     Manual therapy: (10 minutes) tissue mobilization to L knee anteriorly and posteriorly, and scar, to improve tissue mobility for ROM. Soft Tissue mobilization to lower Quad, medial knee secondary to increased tightness. Modalities:(10 minutes) ice pack to L knee x 10 minutes at end of session to decrease pain and swelling. Treatment/Session Assessment:    · Response to Treatment:  Patient tolerated session well. Swelling and ROM are improving. Patient is compliant with HEP. ROM L knee 2-120 degrees today. Skin intact at end of session. Next week is last week of appointments and patient states he is able to do all of his normal activities. · .Compliance with Program/Exercises: compliant  · Recommendations/Intent for next treatment session: \"Next visit will focus on advancements to more challenging activities\".   Total Treatment Duration:  PT Patient Time In/Time Out  Time In: 0935  Time Out: [address] Tee Saavedra, JANINA

## 2017-08-08 ENCOUNTER — HOSPITAL ENCOUNTER (OUTPATIENT)
Dept: PHYSICAL THERAPY | Age: 82
Discharge: HOME | End: 2017-08-08
Payer: MEDICARE

## 2017-08-08 PROCEDURE — 97110 THERAPEUTIC EXERCISES: CPT

## 2017-08-08 PROCEDURE — 97140 MANUAL THERAPY 1/> REGIONS: CPT

## 2017-08-08 NOTE — PROGRESS NOTES
Taylor Louis  : 1935 [facility] [address], [address], [address] [facility]  [phone]   [phone]           OUTPATIENT PHYSICAL THERAPY:Daily Note 2017    ICD-10: Treatment Diagnosis: Pain in left knee (M25.562); Stiffness of left knee, not elsewhere classified (M25.662); Other abnormalities of gait and mobility R26.89  Precautions/Allergies:   Review of patient's allergies indicates no known allergies. Fall Risk Score: 2 (? 5 = High Risk)  MD Orders: eval and treat MEDICAL/REFERRING DIAGNOSIS:  s/p left tka   DATE OF ONSET: 17  REFERRING PHYSICIAN: Shedrick Meigs,*  RETURN PHYSICIAN APPOINTMENT: 17     PROGRESS NOTE 17:  Mr. Oriana Jones has attended 9 PT sessions from 17 to 17 for pain, stiffness, and decreased gait and mobility s/p L TKA on 17. Patient's strength and ROM are improving. Patient is demonstrating improving gait pattern as well. Patient would benefit from continuing PT to address these problems to improve patient's independence and safety with mobility and daily activities. Thank you. PROBLEM LIST (Impacting functional limitations):  1. Decreased Strength  2. Decreased ADL/Functional Activities  3. Decreased Transfer Abilities  4. Decreased Ambulation Ability/Technique  5. Decreased Balance  6. Increased Pain  7. Decreased Activity Tolerance  8. Decreased Flexibility/Joint Mobility  9. Decreased [place] with Home Exercise Program INTERVENTIONS PLANNED:  1. Balance Exercise  2. Bed Mobility  3. Cold  4. Gait Training  5. Heat  6. Home Exercise Program (HEP)  7. Manual Therapy  8. Neuromuscular Re-education/Strengthening  9. Range of Motion (ROM)  10. Therapeutic Activites  11. Therapeutic Exercise/Strengthening  12. Transfer Training   TREATMENT PLAN:  Effective Dates: 2017 TO 2017.   Frequency/Duration: 2-3 times a week for 6-8 weeks  GOALS: (Goals have been discussed and agreed upon with patient.)  Short-Term Functional Goals: Time Frame: 2-4 weeks  1. Patient will demonstrate independence and compliance with home exercise program to improve ROM and strength for daily activities. MET  2. Patient will report decreased L knee pain to less than or equal to 5/10 to improve patient's tolerance of daily activities. met  3. Discharge Goals: Time Frame: 6-8 weeks  1. Patient will report decreased L Knee pain to less than or equal to 2/10 to improve patient's tolerance of daily activities. Progressing and ongoing  2. Patient will ambulate with least assistive device over level and unlevel surfaces without evidence of imbalance to improve safety for daily activities. Progressing and ongoing  3. Patient will demonstrate improved L knee ROM to 0-120 degrees to improve mobility for daily activities. Progressing and ongoing  4. Patient will increase L lower extremity strength to greater than or equal to 4+/5 to improve strength for functional mobility activities. Progressing and ongoing  Rehabilitation Potential For Stated Goals: Good              The information in this section was collected on 7/11/17 (except where otherwise noted). HISTORY:   History of Present Injury/Illness (Reason for Referral):  5/16/17 L TKA. Home from hospital with HHPT for PT. Has a stationary bike, recumbent at home and is working on 1/2 revolutions. L knee has been achy and mildly swollen. I went to ER day after released from hospital because bandage was filling with blood. ER just wrapped it. Then went to hospital downtown due to prolonged diarrhea. Was in hospital 3 days, and said it was something with my kidneys. Feeling better now and eating better. Doing exercises from home health PT. 7/10 pain L knee all of the time, and stiff. Taking tylenol and no other pain meds. No falls. Have used a single point cane for years. Doing all normal activities except getting on riding .  Had L CATRINA in 2014, and has had 4 back surgeries. 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 Past Medical History/Comorbidities:   Mr. Randal Lama  has a past medical history of Arthritis; Chronic pain; Colon cancer ([facility] [address] (6/23/2015); Diabetes ([address] (2006); Dyslipidemia (6/23/2015); GERD (gastroesophageal reflux disease); bladder cancer (6/23/2015); Hypertension; NSTEMI (non-ST elevation myocardial infarction) ([address] (01/10/2014); Osteoarthritis of left hip (12/16/2013); S/P prosthetic total arthroplasty of the hip (1/8/2014); Stented coronary artery (01/10/2014); and Thrombocytopenia, unspecified ([facility] [address] (1/11/2014). He also has no past medical history of Aneurysm ([address] Arrhythmia; Autoimmune disease ([address] Difficult intubation; Heart failure ([address] Liver disease; Malignant hyperthermia due to anesthesia; Morbid obesity ([address] Nausea & vomiting; Pseudocholinesterase deficiency; Psychiatric disorder; PUD (peptic ulcer disease); Seizures ([address] Sleep apnea; or Thromboembolus ([address] Mr. Randal Lama  has a past surgical history that includes abdomen surgery proc unlisted (1991); lumbar fusion (1990 x 2  2012, 2013); urological (2002); heent (as child); mohs procedure (Left, 1990); orthopaedic (Left, 2013); colectomy (1997); colonoscopy (2014); heart catheterization (01/10/2014); coronary stent placement; and knee replacement (Left, 2017). Social History/Living Environment:     3 steps at home. 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 Dominant Side:         RIGHT   Personal Factors:          Sex:  male        Age:  81 y.o. Current Medications:       Current Outpatient Prescriptions:     raNITIdine (ZANTAC) 150 mg tablet, Take 1 Tab by mouth two (2) times a day., Disp: 60 Tab, Rfl: 1    diphenhydrAMINE-acetaminophen (TYLENOL PM EXTRA STRENGTH)  mg tab, Take 50-1,000 mg by mouth as needed (pain for sleep). , Disp: , Rfl:     zolpidem (AMBIEN) 10 mg tablet, Take 5-10 mg by mouth nightly as needed for Sleep., Disp: , Rfl:     acetaminophen (TYLENOL) 500 mg tablet, Take 1,000 mg by mouth every six (6) hours as needed for Pain., Disp: , Rfl:     diphenhydrAMINE (BENADRYL) 50 mg capsule, Take 50 mg by mouth nightly as needed for Sleep., Disp: , Rfl:     aspirin delayed-release 81 mg tablet, Take 81 mg by mouth daily. Per anesthesia protocol:instructed to take am of surgery. Indications: myocardial infarction prevention, Disp: , Rfl:     triamcinolone acetonide (KENALOG) 0.1 % topical cream, Apply  to affected area two (2) times a day. use thin layer, Disp: 60 g, Rfl: 2    clopidogrel (PLAVIX) 75 mg tab, Take 1 Tab by mouth daily. , Disp: 90 Tab, Rfl: 1    finasteride (PROSCAR) 5 mg tablet, Take 1 Tab by mouth daily. (Patient taking differently: Take 5 mg by mouth daily. Per anesthesia protocol:instructed to take am of surgery. Indications: SYMPTOMATIC BENIGN PROSTATIC HYPERPLASIA), Disp: 90 Tab, Rfl: 1    atorvastatin (LIPITOR) 40 mg tablet, Take 1 Tab by mouth daily. (Patient taking differently: Take 40 mg by mouth daily. Indications: hypercholesterolemia), Disp: 90 Tab, Rfl: 1    niacin ER (NIASPAN) 500 mg tablet, Take 4 Tabs by mouth nightly., Disp: 360 Tab, Rfl: 1    metoprolol succinate (TOPROL-XL) 25 mg XL tablet, Take 1 Tab by mouth daily. Kinjal Lagunas MD Lic# [id number] RAYMUNDO [id number] (Patient taking differently: Take 25 mg by mouth nightly.), Disp: 30 Tab, Rfl: 1    hydrocortisone (ANUSOL-HC) 25 mg supp, Insert 1 Suppository into rectum every twelve (12) hours. , Disp: 15 Suppository, Rfl: 5    cholecalciferol, vitamin D3, (VITAMIN D-3) 2,000 unit Tab, Take  by mouth nightly.  , Disp: , Rfl:   No current facility-administered medications for this encounter.      Facility-Administered Medications Ordered in Other Encounters:     0.9% sodium chloride infusion 250 mL, 250 mL, IntraVENous, PRN, OFELIA Sandoval    0.9% sodium chloride infusion 250 mL, 250 mL, IntraVENous, PRN, Ann Shock OFELIA Barajas   Date Last Reviewed:  8/4/17   Number of Personal Factors/Comorbidities that affect the Plan of Care: 1-2: MODERATE COMPLEXITY   EXAMINATION:   Observation/Orthostatic Postural Assessment:          L knee scar healing from TKA, mild swelling  Palpation:          Decreased skin mobility inferior to L patella; mild swelling noted throughout L knee  ROM:          R knee WNL; L knee 5-118 degrees 7-18-17  Strength:          R LE 4+/5, L LE 4/5  Functional Mobility:         Gait/Ambulation:  Patient ambulates with straight cane demonstrating an antalgic gait pattern. Transfers:  independent        Bed Mobility:  independent        Stairs:  NT  Sensation:         intact     Body Structures Involved:  1. Joints  2. Muscles  3. Ligaments Body Functions Affected:  1. Sensory/Pain  2. Neuromusculoskeletal  3. Movement Related Activities and Participation Affected:  1. Mobility  2. Domestic Life  3. Community, Social and [place]   Number of elements (examined above) that affect the Plan of Care: 3: MODERATE COMPLEXITY   CLINICAL PRESENTATION:   Presentation: Evolving clinical presentation with changing clinical characteristics: MODERATE COMPLEXITY   CLINICAL DECISION MAKING:   Outcome Measure: Tool Used: Lower Extremity Functional Scale (LEFS)  Score:  Initial: 32/80 Most Recent: 53/80 (Date: 7/28/17 )   Interpretation of Score: 20 questions each scored on a 5 point scale with 0 representing \"extreme difficulty or unable to perform\" and 4 representing \"no difficulty\". The lower the score, the greater the functional disability. 80/80 represents no disability. Minimal detectable change is 9 points. Score 80 79-63 62-48 47-32 31-16 15-1 0   Modifier CH CI CJ CK CL CM CN     ?  Mobility - Walking and Moving Around:     - CURRENT STATUS: CJ - 20%-39% impaired, limited or restricted    - GOAL STATUS: CJ - 20%-39% impaired, limited or restricted    - D/C STATUS:  ---------------To be determined---------------      Medical Necessity:   · Patient is expected to demonstrate progress in strength, range of motion and functional technique to increase independence with daily activities. Reason for Services/Other Comments:  · Patient continues to demonstrate capacity to improve strength, ROM, pain, gait which will increase independence and increase safety. Use of outcome tool(s) and clinical judgement create a POC that gives a: Questionable prediction of patient's progress: MODERATE COMPLEXITY            TREATMENT:   (In addition to Assessment/Re-Assessment sessions the following treatments were rendered)  Pre-treatment Symptoms/Complaints: \"It hurts, about the same. \"  Pain: Initial:   8/10 L knee Post Session: 5-6/10 L knee       Therapeutic Exercise: (   30 minutes):  Exercises per grid below to improve mobility, strength and balance. Required minimal verbal cues to promote proper body alignment, promote proper body posture and promote proper body mechanics. Progressed resistance, range, repetitions and complexity of movement as indicated.    Date  8-1-17 Date   8/3/17 Date   8/8/17   Activity/Exercise      nustep L4 x 8 minutes L4 x 8 minutes L4 x 8 minutes   Straight leg raise L LE  2 x 10 #2 L LE  2 x 10 #2 hep   slantboard calf stretch 3 x 20 sec 3 x 20 sec 3 x 20 sec   Hamstring stretch Manually with ankle pumps 3 bouts Manually with ankle pumps 3 bouts Manually with ankle pumps 3 bouts   Heel slides  x 15 in supine  x 15 in supine  x 15 in supine   Long arc quad x20 #4 x20 #4 x20 #4   Sit to stand x  HEP   Step ups X 20 time's leading Left LE 6 inch step X 20 time's leading Left LE 6 inch step X 20 time's leading Left LE 6 inch step   Nautilus Press #65 x 20 reps B; x 20 reps L LE at 40#  #65 x 20 reps B; x 20 reps L LE at 40#  #65 x 20 reps B; x 20 reps L LE at 40#   Step downs From 4 inch step with L LE on step x 20 reps From 4 inch step with L LE on step x 20 reps From 4 inch step with L LE on step x 20 reps   Terminal knee extension Black band x 20 reps L LE Black band x 20 reps L LE Black band x 20 reps L LE   Nautilus Leg Curl #50 x 20 reps #50 x 20 reps #50 x 20 reps   walking Worked on walking in clinic: emphasizing L LE heel strike and equal, normal step length B, and good, upright posture, no cane Worked on walking in clinic: emphasizing L LE heel strike and equal, normal step length B, and good, upright posture, no cane Worked on walking in clinic: emphasizing L LE heel strike and equal, normal step length B, and good, upright posture, no cane     Manual therapy: (10 minutes) tissue mobilization to L knee anteriorly and posteriorly, and scar, to improve tissue mobility for ROM. Soft Tissue mobilization to lower Quad, medial knee secondary to increased tightness. Modalities:(8 minutes) ice massage to L knee x 8 minutes at end of session to decrease pain and swelling. Treatment/Session Assessment:    · Response to Treatment:  Patient tolerated session well. Patient continue's to complain of pain in knee. Patient returns to MD in September for follow up. Last week of appointments. Measure next visit. · .Compliance with Program/Exercises: compliant  · Recommendations/Intent for next treatment session: \"Next visit will focus on advancements to more challenging activities\".   Total Treatment Duration:  PT Patient Time In/Time Out  Time In: 0945  Time Out: Lyudmila 5, PTA

## 2017-08-10 ENCOUNTER — HOSPITAL ENCOUNTER (OUTPATIENT)
Dept: PHYSICAL THERAPY | Age: 82
Discharge: HOME | End: 2017-08-10
Payer: MEDICARE

## 2017-08-10 PROCEDURE — 97140 MANUAL THERAPY 1/> REGIONS: CPT

## 2017-08-10 PROCEDURE — 97110 THERAPEUTIC EXERCISES: CPT

## 2017-08-10 NOTE — PROGRESS NOTES
Belen Angeles  : [address] [facility] [place] [address], [address], [facility].  [phone]   [phone]           OUTPATIENT PHYSICAL THERAPY:Daily Note 8/10/2017    ICD-10: Treatment Diagnosis: Pain in left knee (M25.562); Stiffness of left knee, not elsewhere classified (M25.662); Other abnormalities of gait and mobility R26.89  Precautions/Allergies:   Review of patient's allergies indicates no known allergies. Fall Risk Score: 2 (? 5 = High Risk)  MD Orders: eval and treat MEDICAL/REFERRING DIAGNOSIS:  s/p left tka   DATE OF ONSET: 17  REFERRING PHYSICIAN: Frankey Barrios,*  RETURN PHYSICIAN APPOINTMENT: 17     PROGRESS NOTE 17:  Mr. Kyra Mosquera has attended 9 PT sessions from 17 to 17 for pain, stiffness, and decreased gait and mobility s/p L TKA on 17. Patient's strength and ROM are improving. Patient is demonstrating improving gait pattern as well. Patient would benefit from continuing PT to address these problems to improve patient's independence and safety with mobility and daily activities. Thank you. PROBLEM LIST (Impacting functional limitations):  1. Decreased Strength  2. Decreased ADL/Functional Activities  3. Decreased Transfer Abilities  4. Decreased Ambulation Ability/Technique  5. Decreased Balance  6. Increased Pain  7. Decreased Activity Tolerance  8. Decreased Flexibility/Joint Mobility  9. Decreased Iron with Home Exercise Program INTERVENTIONS PLANNED:  1. Balance Exercise  2. Bed Mobility  3. Cold  4. Gait Training  5. Heat  6. Home Exercise Program (HEP)  7. Manual Therapy  8. Neuromuscular Re-education/Strengthening  9. Range of Motion (ROM)  10. Therapeutic Activites  11. Therapeutic Exercise/Strengthening  12. Transfer Training   TREATMENT PLAN:  Effective Dates: 2017 TO 2017.   Frequency/Duration: 2-3 times a week for 6-8 weeks  GOALS: (Goals have been discussed and agreed upon with patient.)  Short-Term Functional Goals: Time Frame: 2-4 weeks  1. Patient will demonstrate independence and compliance with home exercise program to improve ROM and strength for daily activities. MET  2. Patient will report decreased L knee pain to less than or equal to 5/10 to improve patient's tolerance of daily activities. met  3. Discharge Goals: Time Frame: 6-8 weeks  1. Patient will report decreased L Knee pain to less than or equal to 2/10 to improve patient's tolerance of daily activities. Progressing and ongoing  2. Patient will ambulate with least assistive device over level and unlevel surfaces without evidence of imbalance to improve safety for daily activities. Progressing and ongoing  3. Patient will demonstrate improved L knee ROM to 0-120 degrees to improve mobility for daily activities. Progressing and ongoing  4. Patient will increase L lower extremity strength to greater than or equal to 4+/5 to improve strength for functional mobility activities. Progressing and ongoing  Rehabilitation Potential For Stated Goals: Good              The information in this section was collected on 7/11/17 (except where otherwise noted). HISTORY:   History of Present Injury/Illness (Reason for Referral):  5/16/17 L TKA. Home from hospital with HHPT for PT. Has a stationary bike, recumbent at home and is working on 1/2 revolutions. L knee has been achy and mildly swollen. I went to ER day after released from hospital because bandage was filling with blood. ER just wrapped it. Then went to hospital downtown due to prolonged diarrhea. Was in hospital 3 days, and said it was something with my kidneys. Feeling better now and eating better. Doing exercises from home health PT. 7/10 pain L knee all of the time, and stiff. Taking tylenol and no other pain meds. No falls. Have used a single point cane for years. Doing all normal activities except getting on riding .  Had L CATRINA in 2014, and has had 4 back surgeries. 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 Past Medical History/Comorbidities:   Mr. 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 He also has no past medical history of Aneurysm ([address] Arrhythmia; Autoimmune disease ([address] Difficult intubation; Heart failure ([address] Liver disease; Malignant hyperthermia due to anesthesia; Morbid obesity ([address] Nausea & vomiting; Pseudocholinesterase deficiency; Psychiatric disorder; PUD (peptic ulcer disease); Seizures ([address] Sleep apnea; or Thromboembolus ([address] Mr. Jaci Washington  has a past surgical history that includes abdomen surgery proc unlisted (1991); lumbar fusion (1990 x 2  2012, 2013); urological (2002); heent (as child); mohs procedure (Left, 1990); orthopaedic (Left, 2013); colectomy (1997); colonoscopy (2014); heart catheterization (01/10/2014); coronary stent placement; and knee replacement (Left, 2017). Social History/Living Environment:     3 steps at home. 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 Dominant Side:         RIGHT   Personal Factors:          Sex:  male        Age:  81 y.o. Current Medications:       Current Outpatient Prescriptions:     raNITIdine (ZANTAC) 150 mg tablet, Take 1 Tab by mouth two (2) times a day., Disp: 60 Tab, Rfl: 1    diphenhydrAMINE-acetaminophen (TYLENOL PM EXTRA STRENGTH)  mg tab, Take 50-1,000 mg by mouth as needed (pain for sleep). , Disp: , Rfl:     zolpidem (AMBIEN) 10 mg tablet, Take 5-10 mg by mouth nightly as needed for Sleep., Disp: , Rfl:     acetaminophen (TYLENOL) 500 mg tablet, Take 1,000 mg by mouth every six (6) hours as needed for Pain., Disp: , Rfl:     diphenhydrAMINE (BENADRYL) 50 mg capsule, Take 50 mg by mouth nightly as needed for Sleep., Disp: , Rfl:     aspirin delayed-release 81 mg tablet, Take 81 mg by mouth daily. Per anesthesia protocol:instructed to take am of surgery. Indications: myocardial infarction prevention, Disp: , Rfl:     triamcinolone acetonide (KENALOG) 0.1 % topical cream, Apply  to affected area two (2) times a day. use thin layer, Disp: 60 g, Rfl: 2    clopidogrel (PLAVIX) 75 mg tab, Take 1 Tab by mouth daily. , Disp: 90 Tab, Rfl: 1    finasteride (PROSCAR) 5 mg tablet, Take 1 Tab by mouth daily. (Patient taking differently: Take 5 mg by mouth daily. Per anesthesia protocol:instructed to take am of surgery. Indications: SYMPTOMATIC BENIGN PROSTATIC HYPERPLASIA), Disp: 90 Tab, Rfl: 1    atorvastatin (LIPITOR) 40 mg tablet, Take 1 Tab by mouth daily. (Patient taking differently: Take 40 mg by mouth daily. Indications: hypercholesterolemia), Disp: 90 Tab, Rfl: 1    niacin ER (NIASPAN) 500 mg tablet, Take 4 Tabs by mouth nightly., Disp: 360 Tab, Rfl: 1    metoprolol succinate (TOPROL-XL) 25 mg XL tablet, Take 1 Tab by mouth daily. Ayesha Kate MD Lic# [id number] [facility] [id number] (Patient taking differently: Take 25 mg by mouth nightly.), Disp: 30 Tab, Rfl: 1    hydrocortisone (ANUSOL-HC) 25 mg supp, Insert 1 Suppository into rectum every twelve (12) hours. , Disp: 15 Suppository, Rfl: 5    cholecalciferol, vitamin D3, (VITAMIN D-3) 2,000 unit Tab, Take  by mouth nightly.  , Disp: , Rfl:   No current facility-administered medications for this encounter.      Facility-Administered Medications Ordered in Other Encounters:     0.9% sodium chloride infusion 250 mL, 250 mL, IntraVENous, PRN, OFELIA Lowe    0.9% sodium chloride infusion 250 mL, 250 mL, IntraVENous, PRN, Delaney Berman OFELIA Barajas   Date Last Reviewed:  8/10/17   Number of Personal Factors/Comorbidities that affect the Plan of Care: 1-2: MODERATE COMPLEXITY   EXAMINATION:   Observation/Orthostatic Postural Assessment:          L knee scar healing from TKA, mild swelling  Palpation:          Decreased skin mobility inferior to L patella; mild swelling noted throughout L knee  ROM:          R knee WNL; L knee 5-118 degrees 7-18-17  Strength:          R LE 4+/5, L LE 4/5  Functional Mobility:         Gait/Ambulation:  Patient ambulates with straight cane demonstrating an antalgic gait pattern. Transfers:  independent        Bed Mobility:  independent        Stairs:  NT  Sensation:         intact     Body Structures Involved:  1. Joints  2. Muscles  3. Ligaments Body Functions Affected:  1. Sensory/Pain  2. Neuromusculoskeletal  3. Movement Related Activities and Participation Affected:  1. Mobility  2. Domestic Life  3. Community, Social and [place]   Number of elements (examined above) that affect the Plan of Care: 3: MODERATE COMPLEXITY   CLINICAL PRESENTATION:   Presentation: Evolving clinical presentation with changing clinical characteristics: MODERATE COMPLEXITY   CLINICAL DECISION MAKING:   Outcome Measure: Tool Used: Lower Extremity Functional Scale (LEFS)  Score:  Initial: 32/80 Most Recent: 53/80 (Date: 7/28/17 )   Interpretation of Score: 20 questions each scored on a 5 point scale with 0 representing \"extreme difficulty or unable to perform\" and 4 representing \"no difficulty\". The lower the score, the greater the functional disability. 80/80 represents no disability. Minimal detectable change is 9 points. Score 80 79-63 62-48 47-32 31-16 15-1 0   Modifier CH CI CJ CK CL CM CN     ?  Mobility - Walking and Moving Around:     - CURRENT STATUS: CJ - 20%-39% impaired, limited or restricted    - GOAL STATUS: CJ - 20%-39% impaired, limited or restricted    - D/C STATUS:  ---------------To be determined---------------      Medical Necessity:   · Patient is expected to demonstrate progress in strength, range of motion and functional technique to increase independence with daily activities. Reason for Services/Other Comments:  · Patient continues to demonstrate capacity to improve strength, ROM, pain, gait which will increase independence and increase safety. Use of outcome tool(s) and clinical judgement create a POC that gives a: Questionable prediction of patient's progress: MODERATE COMPLEXITY            TREATMENT:   (In addition to Assessment/Re-Assessment sessions the following treatments were rendered)  Pre-treatment Symptoms/Complaints: \"It hurts. Came today without my cane. \"  Pain: Initial:   7/10 L knee (front of knee), feels superficial per patient. Post Session: 5-6/10 L knee       Therapeutic Exercise: (   30 minutes):  Exercises per grid below to improve mobility, strength and balance. Required minimal verbal cues to promote proper body alignment, promote proper body posture and promote proper body mechanics. Progressed resistance, range, repetitions and complexity of movement as indicated.    Date  8-1-17 Date   8/3/17 Date   8/8/17 Date   8/10/17   Activity/Exercise       nustep L4 x 8 minutes L4 x 8 minutes L4 x 8 minutes L4 x 8 minutes   Straight leg raise L LE  2 x 10 #2 L LE  2 x 10 #2 hep L LE  2 x 10 #2   slantboard calf stretch 3 x 20 sec 3 x 20 sec 3 x 20 sec 3 x 20 sec   Hamstring stretch Manually with ankle pumps 3 bouts Manually with ankle pumps 3 bouts Manually with ankle pumps 3 bouts Manually with ankle pumps 3 bouts   Heel slides  x 15 in supine  x 15 in supine  x 15 in supine  x 15 in supine   Long arc quad x20 #4 x20 #4 x20 #4 x20 #4   Sit to stand x  HEP    Step ups X 20 time's leading Left LE 6 inch step X 20 time's leading Left LE 6 inch step X 20 time's leading Left LE 6 inch step X 20 time's leading Left LE 8 inch step   Nautilus Press #65 x 20 reps B; x 20 reps L LE at 40#  #65 x 20 reps B; x 20 reps L LE at 40#  #65 x 20 reps B; x 20 reps L LE at 40# #65 x 20 reps B; x 20 reps L LE at 40#   Step downs From 4 inch step with L LE on step x 20 reps From 4 inch step with L LE on step x 20 reps From 4 inch step with L LE on step x 20 reps From 6 inch step with L LE on step x 20 reps   Terminal knee extension Black band x 20 reps L LE Black band x 20 reps L LE Black band x 20 reps L LE Black band x 20 reps L LE   Nautilus Leg Curl #50 x 20 reps #50 x 20 reps #50 x 20 reps #50 x 20 reps   walking Worked on walking in clinic: emphasizing L LE heel strike and equal, normal step length B, and good, upright posture, no cane Worked on walking in clinic: emphasizing L LE heel strike and equal, normal step length B, and good, upright posture, no cane Worked on walking in clinic: emphasizing L LE heel strike and equal, normal step length B, and good, upright posture, no cane Worked on walking in clinic: emphasizing L LE heel strike and equal, normal step length B, and good, upright posture, no cane     Manual therapy: (10 minutes) tissue mobilization to L knee anteriorly and posteriorly, and scar, to improve tissue mobility for ROM. Soft Tissue mobilization to lower Quad, medial knee secondary to increased tightness. Patellar mobs L knee to improve extension    Modalities:(10 minutes) ice packs to L knee x 10 minutes at end of session to decrease pain and swelling. Treatment/Session Assessment:    · Response to Treatment:  Patient tolerated session well. ROM and gait are improving. Patient returns to MD in September for follow up. Last week of appointments. Measure next visit. · .Compliance with Program/Exercises: compliant  · Recommendations/Intent for next treatment session: \"Next visit will focus on advancements to more challenging activities\".   Total Treatment Duration:  PT Patient Time In/Time Out  Time In: 0945  Time Out: 529 Parveen Max, PT

## 2017-08-11 ENCOUNTER — HOSPITAL ENCOUNTER (OUTPATIENT)
Dept: PHYSICAL THERAPY | Age: 82
Discharge: HOME | End: 2017-08-11
Payer: MEDICARE

## 2017-08-11 PROCEDURE — G8978 MOBILITY CURRENT STATUS: HCPCS

## 2017-08-11 PROCEDURE — G8979 MOBILITY GOAL STATUS: HCPCS

## 2017-08-11 PROCEDURE — 97110 THERAPEUTIC EXERCISES: CPT

## 2017-08-11 PROCEDURE — 97140 MANUAL THERAPY 1/> REGIONS: CPT

## 2017-08-11 NOTE — PROGRESS NOTES
Stephen Mon  : 1935 [facility]  [address], [address], [facility] [address]  [phone]   [phone]           OUTPATIENT PHYSICAL THERAPY:Daily Note and Progress Report 2017    ICD-10: Treatment Diagnosis: Pain in left knee (M25.562); Stiffness of left knee, not elsewhere classified (M25.662); Other abnormalities of gait and mobility R26.89  Precautions/Allergies:   Review of patient's allergies indicates no known allergies. Fall Risk Score: 2 (? 5 = High Risk)  MD Orders: eval and treat MEDICAL/REFERRING DIAGNOSIS:  s/p left tka   DATE OF ONSET: 17  REFERRING PHYSICIAN: Fabien Miller,*  RETURN PHYSICIAN APPOINTMENT: 17     PROGRESS NOTE 17:  Mr. José Miguel Barroso has attended 15 PT sessions from 17 to 17 for pain, stiffness, and decreased gait and mobility s/p L TKA on 17. Patient's strength and ROM have improved. Patient has been instructed to keep stretching out L knee extension. Patient is beginning to ambulate without his cane, and gait pattern is improving. Patient has elected to continue to work on HEP at home at this point. He states he will call and reschedule if having any problems. Will keep chart open through end of plan of treatment in case patient needs to return to therapy. Thank you. PROBLEM LIST (Impacting functional limitations):  1. Decreased Strength  2. Decreased ADL/Functional Activities  3. Decreased Transfer Abilities  4. Decreased Ambulation Ability/Technique  5. Decreased Balance  6. Increased Pain  7. Decreased Activity Tolerance  8. Decreased Flexibility/Joint Mobility  9. Decreased [place] with Home Exercise Program INTERVENTIONS PLANNED:  1. Balance Exercise  2. Bed Mobility  3. Cold  4. Gait Training  5. Heat  6. Home Exercise Program (HEP)  7. Manual Therapy  8. Neuromuscular Re-education/Strengthening  9. Range of Motion (ROM)  10.  Therapeutic Activites  11. Therapeutic Exercise/Strengthening  12. Transfer Training   TREATMENT PLAN:  Effective Dates: 7/11/2017 TO 9/8/2017. Frequency/Duration: 2-3 times a week for 6-8 weeks  GOALS: (Goals have been discussed and agreed upon with patient.)  Short-Term Functional Goals: Time Frame: 2-4 weeks  1. Patient will demonstrate independence and compliance with home exercise program to improve ROM and strength for daily activities. MET  2. Patient will report decreased L knee pain to less than or equal to 5/10 to improve patient's tolerance of daily activities. met  3. Discharge Goals: Time Frame: 6-8 weeks  1. Patient will report decreased L Knee pain to less than or equal to 2/10 to improve patient's tolerance of daily activities. Progressing and ongoing  2. Patient will ambulate with least assistive device over level and unlevel surfaces without evidence of imbalance to improve safety for daily activities. MET  3. Patient will demonstrate improved L knee ROM to 0-120 degrees to improve mobility for daily activities. Progressing and ongoing  4. Patient will increase L lower extremity strength to greater than or equal to 4+/5 to improve strength for functional mobility activities. MET  Rehabilitation Potential For Stated Goals: Good              The information in this section was collected on 7/11/17 (except where otherwise noted). HISTORY:   History of Present Injury/Illness (Reason for Referral):  5/16/17 L TKA. Home from hospital with HHPT for PT. Has a stationary bike, recumbent at home and is working on 1/2 revolutions. L knee has been achy and mildly swollen. I went to ER day after released from hospital because bandage was filling with blood. ER just wrapped it. Then went to hospital downtown due to prolonged diarrhea. Was in hospital 3 days, and said it was something with my kidneys. Feeling better now and eating better.  Doing exercises from home health PT. 7/10 pain L knee all of the time, and stiff. Taking tylenol and no other pain meds. No falls. Have used a single point cane for years. Doing all normal activities except getting on riding . Had L CATRINA in 2014, and has had 4 back surgeries. 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 Past Medical History/Comorbidities:   Mr. 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 He also has no past medical history of Aneurysm ([address] Arrhythmia; Autoimmune disease ([address] Difficult intubation; Heart failure ([address] Liver disease; Malignant hyperthermia due to anesthesia; Morbid obesity ([address] Nausea & vomiting; Pseudocholinesterase deficiency; Psychiatric disorder; PUD (peptic ulcer disease); Seizures ([address] Sleep apnea; or Thromboembolus ([address] Mr. Annette Rutledge  has a past surgical history that includes abdomen surgery proc unlisted (1991); lumbar fusion (1990 x 2  2012, 2013); urological (2002); heent (as child); mohs procedure (Left, 1990); orthopaedic (Left, 2013); colectomy (1997); colonoscopy (2014); heart catheterization (01/10/2014); coronary stent placement; and knee replacement (Left, 2017). Social History/Living Environment:     3 steps at home. 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 Dominant Side:         RIGHT   Personal Factors:          Sex:  male        Age:  81 y.o.       Current Medications:       Current Outpatient Prescriptions:     raNITIdine (ZANTAC) 150 mg tablet, Take 1 Tab by mouth two (2) times a day., Disp: 60 Tab, Rfl: 1    diphenhydrAMINE-acetaminophen (TYLENOL PM EXTRA STRENGTH)  mg tab, Take 50-1,000 mg by mouth as needed (pain for sleep). , Disp: , Rfl:     zolpidem (AMBIEN) 10 mg tablet, Take 5-10 mg by mouth nightly as needed for Sleep., Disp: , Rfl:     acetaminophen (TYLENOL) 500 mg tablet, Take 1,000 mg by mouth every six (6) hours as needed for Pain., Disp: , Rfl:     diphenhydrAMINE (BENADRYL) 50 mg capsule, Take 50 mg by mouth nightly as needed for Sleep., Disp: , Rfl:     aspirin delayed-release 81 mg tablet, Take 81 mg by mouth daily. Per anesthesia protocol:instructed to take am of surgery. Indications: myocardial infarction prevention, Disp: , Rfl:     triamcinolone acetonide (KENALOG) 0.1 % topical cream, Apply  to affected area two (2) times a day. use thin layer, Disp: 60 g, Rfl: 2    clopidogrel (PLAVIX) 75 mg tab, Take 1 Tab by mouth daily. , Disp: 90 Tab, Rfl: 1    finasteride (PROSCAR) 5 mg tablet, Take 1 Tab by mouth daily. (Patient taking differently: Take 5 mg by mouth daily. Per anesthesia protocol:instructed to take am of surgery. Indications: SYMPTOMATIC BENIGN PROSTATIC HYPERPLASIA), Disp: 90 Tab, Rfl: 1    atorvastatin (LIPITOR) 40 mg tablet, Take 1 Tab by mouth daily. (Patient taking differently: Take 40 mg by mouth daily. Indications: hypercholesterolemia), Disp: 90 Tab, Rfl: 1    niacin ER (NIASPAN) 500 mg tablet, Take 4 Tabs by mouth nightly., Disp: 360 Tab, Rfl: 1    metoprolol succinate (TOPROL-XL) 25 mg XL tablet, Take 1 Tab by mouth daily. Charla Rosas MD Lic# [id number] RAYMUNDO [id number] (Patient taking differently: Take 25 mg by mouth nightly.), Disp: 30 Tab, Rfl: 1    hydrocortisone (ANUSOL-HC) 25 mg supp, Insert 1 Suppository into rectum every twelve (12) hours. , Disp: 15 Suppository, Rfl: 5    cholecalciferol, vitamin D3, (VITAMIN D-3) 2,000 unit Tab, Take  by mouth nightly.  , Disp: , Rfl:   No current facility-administered medications for this encounter.      Facility-Administered Medications Ordered in Other Encounters:     0.9% sodium chloride infusion 250 mL, 250 mL, IntraVENous, PRN, OFELIA Sena    0.9% sodium chloride infusion 250 mL, 250 mL, IntraVENous, PRN, OFELIA Sena   Date Last Reviewed:  8/11/17   Number of Personal Factors/Comorbidities that affect the Plan of Care: 1-2: MODERATE COMPLEXITY   EXAMINATION:   Observation/Orthostatic Postural Assessment:          L knee scar healing from TKA, mild swelling  Palpation:        mild swelling noted throughout L knee  ROM:          R knee WNL; L knee 3-120 degrees 8/11/17  Strength:          R LE 4+/5, L LE 4+/5  Functional Mobility:         Gait/Ambulation:  Patient has recently transitioned to walking with no cane. He demonstrates a mildly antalgic gait pattern. Transfers:  independent        Bed Mobility:  independent        Stairs:  NT  Sensation:         intact     Body Structures Involved:  1. Joints  2. Muscles  3. Ligaments Body Functions Affected:  1. Sensory/Pain  2. Neuromusculoskeletal  3. Movement Related Activities and Participation Affected:  1. Mobility  2. Domestic Life  3. Community, Social and [place]   Number of elements (examined above) that affect the Plan of Care: 3: MODERATE COMPLEXITY   CLINICAL PRESENTATION:   Presentation: Evolving clinical presentation with changing clinical characteristics: MODERATE COMPLEXITY   CLINICAL DECISION MAKING:   Outcome Measure: Tool Used: Lower Extremity Functional Scale (LEFS)  Score:  Initial: 32/80 Most Recent: 58/80 (Date: 8/11/17 )   Interpretation of Score: 20 questions each scored on a 5 point scale with 0 representing \"extreme difficulty or unable to perform\" and 4 representing \"no difficulty\". The lower the score, the greater the functional disability. 80/80 represents no disability. Minimal detectable change is 9 points. Score 80 79-63 62-48 47-32 31-16 15-1 0   Modifier CH CI CJ CK CL CM CN     ?  Mobility - Walking and Moving Around:     - CURRENT STATUS: CJ - 20%-39% impaired, limited or restricted    - GOAL STATUS: CJ - 20%-39% impaired, limited or restricted    - D/C STATUS:  ---------------To be determined---------------      Medical Necessity:   · Patient is expected to demonstrate progress in strength, range of motion and functional technique to increase independence with daily activities. Reason for Services/Other Comments:  · Patient continues to demonstrate capacity to improve strength, ROM, pain, gait which will increase independence and increase safety. Use of outcome tool(s) and clinical judgement create a POC that gives a: Questionable prediction of patient's progress: MODERATE COMPLEXITY            TREATMENT:   (In addition to Assessment/Re-Assessment sessions the following treatments were rendered)  Pre-treatment Symptoms/Complaints: \"I'm doing okay. About the same. \"  Pain: Initial:   4-5/10 L knee  Post Session: 4/10 L knee       Therapeutic Exercise: (   30 minutes):  Exercises per grid below to improve mobility, strength and balance. Required minimal verbal cues to promote proper body alignment, promote proper body posture and promote proper body mechanics. Progressed resistance, range, repetitions and complexity of movement as indicated.    Date  8-1-17 Date   8/3/17 Date   8/8/17 Date   8/10/17 Date   8/11/17   Activity/Exercise        nustep L4 x 8 minutes L4 x 8 minutes L4 x 8 minutes L4 x 8 minutes L4 x 8 minutes   Straight leg raise L LE  2 x 10 #2 L LE  2 x 10 #2 hep L LE  2 x 10 #2 L LE  2 x 10 #2   slantboard calf stretch 3 x 20 sec 3 x 20 sec 3 x 20 sec 3 x 20 sec 3 x 20 sec   Hamstring stretch Manually with ankle pumps 3 bouts Manually with ankle pumps 3 bouts Manually with ankle pumps 3 bouts Manually with ankle pumps 3 bouts Manually with ankle pumps 3 bouts   Heel slides  x 15 in supine  x 15 in supine  x 15 in supine  x 15 in supine x 15 in supine   Long arc quad x20 #4 x20 #4 x20 #4 x20 #4 x20 #4   Sit to stand x  HEP Step ups X 20 time's leading Left LE 6 inch step X 20 time's leading Left LE 6 inch step X 20 time's leading Left LE 6 inch step X 20 time's leading Left LE 8 inch step X 20 time's leading Left LE 8 inch step   Nautilus Press #65 x 20 reps B; x 20 reps L LE at 40#  #65 x 20 reps B; x 20 reps L LE at 40#  #65 x 20 reps B; x 20 reps L LE at 40# #65 x 20 reps B; x 20 reps L LE at 40# #65 x 20 reps B; x 20 reps L LE at 40#   Step downs From 4 inch step with L LE on step x 20 reps From 4 inch step with L LE on step x 20 reps From 4 inch step with L LE on step x 20 reps From 6 inch step with L LE on step x 20 reps From 6 inch step with L LE on step x 20 reps   Terminal knee extension Black band x 20 reps L LE Black band x 20 reps L LE Black band x 20 reps L LE Black band x 20 reps L LE Black band x 20 reps L LE   Nautilus Leg Curl #50 x 20 reps #50 x 20 reps #50 x 20 reps #50 x 20 reps #50 x 20 reps   walking Worked on walking in clinic: emphasizing L LE heel strike and equal, normal step length B, and good, upright posture, no cane Worked on walking in clinic: emphasizing L LE heel strike and equal, normal step length B, and good, upright posture, no cane Worked on walking in clinic: emphasizing L LE heel strike and equal, normal step length B, and good, upright posture, no cane Worked on walking in clinic: emphasizing L LE heel strike and equal, normal step length B, and good, upright posture, no cane Worked on walking in clinic: emphasizing L LE heel strike and equal, normal step length B, and good, upright posture, no cane   Single leg stance     Each leg 3-5 sec. Pt to practice for HEP. Manual therapy: (10 minutes) tissue mobilization to L knee anteriorly and posteriorly, and scar, to improve tissue mobility for ROM. Soft Tissue mobilization to lower Quad, medial knee secondary to increased tightness.  Patellar mobs L knee to improve extension    Modalities:(10 minutes) ice packs to L knee x 10 minutes at end of session to decrease pain and swelling. Treatment/Session Assessment:    · Response to Treatment:  Patient tolerated session well. ROM, strength, and gait have progressed well. Encouraging patient to continue to work on end ranges of motion, strengthening exercises, and single leg stance for HEP. Patient returns to MD in September for follow up. Patient wants to work on HEP on his own, and will call if is having any problems. Will leave chart open through end of plan of treatment in case he needs additional therapy. · .Compliance with Program/Exercises: compliant  · Recommendations/Intent for next treatment session: \"Next visit will focus on advancements to more challenging activities\".   Total Treatment Duration:  PT Patient Time In/Time Out  Time In: 0930  Time Out: [address]

## 2017-09-22 NOTE — PROGRESS NOTES
Vianney Matamoros  : 1935 [facility] [address], [address], [facility] [address]  [phone]   [phone]           OUTPATIENT PHYSICAL THERAPY:Discontinuation Summary 2017    ICD-10: Treatment Diagnosis: Pain in left knee (M25.562); Stiffness of left knee, not elsewhere classified (M25.662); Other abnormalities of gait and mobility R26.89  Precautions/Allergies:   Review of patient's allergies indicates no known allergies. Fall Risk Score: 2 (? 5 = High Risk)  MD Orders: eval and treat MEDICAL/REFERRING DIAGNOSIS:  s/p left tka   DATE OF ONSET: 17  REFERRING PHYSICIAN: Logan Tuttle,*  RETURN PHYSICIAN APPOINTMENT: 17     DISCONTINUATION 17:  Mr. Preston Mora attended 15 PT sessions from 17 to 17 for pain, stiffness, and decreased gait and mobility s/p L TKA on 17. Patient's strength and ROM improved. After PT appointment on 17, patient elected to continue to work on HEP at home. He stated he would call and reschedule if having any problems. Patient never rescheduled and plan of treatment has . We will discharge patient at this time. Thank you. PROBLEM LIST (Impacting functional limitations):  1. Decreased Strength  2. Decreased ADL/Functional Activities  3. Decreased Transfer Abilities  4. Decreased Ambulation Ability/Technique  5. Decreased Balance  6. Increased Pain  7. Decreased Activity Tolerance  8. Decreased Flexibility/Joint Mobility  9. Decreased Adah with Home Exercise Program INTERVENTIONS PLANNED:  1. Balance Exercise  2. Bed Mobility  3. Cold  4. Gait Training  5. Heat  6. Home Exercise Program (HEP)  7. Manual Therapy  8. Neuromuscular Re-education/Strengthening  9. Range of Motion (ROM)  10. Therapeutic Activites  11. Therapeutic Exercise/Strengthening  12. Transfer Training   TREATMENT PLAN:  Effective Dates: 2017 TO 2017.   Frequency/Duration: 2-3 times a week for 6-8 weeks  GOALS: (Goals have been discussed and agreed upon with patient.)  Short-Term Functional Goals: Time Frame: 2-4 weeks  1. Patient will demonstrate independence and compliance with home exercise program to improve ROM and strength for daily activities. MET  2. Patient will report decreased L knee pain to less than or equal to 5/10 to improve patient's tolerance of daily activities. met  3. Discharge Goals: Time Frame: 6-8 weeks  1. Patient will report decreased L Knee pain to less than or equal to 2/10 to improve patient's tolerance of daily activities. Unable to reassess  2. Patient will ambulate with least assistive device over level and unlevel surfaces without evidence of imbalance to improve safety for daily activities. MET  3. Patient will demonstrate improved L knee ROM to 0-120 degrees to improve mobility for daily activities. Unable to reassess  4. Patient will increase L lower extremity strength to greater than or equal to 4+/5 to improve strength for functional mobility activities. MET  Rehabilitation Potential For Stated Goals: Good              The information in this section was collected on 7/11/17 (except where otherwise noted). HISTORY:   History of Present Injury/Illness (Reason for Referral):  5/16/17 L TKA. Home from hospital with HHPT for PT. Has a stationary bike, recumbent at home and is working on 1/2 revolutions. L knee has been achy and mildly swollen. I went to ER day after released from hospital because bandage was filling with blood. ER just wrapped it. Then went to hospital downtown due to prolonged diarrhea. Was in hospital 3 days, and said it was something with my kidneys. Feeling better now and eating better. Doing exercises from home health PT. 7/10 pain L knee all of the time, and stiff. Taking tylenol and no other pain meds. No falls. Have used a single point cane for years. Doing all normal activities except getting on riding .  Had L CATRINA in 2014, and has had 4 back surgeries. 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 Past Medical History/Comorbidities:   Mr. 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 He also has no past medical history of Aneurysm ([address] Arrhythmia; Autoimmune disease ([address] Difficult intubation; Heart failure ([address] Liver disease; Malignant hyperthermia due to anesthesia; Morbid obesity ([address] Nausea & vomiting; Pseudocholinesterase deficiency; Psychiatric disorder; PUD (peptic ulcer disease); Seizures ([address] Sleep apnea; or Thromboembolus ([address] Mr. José Miguel Barroso  has a past surgical history that includes abdomen surgery proc unlisted (1991); lumbar fusion (1990 x 2  2012, 2013); urological (2002); heent (as child); mohs procedure (Left, 1990); orthopaedic (Left, 2013); colectomy (1997); colonoscopy (2014); heart catheterization (01/10/2014); coronary stent placement; and knee replacement (Left, 2017). Social History/Living Environment:     3 steps at home. Prior Level of Function/Work/Activity:  Driving for [employer]. Dominant Side:         RIGHT   Personal Factors:          Sex:  male        Age:  81 y.o. Current Medications:       Current Outpatient Prescriptions:     raNITIdine (ZANTAC) 150 mg tablet, Take 1 Tab by mouth two (2) times a day., Disp: 180 Tab, Rfl: 1    clopidogrel (PLAVIX) 75 mg tab, Take 1 Tab by mouth daily. , Disp: 90 Tab, Rfl: 1    finasteride (PROSCAR) 5 mg tablet, Take 1 Tab by mouth daily. , Disp: 90 Tab, Rfl: 1    atorvastatin (LIPITOR) 40 mg tablet, Take 1 Tab by mouth daily. , Disp: 90 Tab, Rfl: 1    niacin ER (NIASPAN) 500 mg tablet, Take 4 Tabs by mouth nightly., Disp: 360 Tab, Rfl: 1    metoprolol succinate (TOPROL-XL) 25 mg XL tablet, Take 1 Tab by mouth nightly., Disp: 90 Tab, Rfl: 1    diphenhydrAMINE-acetaminophen (TYLENOL PM EXTRA STRENGTH)  mg tab, Take 50-1,000 mg by mouth as needed (pain for sleep). , Disp: , Rfl:     zolpidem (AMBIEN) 10 mg tablet, Take 5-10 mg by mouth nightly as needed for Sleep., Disp: , Rfl:     acetaminophen (TYLENOL) 500 mg tablet, Take 1,000 mg by mouth every six (6) hours as needed for Pain., Disp: , Rfl:     diphenhydrAMINE (BENADRYL) 50 mg capsule, Take 50 mg by mouth nightly as needed for Sleep., Disp: , Rfl:     aspirin delayed-release 81 mg tablet, Take 81 mg by mouth daily. Per anesthesia protocol:instructed to take am of surgery. Indications: myocardial infarction prevention, Disp: , Rfl:     triamcinolone acetonide (KENALOG) 0.1 % topical cream, Apply  to affected area two (2) times a day. use thin layer, Disp: 60 g, Rfl: 2    hydrocortisone (ANUSOL-HC) 25 mg supp, Insert 1 Suppository into rectum every twelve (12) hours. , Disp: 15 Suppository, Rfl: 5    cholecalciferol, vitamin D3, (VITAMIN D-3) 2,000 unit Tab, Take  by mouth nightly.  , Disp: , Rfl:   No current facility-administered medications for this encounter.      Facility-Administered Medications Ordered in Other Encounters:     0.9% sodium chloride infusion 250 mL, 250 mL, IntraVENous, PRN, OFELIA Sandoval    0.9% sodium chloride infusion 250 mL, 250 mL, IntraVENous, PRN, OFELIA Sandoval   Date Last Reviewed:  8/11/17   Number of Personal Factors/Comorbidities that affect the Plan of Care: 1-2: MODERATE COMPLEXITY   EXAMINATION:   Observation/Orthostatic Postural Assessment:          L knee scar healing from TKA, mild swelling  Palpation:        mild swelling noted throughout L knee  ROM:          R knee WNL; L knee 3-120 degrees 8/11/17  Strength:          R LE 4+/5, L LE 4+/5  Functional Mobility:         Gait/Ambulation:  Patient has recently transitioned to walking with no cane. He demonstrates a mildly antalgic gait pattern. Transfers:  independent        Bed Mobility:  independent        Stairs:  NT  Sensation:         intact     Body Structures Involved:  1. Joints  2. Muscles  3. Ligaments Body Functions Affected:  1. Sensory/Pain  2. Neuromusculoskeletal  3. Movement Related Activities and Participation Affected:  1. Mobility  2. Domestic Life  3. Community, Social and [place]   Number of elements (examined above) that affect the Plan of Care: 3: MODERATE COMPLEXITY   CLINICAL PRESENTATION:   Presentation: Evolving clinical presentation with changing clinical characteristics: MODERATE COMPLEXITY   CLINICAL DECISION MAKING:   Outcome Measure: Tool Used: Lower Extremity Functional Scale (LEFS)  Score:  Initial: 32/80 Most Recent: 58/80 (Date: 8/11/17 )   Interpretation of Score: 20 questions each scored on a 5 point scale with 0 representing \"extreme difficulty or unable to perform\" and 4 representing \"no difficulty\". The lower the score, the greater the functional disability. 80/80 represents no disability. Minimal detectable change is 9 points. Score 80 79-63 62-48 47-32 31-16 15-1 0   Modifier CH CI CJ CK CL CM CN     ? Mobility - Walking and Moving Around:     - CURRENT STATUS: CJ - 20%-39% impaired, limited or restricted    - GOAL STATUS: CJ - 20%-39% impaired, limited or restricted    - D/C STATUS:  unable to reassess      Medical Necessity:   · Patient is expected to demonstrate progress in strength, range of motion and functional technique to increase independence with daily activities. Reason for Services/Other Comments:  · Patient continues to demonstrate capacity to improve strength, ROM, pain, gait which will increase independence and increase safety.    Use of outcome tool(s) and clinical judgement create a POC that gives a: Questionable prediction of patient's progress: MODERATE COMPLEXITY            TREATMENT:   (In addition to Assessment/Re-Assessment sessions the following treatments were rendered)    Karel [place], PT

## 2018-01-31 PROBLEM — R19.7 DIARRHEA: Status: RESOLVED | Noted: 2017-06-17 | Resolved: 2018-01-31

## 2018-01-31 PROBLEM — R53.1 WEAKNESS GENERALIZED: Status: RESOLVED | Noted: 2017-06-17 | Resolved: 2018-01-31

## 2018-02-28 PROBLEM — R07.9 CHEST PAIN AT REST: Status: ACTIVE | Noted: 2018-02-28

## 2018-08-21 PROBLEM — I25.119 ATHEROSCLEROSIS OF NATIVE CORONARY ARTERY OF NATIVE HEART WITH ANGINA PECTORIS (HCC): Status: ACTIVE | Noted: 2018-08-21

## 2019-02-27 PROBLEM — M79.604 RIGHT LEG PAIN: Status: ACTIVE | Noted: 2019-02-27

## 2019-06-11 ENCOUNTER — HOSPITAL ENCOUNTER (OUTPATIENT)
Dept: SURGERY | Age: 84
Discharge: HOME | End: 2019-06-11
Payer: MEDICARE

## 2019-06-11 ENCOUNTER — HOSPITAL ENCOUNTER (OUTPATIENT)
Dept: PHYSICAL THERAPY | Age: 84
Discharge: HOME | End: 2019-06-11
Payer: MEDICARE

## 2019-06-11 VITALS
HEART RATE: 65 BPM | TEMPERATURE: 95.6 F | BODY MASS INDEX: 24.16 KG/M2 | HEIGHT: 72 IN | DIASTOLIC BLOOD PRESSURE: 55 MMHG | SYSTOLIC BLOOD PRESSURE: 153 MMHG | OXYGEN SATURATION: 98 % | RESPIRATION RATE: 16 BRPM | WEIGHT: 178.4 LBS

## 2019-06-11 LAB
ANION GAP SERPL CALC-SCNC: 6 MMOL/L (ref 7–16)
APPEARANCE UR: CLEAR
APTT PPP: 34.2 SEC (ref 24.7–39.8)
BACTERIA SPEC CULT: ABNORMAL
BASOPHILS # BLD: 0 K/UL (ref 0–0.2)
BASOPHILS NFR BLD: 0 % (ref 0–2)
BILIRUB UR QL: NEGATIVE
BUN SERPL-MCNC: 28 MG/DL (ref 8–23)
CALCIUM SERPL-MCNC: 9.7 MG/DL (ref 8.3–10.4)
CHLORIDE SERPL-SCNC: 100 MMOL/L (ref 98–107)
CO2 SERPL-SCNC: 33 MMOL/L (ref 21–32)
COLOR UR: YELLOW
CREAT SERPL-MCNC: 0.96 MG/DL (ref 0.8–1.5)
DIFFERENTIAL METHOD BLD: NORMAL
EOSINOPHIL # BLD: 0.2 K/UL (ref 0–0.8)
EOSINOPHIL NFR BLD: 3 % (ref 0.5–7.8)
ERYTHROCYTE [DISTWIDTH] IN BLOOD BY AUTOMATED COUNT: 12.3 % (ref 11.9–14.6)
GLUCOSE SERPL-MCNC: 92 MG/DL (ref 65–100)
GLUCOSE UR STRIP.AUTO-MCNC: NEGATIVE MG/DL
HCT VFR BLD AUTO: 43.6 % (ref 41.1–50.3)
HGB BLD-MCNC: 14.4 G/DL (ref 13.6–17.2)
HGB UR QL STRIP: NEGATIVE
IMM GRANULOCYTES # BLD AUTO: 0 K/UL (ref 0–0.5)
IMM GRANULOCYTES NFR BLD AUTO: 1 % (ref 0–5)
INR PPP: 1.1
KETONES UR QL STRIP.AUTO: NEGATIVE MG/DL
LEUKOCYTE ESTERASE UR QL STRIP.AUTO: NEGATIVE
LYMPHOCYTES # BLD: 1 K/UL (ref 0.5–4.6)
LYMPHOCYTES NFR BLD: 15 % (ref 13–44)
MCH RBC QN AUTO: 31.2 PG (ref 26.1–32.9)
MCHC RBC AUTO-ENTMCNC: 33 G/DL (ref 31.4–35)
MCV RBC AUTO: 94.4 FL (ref 79.6–97.8)
MONOCYTES # BLD: 0.8 K/UL (ref 0.1–1.3)
MONOCYTES NFR BLD: 11 % (ref 4–12)
NEUTS SEG # BLD: 5 K/UL (ref 1.7–8.2)
NEUTS SEG NFR BLD: 71 % (ref 43–78)
NITRITE UR QL STRIP.AUTO: NEGATIVE
NRBC # BLD: 0 K/UL (ref 0–0.2)
PH UR STRIP: 7 [PH] (ref 5–9)
PLATELET # BLD AUTO: 199 K/UL (ref 150–450)
PMV BLD AUTO: 10.3 FL (ref 9.4–12.3)
POTASSIUM SERPL-SCNC: 4.4 MMOL/L (ref 3.5–5.1)
PROT UR STRIP-MCNC: NEGATIVE MG/DL
PROTHROMBIN TIME: 14.3 SEC (ref 11.7–14.5)
RBC # BLD AUTO: 4.62 M/UL (ref 4.23–5.6)
SERVICE CMNT-IMP: ABNORMAL
SODIUM SERPL-SCNC: 139 MMOL/L (ref 136–145)
SP GR UR REFRACTOMETRY: 1.02 (ref 1–1.02)
UROBILINOGEN UR QL STRIP.AUTO: 0.2 EU/DL (ref 0.2–1)
WBC # BLD AUTO: 7.1 K/UL (ref 4.3–11.1)

## 2019-06-11 PROCEDURE — 85025 COMPLETE CBC W/AUTO DIFF WBC: CPT

## 2019-06-11 PROCEDURE — 85730 THROMBOPLASTIN TIME PARTIAL: CPT

## 2019-06-11 PROCEDURE — 80048 BASIC METABOLIC PNL TOTAL CA: CPT

## 2019-06-11 PROCEDURE — 77030027138 HC INCENT SPIROMETER -A

## 2019-06-11 PROCEDURE — 36415 COLL VENOUS BLD VENIPUNCTURE: CPT

## 2019-06-11 PROCEDURE — 81003 URINALYSIS AUTO W/O SCOPE: CPT

## 2019-06-11 PROCEDURE — 97161 PT EVAL LOW COMPLEX 20 MIN: CPT

## 2019-06-11 PROCEDURE — 87641 MR-STAPH DNA AMP PROBE: CPT

## 2019-06-11 PROCEDURE — 85610 PROTHROMBIN TIME: CPT

## 2019-06-11 RX ORDER — TRAMADOL HYDROCHLORIDE 50 MG/1
50 TABLET ORAL
COMMUNITY
End: 2019-06-26

## 2019-06-11 NOTE — PERIOP NOTES
Patient verified name and . Order for consent found in EHR and matches case posting; patient verified. Right total hip arthroplasty    Type 3 surgery, PAT Joint assessment complete. Labs per surgeon: cbc, bmp, UA, PT, PTT, MRSA nasal swab; results pending. Labs per anesthesia protocol: no additional lab work needed. EKG:Done 19- within anesthesia guidelines. Last cardiology office note dated 19, last stress test dated 3/23/18, Echo dated 1/10/14, cath report dated 1/15/14 and lumbar imaging dated 14 found in EHR if needed for anesthesia reference. Message sent to [facility] cardiology triage nurse for an ok to hold plavix for 7 days prior to surgery. MRSA/MSSA swab collected; pharmacy to review and dose antibiotic as appropriate. Hibiclens and instructions to return bottle on DOS given per hospital policy. Patient provided with handouts including Guide to Surgery, Pain Management, Hand Hygiene, Blood Transfusion Education, and [facility] Anesthesia Brochure. Patient answered medical/surgical history questions at their best of ability. All prior to admission medications documented in [facility]. Original medication prescription bottle not visualized during patient appointment. Patient instructed to hold all vitamins 7 days prior to surgery and NSAIDS 5 days prior to surgery. Prescription medications to hold: plavix x 7 days. Patient instructed to continue previous medications as prescribed prior to surgery and to take the following medications the day of surgery according to anesthesia guidelines with a small sip of water: aspirin, finasteride, zantac and tramadol if needed. Patient teach back successful and patient demonstrates knowledge of instruction.

## 2019-06-11 NOTE — PERIOP NOTES
Lab results within anesthesia guidelines. Lab results sent to PCP per surgeon's request.       Recent Results (from the past 12 hour(s))   CBC WITH AUTOMATED DIFF    Collection Time: 06/11/19 11:40 AM   Result Value Ref Range    WBC 7.1 4.3 - 11.1 K/uL    RBC 4.62 4.23 - 5.6 M/uL    HGB 14.4 13.6 - 17.2 g/dL    HCT 43.6 41.1 - 50.3 %    MCV 94.4 79.6 - 97.8 FL    MCH 31.2 26.1 - 32.9 PG    MCHC 33.0 31.4 - 35.0 g/dL    RDW 12.3 11.9 - 14.6 %    PLATELET 840 913 - 024 K/uL    MPV 10.3 9.4 - 12.3 FL    ABSOLUTE NRBC 0.00 0.0 - 0.2 K/uL    DF AUTOMATED      NEUTROPHILS 71 43 - 78 %    LYMPHOCYTES 15 13 - 44 %    MONOCYTES 11 4.0 - 12.0 %    EOSINOPHILS 3 0.5 - 7.8 %    BASOPHILS 0 0.0 - 2.0 %    IMMATURE GRANULOCYTES 1 0.0 - 5.0 %    ABS. NEUTROPHILS 5.0 1.7 - 8.2 K/UL    ABS. LYMPHOCYTES 1.0 0.5 - 4.6 K/UL    ABS. MONOCYTES 0.8 0.1 - 1.3 K/UL    ABS. EOSINOPHILS 0.2 0.0 - 0.8 K/UL    ABS. BASOPHILS 0.0 0.0 - 0.2 K/UL    ABS. IMM.  GRANS. 0.0 0.0 - 0.5 K/UL   METABOLIC PANEL, BASIC    Collection Time: 06/11/19 11:40 AM   Result Value Ref Range    Sodium 139 136 - 145 mmol/L    Potassium 4.4 3.5 - 5.1 mmol/L    Chloride 100 98 - 107 mmol/L    CO2 33 (H) 21 - 32 mmol/L    Anion gap 6 (L) 7 - 16 mmol/L    Glucose 92 65 - 100 mg/dL    BUN 28 (H) 8 - 23 MG/DL    Creatinine 0.96 0.8 - 1.5 MG/DL    GFR est AA >60 >60 ml/min/1.73m2    GFR est non-AA >60 >60 ml/min/1.73m2    Calcium 9.7 8.3 - 10.4 MG/DL   PROTHROMBIN TIME + INR    Collection Time: 06/11/19 11:40 AM   Result Value Ref Range    Prothrombin time 14.3 11.7 - 14.5 sec    INR 1.1     PTT    Collection Time: 06/11/19 11:40 AM   Result Value Ref Range    aPTT 34.2 24.7 - 39.8 SEC   URINALYSIS W/ RFLX MICROSCOPIC    Collection Time: 06/11/19 11:40 AM   Result Value Ref Range    Color YELLOW      Appearance CLEAR      Specific gravity 1.017 1.001 - 1.023      pH (UA) 7.0 5.0 - 9.0      Protein NEGATIVE  NEG mg/dL    Glucose NEGATIVE  mg/dL    Ketone NEGATIVE  NEG mg/dL Bilirubin NEGATIVE  NEG      Blood NEGATIVE  NEG      Urobilinogen 0.2 0.2 - 1.0 EU/dL    Nitrites NEGATIVE  NEG      Leukocyte Esterase NEGATIVE  NEG

## 2019-06-11 NOTE — PROGRESS NOTES
19 1200   Oxygen Therapy   O2 Sat (%) 99 %   Pulse via Oximetry 66 beats per minute   O2 Device Room air   Pre-Treatment   Breath Sounds Bilateral Clear   Pre FEV1 (liters) 2.9 liters   % Predicted 99   Incentive Spirometry Treatment   Actual Volume (ml) 3000 ml     Initial respiratory Assessment completed with pt. Pt was interviewed and evaluated in Joint camp prior to surgery. Patient ID:  Rajeev Peoples  [id number]  41 y.o.  1935  Surgeon: Dr. Oscar Wells  Date of Surgery: 2019  Procedure: Total Right Hip Arthroplasty  Primary Care Physician: Luanne Friedman [phone]  Specialists:                                  Pt instructed in the use of Incentive Spirometry. Pt instructed to bring Incentive Spirometer back on date of surgery & to start using Is upon return to pt room. Pt taught proper cough technique    History of smokin.5 PPD FOR 28 YEARS                                                      Quit date:       18    Secondhand smoke:FATHER      Past procedures with Oxygen desaturation:DELAYED AWAKENING    Past Medical History:   Diagnosis Date    Arthritis     osteo    Chronic pain     left hip/back/LLE    Colon cancer ([address] 2015    surgery     Diabetes ([facility]     Type 2. diet controlled. Pt does not monitor blood sugar. A1C=5.2019.  Dyslipidemia 2015    daily meds     GERD (gastroesophageal reflux disease)     controlled w/med    Hx of bladder cancer 2015    Hypertension     controlled on medication    NSTEMI (non-ST elevation myocardial infarction) ([address] 01/10/2014    MI. heart cath: preserved LVSF. EF =65%. mild to mod LAD and circumflex disease.  Osteoarthritis of left hip 2013    S/P prosthetic total arthroplasty of the hip 2014    Stented coronary artery 01/10/2014    mid right coronary artery - [employer] bare metal stent.      Thrombocytopenia, unspecified ([facility] 2014 Respiratory history:DENIES SOB                                 HX OF ATELECTAISI                                  Respiratory meds:  DENIES                                       FAMILY PRESENT:                                                          NO                                        PAST SLEEP STUDY:                       DENIES  HX OF RITA:                                     DENIES                                     RITA assessment:                                               SLEEPS ON SIDE                                                             PHYSICAL EXAM   Body mass index is 24.2 kg/m².    Visit Vitals  /55 (BP 1 Location: Right arm, BP Patient Position: At rest)   Pulse 65   Temp 95.6 °F (35.3 °C)   Resp 16   Ht 6' (1.829 m)   Wt 80.9 kg (178 lb 6.4 oz)   SpO2 98%   BMI 24.20 kg/m²     Neck circumference:  42    cm    Loud snoring:        YES                                Witnessed apnea or wakening gasping or choking:,             DENIES,                                                                                               Awakens with headaches:                                                  DENIES    Morning or daytime tiredness/ sleepiness:                   EPWORTH 9                                                                                       TIRED   Dry mouth or sore throat in morning:                YES                                                                         Chung stage:  3    SACS score:25      Stop Bang   STOP-BANG  Does the patient snore loudly (louder than talking or loud enough to be heard through closed doors)?: Yes  Does the patient often feel tired, fatigued, or sleepy during the daytime, even after a \"good\" night's sleep?: Yes  Has anyone ever observed the patient stop breathing during their sleep? : No  Does the patient have or are they being treated for high blood pressure?: Yes  Is the patient's BMI greater than 35?: No  Is your neck circumference greater than 17 inches (Male) or 16 inches (Female)?: No  Is the patient older than 48?: Yes  Is the patient male?: Yes  RITA Score: 5  Has the patient been referred to Sleep Medicine?: No  Has the patient previously been diagnosed with Obstructive Sleep Apnea?: No                            CPAP:                       NONE                                    HOME CPAP               CONT SAT HS            Referrals:  DECLINED  Pt.  Phone Number:

## 2019-06-11 NOTE — PROGRESS NOTES
[facility]  : 7[birth year](40 y.o.) [address] at [address] [address].  [phone]       Physical Therapy Prehab Plan of Treatment and Evaluation Summary:2019    ICD-10: Treatment Diagnosis:   · Pain in Right Hip (M25.551)  · Stiffness of Right Hip, Not elsewhere classified (M25.651)  · Difficulty in walking, Not elsewhere classified (R26.2)  Precautions/Allergies:   Patient has no known allergies. MEDICAL/REFERRING DIAGNOSIS:  Unilateral primary osteoarthritis, right hip [M16.11]  REFERRING PHYSICIAN: Raquel Collet,*  DATE OF SURGERY: 19    Assessment:   Comments:  Patient presents prior to R CATRINA. He has a history of L TKA (), L CATRINA (), and multiple spinal surgeries including lumbar fusion. He ambulates with a rollator outside of the house and a cane in the house. Patient reports he had an injection in the R knee recently but it has been aching ever since. Patient will return home at d/c with assist from his son. PROBLEM LIST (Impacting functional limitations):  Mr. Aroldo Fatima presents with the following right lower extremity(s) problems:  1. Transfers  2. Gait  3. Strength  4. Range of Motion  5. Balance  6. Home Exercise Program  7. Pain   INTERVENTIONS PLANNED:  1. Home Exercise Program  2. Educational Discussion      TREATMENT PLAN: Effective Dates: 2019 TO 2019. Frequency/Duration: Patient to continue to perform home exercise program at least twice per day up until his surgery. GOALS: (Goals have been discussed and agreed upon with patient.)  Discharge Goals: Time Frame: 1 Day  1. Patient will demonstrate independence with a home exercise program designed to increase strength, range of motion, balance, coordination and pain control to minimize functional deficits and optimize patient for total joint replacement.   Rehabilitation Potential For Stated Goals: Good  Regarding [facility] therapy, I certify that the treatment plan above will be carried out by a therapist or under their direction. Thank you for this referral,  Ashley Beltran, PT               HISTORY:   Present Symptoms:  Pain Intensity 1: 7  Pain Location 1: Hip  Pain Orientation 1: Right   History of Present Injury/Illness (Reason for Referral):  Medical/Referring Diagnosis: Unilateral primary osteoarthritis, right hip [M16.11]   Past Medical History/Comorbidities:   Mr. Kellen Walters  has a past medical history of Arthritis, Chronic pain, Colon cancer ([facility] (06/23/2015), [facility] ([email or web address] (2006), Dyslipidemia (06/23/2015), GERD (gastroesophageal reflux disease), bladder cancer (6/23/2015), Hypertension, NSTEMI (non-ST elevation myocardial infarction) ([facility] (01/10/2014), Osteoarthritis of left hip (12/16/2013), S/P prosthetic total arthroplasty of the hip (1/8/2014), Stented coronary artery (01/10/2014), and Thrombocytopenia, unspecified ([facility] (1/11/2014). Mr. Kellen Walters  has a past surgical history that includes pr abdomen surgery proc unlisted (1991); hx lumbar fusion (1990 x 2  2012, 2013); hx urological (2002); hx heent (as child); hx orthopaedic (Left, 2013); hx colectomy (1997); hx colonoscopy (2014, 2018); hx heart catheterization (01/10/2014); hx coronary stent placement; hx knee replacement (Left, 2017); and hx shoulder arthroscopy (Left, 1990). Social History/Living Environment:   Home Environment: Private residence  # Steps to Enter: 1  Rails to Enter: No  One/Two Story Residence: Other (Comment)  # of Interior Steps: 3  Interior Rails: Both  Living Alone: No  Support Systems: Child(linda)  Patient Expects to be Discharged to[employer] residence  Current DME Used/Available at Home: Deedee Duet, straight, Commode, bedside, Walker, Walker, rollator, Walker, rolling  Tub or Shower Type: Shower  Work/Activity:  Ambulates with a cane or rollator. Drives for [employer].   Dominant Side:  RIGHT  Current Medications:  See Pre-assessment nursing note   Number of Personal Factors/Comorbidities that affect the Plan of Care: 0: LOW COMPLEXITY   EXAMINATION:   ADLs (Current Functional Status):   Ambulation:  [x] Independent  [] Walk Indoors Only  [] Walk Outdoors  [x] Use Assistive Device  [] Use Wheelchair Only Dressing:  [] Independent  Requires Assistance from Someone for:  [x] Sock/Shoes  [] Pants  [] Everything   Bathing/Showering:   [x] Independent  [] Requires Assistance from Someone  [] Sponge Bath Only Household Activities:  [] Routine house and yard work  [x] Light Housework Only  [] None   Observation/Orthostatic Postural Assessment:   Exceptions to WDLForward head, Rounded shoulders, Trunk flexion  ROM/Flexibility:   Gross Assessment: Yes  AROM: Within functional limits(L LE)                           Strength:   Gross Assessment: Yes  Strength: Within functional limits(L LE 5/5)              RLE Strength  R Hip Flexion: 4  R Hip ABduction: 4  R Knee Flexion: 4  R Knee Extension: 4   Functional Mobility:    Gross Assessment: Yes  Coordination: Within functional limits    Gait Description (WDL): Exceptions to WDL  Stand to Sit: Modified independent  Sit to Stand: Modified independent  Distance (ft): 500 Feet (ft)  Ambulation - Level of Assistance: Modified independent  Assistive Device: Walker, rollator  Base of Support: Center of gravity altered  Speed/Mary: Pace decreased (<100 feet/min)  Step Length: Left shortened;Right shortened          Balance:    Sitting: Intact  Standing: Impaired  Standing - Static: Fair  Standing - Dynamic : Fair   Body Structures Involved:  1. Joints  2. Muscles Body Functions Affected:  1. Movement Related Activities and Participation Affected:  1. Mobility   Number of elements that affect the Plan of Care: 4+: HIGH COMPLEXITY   CLINICAL PRESENTATION:   Presentation: Stable and uncomplicated: LOW COMPLEXITY   CLINICAL DECISION MAKING:   Outcome Measure:    Tool Used: Lower Extremity Functional Scale (LEFS)  Score: Initial: 19/80 Most Recent: X/80 (Date: -- )   Interpretation of Score: 20 questions each scored on a 5 point scale with 0 representing \"extreme difficulty or unable to perform\" and 4 representing \"no difficulty\". The lower the score, the greater the functional disability. 80/80 represents no disability. Minimal detectable change is 9 points. Medical Necessity:   · Mr. Kuldeep Leyva is expected to optimize his lower extremity strength and ROM in preparation for joint replacement surgery. Reason for Services/Other Comments:  · Achieve baseline assesment of musculoskeletal system, functional mobility and home environment. , educate in PT HEP in preparation for surgery, educate in hospital plan of care. Use of outcome tool(s) and clinical judgement create a POC that gives a: Clear prediction of patient's progress: LOW COMPLEXITY   TREATMENT:   Treatment/Session Assessment:  Patient was instructed in PT- HEP to increase strength and ROM in LEs. Answered all questions. · Post session pain:  7/10  · Compliance with Program/Exercises: Will assess as treatment progresses.   Total Treatment Duration:  PT Patient Time In/Time Out  Time In: 1145  Time Out: 3[facility],

## 2019-06-20 NOTE — PERIOP NOTES
Note in EMR from [facility] that reads:    June 20, 2019     Ben Roberts MD   to Pavan ESPINOSA        8:34 AM   Note      OK to hold Plavix for 7 days for surgery, then resume post op.

## 2019-06-21 NOTE — H&P
[address] [facility]  History and Physical Exam    Patient ID:  Veda Dukes  [id number]    47 y.o.  1935    Today: June 21, 2019    Vitals Signs: Reviewed as noted in medical record. Allergies: No Known Allergies    CC: right hip pain    HPI:  Pt complains of right hip pain with difficulty ambulating. Relevant PMH:   Past Medical History:   Diagnosis Date    Arthritis     osteo    Chronic pain     left hip/back/LLE    Colon cancer ([facility] 06/23/2015    surgery     Diabetes ([facility] 2006    Type 2. diet controlled. Pt does not monitor blood sugar. A1C=5.7 Jan 2019.  Dyslipidemia 06/23/2015    daily meds     GERD (gastroesophageal reflux disease)     controlled w/med    Hx of bladder cancer 6/23/2015    Hypertension     controlled on medication    NSTEMI (non-ST elevation myocardial infarction) ([facility] 01/10/2014    MI. heart cath: preserved LVSF. EF =65%. mild to mod LAD and circumflex disease. 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  Thrombocytopenia, unspecified ([facility] 1/11/2014       Objective:                    HEENT: NC/AT                   Lungs:  clear                   Heart:   rrr                   Abdomen: soft                   Extremities:  Pain with rom of the right hip joint    Radiographs: reveal osteoarthritis with loss of joint space and bone spurs. Assessment: Unilateral primary osteoarthritis, right hip [M16.11]    Plan:  Proceed with scheduled Procedure(s) (LRB):  RIGHT TOTAL HIP ARTHROPLASTY DEPUY (Right) . The patient has failed conservative treatment including NSAIDS, and injections. Due to the amount of pain the patient is experiencing we will proceed with scheduled procedure.       Signed By: OFELIA Hart  June 21, 2019

## 2019-06-24 NOTE — ADVANCED PRACTICE NURSE
Total Joint Surgery Preoperative Chart Review      Patient ID:  Sheila Lyons  [id number]  89 y.o.  1935  Surgeon: Dr. Dc Alvarez  Date of Surgery: 6/27/2019  Procedure: Total Right Hip Arthroplasty  Primary Care Physician: Siddhartha Castillo -[phone]  Specialty Physician(s):      Subjective:   Sheila Lyons is a 81 y.o. WHITE [place]  male who presents for preoperative evaluation for Total Right Hip arthroplasty. This is a preoperative chart review note based on data collected by the nurse at the surgical Pre-Assessment visit. Past Medical History:   Diagnosis Date    Arthritis     osteo    Chronic pain     left hip/back/LLE    Colon cancer ([address] 06/23/2015    surgery     Diabetes ([facility] 2006    Type 2. diet controlled. Pt does not monitor blood sugar. A1C=5.7 Jan 2019.  Dyslipidemia 06/23/2015    daily meds     GERD (gastroesophageal reflux disease)     controlled w/med    Hx of bladder cancer 6/23/2015    Hypertension     controlled on medication    NSTEMI (non-ST elevation myocardial infarction) ([facility] 01/10/2014    MI. heart cath: preserved LVSF. EF =65%. mild to mod LAD and circumflex disease. 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      Thrombocytopenia, unspecified ([facility] 1/11/2014      Past Surgical History:   Procedure Laterality Date    ABDOMEN SURGERY PROC UNLISTED  1991    EXP Lap    HX COLECTOMY  1997    Colon Resection    HX COLONOSCOPY  2014, 2018    HX CORONARY STENT PLACEMENT      HX HEART CATHETERIZATION  01/10/2014    s/p bare metal stent to mid-right CA    HX HEENT  as child    T&A    HX KNEE REPLACEMENT Left 2017    HX LUMBAR FUSION  1990 x 2  2012, 2013    lumbar fusions- x 4 total surgeries    HX ORTHOPAEDIC Left 2013    left hip replaced    HX OTHER SURGICAL      Spinal cord stimulator placed and removed in     HX SHOULDER ARTHROSCOPY Left     HX UROLOGICAL  2002    Turp     Family History   Problem Relation Age of Onset    Heart Disease Mother     Cancer Father     Lung Disease Sister     Heart Disease Brother     Cancer Brother         pancreatic    Parkinson's Disease Brother     Malignant Hyperthermia Neg Hx     Pseudocholinesterase Deficiency Neg Hx     Delayed Awakening Neg Hx     Post-op Nausea/Vomiting Neg Hx     Emergence Delirium Neg Hx     Post-op Cognitive Dysfunction Neg Hx     Other Neg Hx       Social History     Tobacco Use    Smoking status: Former Smoker     Packs/day: 1.50     Years: 28.00     Pack years: 42.00     Last attempt to quit: 1982     Years since quittin.5    Smokeless tobacco: Never Used    Tobacco comment: quit    Substance Use Topics    Alcohol use: No       Prior to Admission medications    Medication Sig Start Date End Date Taking? Authorizing Provider   traMADol (ULTRAM) 50 mg tablet Take 50 mg by mouth every six (6) hours as needed for Pain. Yes Provider, Historical   raNITIdine (ZANTAC) 150 mg tablet Take 1 Tab by mouth two (2) times a day. 19  Yes Caroline Mcfadden MD   clopidogrel (PLAVIX) 75 mg tab Take 1 Tab by mouth daily. 19  Yes Caroline Mcfadden MD   atorvastatin (LIPITOR) 40 mg tablet Take 1 Tab by mouth daily. Patient taking differently: Take 40 mg by mouth nightly. 19  Yes Caroline Mcfadden MD   finasteride (PROSCAR) 5 mg tablet Take 1 Tab by mouth daily. 19  Yes Caroline Mcfadden MD   metoprolol succinate (TOPROL-XL) 25 mg XL tablet Take 1 Tab by mouth nightly. 19  Yes Caroline Mcfadden MD   varicella-zoster recombinant, PF, [facility], ,) 50 mcg/0.5 mL susr injection 0.5mL by IntraMUSCular route once now and then repeat in 2-6 months 19  Yes Caroline Mcfadden MD   aspirin delayed-release 81 mg tablet Take 81 mg by mouth daily.    Yes Provider, Historical   triamcinolone acetonide (KENALOG) 0.1 % topical cream Apply  to affected area two (2) times a day. use thin layer 7/31/18  Yes Luanne Friedman MD   cholecalciferol, vitamin D3, (VITAMIN D-3) 2,000 unit Tab Take 2,000 Int'l Units by mouth nightly. Yes Provider, Historical     No Known Allergies       Objective:     Physical Exam:   No data found. ECG:    EKG Results     Procedure 720 Value Units Date/Time    EKG, 12 LEAD, INITIAL [[id number]]     Order Status:  Canceled           Data Review:   Labs:   Results for Jacobo Song (MRN [id number]) as of 6/24/2019 11:41   Ref. Range 6/11/2019 11:40   Sodium Latest Ref Range: 136 - 145 mmol/L 139   Potassium Latest Ref Range: 3.5 - 5.1 mmol/L 4.4   Chloride Latest Ref Range: 98 - 107 mmol/L 100   CO2 Latest Ref Range: 21 - 32 mmol/L 33 (H)   Anion gap Latest Ref Range: 7 - 16 mmol/L 6 (L)   Glucose Latest Ref Range: 65 - 100 mg/dL 92   BUN Latest Ref Range: 8 - 23 MG/DL 28 (H)   Creatinine Latest Ref Range: 0.8 - 1.5 MG/DL 0.96   Calcium Latest Ref Range: 8.3 - 10.4 MG/DL 9.7   GFR est non-AA Latest Ref Range: >60 ml/min/1.73m2 >60   GFR est AA Latest Ref Range: >60 ml/min/1.73m2 >60       Results for Jacobo Song (MRN [id number]) as of 6/24/2019 11:41   Ref.  Range 1/21/2019 09:14   Hemoglobin A1c, (calculated) Latest Ref Range: 4.8 - 5.6 % 5.7 (H)     Problem List:  )  Patient Active Problem List   Diagnosis Code    Essential hypertension I10    Arthritis M19.90    GERD (gastroesophageal reflux disease) K21.9    Unspecified adverse effect of anesthesia T41.45XA    Spinal stenosis, lumbar region, 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    Scoliosis (and kyphoscoliosis), idiopathic M41.20    Status post lumbar spinal fusion Z98.1    Osteoarthritis of left hip M16.12    BPH (benign prostatic hyperplasia) N40.0    HLD (hyperlipidemia) E78.5    Former smoker Z87.891    H/O chest pain Z87.898    History of normal resting EKG [id number]    History of colon cancer Z85.038    History of bladder cancer Z85.51    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.649    History of non-ST elevation myocardial infarction (NSTEMI) I25.2    Thrombocytopenia, unspecified ([facility]) D69.6    Dyslipidemia E78.5    Hx of bladder cancer Z85.51    Chronic pain G89.29    Lumbar radiculopathy M54.16    Chronic pain of left knee M25.562, G89.29    Medicare annual wellness visit, subsequent Z00.00    Idiopathic peripheral neuropathy G60.9    Coronary atherosclerosis of native coronary vessel I25.10    S/P PTCA (percutaneous transluminal coronary angioplasty) Z98.61    Atherosclerosis of native coronary artery of native heart without angina pectoris I25.10    Type 2 diabetes mellitus without complication, without long-term current use of insulin ([facility]) E11.9    Mixed hyperlipidemia E78.2    Arthritis of knee, left M17.12    S/P total knee arthroplasty Z96.659    CHERYL (acute kidney injury) ([facility]) N17.9    Hypomagnesemia E83.42    Chest pain at rest R07.9    Atherosclerosis of native coronary artery of native heart with angina pectoris ([facility]) I25.119    Right leg pain M79.604       Total Joint Surgery Pre-Assessment Recommendations:           Recommend continuous saturation monitoring hours of sleep, during hospitalization.       Signed By: CARROLL Ariza    June 24, 2019

## 2019-06-25 ENCOUNTER — APPOINTMENT (OUTPATIENT)
Dept: GENERAL RADIOLOGY | Age: 84
DRG: 470 | End: 2019-06-25
Attending: PHYSICIAN ASSISTANT
Payer: MEDICARE

## 2019-06-25 ENCOUNTER — ANESTHESIA (OUTPATIENT)
Dept: SURGERY | Age: 84
DRG: 470 | End: 2019-06-25
Payer: MEDICARE

## 2019-06-25 ENCOUNTER — HOSPITAL ENCOUNTER (INPATIENT)
Age: 84
LOS: 1 days | Discharge: HOME HEALTH | DRG: 470 | End: 2019-06-26
Attending: ORTHOPAEDIC SURGERY | Admitting: ORTHOPAEDIC SURGERY
Payer: MEDICARE

## 2019-06-25 ENCOUNTER — HOME HEALTH ADMISSION (OUTPATIENT)
Dept: HOME HEALTH SERVICES | Facility: HOME HEALTH | Age: 84
End: 2019-06-25
Payer: MEDICARE

## 2019-06-25 ENCOUNTER — ANESTHESIA EVENT (OUTPATIENT)
Dept: SURGERY | Age: 84
DRG: 470 | End: 2019-06-25
Payer: MEDICARE

## 2019-06-25 DIAGNOSIS — M16.11 ARTHRITIS OF RIGHT HIP: Primary | ICD-10-CM

## 2019-06-25 LAB
ABO + RH BLD: NORMAL
BLOOD GROUP ANTIBODIES SERPL: NORMAL
GLUCOSE BLD STRIP.AUTO-MCNC: 88 MG/DL (ref 65–100)
HGB BLD-MCNC: 13.1 G/DL (ref 13.6–17.2)
SPECIMEN EXP DATE BLD: NORMAL

## 2019-06-25 PROCEDURE — 77030003665 HC NDL SPN BBMI -A: Performed by: ANESTHESIOLOGY

## 2019-06-25 PROCEDURE — 74011000302 HC RX REV CODE- 302: Performed by: ORTHOPAEDIC SURGERY

## 2019-06-25 PROCEDURE — 97110 THERAPEUTIC EXERCISES: CPT

## 2019-06-25 PROCEDURE — 94760 N-INVAS EAR/PLS OXIMETRY 1: CPT

## 2019-06-25 PROCEDURE — 76060000034 HC ANESTHESIA 1.5 TO 2 HR: Performed by: ORTHOPAEDIC SURGERY

## 2019-06-25 PROCEDURE — 82962 GLUCOSE BLOOD TEST: CPT

## 2019-06-25 PROCEDURE — 77030007880 HC KT SPN EPDRL BBMI -B: Performed by: ANESTHESIOLOGY

## 2019-06-25 PROCEDURE — 97165 OT EVAL LOW COMPLEX 30 MIN: CPT

## 2019-06-25 PROCEDURE — 74011250636 HC RX REV CODE- 250/636: Performed by: ORTHOPAEDIC SURGERY

## 2019-06-25 PROCEDURE — 86580 TB INTRADERMAL TEST: CPT | Performed by: ORTHOPAEDIC SURGERY

## 2019-06-25 PROCEDURE — 85018 HEMOGLOBIN: CPT

## 2019-06-25 PROCEDURE — 74011000250 HC RX REV CODE- 250

## 2019-06-25 PROCEDURE — 97161 PT EVAL LOW COMPLEX 20 MIN: CPT

## 2019-06-25 PROCEDURE — 77030034849: Performed by: ORTHOPAEDIC SURGERY

## 2019-06-25 PROCEDURE — C1776 JOINT DEVICE (IMPLANTABLE): HCPCS | Performed by: ORTHOPAEDIC SURGERY

## 2019-06-25 PROCEDURE — 74011250636 HC RX REV CODE- 250/636

## 2019-06-25 PROCEDURE — 72170 X-RAY EXAM OF PELVIS: CPT

## 2019-06-25 PROCEDURE — 77030018547 HC SUT ETHBND1 J&J -B: Performed by: ORTHOPAEDIC SURGERY

## 2019-06-25 PROCEDURE — 77030006835 HC BLD SAW SAG STRY -B: Performed by: ORTHOPAEDIC SURGERY

## 2019-06-25 PROCEDURE — 77030018836 HC SOL IRR NACL ICUM -A: Performed by: ORTHOPAEDIC SURGERY

## 2019-06-25 PROCEDURE — 74011250637 HC RX REV CODE- 250/637: Performed by: PHYSICIAN ASSISTANT

## 2019-06-25 PROCEDURE — 77030002933 HC SUT MCRYL J&J -A: Performed by: ORTHOPAEDIC SURGERY

## 2019-06-25 PROCEDURE — 77030019940 HC BLNKT HYPOTHRM STRY -B: Performed by: ANESTHESIOLOGY

## 2019-06-25 PROCEDURE — 77030018074 HC RTVR SUT ARTH4 S&N -B: Performed by: ORTHOPAEDIC SURGERY

## 2019-06-25 PROCEDURE — 65270000029 HC RM PRIVATE

## 2019-06-25 PROCEDURE — 0SR904A REPLACEMENT OF RIGHT HIP JOINT WITH CERAMIC ON POLYETHYLENE SYNTHETIC SUBSTITUTE, UNCEMENTED, OPEN APPROACH: ICD-10-PCS | Performed by: ORTHOPAEDIC SURGERY

## 2019-06-25 PROCEDURE — 74011000258 HC RX REV CODE- 258: Performed by: ORTHOPAEDIC SURGERY

## 2019-06-25 PROCEDURE — 74011000250 HC RX REV CODE- 250: Performed by: ORTHOPAEDIC SURGERY

## 2019-06-25 PROCEDURE — 77030008467 HC STPLR SKN COVD -B: Performed by: ORTHOPAEDIC SURGERY

## 2019-06-25 PROCEDURE — 36415 COLL VENOUS BLD VENIPUNCTURE: CPT

## 2019-06-25 PROCEDURE — 94762 N-INVAS EAR/PLS OXIMTRY CONT: CPT

## 2019-06-25 PROCEDURE — 77030020263 HC SOL INJ SOD CL0.9% LFCR 1000ML

## 2019-06-25 PROCEDURE — 77030032490 HC SLV COMPR SCD KNE COVD -B

## 2019-06-25 PROCEDURE — 77030013708 HC HNDPC SUC IRR PULS STRY –B: Performed by: ORTHOPAEDIC SURGERY

## 2019-06-25 PROCEDURE — 74011250637 HC RX REV CODE- 250/637: Performed by: ORTHOPAEDIC SURGERY

## 2019-06-25 PROCEDURE — 77030002966 HC SUT PDS J&J -A: Performed by: ORTHOPAEDIC SURGERY

## 2019-06-25 PROCEDURE — 86900 BLOOD TYPING SEROLOGIC ABO: CPT

## 2019-06-25 PROCEDURE — 76210000006 HC OR PH I REC 0.5 TO 1 HR: Performed by: ORTHOPAEDIC SURGERY

## 2019-06-25 PROCEDURE — 76010000162 HC OR TIME 1.5 TO 2 HR INTENSV-TIER 1: Performed by: ORTHOPAEDIC SURGERY

## 2019-06-25 PROCEDURE — 74011250636 HC RX REV CODE- 250/636: Performed by: PHYSICIAN ASSISTANT

## 2019-06-25 DEVICE — CUP ACET DIA58MM 12 H HIP TI GRIPTION VIP TAPR DOME REV: Type: IMPLANTABLE DEVICE | Site: HIP | Status: FUNCTIONAL

## 2019-06-25 DEVICE — SCREW BNE L25MM DIA6.5MM CANC HIP S STL GRIPTION FULL THRD: Type: IMPLANTABLE DEVICE | Site: HIP | Status: FUNCTIONAL

## 2019-06-25 DEVICE — LINER ACET OD58MM ID36MM HIP ALTRX PINN: Type: IMPLANTABLE DEVICE | Site: HIP | Status: FUNCTIONAL

## 2019-06-25 DEVICE — STEM FEM SZ 9 L113MM 12/14 TAPR STD OFFSET HIP DUOFIX CLLRD: Type: IMPLANTABLE DEVICE | Site: HIP | Status: FUNCTIONAL

## 2019-06-25 DEVICE — HEAD FEM DIA36MM +8MM OFFSET 12/14 TAPR HIP CERAMIC BIOLOX: Type: IMPLANTABLE DEVICE | Site: HIP | Status: FUNCTIONAL

## 2019-06-25 RX ORDER — ALBUTEROL SULFATE 0.83 MG/ML
2.5 SOLUTION RESPIRATORY (INHALATION) AS NEEDED
Status: DISCONTINUED | OUTPATIENT
Start: 2019-06-25 | End: 2019-06-25 | Stop reason: HOSPADM

## 2019-06-25 RX ORDER — OXYCODONE HYDROCHLORIDE 5 MG/1
5 TABLET ORAL
Status: DISCONTINUED | OUTPATIENT
Start: 2019-06-25 | End: 2019-06-25 | Stop reason: HOSPADM

## 2019-06-25 RX ORDER — MIDAZOLAM HYDROCHLORIDE 1 MG/ML
2 INJECTION, SOLUTION INTRAMUSCULAR; INTRAVENOUS
Status: CANCELLED | OUTPATIENT
Start: 2019-06-25 | End: 2019-06-26

## 2019-06-25 RX ORDER — AMOXICILLIN 250 MG
2 CAPSULE ORAL DAILY
Status: DISCONTINUED | OUTPATIENT
Start: 2019-06-26 | End: 2019-06-26 | Stop reason: HOSPADM

## 2019-06-25 RX ORDER — NALOXONE HYDROCHLORIDE 0.4 MG/ML
0.1 INJECTION, SOLUTION INTRAMUSCULAR; INTRAVENOUS; SUBCUTANEOUS AS NEEDED
Status: DISCONTINUED | OUTPATIENT
Start: 2019-06-25 | End: 2019-06-25 | Stop reason: HOSPADM

## 2019-06-25 RX ORDER — HYDROMORPHONE HYDROCHLORIDE 2 MG/ML
0.5 INJECTION, SOLUTION INTRAMUSCULAR; INTRAVENOUS; SUBCUTANEOUS
Status: DISCONTINUED | OUTPATIENT
Start: 2019-06-25 | End: 2019-06-25 | Stop reason: HOSPADM

## 2019-06-25 RX ORDER — LIDOCAINE HYDROCHLORIDE 10 MG/ML
0.1 INJECTION INFILTRATION; PERINEURAL AS NEEDED
Status: CANCELLED | OUTPATIENT
Start: 2019-06-25

## 2019-06-25 RX ORDER — ONDANSETRON 4 MG/1
4 TABLET, ORALLY DISINTEGRATING ORAL
Status: DISCONTINUED | OUTPATIENT
Start: 2019-06-25 | End: 2019-06-26 | Stop reason: HOSPADM

## 2019-06-25 RX ORDER — SODIUM CHLORIDE, SODIUM LACTATE, POTASSIUM CHLORIDE, CALCIUM CHLORIDE 600; 310; 30; 20 MG/100ML; MG/100ML; MG/100ML; MG/100ML
75 INJECTION, SOLUTION INTRAVENOUS CONTINUOUS
Status: DISCONTINUED | OUTPATIENT
Start: 2019-06-25 | End: 2019-06-26 | Stop reason: HOSPADM

## 2019-06-25 RX ORDER — OXYCODONE HYDROCHLORIDE 10 MG/1
10 TABLET ORAL
Qty: 50 TAB | Refills: 0 | Status: SHIPPED | OUTPATIENT
Start: 2019-06-25 | End: 2019-06-28

## 2019-06-25 RX ORDER — CELECOXIB 200 MG/1
200 CAPSULE ORAL EVERY 12 HOURS
Status: DISCONTINUED | OUTPATIENT
Start: 2019-06-25 | End: 2019-06-26 | Stop reason: HOSPADM

## 2019-06-25 RX ORDER — MIDAZOLAM HYDROCHLORIDE 1 MG/ML
2 INJECTION, SOLUTION INTRAMUSCULAR; INTRAVENOUS ONCE
Status: CANCELLED | OUTPATIENT
Start: 2019-06-25 | End: 2019-06-25

## 2019-06-25 RX ORDER — SODIUM CHLORIDE 9 MG/ML
100 INJECTION, SOLUTION INTRAVENOUS CONTINUOUS
Status: DISCONTINUED | OUTPATIENT
Start: 2019-06-25 | End: 2019-06-26 | Stop reason: HOSPADM

## 2019-06-25 RX ORDER — ONDANSETRON 2 MG/ML
4 INJECTION INTRAMUSCULAR; INTRAVENOUS ONCE
Status: DISCONTINUED | OUTPATIENT
Start: 2019-06-25 | End: 2019-06-25 | Stop reason: HOSPADM

## 2019-06-25 RX ORDER — SODIUM CHLORIDE 0.9 % (FLUSH) 0.9 %
5-40 SYRINGE (ML) INJECTION AS NEEDED
Status: DISCONTINUED | OUTPATIENT
Start: 2019-06-25 | End: 2019-06-26 | Stop reason: HOSPADM

## 2019-06-25 RX ORDER — FINASTERIDE 5 MG/1
5 TABLET, FILM COATED ORAL DAILY
Status: DISCONTINUED | OUTPATIENT
Start: 2019-06-26 | End: 2019-06-26 | Stop reason: HOSPADM

## 2019-06-25 RX ORDER — ASPIRIN 81 MG/1
81 TABLET ORAL EVERY 12 HOURS
Status: DISCONTINUED | OUTPATIENT
Start: 2019-06-25 | End: 2019-06-26 | Stop reason: HOSPADM

## 2019-06-25 RX ORDER — BUPIVACAINE HYDROCHLORIDE 7.5 MG/ML
INJECTION, SOLUTION INTRASPINAL
Status: COMPLETED | OUTPATIENT
Start: 2019-06-25 | End: 2019-06-25

## 2019-06-25 RX ORDER — DEXAMETHASONE SODIUM PHOSPHATE 4 MG/ML
INJECTION, SOLUTION INTRA-ARTICULAR; INTRALESIONAL; INTRAMUSCULAR; INTRAVENOUS; SOFT TISSUE AS NEEDED
Status: DISCONTINUED | OUTPATIENT
Start: 2019-06-25 | End: 2019-06-25 | Stop reason: HOSPADM

## 2019-06-25 RX ORDER — PROPOFOL 10 MG/ML
INJECTION, EMULSION INTRAVENOUS
Status: DISCONTINUED | OUTPATIENT
Start: 2019-06-25 | End: 2019-06-25 | Stop reason: HOSPADM

## 2019-06-25 RX ORDER — ROPIVACAINE HYDROCHLORIDE 2 MG/ML
INJECTION, SOLUTION EPIDURAL; INFILTRATION; PERINEURAL AS NEEDED
Status: DISCONTINUED | OUTPATIENT
Start: 2019-06-25 | End: 2019-06-25 | Stop reason: HOSPADM

## 2019-06-25 RX ORDER — NEOMYCIN AND POLYMYXIN B SULFATES 40; 200000 MG/ML; [USP'U]/ML
SOLUTION IRRIGATION AS NEEDED
Status: DISCONTINUED | OUTPATIENT
Start: 2019-06-25 | End: 2019-06-25 | Stop reason: HOSPADM

## 2019-06-25 RX ORDER — HYDROMORPHONE HYDROCHLORIDE 2 MG/ML
1 INJECTION, SOLUTION INTRAMUSCULAR; INTRAVENOUS; SUBCUTANEOUS
Status: DISCONTINUED | OUTPATIENT
Start: 2019-06-25 | End: 2019-06-26 | Stop reason: HOSPADM

## 2019-06-25 RX ORDER — DIPHENHYDRAMINE HCL 25 MG
25 CAPSULE ORAL
Status: DISCONTINUED | OUTPATIENT
Start: 2019-06-25 | End: 2019-06-26 | Stop reason: HOSPADM

## 2019-06-25 RX ORDER — VANCOMYCIN HYDROCHLORIDE 1 G/20ML
INJECTION, POWDER, LYOPHILIZED, FOR SOLUTION INTRAVENOUS AS NEEDED
Status: DISCONTINUED | OUTPATIENT
Start: 2019-06-25 | End: 2019-06-25 | Stop reason: HOSPADM

## 2019-06-25 RX ORDER — SODIUM CHLORIDE, SODIUM LACTATE, POTASSIUM CHLORIDE, CALCIUM CHLORIDE 600; 310; 30; 20 MG/100ML; MG/100ML; MG/100ML; MG/100ML
100 INJECTION, SOLUTION INTRAVENOUS CONTINUOUS
Status: DISCONTINUED | OUTPATIENT
Start: 2019-06-25 | End: 2019-06-25 | Stop reason: HOSPADM

## 2019-06-25 RX ORDER — OXYCODONE HYDROCHLORIDE 5 MG/1
10 TABLET ORAL
Status: DISCONTINUED | OUTPATIENT
Start: 2019-06-25 | End: 2019-06-26 | Stop reason: HOSPADM

## 2019-06-25 RX ORDER — METOPROLOL SUCCINATE 25 MG/1
25 TABLET, EXTENDED RELEASE ORAL
Status: DISCONTINUED | OUTPATIENT
Start: 2019-06-25 | End: 2019-06-26 | Stop reason: HOSPADM

## 2019-06-25 RX ORDER — TRANEXAMIC ACID 100 MG/ML
INJECTION, SOLUTION INTRAVENOUS AS NEEDED
Status: DISCONTINUED | OUTPATIENT
Start: 2019-06-25 | End: 2019-06-25 | Stop reason: HOSPADM

## 2019-06-25 RX ORDER — CEFAZOLIN SODIUM/WATER 2 G/20 ML
2 SYRINGE (ML) INTRAVENOUS ONCE
Status: COMPLETED | OUTPATIENT
Start: 2019-06-25 | End: 2019-06-25

## 2019-06-25 RX ORDER — EPHEDRINE SULFATE 50 MG/ML
INJECTION, SOLUTION INTRAVENOUS AS NEEDED
Status: DISCONTINUED | OUTPATIENT
Start: 2019-06-25 | End: 2019-06-25 | Stop reason: HOSPADM

## 2019-06-25 RX ORDER — DIPHENHYDRAMINE HYDROCHLORIDE 50 MG/ML
12.5 INJECTION, SOLUTION INTRAMUSCULAR; INTRAVENOUS
Status: DISCONTINUED | OUTPATIENT
Start: 2019-06-25 | End: 2019-06-25 | Stop reason: HOSPADM

## 2019-06-25 RX ORDER — SODIUM CHLORIDE 0.9 % (FLUSH) 0.9 %
5-40 SYRINGE (ML) INJECTION EVERY 8 HOURS
Status: DISCONTINUED | OUTPATIENT
Start: 2019-06-25 | End: 2019-06-26 | Stop reason: HOSPADM

## 2019-06-25 RX ORDER — NALOXONE HYDROCHLORIDE 0.4 MG/ML
.2-.4 INJECTION, SOLUTION INTRAMUSCULAR; INTRAVENOUS; SUBCUTANEOUS
Status: DISCONTINUED | OUTPATIENT
Start: 2019-06-25 | End: 2019-06-26 | Stop reason: HOSPADM

## 2019-06-25 RX ORDER — FENTANYL CITRATE 50 UG/ML
100 INJECTION, SOLUTION INTRAMUSCULAR; INTRAVENOUS ONCE
Status: CANCELLED | OUTPATIENT
Start: 2019-06-25 | End: 2019-06-25

## 2019-06-25 RX ORDER — DEXAMETHASONE SODIUM PHOSPHATE 100 MG/10ML
10 INJECTION INTRAMUSCULAR; INTRAVENOUS ONCE
Status: DISCONTINUED | OUTPATIENT
Start: 2019-06-26 | End: 2019-06-26 | Stop reason: HOSPADM

## 2019-06-25 RX ORDER — SODIUM CHLORIDE, SODIUM LACTATE, POTASSIUM CHLORIDE, CALCIUM CHLORIDE 600; 310; 30; 20 MG/100ML; MG/100ML; MG/100ML; MG/100ML
100 INJECTION, SOLUTION INTRAVENOUS CONTINUOUS
Status: CANCELLED | OUTPATIENT
Start: 2019-06-25 | End: 2019-06-26

## 2019-06-25 RX ORDER — OXYCODONE HYDROCHLORIDE 5 MG/1
10 TABLET ORAL
Status: DISCONTINUED | OUTPATIENT
Start: 2019-06-25 | End: 2019-06-25 | Stop reason: HOSPADM

## 2019-06-25 RX ORDER — KETOROLAC TROMETHAMINE 30 MG/ML
INJECTION, SOLUTION INTRAMUSCULAR; INTRAVENOUS AS NEEDED
Status: DISCONTINUED | OUTPATIENT
Start: 2019-06-25 | End: 2019-06-25 | Stop reason: HOSPADM

## 2019-06-25 RX ORDER — LIDOCAINE HYDROCHLORIDE 20 MG/ML
INJECTION, SOLUTION EPIDURAL; INFILTRATION; INTRACAUDAL; PERINEURAL AS NEEDED
Status: DISCONTINUED | OUTPATIENT
Start: 2019-06-25 | End: 2019-06-25 | Stop reason: HOSPADM

## 2019-06-25 RX ORDER — ACETAMINOPHEN 10 MG/ML
1000 INJECTION, SOLUTION INTRAVENOUS ONCE
Status: COMPLETED | OUTPATIENT
Start: 2019-06-25 | End: 2019-06-25

## 2019-06-25 RX ORDER — FAMOTIDINE 20 MG/1
20 TABLET, FILM COATED ORAL 2 TIMES DAILY
Status: DISCONTINUED | OUTPATIENT
Start: 2019-06-25 | End: 2019-06-26 | Stop reason: HOSPADM

## 2019-06-25 RX ORDER — ZOLPIDEM TARTRATE 5 MG/1
5 TABLET ORAL
Status: DISCONTINUED | OUTPATIENT
Start: 2019-06-25 | End: 2019-06-26 | Stop reason: HOSPADM

## 2019-06-25 RX ORDER — ONDANSETRON 2 MG/ML
INJECTION INTRAMUSCULAR; INTRAVENOUS AS NEEDED
Status: DISCONTINUED | OUTPATIENT
Start: 2019-06-25 | End: 2019-06-25 | Stop reason: HOSPADM

## 2019-06-25 RX ORDER — ACETAMINOPHEN 500 MG
1000 TABLET ORAL EVERY 6 HOURS
Status: DISCONTINUED | OUTPATIENT
Start: 2019-06-26 | End: 2019-06-26 | Stop reason: HOSPADM

## 2019-06-25 RX ORDER — CEFAZOLIN SODIUM/WATER 2 G/20 ML
2 SYRINGE (ML) INTRAVENOUS EVERY 8 HOURS
Status: COMPLETED | OUTPATIENT
Start: 2019-06-25 | End: 2019-06-26

## 2019-06-25 RX ORDER — MIDAZOLAM HYDROCHLORIDE 1 MG/ML
INJECTION, SOLUTION INTRAMUSCULAR; INTRAVENOUS AS NEEDED
Status: DISCONTINUED | OUTPATIENT
Start: 2019-06-25 | End: 2019-06-25 | Stop reason: HOSPADM

## 2019-06-25 RX ADMIN — PROPOFOL 50 MCG/KG/MIN: 10 INJECTION, EMULSION INTRAVENOUS at 09:39

## 2019-06-25 RX ADMIN — Medication 3 AMPULE: at 07:49

## 2019-06-25 RX ADMIN — CELECOXIB 200 MG: 200 CAPSULE ORAL at 21:38

## 2019-06-25 RX ADMIN — EPHEDRINE SULFATE 10 MG: 50 INJECTION, SOLUTION INTRAVENOUS at 09:46

## 2019-06-25 RX ADMIN — LIDOCAINE HYDROCHLORIDE 20 MG: 20 INJECTION, SOLUTION EPIDURAL; INFILTRATION; INTRACAUDAL; PERINEURAL at 09:40

## 2019-06-25 RX ADMIN — ACETAMINOPHEN 1000 MG: 500 TABLET, FILM COATED ORAL at 23:31

## 2019-06-25 RX ADMIN — ASPIRIN 81 MG: 81 TABLET ORAL at 21:38

## 2019-06-25 RX ADMIN — SODIUM CHLORIDE 100 ML/HR: 900 INJECTION, SOLUTION INTRAVENOUS at 22:05

## 2019-06-25 RX ADMIN — HYDROMORPHONE HYDROCHLORIDE 1 MG: 2 INJECTION INTRAMUSCULAR; INTRAVENOUS; SUBCUTANEOUS at 23:30

## 2019-06-25 RX ADMIN — OXYCODONE HYDROCHLORIDE 10 MG: 5 TABLET ORAL at 21:38

## 2019-06-25 RX ADMIN — SODIUM CHLORIDE, SODIUM LACTATE, POTASSIUM CHLORIDE, AND CALCIUM CHLORIDE: 600; 310; 30; 20 INJECTION, SOLUTION INTRAVENOUS at 09:51

## 2019-06-25 RX ADMIN — BUPIVACAINE HYDROCHLORIDE 12.5 MG: 7.5 INJECTION, SOLUTION INTRASPINAL at 09:26

## 2019-06-25 RX ADMIN — EPHEDRINE SULFATE 10 MG: 50 INJECTION, SOLUTION INTRAVENOUS at 09:48

## 2019-06-25 RX ADMIN — TUBERCULIN PURIFIED PROTEIN DERIVATIVE 5 UNITS: 5 INJECTION, SOLUTION INTRADERMAL at 07:50

## 2019-06-25 RX ADMIN — ONDANSETRON 4 MG: 2 INJECTION INTRAMUSCULAR; INTRAVENOUS at 09:50

## 2019-06-25 RX ADMIN — Medication 5 ML: at 14:22

## 2019-06-25 RX ADMIN — METOPROLOL SUCCINATE 25 MG: 25 TABLET, EXTENDED RELEASE ORAL at 21:38

## 2019-06-25 RX ADMIN — SODIUM CHLORIDE, SODIUM LACTATE, POTASSIUM CHLORIDE, AND CALCIUM CHLORIDE 75 ML/HR: 600; 310; 30; 20 INJECTION, SOLUTION INTRAVENOUS at 07:52

## 2019-06-25 RX ADMIN — Medication 10 ML: at 22:44

## 2019-06-25 RX ADMIN — PROPOFOL: 10 INJECTION, EMULSION INTRAVENOUS at 10:27

## 2019-06-25 RX ADMIN — Medication 1 AMPULE: at 21:38

## 2019-06-25 RX ADMIN — SODIUM CHLORIDE 100 ML/HR: 900 INJECTION, SOLUTION INTRAVENOUS at 14:21

## 2019-06-25 RX ADMIN — EPHEDRINE SULFATE 10 MG: 50 INJECTION, SOLUTION INTRAVENOUS at 10:15

## 2019-06-25 RX ADMIN — TRANEXAMIC ACID 1000 MG: 100 INJECTION, SOLUTION INTRAVENOUS at 09:32

## 2019-06-25 RX ADMIN — ACETAMINOPHEN 1000 MG: 10 INJECTION, SOLUTION INTRAVENOUS at 17:17

## 2019-06-25 RX ADMIN — Medication 2 G: at 09:28

## 2019-06-25 RX ADMIN — DEXAMETHASONE SODIUM PHOSPHATE 10 MG: 4 INJECTION, SOLUTION INTRA-ARTICULAR; INTRALESIONAL; INTRAMUSCULAR; INTRAVENOUS; SOFT TISSUE at 09:49

## 2019-06-25 RX ADMIN — Medication 2 G: at 17:18

## 2019-06-25 RX ADMIN — MIDAZOLAM HYDROCHLORIDE 2 MG: 1 INJECTION, SOLUTION INTRAMUSCULAR; INTRAVENOUS at 09:21

## 2019-06-25 NOTE — PERIOP NOTES
....TRANSFER - OUT REPORT:    Verbal report given to NO Kessler(name) on Ann De La Cruz  being transferred to pre-op(unit) for routine progression of care       Report consisted of patients Situation, Background, Assessment and   Recommendations(SBAR). Information from the following report(s) SBAR and MAR was reviewed with the receiving nurse. Lines:   Peripheral IV 06/25/19 Left Wrist (Active)   Site Assessment Clean, dry, & intact 6/25/2019  7:48 AM   Phlebitis Assessment 0 6/25/2019  7:48 AM   Infiltration Assessment 0 6/25/2019  7:48 AM   Dressing Status Clean, dry, & intact 6/25/2019  7:48 AM   Dressing Type Tape;Transparent 6/25/2019  7:48 AM   Hub Color/Line Status Pink; Infusing 6/25/2019  7:48 AM        Opportunity for questions and clarification was provided.       Patient transported with:   Tech   BS=88

## 2019-06-25 NOTE — PERIOP NOTES
TRANSFER - IN REPORT:    Verbal report received from [facility] on Abby Shearer  being received from 3rd floor ortho for routine progression of care      Report consisted of patients Situation, Background, Assessment and   Recommendations(SBAR). Information from the following report(s) Kardex and MAR was reviewed with the receiving nurse. Opportunity for questions and clarification was provided. Assessment completed upon patients arrival to unit and care assumed.

## 2019-06-25 NOTE — PROGRESS NOTES
Problem: Mobility Impaired (Adult and Pediatric)  Goal: *Acute Goals and Plan of Care (Insert Text)  Description  GOALS (1-4 days):  (1.)Mr. Kedar Hi will move from supine to sit and sit to supine  in bed with STAND BY ASSIST.    (2.)Mr. Kedar Hi will transfer from bed to chair and chair to bed with STAND BY ASSIST using the least restrictive device. (3.)Mr. Kedar Hi will ambulate with STAND BY ASSIST for 300 feet with the least restrictive device. (4.)Mr. Kedar Hi will ambulate up/down 3 steps with bilateral  railing with CONTACT GUARD ASSIST with no device. (5.)Mr. Kedar Hi will state/observe CATRINA precautions with 0 verbal cues. ________________________________________________________________________________________________     Outcome: Progressing Towards Goal     PHYSICAL THERAPY JOINT CAMP CATRINA: Initial Assessment, Treatment Day: Day of Assessment and PM 6/25/2019  INPATIENT: Hospital Day: 1  [place] / [place] B / Product Type: Medicare /      NAME/AGE/GENDER: Tracey Alpers is a 81 y.o. male   PRIMARY DIAGNOSIS:  Unilateral primary osteoarthritis, right hip [M16.11]   Procedure(s) and Anesthesia Type:     * RIGHT TOTAL HIP ARTHROPLASTY DEPUY - Spinal (Right)  ICD-10: Treatment Diagnosis:    Pain in Right Hip (M25.551)  Stiffness of Right Hip, Not elsewhere classified (M25.651)  Difficulty in walking, Not elsewhere classified (R26.2)      ASSESSMENT:     Mr. Kedar Hi presents with decreased strength and range of motion right lower extremity and with decreased independence with functional mobility s/p right CATRINA. Pt will benefit from skilled PT interventions to maximize independence with functional mobility. Pt did well with assessment although some residual numbness and decreased proprioception. Was able to walk about 10 feet with assist of 2. Did great with CATRINA exercises in the chair with verbal cues. Pt stayed up in chair with needs in reach.  Hope to further progress at next therapy session. This section established at most recent assessment   PROBLEM LIST (Impairments causing functional limitations):  Decreased Strength  Decreased ADL/Functional Activities  Decreased Transfer Abilities  Decreased Ambulation Ability/Technique  Increased Pain  Decreased Flexibility/Joint Mobility  Edema/Girth  Decreased Knowledge of Precautions  Decreased [place] with Home Exercise Program   INTERVENTIONS PLANNED: (Benefits and precautions of physical therapy have been discussed with the patient.)  Cold  bed mobility  gait training  home exercise program (HEP)  Range of Motion: active/assisted/passive  Therapeutic Activities  therapeutic exercise/strengthening  transfer training  Group Therapy     TREATMENT PLAN: Frequency/Duration: Follow patient BID for duration of hospital stay to address above goals. Rehabilitation Potential For Stated Goals: Good     RECOMMENDED REHABILITATION/EQUIPMENT: (at time of discharge pending progress): Continue Skilled Therapy and Home Health: Physical Therapy. HISTORY:   History of Present Injury/Illness (Reason for Referral):  Pt s/p right CATRINA on 6/25/19  Past Medical History/Comorbidities:   Mr. 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   Mr. Conley Nageotte  has a past surgical history that includes pr abdomen surgery proc unlisted (1991); hx lumbar fusion (1990 x 2  2012, 2013); hx urological (2002); hx heent (as child); hx orthopaedic (Left, 2013); hx colectomy (1997); hx colonoscopy (2014, 2018); hx heart catheterization (01/10/2014); hx coronary stent placement; hx knee replacement (Left, 2017); hx shoulder arthroscopy (Left, 1990); and hx other surgical.  Social History/Living Environment:   Home Environment: Private residence  # Steps to Enter: 1  Rails to Enter: No  One/Two Story Residence: One story  # of Interior Steps: 3  Interior Rails: Both  Living Alone: No  Support Systems: Child(linda)  Patient Expects to be Discharged to[de-identified] Private residence  Current DME Used/Available at Home: Berino Boys, straight, Commode, bedside, Josie Lion, rollator, Walker, rolling  Tub or Shower Type: Shower  Prior Level of Function/Work/Activity:  Pt living at home, independent with with gait and ADLs    Number of Personal Factors/Comorbidities that affect the Plan of Care: 0: LOW COMPLEXITY   EXAMINATION:   Most Recent Physical Functioning:   Gross Assessment: Yes  Gross Assessment  AROM: Within functional limits(except right lower extremity, s/p CATRINA)  Strength: Within functional limits(except right lower extremity, s/p CATRINA)                     Bed Mobility  Supine to Sit: Stand-by assistance;Contact guard assistance  Sit to Supine: (stayed up in chair)    Transfers  Sit to Stand: Minimum assistance  Stand to Sit: Minimum assistance    Balance  Sitting: Intact  Standing: Pull to stand; With support    Posture  Posture (WDL): Exceptions to WDL  Posture Assessment: Forward head;Rounded shoulders         Weight Bearing Status  Right Side Weight Bearing: As tolerated  Distance (ft): 10 Feet (ft)  Ambulation - Level of Assistance: Minimal assistance(of 1 to 2, some residual numbnes and decreased proprioceptio)  Assistive Device: Walker, rolling  Speed/Mary: Pace decreased (<100 feet/min); Shuffled  Step Length: Left shortened  Stance: Right decreased  Gait Abnormalities: Antalgic;Decreased step clearance        Braces/Orthotics: none           Body Structures Involved:  Joints  Muscles Body Functions Affected:   Movement Related Activities and Participation Affected: Mobility  Self Care   Number of elements that affect the Plan of Care: 4+: HIGH COMPLEXITY   CLINICAL PRESENTATION:   Presentation: Stable and uncomplicated: LOW COMPLEXITY   CLINICAL DECISION MAKIN [address]? ?6 Clicks? Basic Mobility Inpatient Short Form  How much difficulty does the patient currently have. .. Unable A Lot A Little None   1. Turning over in bed (including adjusting bedclothes, sheets and blankets)? ? 1   ? 2   ? 3   ? 4   2. Sitting down on and standing up from a chair with arms ( e.g., wheelchair, bedside commode, etc.)   ? 1   ? 2   ? 3   ? 4   3. Moving from lying on back to sitting on the side of the bed?   ? 1   ? 2   ? 3   ? 4   How much help from another person does the patient currently need. .. Total A Lot A Little None   4. Moving to and from a bed to a chair (including a wheelchair)? ? 1   ? 2   ? 3   ? 4   5. Need to walk in hospital room? ? 1   ? 2   ? 3   ? 4   6. Climbing 3-5 steps with a railing? ? 1   ? 2   ? 3   ? 4   © , Trustees of [facility] [employer], under license to [employer]. All rights reserved     Score:  Initial: 16 Most Recent: X (Date: -- )    Interpretation of Tool:  Represents activities that are increasingly more difficult (i.e. Bed mobility, Transfers, Gait). Medical Necessity:     Patient is expected to demonstrate progress in strength, range of motion and functional technique   to decrease assistance required with functional mobility and CATRINA exercises   . Reason for Services/Other Comments:  Patient continues to require skilled intervention due to inability to complete functional mobility and CATRINA exercises independently   .    Use of outcome tool(s) and clinical judgement create a POC that gives a: Clear prediction of patient's progress: LOW COMPLEXITY            TREATMENT:   (In addition to Assessment/Re-Assessment sessions the following treatments were rendered)     Pre-treatment Symptoms/Complaints:  none  Pain Initial:   Pain Intensity 1: 0  Post Session:  0/10     Therapeutic Exercise: (10 Minutes):  Exercises per grid below to improve mobility and strength. Required minimal verbal and manual cues to promote proper body alignment and promote proper body posture. Progressed repetitions as indicated. Date:  6/25/19 Date:   Date:     ACTIVITY/EXERCISE AM PM AM PM AM PM   GROUP THERAPY  ?  ?  ?  ?  ?  ? Ankle Pumps  10       Quad Sets  10       Gluteal Sets  10       Hip ABd/ADduction  10       Straight Leg Raises         Knee Slides  10       Short Arc Quads         Long Arc Quads  10       Chair Slides                  B = bilateral; AA = active assistive; A = active; P = passive      Treatment/Session Assessment:     Response to Treatment:  pt tolerated well, some decreased coordination from numbness. Education:  ? Home Exercises  ? Fall Precautions  ? Hip Precautions ? D/C Instruction Review  ? Hip Prosthesis Review  ? Walker Management/Safety ? Adaptive Equipment as Needed       Interdisciplinary Collaboration:   Occupational Therapist  Registered Nurse    After treatment position/precautions:   Up in chair  Bed/Chair-wheels locked  Call light within reach    Compliance with Program/Exercises: Compliant all of the time, Will assess as treatment progresses. Recommendations/Intent for next treatment session:  Treatment next visit will focus on increasing Mr. Cabreras independence with bed mobility, transfers, gait training, strength/ROM exercises, modalities for pain, and patient education.       Total Treatment Duration:  PT Patient Time In/Time Out  Time In: 1410  Time Out: 6699 Asia Kate, PT

## 2019-06-25 NOTE — ANESTHESIA PROCEDURE NOTES
Spinal Block    Start time: 6/25/2019 9:24 AM  End time: 6/25/2019 9:26 AM  Performed by: Fred Warren MD  Authorized by: Fred Warren MD     Pre-procedure:   Indications: primary anesthetic  Preanesthetic Checklist: patient identified, risks and benefits discussed, anesthesia consent, site marked, patient being monitored and timeout performed    Timeout Time: 09:24          Spinal Block:   Patient Position:  Seated  Prep Region:  Lumbar  Prep: chlorhexidine      Location:  L3-4  Technique:  Single shot    Local Dose (mL):  3    Needle:   Needle Type:  Pencan  Needle Gauge:  25 G  Attempts:  1      Events: CSF confirmed, no blood with aspiration and no paresthesia        Assessment:  Insertion:  Uncomplicated  Patient tolerance:  Patient tolerated the procedure well with no immediate complications

## 2019-06-25 NOTE — PERIOP NOTES
[id number]      in pre-op- Instructed to use spirometer 10x/hr post-op while awake Teach back method used with patient concerning hibiclens wash, TB screening, incentive spirometer , pain management and educated pt and family on home discharge needs list

## 2019-06-25 NOTE — OP NOTES
Total Hip Procedure Note               Ann De La Cruz, [id number], 1935    Pre-operative Diagnosis: Unilateral primary osteoarthritis, right hip [M16.11]      Post-operative Diagnosis: Unilateral primary osteoarthritis, right hip [M16.11]     Procedure: Right Total Hip Arthroplasty     BMI: Body mass index is 24.2 kg/m². Mellisa Corona Location: [address] [place] [address]      Surgeon: Robles Sanchez MD      Assistant: OFELIA Freeman    Anesthesia: Spinal      EBL: 200 cc     Procedure: Total Hip Arthroplasty    The complexity of total joint surgery requires the use of a skilled first assistant for positioning, retraction and assistance in closure. Ann De La Cruz was brought to the operating room and positioned on the operating table. Anesthesia was administered. A singh catheter was placed preoperatively and IV antibiotics were administered, along with IV transexamic acid. A time out identifying the patient, procedure, operative side and surgeon was administered and charted by the OR Nurse. Ann De La Cruz was positioned on right lateral decubitus position. The limb was prepped and draped in the usual sterile fashion. The Posterior approach was utilized to expose the hip. The incision was carried through the subcutaneous tissue and underlying fascia with hemostasis obtained using the bovie cauterization. A [place] retractor was inserted. The short external rotators were divided at their insertion. The sciatic nerve was palpated and identified. Next, a T-shaped capsular incision was accomplished and femoral head was dislocated. The articular surface revealed loss of cartilage, exposed bone and bone spurring. The neck was osteotomized in the appropriate position just above the lesser trochanteric region. The neck cut was measured to be 21 mm. Acetabular retractors were placed and the appropriate capsulotomy performed. Soft tissue was removed from the acetabulum.  The acetabular surface revealed loss of cartilage with exposed bone. The acetabulum was sequentially reamed. Using trial components, the acetabulum was sized to a 58 mm [place] acetabular cup. The acetabular component was impacted to achieve appropriate horizontal tilt, anteversion, coverage, and stability. 1 screw was 1 used to stabilize the cup. The polyethelene liner was impacted into position. Utilizing the femoral retractor, the canal was prepared with appropriate lateralization with the starter rasp. The canal was then broached progressively. The broach was positioned with the appropriate anatomic femoral anteversion. A calcar planar was utilized. A trial reduction with a +8.5 Biolox neck length was utilized. This was found to be the most stable to flexion greater than 90 degrees and on internal and external rotation. Limb lengths were thought to be equilibrated and with appropriate stability as mentioned above. All trial components were removed. The cementless permanent size 9 std Corail was impacted into place. A trial reduction was performed and the above neck length was selected. The permanent femoral head was impacted into place. Mary Ericaer Rodrigues's hip was reduced and stability was as mentioned above. Copious irrigation was accomplished about the surgical site. The sciatic nerve was palpated and noted to be intact. The capsule was repaired with a #1 PDS and the external rotators were reattached with a #5 Mersilene. A lavage of diluted betadine solution of 17.5 ml Betadine in 500 of 0.9% Normal Saline was allowed to soak in the wound for 3 minutes after implanting of the prosthesis. The wound was irrigated with Saline again before closure. The joint and skin areas were sprinkled with 1 gm of vancomycin powder. Prior to the final skin closure, full strength betadine was applied to the skin surrounding the skin incision.      The operative hip was injected prior to closure for post operative pain management. A #1 PDS suture was used to re-approximate the fascia. 60 cc of transexamic acid was injected under the fascia for hemostasis. Monocryl sutures were used to approximate the subcutaneous layers. Staples were used to reapproximate the skin edges and a sterile bandage was placed. The patient was then rolled to a supine position. The sponge and needle counts were correct. The patient tolerated the procedure without difficulty and left the operating room in satisfactory condition. Implants:   Implant Name Type Inv.  Item Serial No.  Lot No. LRB No. Used   LINER ACET PINN NEUT [id number] -- [employer] - [id number]  LINER ACET PINN NEUT [id number] -- [employer] [id number] [facility] [id number] Right 1   SCR ACET CANC PINN 6.5X25MM SS --  - [id number]  SCR ACET CANC PINN 6.5X25MM SS --  [id number] [facility] [id number] Right 1   CUP ACET MH PINN 58MM GRIPTION --  - [id number]  CUP ACET MH PINN 58MM GRIPTION --  [id number] [facility] [id number] Right 1   STEM FEM TAPR SZ9 STD OFFSET -- [employer] - [id number]  STEM FEM TAPR SZ9 STD OFFSET -- [employer] [id number] [facility] [id number] Right 1   HEAD FEM CER ARTC EZ 36M+8.5 -- [employer] DELTA - [id number]  HEAD FEM CER ARTC EZ 36M+8.5 -- BI[employer] DELTA [id number] [facility] [id number] Right 1        By: Robles Sanchez MD

## 2019-06-25 NOTE — PROGRESS NOTES
Pt up in recliner tolerating dinner well. Pain to hip controlled. Pt has strong push/ pulls to both feet and normal neurovascular checks to both feet. Pt voiding well. Call light in reach.

## 2019-06-25 NOTE — PERIOP NOTES
TRANSFER - OUT REPORT:    Verbal report given to Jyoti Mason RN on [facility]  being transferred to [facility] for routine post - op       Report consisted of patients Situation, Background, Assessment and   Recommendations(SBAR). Information from the following report(s) OR Summary, Procedure Summary, Intake/Output and MAR was reviewed with the receiving nurse. Opportunity for questions and clarification was provided. Patient transported on room air.

## 2019-06-25 NOTE — PROGRESS NOTES
06/25/19 1226   Oxygen Therapy   O2 Sat (%) 98 %   Pulse via Oximetry 66 beats per minute   O2 Device Room air   O2 Flow Rate (L/min) 0 l/min   Incentive Spirometry Treatment   Actual Volume (ml) 3000 ml   Number of Attempts Greater than 3   Patient achieved  3000    Ml/sec on IS. Patient encouraged to do every hour while awake-patient agreed and demonstrated. No shortness of breath or distress noted. BS are clear b/l.    [facility] Camp notes reviewed- continuous sat # 04 ordered HS

## 2019-06-25 NOTE — PROGRESS NOTES
TRANSFER - IN REPORT:    Verbal report received from Fabien Rowe rn(name) [facility] [employer] for routine progression of care      Report consisted of patients Situation, Background, Assessment and   Recommendations(SBAR). Information from the following report(s) SBAR, Kardex, Procedure Summary, Intake/Output, MAR and Recent Results was reviewed with the receiving nurse. Opportunity for questions and clarification was provided. Assessment completed upon patients arrival to unit and care assumed.

## 2019-06-25 NOTE — H&P
The patient has end stage arthritis of the right hip joint. The patient was seen and examined and there are no changes to the patient's orthopedic condition. They have tried conservative treatment for this condition; including antiinflammatories and lifestyle modifications and have failed. The necessity for the joint replacement is still present, and the H&P from the office is still current. The patient will be admitted today forProcedure(s) (LRB):  RIGHT TOTAL HIP ARTHROPLASTY DEPUY (Right) .

## 2019-06-25 NOTE — PROGRESS NOTES
Problem: Self Care Deficits Care Plan (Adult)  Goal: *Acute Goals and Plan of Care (Insert Text)  Description  GOALS:   DISCHARGE GOALS (in preparation for going home/rehab):  3 days  1. Mr. Nathanael Valdivia will perform one lower body dressing activity with minimal assistance with adaptive equipment to demonstrate improved functional mobility and safety. 2.  Mr. Nathanael Valdivia will perform one lower body bathing activity with minimal  assistance with adaptive equipment to demonstrate improved functional mobility and safety. 3.  Mr. Nathanael Valdivia will perform toileting/toilet transfer with contact guard assistance with adaptive equipment to demonstrate improved functional mobility and safety. 4.  Mr. Nathanael Valdivia will perform shower transfer with contact guard assistance with adaptive equipment to demonstrate improved functional mobility and safety. 5.  Mr. Nathanael Valdivia will state CATRINA precautions with two verbal cues to demonstrate improved functional mobility and safety. JOINT CAMP OCCUPATIONAL THERAPY CATRINA: Initial Assessment and Daily Note 6/25/2019  INPATIENT: Hospital Day: 1  [place] / [place] / Product Type: Medicare /      NAME/AGE/GENDER: Tal Ryan is a 81 y.o. male   PRIMARY DIAGNOSIS:  Unilateral primary osteoarthritis, right hip [M16.11]   Procedure(s) and Anesthesia Type:     * RIGHT TOTAL HIP ARTHROPLASTY DEPUY - Spinal (Right)  ICD-10: Treatment Diagnosis:    Pain in Right Hip (M25.551)  Stiffness of Right Hip, Not elsewhere classified (M25.651)      ASSESSMENT:     Mr. Nathanael Valdivia is s/p Right CATRINA and presents with decreased weight bearing on R LE and decreased independence with functional mobility and activities of daily living as compared to prior level of function and safety. Patient would benefit from skilled Occupational Therapy to maximize independence and safety with self-care task and functional mobility.   Pt would also benefit from education on lower body adaptive equipment and hip precautions post-surgery in preparation for going home with son. Able to mobilize from bed to recliner. Will progress well with ADL's tomorrow. This section established at most recent assessment   PROBLEM LIST (Impairments causing functional limitations):  Decreased Strength  Decreased ADL/Functional Activities  Decreased Transfer Abilities  Increased Pain  Increased Fatigue  Decreased Flexibility/Joint Mobility  Decreased Knowledge of Precautions   INTERVENTIONS PLANNED: (Benefits and precautions of occupational therapy have been discussed with the patient.)  Activities of daily living training  Adaptive equipment training  Balance training  Clothing management  Donning&doffing training  Theraputic activity     TREATMENT PLAN: Frequency/Duration: Follow patient 1-2tx to address above goals. Rehabilitation Potential For Stated Goals: Excellent     RECOMMENDED REHABILITATION/EQUIPMENT: (at time of discharge pending progress): Continue Skilled Therapy. OCCUPATIONAL PROFILE AND HISTORY:   History of Present Injury/Illness (Reason for Referral): Pt presents this date s/p (right) CATRINA. Past Medical History/Comorbidities:   Mr. 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   Mr. Shasta Perry  has a past surgical history that includes pr abdomen surgery proc unlisted (1991); hx lumbar fusion (1990 x 2  2012, 2013); hx urological (2002); hx heent (as child); hx orthopaedic (Left, 2013); hx colectomy (1997); hx colonoscopy (2014, 2018); hx heart catheterization (01/10/2014); hx coronary stent placement; hx knee replacement (Left, 2017); hx shoulder arthroscopy (Left, 1990); and hx other surgical.  Social History/Living Environment:   Home Environment: Private residence  # Steps to Enter: 1  Rails to Enter: No  One/Two Story Residence: One story  # of Interior Steps: 3  Interior Rails: Both  Living Alone: No  Support Systems: Child(linda)  Patient Expects to be Discharged to[employer] residence  Current DME Used/Available at Home: [employer], straight, Commode, bedside, [place] [employer], rollator, Walker, rolling  Tub or Shower Type: Shower  Prior Level of Function/Work/Activity:  Used a cane inside, a rollator outside. L TKA in 2017 and L CATRINA in 2013. Number of Personal Factors/Comorbidities that affect the Plan of Care: Brief history (0):  LOW COMPLEXITY   ASSESSMENT OF OCCUPATIONAL PERFORMANCE[de-identified]   Most Recent Physical Functioning:   Balance  Sitting: Intact  Standing: Pull to stand; With support       Gross Assessment: Yes  Gross Assessment  AROM: Within functional limits(except right lower extremity, s/p CATRINA)  Strength: Within functional limits(except right lower extremity, s/p CATRINA)            Coordination  Fine Motor Skills-Upper: Left Intact; Right Intact  Gross Motor Skills-Upper: Left Intact; Right Intact         Mental Status  Neurologic State: Alert  Orientation Level: Oriented X4  Cognition: Appropriate decision making                Basic ADLs (From Assessment) Complex ADLs (From Assessment)   Basic ADL  Feeding: Independent  Oral Facial Hygiene/Grooming: Setup  Bathing: Minimum assistance  Upper Body Dressing: Setup  Lower Body Dressing: Moderate assistance  Toileting: Moderate assistance     Grooming/Bathing/Dressing Activities of Daily Living                       Functional Transfers  Shower Transfer:  Moderate assistance     Bed/Mat Mobility  Supine to Sit: Stand-by assistance;Contact guard assistance  Sit to Supine: (stayed up in chair)  Sit to Stand: Minimum assistance  Stand to Sit: Minimum assistance  Bed to Chair: Minimum assistance         Physical Skills Involved:  Range of Motion  Balance  Strength Cognitive Skills Affected (resulting in the inability to perform in a timely and safe manner):  [facility]  Psychosocial Skills Affected:  [facility]    Number of elements that affect the Plan of Care: 1-3:  LOW COMPLEXITY   CLINICAL DECISION [facility]  Ne? ?6 Clicks? Daily Activity Inpatient Short Form  How much help from another person does the patient currently need. .. Total A Lot A Little None   1. Putting on and taking off regular lower body clothing? ? 1   ? 2   ? 3   ? 4   2. Bathing (including washing, rinsing, drying)? ? 1   ? 2   ? 3   ? 4   3. Toileting, which includes using toilet, bedpan or urinal?   ? 1   ? 2   ? 3   ? 4   4. Putting on and taking off regular upper body clothing? ? 1   ? 2   ? 3   ? 4   5. Taking care of personal grooming such as brushing teeth? ? 1   ? 2   ? 3   ? 4   6. Eating meals? ? 1   ? 2   ? 3   ? 4   © 2007, Trustees of [address] [place] [address] [address] [address], under license to [employer]. All rights reserved     Score:  Initial: 18 Most Recent: X (Date: -- )    Interpretation of Tool:  Represents activities that are increasingly more difficult (i.e. Bed mobility, Transfers, Gait). Medical Necessity:     Skilled intervention continues to be required due to Deficits listed above. Reason for Services/Other Comments:  Patient continues to require skilled intervention due to new CATRNIA   .    Use of outcome tool(s) and clinical judgement create a POC that gives a: MODERATE COMPLEXITY            TREATMENT:   (In addition to Assessment/Re-Assessment sessions the following treatments were rendered)     Pre-treatment Symptoms/Complaints:    Pain: Initial:   Pain Intensity 1: 0  Post Session:  0     Assessment/Reassessment only, no treatment provided today    Treatment/Session Assessment:     Response to Treatment: Good, sitting up in recliner. Education:  ? Home Exercises  ? Fall Precautions  ? Hip Precautions ? Going Home Video  ? Knee/Hip Prosthesis Review  ? Walker Management/Safety ? Adaptive Equipment as Needed       Interdisciplinary Collaboration:   Physical Therapist  Occupational Therapist  Registered Nurse    After treatment position/precautions:   Up in chair  Bed/Chair-wheels locked  Caregiver at bedside  Call light within reach  RN notified     Compliance with Program/Exercises: Compliant all of the time, Will assess as treatment progresses. Recommendations/Intent for next treatment session:  Treatment next visit will focus on increasing Mr. Cabreras independence with bed mobility, transfers, self care, functional mobility, modalities for pain, and patient education.       Total Treatment Duration:  OT Patient Time In/Time Out  Time In: 1400  Time Out: [place], [place]

## 2019-06-25 NOTE — PROGRESS NOTES
Care Management Interventions  PCP Verified by CM: Yes  Mode of Transport at Discharge: Self  Transition of Care Consult (CM Consult): [address]: Yes  Discharge Durable Medical Equipment: No  Physical Therapy Consult: Yes  Occupational Therapy Consult: Yes  Current Support Network: Own Home  Confirm Follow Up Transport: Family  Plan discussed with Pt/Family/Caregiver: Yes  Freedom of Choice Offered: Yes  Discharge Location  Discharge Placement: Home with home health    Patient is a 81y.o. year old male admitted for Right CATRINA . Patient lives with His spouse and plans to return home on discharge. Order received to arrange home health. Patient without preference towards agency. Referral sent to [facility]. Patient denies any equipment needs as he has a walker and raised toilet seat. Will follow until discharge.

## 2019-06-25 NOTE — PROGRESS NOTES
[facility] - INPATIENT  Face to Face Encounter    Patients Name: Tal Ryan    YOB: 1935    Ordering Physician:  Danette Schuster    Primary Diagnosis: Unilateral primary osteoarthritis, right hip [M16.11]  Arthritis of right hip [M16.11]  S/p right CATRINA    Date of Face to Face:   6/25/2019                                  Face to Face Encounter findings are related to primary reason for home care:   yes. 1. I certify that the patient needs intermittent care as follows: physical therapy: gait/stair training    2. I certify that this patient is homebound, that is: 1) patient requires the use of a walker device, special transportation, or assistance of another to leave the home; or 2) patient's condition makes leaving the home medically contraindicated; and 3) patient has a normal inability to leave the home and leaving the home requires considerable and taxing effort. Patient may leave the home for infrequent and short duration for medical reasons, and occasional absences for non-medical reasons. Homebound status is due to the following functional limitations: Patient's ambulation limited secondary to severe pain and requires the use of an assistive device and the assistance of a caregiver for safe completion. Patient with strength and ROM deficits limiting ambulation endurance requiring the use of an assistive device and the assistance of a caregiver. Patient deemed temporarily homebound secondary to increased risk for infection when leaving home and going out into the community. 3. I certify that this patient is under my care and that I, or a nurse practitioner or  [id number], or clinical nurse specialist, or certified nurse midwife, working with me, had a Face-to-Face Encounter that meets the physician Face-to-Face Encounter requirements.   The following are the clinical findings from the [address] [place] [address] encounter that support the need for skilled services and is a summary of the encounter: see hospital chart        Nitin Gill, BSW  6/25/2019      THE FOLLOWING TO BE COMPLETED BY THE COMMUNITY PHYSICIAN:    I concur with the findings described above from the F2F encounter that this patient is homebound and in need of a skilled service.     Certifying Physician: _____________________________________      Printed Certifying Physician Name: _____________________________________    Date: _________________

## 2019-06-25 NOTE — ANESTHESIA POSTPROCEDURE EVALUATION
Procedure(s):  RIGHT TOTAL HIP ARTHROPLASTY DEPUY.     spinal    Anesthesia Post Evaluation      Multimodal analgesia: multimodal analgesia used between 6 hours prior to anesthesia start to PACU discharge  Patient location during evaluation: PACU  Patient participation: complete - patient participated  Level of consciousness: awake and awake and alert  Pain management: adequate  Airway patency: patent  Anesthetic complications: no  Cardiovascular status: acceptable  Respiratory status: acceptable  Hydration status: acceptable  Post anesthesia nausea and vomiting:  controlled      Vitals Value Taken Time   /59 6/25/2019 11:46 AM   Temp 36.7 °C (98 °F) 6/25/2019 10:51 AM   Pulse 65 6/25/2019 11:46 AM   Resp 16 6/25/2019 11:46 AM   SpO2 95 % 6/25/2019 11:46 AM

## 2019-06-26 VITALS
SYSTOLIC BLOOD PRESSURE: 137 MMHG | TEMPERATURE: 98 F | RESPIRATION RATE: 20 BRPM | BODY MASS INDEX: 24.16 KG/M2 | WEIGHT: 178.4 LBS | OXYGEN SATURATION: 97 % | HEART RATE: 76 BPM | HEIGHT: 72 IN | DIASTOLIC BLOOD PRESSURE: 65 MMHG

## 2019-06-26 LAB
EST. AVERAGE GLUCOSE BLD GHB EST-MCNC: 114 MG/DL
HBA1C MFR BLD: 5.6 %
HGB BLD-MCNC: 11.5 G/DL (ref 13.6–17.2)

## 2019-06-26 PROCEDURE — 97116 GAIT TRAINING THERAPY: CPT

## 2019-06-26 PROCEDURE — 74011250637 HC RX REV CODE- 250/637: Performed by: ORTHOPAEDIC SURGERY

## 2019-06-26 PROCEDURE — 97110 THERAPEUTIC EXERCISES: CPT

## 2019-06-26 PROCEDURE — 83036 HEMOGLOBIN GLYCOSYLATED A1C: CPT

## 2019-06-26 PROCEDURE — 97535 SELF CARE MNGMENT TRAINING: CPT

## 2019-06-26 PROCEDURE — 97150 GROUP THERAPEUTIC PROCEDURES: CPT

## 2019-06-26 PROCEDURE — 36415 COLL VENOUS BLD VENIPUNCTURE: CPT

## 2019-06-26 PROCEDURE — 74011250637 HC RX REV CODE- 250/637: Performed by: PHYSICIAN ASSISTANT

## 2019-06-26 PROCEDURE — 94760 N-INVAS EAR/PLS OXIMETRY 1: CPT

## 2019-06-26 PROCEDURE — 85018 HEMOGLOBIN: CPT

## 2019-06-26 PROCEDURE — 74011250636 HC RX REV CODE- 250/636: Performed by: PHYSICIAN ASSISTANT

## 2019-06-26 RX ORDER — MAG HYDROX/ALUMINUM HYD/SIMETH 200-200-20
30 SUSPENSION, ORAL (FINAL DOSE FORM) ORAL
Status: DISCONTINUED | OUTPATIENT
Start: 2019-06-26 | End: 2019-06-26 | Stop reason: HOSPADM

## 2019-06-26 RX ADMIN — FAMOTIDINE 20 MG: 20 TABLET, FILM COATED ORAL at 08:30

## 2019-06-26 RX ADMIN — ASPIRIN 81 MG: 81 TABLET ORAL at 08:30

## 2019-06-26 RX ADMIN — Medication 10 ML: at 06:03

## 2019-06-26 RX ADMIN — ACETAMINOPHEN 1000 MG: 500 TABLET, FILM COATED ORAL at 06:02

## 2019-06-26 RX ADMIN — Medication 1 AMPULE: at 08:30

## 2019-06-26 RX ADMIN — ONDANSETRON 4 MG: 4 TABLET, ORALLY DISINTEGRATING ORAL at 08:29

## 2019-06-26 RX ADMIN — ALUMINUM HYDROXIDE, MAGNESIUM HYDROXIDE, AND SIMETHICONE 30 ML: 200; 200; 20 SUSPENSION ORAL at 03:56

## 2019-06-26 RX ADMIN — Medication 2 G: at 02:33

## 2019-06-26 RX ADMIN — SENNOSIDES AND DOCUSATE SODIUM 2 TABLET: 8.6; 5 TABLET ORAL at 08:30

## 2019-06-26 RX ADMIN — FINASTERIDE 5 MG: 5 TABLET, FILM COATED ORAL at 08:30

## 2019-06-26 RX ADMIN — CELECOXIB 200 MG: 200 CAPSULE ORAL at 08:30

## 2019-06-26 RX ADMIN — ACETAMINOPHEN 1000 MG: 500 TABLET, FILM COATED ORAL at 12:10

## 2019-06-26 RX ADMIN — OXYCODONE HYDROCHLORIDE 10 MG: 5 TABLET ORAL at 03:33

## 2019-06-26 NOTE — DISCHARGE INSTRUCTIONS
[facility]   Patient Discharge Instructions    Mackenzie Florez / [id number] : 1935    Admitted 2019 Discharged: 2019     IF YOU HAVE ANY PROBLEMS ONCE YOU ARE AT HOME CALL THE FOLLOWING NUMBERS:   Main office number: [phone]    Take Home Medications     · It is important that you take the medication exactly as they are prescribed. · Keep your medication in the bottles provided by the pharmacist and keep a list of the medication names, dosages, and times to be taken in your wallet. · Do not take other medications without consulting your doctor. What to do at [address] your prehospital diet. If you have excessive nausea or vomitting call your doctor's office     Home Physical Therapy is arranged. Use rolling walker when walking. Patients who have had a joint replacement should not drive until you are seen for your follow up appointment by Dr. Alana Vasquez. When to Call    - Call if you have a temperature greater then 101  - Unable to keep food down  - Loose control of your bladder or bowel function  - Are unable to bear any weight   - Need a pain medication refill       DISCHARGE SUMMARY from Nurse    The following personal items collected during your admission are returned to you:   Dental Appliance: Dental Appliances: Uppers  Vision: Visual Aid: Glasses  Hearing Aid:    Jewelry: Jewelry: None  Clothing: Clothing: Footwear, Shirt, Pants  Other Valuables: Other Valuables: None  Valuables sent to safe:      PATIENT INSTRUCTIONS:    After general anesthesia or intravenous sedation, for 24 hours or while taking prescription Narcotics:  · Limit your activities  · Do not drive and operate hazardous machinery  · Do not make important personal or business decisions  · Do  not drink alcoholic beverages  · If you have not urinated within 8 hours after discharge, please contact your surgeon on call.     Report the following to your surgeon:  · Excessive pain, swelling, redness or odor of or around the surgical area  · Temperature over 101  · Nausea and vomiting lasting longer than 4 hours or if unable to take medications  · Any signs of decreased circulation or nerve impairment to extremity: change in color, persistent  numbness, tingling, coldness or increase pain  · Follow hip precautions @ all times! · Any questions, call office @ [phone]      Keep scheduled follow up appointment. If need to change, call office @ [phone]. *  Please give a list of your current medications to your Primary Care Provider. *  Please update this list whenever your medications are discontinued, doses are      changed, or new medications (including over-the-counter products) are added. *  Please carry medication information at all times in case of emergency situations. Patient Education        Hip Replacement Surgery (Posterior): What to Expect at Home  Your Recovery  Hip replacement surgery replaces the worn parts of your hip joint. When you leave the hospital, you will probably be walking with crutches or a walker. You may be able to climb a few stairs and get in and out of bed and chairs. But you will need someone to help you at home for the next few weeks or until you have more energy and can move around better. If there is no one to help you at home, you may go to a rehabilitation center or long-term care center. You will go home with a bandage and stitches, staples, tissue glue, or tape strips. You can remove the bandage when your doctor tells you to. If you have stitches or staples, your doctor will remove them 10 days to 3 weeks after your surgery. Glue or tape strips will fall off on their own over time. You may still have some mild pain, and the area may be swollen for 3 to 4 months after surgery. Your doctor will give you medicine for the pain.   You will continue the rehabilitation program (rehab) you started in the hospital. The better you do with your rehab exercises, the sooner you will get your strength and movement back. Most people are able to return to work 4 weeks to 4 months after surgery. This care sheet gives you a general idea about how long it will take for you to recover. But each person recovers at a different pace. Follow the steps below to get better as quickly as possible. How can you care for yourself at home? Activity    · Your doctor may not want your affected leg to cross the center of your body toward the other leg. If so, your therapist may suggest these ideas:  ? Do not cross your legs. ? Be very careful as you get in or out of bed or a car, so your leg does not cross that imaginary line in the middle of your body.     · Rest when you feel tired. You may take a nap, but do not stay in bed all day.     · Work with your physical therapist to learn the best way to exercise. You may be able to take frequent, short walks using crutches or a walker. You will probably have to use crutches or a walker for at least 4 to 6 weeks.     · Your doctor may advise you to stay away from activities that put stress on the joint. This includes sports such as tennis, football, and jogging.     · Try not to sit for too long at one time. You will feel less stiff if you take a short walk about every hour. When you sit, use chairs with arms, and do not sit in low chairs.     · Do not bend over more than 90 degrees (like the angle in a letter \"L\").     · Sleep on your back with your legs slightly apart or on your side with a pillow between your knees for about 6 weeks or as your doctor tells you. Do not sleep on your stomach or affected leg.     · Ask your doctor when you can drive again.     · Most people are able to return to work 4 weeks to 4 months after surgery.     · Ask your doctor when it is okay for you to have sex. Diet    · By the time you leave the hospital, you will probably be eating your normal diet.  If your stomach is upset, try bland, low-fat foods like plain rice, broiled chicken, toast, and yogurt. Your doctor may recommend that you take iron and vitamin supplements.     · Drink plenty of fluids (unless your doctor tells you not to).   · Eat healthy foods, and watch your portion sizes. Try to stay at your ideal weight. Too much weight puts more stress on your new hip joint.     · You may notice that your bowel movements are not regular right after your surgery. This is common. Try to avoid constipation and straining with bowel movements. You may want to take a fiber supplement every day. If you have not had a bowel movement after a couple of days, ask your doctor about taking a mild laxative. Medicines    · Your doctor will tell you if and when you can restart your medicines. He or she will also give you instructions about taking any new medicines.     · If you take blood thinners, such as warfarin (Coumadin), clopidogrel (Plavix), or aspirin, be sure to talk to your doctor. He or she will tell you if and when to start taking those medicines again. Make sure that you understand exactly what your doctor wants you to do.     · Your doctor may give you a blood-thinning medicine to prevent blood clots. If you take a blood thinner, be sure you get instructions about how to take your medicine safely. Blood thinners can cause serious bleeding problems. This medicine could be in pill form or as a shot (injection). If a shot is necessary, your doctor will tell you how to do this.     · Be safe with medicines. Take pain medicines exactly as directed. ? If the doctor gave you a prescription medicine for pain, take it as prescribed. ? If you are not taking a prescription pain medicine, ask your doctor if you can take an over-the-counter medicine.     · If you think your pain medicine is making you sick to your stomach:  ? Take your medicine after meals (unless your doctor has told you not to). ?  Ask your doctor for a different pain medicine.     · If your doctor prescribed antibiotics, take them as directed. Do not stop taking them just because you feel better. You need to take the full course of antibiotics. Incision care    · If your doctor told you how to care for your cut (incision), follow your doctor's instructions. You will have a dressing over the cut. A dressing helps the incision heal and protects it. Your doctor will tell you how to take care of this.     · If you did not get instructions, follow this general advice:  ? If you have strips of tape on the cut the doctor made, leave the tape on for a week or until it falls off.  ? If you have stitches or staples, your doctor will tell you when to come back to have them removed. ? If you have skin adhesive on the cut, leave it on until it falls off. Skin adhesive is also called glue or liquid stitches. ? Change the bandage every day. ? Wash the area daily with warm water, and pat it dry. Don't use hydrogen peroxide or alcohol. They can slow healing. ? You may cover the area with a gauze bandage if it oozes fluid or rubs against clothing. ? You may shower 24 to 48 hours after surgery. Pat the incision dry. Don't swim or take a bath for the first 2 weeks, or until your doctor tells you it is okay. Exercise    · Your rehab program will include a number of exercises to do. Always do them as your therapist tells you. Ice and elevation    · For pain, put ice or a cold pack on the area for 10 to 20 minutes at a time. Put a thin cloth between the ice and your skin.     · Your ankle may swell for about 3 months. Prop up your ankle when you ice it or anytime you sit or lie down. Try to keep it above the level of your heart. This will help reduce swelling. Other instructions   Continue to wear your support stockings as your doctor says. These help to prevent blood clots. The length of time that you will have to wear them depends on your activity level and the amount of swelling you have.  Most people wear these stockings for 4 to 6 weeks after surgery.   Preventing falls is also very important. To prevent falls:    · Arrange furniture so that you will not trip on it.     · Get rid of throw rugs, and move electrical cords out of the way.     · Walk only in areas with plenty of light.     · Put grab bars in showers and bathtubs.     · Avoid icy or snowy sidewalks.     · Wear shoes with sturdy, flat soles. Follow-up care is a key part of your treatment and safety. Be sure to make and go to all appointments, and call your doctor if you are having problems. It's also a good idea to know your test results and keep a list of the medicines you take. When should you call for help? Call 911 anytime you think you may need emergency care. For example, call if:    · You passed out (lost consciousness).     · You have severe trouble breathing.     · You have sudden chest pain and shortness of breath, or you cough up blood.    Call your doctor now or seek immediate medical care if:    · You have signs that your hip may be dislocated, including:  ? Severe pain and not being able to stand. ? A crooked leg that looks like your hip is out of position. ? Not being able to bend or straighten your leg.     · Your leg or foot is cool or pale or changes color.     · You cannot feel or move your leg.     · You have signs of a blood clot, such as:  ? Pain in your calf, back of the knee, thigh, or groin. ? Redness and swelling in your leg or groin.     · Your incision comes open and begins to bleed, or the bleeding increases.     · You feel like your heart is racing or beating irregularly.     · You have signs of infection, such as:  ? Increased pain, swelling, warmth, or redness. ? Red streaks leading from the incision. ? Pus draining from the incision. ? A fever.    Watch closely for changes in your health, and be sure to contact your doctor if:    · You do not have a bowel movement after taking a laxative.     · You do not get better as expected. Where can you learn more? Go to http://annabelle-jaky.info/. Enter J871 in the search box to learn more about \"Hip Replacement Surgery (Posterior): What to Expect at Home. \"  Current as of: September 20, 2018  Content Version: 11.9  © 1671-6338 [employer]. Care instructions adapted under license by [employer] (which disclaims liability or warranty for this information). If you have questions about a medical condition or this instruction, always ask your healthcare professional. [address] any warranty or liability for your use of this information. These are general instructions for a healthy lifestyle:    No smoking/ No tobacco products/ Avoid exposure to second hand smoke    Surgeon General's Warning:  Quitting smoking now greatly reduces serious risk to your health. Obesity, smoking, and sedentary lifestyle greatly increases your risk for illness    A healthy diet, regular physical exercise & weight monitoring are important for maintaining a healthy lifestyle    You may be retaining fluid if you have a history of heart failure or if you experience any of the following symptoms:  Weight gain of 3 pounds or more overnight or 5 pounds in a week, increased swelling in our hands or feet or shortness of breath while lying flat in bed. Please call your doctor as soon as you notice any of these symptoms; do not wait until your next office visit. Recognize signs and symptoms of STROKE:    F-face looks uneven    A-arms unable to move or move even    S-speech slurred or non-existent    T-time-call 911 as soon as signs and symptoms begin-DO NOT go       Back to bed or wait to see if you get better-TIME IS BRAIN. The discharge information has been reviewed with the patient. The patient verbalized understanding.       Information obtained by :  I understand that if any problems occur once I am at home I am to contact my physician. I understand and acknowledge receipt of the instructions indicated above.                                                                                                                                            Physician's or R.N.'s Signature                                                                  Date/Time                                                                                                                                              Patient or Representative Signature                                                          Date/Time

## 2019-06-26 NOTE — PROGRESS NOTES
Problem: Self Care Deficits Care Plan (Adult)  Goal: *Acute Goals and Plan of Care (Insert Text)  Description  GOALS:   DISCHARGE GOALS (in preparation for going home/rehab):  3 days  1. Mr. Guy East will perform one lower body dressing activity with minimal assistance with adaptive equipment to demonstrate improved functional mobility and safety. -GOAL MET 6/26/2019  2. Mr. Guy East will perform one lower body bathing activity with minimal  assistance with adaptive equipment to demonstrate improved functional mobility and safety. -GOAL MET 6/26/2019  3. Mr. Guy East will perform toileting/toilet transfer with contact guard assistance with adaptive equipment to demonstrate improved functional mobility and safety. -GOAL MET 6/26/2019   4. Mr. Guy East will perform shower transfer with contact guard assistance with adaptive equipment to demonstrate improved functional mobility and safety. -GOAL MET 6/26/2019   5. Mr. Guy East will state CATRINA precautions with two verbal cues to demonstrate improved functional mobility and safety. -GOAL MET 6/26/2019          JOINT CAMP OCCUPATIONAL THERAPY CATRINA: Daily Note and Discharge 6/26/2019  INPATIENT: Hospital Day: 2  [place] / [place] / Product Type: Medicare /      NAME/AGE/GENDER: Lynda Sue is a 81 y.o. male   PRIMARY DIAGNOSIS:  Unilateral primary osteoarthritis, right hip [M16.11]   Procedure(s) and Anesthesia Type:     * RIGHT TOTAL HIP ARTHROPLASTY DEPUY - Spinal (Right)  ICD-10: Treatment Diagnosis:    · Pain in Right Hip (M25.551)  · Stiffness of Right Hip, Not elsewhere classified (M25.651)      ASSESSMENT:     Mr. Guy East is s/p Right CATRINA and presents with decreased weight bearing on r LE and decreased independence with functional mobility and activities of daily living.   Patient completed shower and dressing as charted below in ADL grid and is ambulating with rolling walker and Stand by assist.  Patient has met 5/5 goals and plans to return home with good family support. Will do well at home with family assist from sons. This section established at most recent assessment   PROBLEM LIST (Impairments causing functional limitations):  1. Decreased Strength  2. Decreased ADL/Functional Activities  3. Decreased Transfer Abilities  4. Increased Pain  5. Increased Fatigue  6. Decreased Flexibility/Joint Mobility  7. Decreased Knowledge of Precautions   INTERVENTIONS PLANNED: (Benefits and precautions of occupational therapy have been discussed with the patient.)  1. Activities of daily living training  2. Adaptive equipment training  3. Balance training  4. Clothing management  5. Donning&doffing training  6. Theraputic activity     TREATMENT PLAN: Frequency/Duration: Follow patient 1-2tx to address above goals. Rehabilitation Potential For Stated Goals: Excellent     RECOMMENDED REHABILITATION/EQUIPMENT: (at time of discharge pending progress): Continue Skilled Therapy. OCCUPATIONAL PROFILE AND HISTORY:   History of Present Injury/Illness (Reason for Referral): Pt presents this date s/p (right) CATRINA. Past Medical History/Comorbidities:   Mr. 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   Mr. Nohemi Brady  has a past surgical history that includes pr abdomen surgery proc unlisted (1991); hx lumbar fusion (1990 x 2  2012, 2013); hx urological (2002); hx heent (as child); hx orthopaedic (Left, 2013); hx colectomy (1997); hx colonoscopy (2014, 2018); hx heart catheterization (01/10/2014); hx coronary stent placement; hx knee replacement (Left, 2017); hx shoulder arthroscopy (Left, 1990); and hx other surgical.  Social History/Living Environment:   Home Environment: Private residence  # Steps to Enter: 1  Rails to Enter: No  One/Two Story Residence: One story  # of Interior Steps: 3  Interior Rails: Both  Living Alone: No  Support Systems: Child(linda)  Patient Expects to be Discharged to[de-identified] Private residence  Current DME Used/Available at Home: [employer], straight, Commode, bedside, [employer], rollator, Walker, rolling  Tub or Shower Type: Shower  Prior Level of Function/Work/Activity:  Used a cane inside, a rollator outside. L TKA in 2017 and L CATRINA in 2013. Number of Personal Factors/Comorbidities that affect the Plan of Care: Brief history (0):  LOW COMPLEXITY   ASSESSMENT OF OCCUPATIONAL PERFORMANCE[de-identified]   Most Recent Physical Functioning:   Balance  Sitting: Intact  Standing: With support  Standing - Static: Good  Standing - Dynamic : Good;Fair                    Coordination  Fine Motor Skills-Upper: Left Intact; Right Intact  Gross Motor Skills-Upper: Left Intact; Right Intact         Mental Status  Neurologic State: Alert  Orientation Level: Oriented X4  Cognition: Appropriate decision making  Perception: Appears intact                Basic ADLs (From Assessment) Complex ADLs (From Assessment)   Basic ADL  Feeding: Independent  Oral Facial Hygiene/Grooming: Setup  Bathing: Stand-by assistance  Type of Bath: Chlorhexidine (CHG), Full, Shower  Upper Body Dressing: Setup  Lower Body Dressing: Minimum assistance  Toileting: Stand by assistance     Grooming/Bathing/Dressing Activities of Daily Living   Grooming  Grooming Assistance: Set-up     Upper Body Bathing  Bathing Assistance: Stand-by assistance     Lower Body Bathing  Bathing Assistance: Stand-by assistance     Upper Body Dressing Assistance  Dressing Assistance: Set-up Functional Transfers  Bathroom Mobility: Stand-by assistance  Toilet Transfer : Stand-by assistance  Shower Transfer: Contact guard assistance   Lower Body Dressing Assistance  Dressing Assistance: Minimum assistance  Underpants: Stand-by assistance  Pants With Elastic Waist: Stand-by assistance  Slip on Shoes with Back: Minimum assistance Bed/Mat Mobility  Sit to Stand: Stand-by assistance  Stand to Sit: Stand-by assistance  Bed to Chair: Stand-by assistance         Physical Skills Involved:  1. Range of Motion  2. Balance  3. Strength Cognitive Skills Affected (resulting in the inability to perform in a timely and safe manner):  1. [facility]  Psychosocial Skills Affected:  1. WFL    Number of elements that affect the Plan of Care: 1-3:  LOW COMPLEXITY   CLINICAL DECISION MAKING:   [facility] AM-PAC 6 Clicks   Daily Activity Inpatient Short Form  How much help from another person does the patient currently need. .. Total A Lot A Little None   1. Putting on and taking off regular lower body clothing? ? 1    2   x? 3   ? 4   2. Bathing (including washing, rinsing, drying)? ? 1    2   x? 3   ? 4   3. Toileting, which includes using toilet, bedpan or urinal?   ? 1    2   x? 3   ? 4   4. Putting on and taking off regular upper body clothing? ? 1   ? 2   ? 3   ? 4   5. Taking care of personal grooming such as brushing teeth? ? 1   ? 2   ? 3   ? 4   6. Eating meals? ? 1   ? 2   ? 3   ? 4   © 2007, Trustees of [facility], under license to [employer]. All rights reserved     Score:  Initial: 18 Most Recent: 21 (Date: 6/26/2019 )    Interpretation of Tool:  Represents activities that are increasingly more difficult (i.e. Bed mobility, Transfers, Gait).        Use of outcome tool(s) and clinical judgement create a POC that gives a: MODERATE COMPLEXITY            TREATMENT:   (In addition to Assessment/Re-Assessment sessions the following treatments were rendered)     Pre-treatment Symptoms/Complaints:    Pain: Initial:   Pain Intensity 1: 2  Post Session:  0     Self Care: (45): Procedure(s) (per grid) utilized to improve and/or restore self-care/home management as related to dressing, bathing, toileting and grooming. Required minimal verbal and tactile cueing to facilitate activities of daily living skills. Treatment/Session Assessment:     Response to Treatment:  Good, sitting up in recliner. Education:  ? Home Exercises  ? Fall Precautions  ? Hip Precautions ? Going Home Video  ? Knee/Hip Prosthesis Review  ? Walker Management/Safety ? Adaptive Equipment as Needed       Interdisciplinary Collaboration:   o Physical Therapist  o Occupational Therapist  o Registered Nurse    After treatment position/precautions:   o Up in chair  o Bed/Chair-wheels locked  o Caregiver at bedside  o Call light within reach  o RN notified     Compliance with Program/Exercises: Compliant all of the time, Will assess as treatment progresses. Recommendations/Intent for next treatment session:  D/C OT for acute deficits.       Total Treatment Duration:  OT Patient Time In/Time Out  Time In: 1015  Time Out: [address], [place]

## 2019-06-26 NOTE — PROGRESS NOTES
Problem: Mobility Impaired (Adult and Pediatric)  Goal: *Acute Goals and Plan of Care (Insert Text)  Description  GOALS (1-4 days):  (1.)Mr. Aroldo Fatima will move from supine to sit and sit to supine  in bed with STAND BY ASSIST.    (2.)Mr. Aroldo Fatima will transfer from bed to chair and chair to bed with STAND BY ASSIST using the least restrictive device. (3.)Mr. Aroldo Fatima will ambulate with STAND BY ASSIST for 300 feet with the least restrictive device. (4.)Mr. Aroldo Fatima will ambulate up/down 3 steps with bilateral  railing with CONTACT GUARD ASSIST with no device. (5.)Mr. Aroldo Fatima will state/observe CATRINA precautions with 0 verbal cues. ________________________________________________________________________________________________     Outcome: Progressing Towards Goal     PHYSICAL THERAPY JOINT CAMP CATRINA: Daily Note, Treatment Day: 1st and AM 6/26/2019  INPATIENT: Hospital Day: 2  [place] / [place] / Product Type: Medicare /      NAME/AGE/GENDER: Dayne Ramírez is a 81 y.o. male   PRIMARY DIAGNOSIS:  Unilateral primary osteoarthritis, right hip [M16.11]   Procedure(s) and Anesthesia Type:     * RIGHT TOTAL HIP ARTHROPLASTY DEPUY - Spinal (Right)  ICD-10: Treatment Diagnosis:    · Pain in Right Hip (M25.551)  · Stiffness of Right Hip, Not elsewhere classified (M25.651)  · Difficulty in walking, Not elsewhere classified (R26.2)      ASSESSMENT:     Mr. Aroldo Fatima presents up in chair and ready to move a little with therapy. States he slept in the chair last night. Worked on gait training in the chawla with verbal cues progressing with gait distance. Better proprioception today but lacks a little ankle DF on the right. Back in room in chair worked on his CATRINA exercises with verbal cues progressing with repetitions from yesterday. Hope to continue to progress at next therapy session.      This section established at most recent assessment   PROBLEM LIST (Impairments causing functional limitations):  1. Decreased Strength  2. Decreased ADL/Functional Activities  3. Decreased Transfer Abilities  4. Decreased Ambulation Ability/Technique  5. Increased Pain  6. Decreased Flexibility/Joint Mobility  7. Edema/Girth  8. Decreased Knowledge of Precautions  9. Decreased [place] with Home Exercise Program   INTERVENTIONS PLANNED: (Benefits and precautions of physical therapy have been discussed with the patient.)  1. Cold  2. bed mobility  3. gait training  4. home exercise program (HEP)  5. Range of Motion: active/assisted/passive  6. Therapeutic Activities  7. therapeutic exercise/strengthening  8. transfer training  9. Group Therapy     TREATMENT PLAN: Frequency/Duration: Follow patient BID for duration of hospital stay to address above goals. Rehabilitation Potential For Stated Goals: Good     RECOMMENDED REHABILITATION/EQUIPMENT: (at time of discharge pending progress): Continue Skilled Therapy and Home Health: Physical Therapy. HISTORY:   History of Present Injury/Illness (Reason for Referral):  Pt s/p right CATRINA on 6/25/19  Past Medical History/Comorbidities:   Mr. Dixie Naranjo  has a past medical history of Arthritis, Chronic pain, Colon cancer ([facility] [address] (06/23/2015), Diabetes ([facility] [address] (2006), Dyslipidemia (06/23/2015), GERD (gastroesophageal reflux disease), bladder cancer (6/23/2015), Hypertension, NSTEMI (non-ST elevation myocardial infarction) ([address] (01/10/2014), Osteoarthritis of left hip (12/16/2013), S/P prosthetic total arthroplasty of the hip (1/8/2014), Stented coronary artery (01/10/2014), and Thrombocytopenia, unspecified ([facility] [address] (1/11/2014).   Mr. Dixie Naranjo  has a past surgical history that includes pr abdomen surgery proc unlisted (1991); hx lumbar fusion (1990 x 2  2012, 2013); hx urological (2002); hx heent (as child); hx orthopaedic (Left, 2013); hx colectomy (1997); hx colonoscopy (2014, 2018); hx heart catheterization (01/10/2014); hx coronary stent placement; hx knee replacement (Left, 2017); hx shoulder arthroscopy (Left, ); and hx other surgical.  Social History/Living Environment:   Home Environment: Private residence  # Steps to Enter: 1  Rails to Enter: No  One/Two Story Residence: One story  # of Interior Steps: 3  Interior Rails: Both  Living Alone: No  Support Systems: Child(linda)  Patient Expects to be Discharged to[de-identified] Private residence  Current DME Used/Available at Home: Robert Pollack, straight, Commode, bedside, Georgie Elliott, rollator, Walker, rolling  Tub or Shower Type: Shower  Prior Level of Function/Work/Activity:  Pt living at home, independent with with gait and ADLs    Number of Personal Factors/Comorbidities that affect the Plan of Care: 0: LOW COMPLEXITY   EXAMINATION:   Most Recent Physical Functioning:                                 Transfers  Sit to Stand: Contact guard assistance  Stand to Sit: Contact guard assistance    Balance  Sitting: Intact  Standing: Pull to stand; With support  Standing - Static: Good  Standing - Dynamic : Good;Fair         Gait Training: Yes    Weight Bearing Status  Right Side Weight Bearing: As tolerated  Distance (ft): 125 Feet (ft)  Ambulation - Level of Assistance: Contact guard assistance;Minimal assistance  Assistive Device: Walker, rolling  Speed/Mary: Pace decreased (<100 feet/min)  Step Length: Left shortened  Stance: Right decreased  Gait Abnormalities: Antalgic;Decreased step clearance  Interventions: Safety awareness training;Verbal cues     Braces/Orthotics: none    Right Hip Cold  Type: Cold/ice packs  Patient Position: Sitting      Body Structures Involved:  1. Joints  2. Muscles Body Functions Affected:  1. Movement Related Activities and Participation Affected:  1. Mobility  2.  Self Care   Number of elements that affect the Plan of Care: 4+: HIGH COMPLEXITY   CLINICAL PRESENTATION:   Presentation: Stable and uncomplicated: LOW COMPLEXITY   CLINICAL DECISION MAKIN [place] Mobility Inpatient Short Form  How much difficulty does the patient currently have. .. Unable A Lot A Little None   1. Turning over in bed (including adjusting bedclothes, sheets and blankets)? ? 1   ? 2   ? 3   ? 4   2. Sitting down on and standing up from a chair with arms ( e.g., wheelchair, bedside commode, etc.)   ? 1   ? 2   ? 3   ? 4   3. Moving from lying on back to sitting on the side of the bed?   ? 1   ? 2   ? 3   ? 4   How much help from another person does the patient currently need. .. Total A Lot A Little None   4. Moving to and from a bed to a chair (including a wheelchair)? ? 1   ? 2   ? 3   ? 4   5. Need to walk in hospital room? ? 1   ? 2   ? 3   ? 4   6. Climbing 3-5 steps with a railing? ? 1   ? 2   ? 3   ? 4   © 2007, Trustees of [facility] [employer], under license to [employer]. All rights reserved     Score:  Initial: 16 Most Recent: X (Date: -- )    Interpretation of Tool:  Represents activities that are increasingly more difficult (i.e. Bed mobility, Transfers, Gait). Medical Necessity:     · Patient is expected to demonstrate progress in strength, range of motion and functional technique  ·  to decrease assistance required with functional mobility and CATRINA exercises   · . Reason for Services/Other Comments:  · Patient continues to require skilled intervention due to inability to complete functional mobility and CATRINA exercises independently   · . Use of outcome tool(s) and clinical judgement create a POC that gives a: Clear prediction of patient's progress: LOW COMPLEXITY            TREATMENT:   (In addition to Assessment/Re-Assessment sessions the following treatments were rendered)     Pre-treatment Symptoms/Complaints:  none  Pain Initial:   Pain Intensity 1: 1  Post Session:  0/10     Therapeutic Exercise: (13 Minutes):  Exercises per grid below to improve mobility and strength.   Required minimal verbal and manual cues to promote proper body alignment and promote proper body posture. Progressed repetitions as indicated. Gait Training (10 Minutes):  Gait training to improve and/or restore physical functioning as related to mobility and strength. Ambulated 125 Feet (ft) with Contact guard assistance;Minimal assistance using a Walker, rolling and minimal Safety awareness training;Verbal cues related to their stance phase and stride length to promote proper body alignment and promote proper body posture. Instruction in performance of walker use and gait sequencing to correct stance phase and stride length. Date:  6/25/19 Date:  6/26/19 Date:     ACTIVITY/EXERCISE AM PM AM PM AM PM   GROUP THERAPY  ?  ?  ?  ?  ?  ? Ankle Pumps  10 15      Quad Sets  10 15      Gluteal Sets  10 15      Hip ABd/ADduction  10 15      Straight Leg Raises         Knee Slides  10 15      Short Arc Quads   15      Long Arc Quads  10 15      Chair Slides                  B = bilateral; AA = active assistive; A = active; P = passive      Treatment/Session Assessment:     Response to Treatment:  pt tolerated well, progressing nicely    Education:  ? Home Exercises  ? Fall Precautions  ? Hip Precautions ? D/C Instruction Review  ? Hip Prosthesis Review  ? Walker Management/Safety ? Adaptive Equipment as Needed       Interdisciplinary Collaboration:   o Registered Nurse    After treatment position/precautions:   o Up in chair  o Bed/Chair-wheels locked  o Call light within reach    Compliance with Program/Exercises: Compliant all of the time, Will assess as treatment progresses. Recommendations/Intent for next treatment session:  Treatment next visit will focus on increasing Mr. Rodrigues's independence with bed mobility, transfers, gait training, strength/ROM exercises, modalities for pain, and patient education.       Total Treatment Duration:  PT Patient Time In/Time Out  Time In: 0850  Time Out: Malvin 17, PT

## 2019-06-26 NOTE — PROGRESS NOTES
2019         Post Op day: 1 Day Post-Op   Admit Diagnosis: Unilateral primary osteoarthritis, right hip [M16.11]  Arthritis of right hip [M16.11]  LAB:    Recent Results (from the past 24 hour(s))   HEMOGLOBIN    Collection Time: 19  7:42 PM   Result Value Ref Range    HGB 13.1 (L) 13.6 - 17.2 g/dL   HEMOGLOBIN    Collection Time: 19  3:11 AM   Result Value Ref Range    HGB 11.5 (L) 13.6 - 17.2 g/dL   HEMOGLOBIN A1C WITH EAG    Collection Time: 19  3:11 AM   Result Value Ref Range    Hemoglobin A1c 5.6 %    Est. average glucose 114 mg/dL     Vital Signs:    Patient Vitals for the past 8 hrs:   BP Temp Pulse Resp SpO2   19 0655 123/82 97.4 °F (36.3 °C) 74 18 96 %   19 0333 110/58 97.3 °F (36.3 °C) 80 16 97 %   19 0041 119/64 97.7 °F (36.5 °C) 85 16 96 %     Temp (24hrs), Av.7 °F (36.5 °C), Min:97.3 °F (36.3 °C), Max:98 °F (36.7 °C)    Pain Control:   Pain Assessment  Pain Scale 1: FLACC  Pain Intensity 1: 5  Pain Onset 1: 6 mo  Pain Location 1: Hip  Pain Orientation 1: Right  Pain Description 1: Constant  Pain Intervention(s) 1: Medication (see MAR)  Subjective: Doing well, no complaints     Objective:  No Acute Distress, Alert and Oriented, Neurovascular exam is normal       Assessment:   Patient Active Problem List   Diagnosis Code    Essential hypertension I10    Arthritis M19.90    GERD (gastroesophageal reflux disease) K21.9    Unspecified adverse effect of anesthesia T41.45XA    Spinal stenosis, lumbar region, 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    Scoliosis (and kyphoscoliosis), idiopathic M41.20    Status post lumbar spinal fusion Z98.1    Osteoarthritis of left hip M16.12    BPH (benign prostatic hyperplasia) N40.0    HLD (hyperlipidemia) E78.5    Former smoker Z87.891    H/O chest pain Z87.898    History of normal resting EKG [id number]    History of colon cancer Z85.038    History of bladder cancer Z85.51    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.649    History of non-ST elevation myocardial infarction (NSTEMI) I25.2    Thrombocytopenia, unspecified ([facility]) D69.6    Dyslipidemia E78.5    Hx of bladder cancer Z85.51    Chronic pain G89.29    Lumbar radiculopathy M54.16    Chronic pain of left knee M25.562, G89.29    Medicare annual wellness visit, subsequent Z00.00    Idiopathic peripheral neuropathy G60.9    Coronary atherosclerosis of native coronary vessel I25.10    S/P PTCA (percutaneous transluminal coronary angioplasty) Z98.61    Atherosclerosis of native coronary artery of native heart without angina pectoris I25.10    Type 2 diabetes mellitus without complication, without long-term current use of insulin ([facility]) E11.9    Mixed hyperlipidemia E78.2    Arthritis of knee, left M17.12    S/P total knee arthroplasty Z96.659    CHERYL (acute kidney injury) ([facility]) N17.9    Hypomagnesemia E83.42    Chest pain at rest R07.9    Atherosclerosis of native coronary artery of native heart with angina pectoris ([facility]) I25.119    Right leg pain M79.604    Arthritis of right hip M16.11       Status Post Procedure(s) (LRB):  RIGHT TOTAL HIP ARTHROPLASTY DEPUY (Right)        Plan: Continue Physical Therapy, Monitor Hgb. ASA/SCDs for DVT prophylaxis. D/c to home today.   Pt seen by Dr. Fidel Dunaway this AM.   Signed By: OFELIA Hobbs

## 2019-06-26 NOTE — PROGRESS NOTES
Shift assessment completed. Pt is alert & oriented x4. Able to verbalize needs. Resting quietly with no distress noted. Dressing to right hip is clean, dry and intact. Ice pack provided. Neurovascular and peripheral vascular checks WNL. Incentive Spirometry at bedside. Patient encouraged to use hourly x 10 repetitions. Patient voiding clear yellow urine without difficulty. Pain is being managed with Oxycodone with patient tolerating well. Bed low and locked. Call light within reach. Patient instructed to call for assistance. Pt verbalizes understanding. Will monitor.

## 2019-06-26 NOTE — PROGRESS NOTES
Problem: Mobility Impaired (Adult and Pediatric)  Goal: *Acute Goals and Plan of Care (Insert Text)  Description  GOALS (1-4 days):  (1.)Mr. Neymar Sagastume will move from supine to sit and sit to supine  in bed with STAND BY ASSIST.    (2.)Mr. Neymar Sagastume will transfer from bed to chair and chair to bed with STAND BY ASSIST using the least restrictive device. (3.)Mr. Neymar Sagastume will ambulate with STAND BY ASSIST for 300 feet with the least restrictive device. (4.)Mr. Neymar Sagastume will ambulate up/down 3 steps with bilateral  railing with CONTACT GUARD ASSIST with no device. (5.)Mr. Neymar Sagastume will state/observe CATRINA precautions with 0 verbal cues. ________________________________________________________________________________________________     Outcome: Progressing Towards Goal     PHYSICAL THERAPY JOINT CAMP CATRINA: Daily Note, Treatment Day: 1st and PM 6/26/2019  INPATIENT: Hospital Day: 2  [place] / Product Type: Medicare /      NAME/AGE/GENDER: Shelby Baumann is a 81 y.o. male   PRIMARY DIAGNOSIS:  Unilateral primary osteoarthritis, right hip [M16.11]   Procedure(s) and Anesthesia Type:     * RIGHT TOTAL HIP ARTHROPLASTY DEPUY - Spinal (Right)  ICD-10: Treatment Diagnosis:    · Pain in Right Hip (M25.551)  · Stiffness of Right Hip, Not elsewhere classified (M25.651)  · Difficulty in walking, Not elsewhere classified (R26.2)      ASSESSMENT:     Mr. Neymar Sagastume presents up in chair on contact and ready for therapy. Worked on gait training in the chawla making great progress with gait distance. In gym worked on [employer] exercises with verbal cues. Reviewed HEP and use use of ice along with exercises. Pt walked back to his room and stayed up in chair with ice by hip and needs in reach. This section established at most recent assessment   PROBLEM LIST (Impairments causing functional limitations):  1. Decreased Strength  2. Decreased ADL/Functional Activities  3.  Decreased Transfer Abilities  4. Decreased Ambulation Ability/Technique  5. Increased Pain  6. Decreased Flexibility/Joint Mobility  7. Edema/Girth  8. Decreased Knowledge of Precautions  9. Decreased [place] with Home Exercise Program   INTERVENTIONS PLANNED: (Benefits and precautions of physical therapy have been discussed with the patient.)  1. Cold  2. bed mobility  3. gait training  4. home exercise program (HEP)  5. Range of Motion: active/assisted/passive  6. Therapeutic Activities  7. therapeutic exercise/strengthening  8. transfer training  9. Group Therapy     TREATMENT PLAN: Frequency/Duration: Follow patient BID for duration of hospital stay to address above goals. Rehabilitation Potential For Stated Goals: Good     RECOMMENDED REHABILITATION/EQUIPMENT: (at time of discharge pending progress): Continue Skilled Therapy and Home Health: Physical Therapy. HISTORY:   History of Present Injury/Illness (Reason for Referral):  Pt s/p right CATRINA on 6/25/19  Past Medical History/Comorbidities:   Mr. Neymar Sagastume  has a past medical history of Arthritis, Chronic pain, Colon cancer ([facility] [address] (06/23/2015), Diabetes ([address] (2006), Dyslipidemia (06/23/2015), GERD (gastroesophageal reflux disease), bladder cancer (6/23/2015), Hypertension, NSTEMI (non-ST elevation myocardial infarction) ([address] (01/10/2014), Osteoarthritis of left hip (12/16/2013), S/P prosthetic total arthroplasty of the hip (1/8/2014), Stented coronary artery (01/10/2014), and Thrombocytopenia, unspecified ([address] (1/11/2014).   Mr. Neymar Sagastume  has a past surgical history that includes pr abdomen surgery proc unlisted (1991); hx lumbar fusion (1990 x 2  2012, 2013); hx urological (2002); hx heent (as child); hx orthopaedic (Left, 2013); hx colectomy (1997); hx colonoscopy (2014, 2018); hx heart catheterization (01/10/2014); hx coronary stent placement; hx knee replacement (Left, 2017); hx shoulder arthroscopy (Left, 1990); and hx other surgical.  Social History/Living Environment:   Home Environment: Private residence  # Steps to Enter: 1  Rails to [employer]: No  One/Two Story Residence: One story  # of Interior Steps: 3  Interior Rails: Both  Living Alone: No  Support Systems: Child(linda)  [facility]s [facility]  Current DME Used/Available at Home: Milroy Sharp, straight, Commode, bedside, Delaney Smaller, rollator, Walker, rolling  Tub or Shower Type: Shower  Prior Level of Function/Work/Activity:  Pt living at home, independent with with gait and ADLs    Number of Personal Factors/Comorbidities that affect the Plan of Care: 0: LOW COMPLEXITY   EXAMINATION:   Most Recent Physical Functioning:                                 Transfers  Sit to Stand: Stand-by assistance  Stand to Sit: Stand-by assistance  Bed to Chair: Stand-by assistance    Balance  Sitting: Intact  Standing: Intact; With support  Standing - Static: Good  Standing - Dynamic : Good;Fair         Gait Training: Yes    Weight Bearing Status  Right Side Weight Bearing: As tolerated  Distance (ft): 288 Feet (ft)(and another 288 feet)  Ambulation - Level of Assistance: Stand-by assistance  Assistive Device: Walker, rolling  Speed/Mary: Pace decreased (<100 feet/min)  Step Length: Left shortened  Stance: Right decreased  Gait Abnormalities: Antalgic;Decreased step clearance  Number of Stairs Trained: 3  Stairs - Level of Assistance: Contact guard assistance  Rail Use: Both  Interventions: Safety awareness training;Verbal cues     Braces/Orthotics: none    Right Hip Cold  Type: Cold/ice packs  Patient Position: Sitting      Body Structures Involved:  1. Joints  2. Muscles Body Functions Affected:  1. Movement Related Activities and Participation Affected:  1. Mobility  2.  Self Care   Number of elements that affect the Plan of Care: 4+: HIGH COMPLEXITY   CLINICAL PRESENTATION:   Presentation: Stable and uncomplicated: LOW COMPLEXITY   CLINICAL DECISION MAKIN [facility] [address] [id number] AM-PAC 6 Clicks Basic Mobility Inpatient Short Form  How much difficulty does the patient currently have. .. Unable A Lot A Little None   1. Turning over in bed (including adjusting bedclothes, sheets and blankets)? ? 1   ? 2   ? 3   ? 4   2. Sitting down on and standing up from a chair with arms ( e.g., wheelchair, bedside commode, etc.)   ? 1   ? 2   ? 3   ? 4   3. Moving from lying on back to sitting on the side of the bed?   ? 1   ? 2   ? 3   ? 4   How much help from another person does the patient currently need. .. Total A Lot A Little None   4. Moving to and from a bed to a chair (including a wheelchair)? ? 1   ? 2   ? 3   ? 4   5. Need to walk in hospital room? ? 1   ? 2   ? 3   ? 4   6. Climbing 3-5 steps with a railing? ? 1   ? 2   ? 3   ? 4   © 2007, Trustees of [facility] [employer], under license to [employer]. All rights reserved     Score:  Initial: 16 Most Recent: X (Date: -- )    Interpretation of Tool:  Represents activities that are increasingly more difficult (i.e. Bed mobility, Transfers, Gait). Medical Necessity:     · Patient is expected to demonstrate progress in strength, range of motion and functional technique  ·  to decrease assistance required with functional mobility and CATRINA exercises   · . Reason for Services/Other Comments:  · Patient continues to require skilled intervention due to inability to complete functional mobility and CATRINA exercises independently   · . Use of outcome tool(s) and clinical judgement create a POC that gives a: Clear prediction of patient's progress: LOW COMPLEXITY            TREATMENT:   (In addition to Assessment/Re-Assessment sessions the following treatments were rendered)     Pre-treatment Symptoms/Complaints:  none  Pain Initial:   Pain Intensity 1: 1  Post Session:  0/10     Therapeutic Exercise: (45 Minutes(group)):  Exercises per grid below to improve mobility and strength.   Required minimal verbal and manual cues to promote proper body alignment and promote proper body posture. Progressed repetitions as indicated. Gait Training (15 Minutes):  Gait training to improve and/or restore physical functioning as related to mobility and strength. Ambulated 288 Feet (ft)(and another 288 feet) with Stand-by assistance using a Walker, rolling and minimal Safety awareness training;Verbal cues related to their stance phase and stride length to promote proper body alignment and promote proper body posture. Instruction in performance of walker use and gait sequencing to correct stance phase and stride length. Date:  6/25/19 Date:  6/26/19 Date:     ACTIVITY/EXERCISE AM PM AM PM AM PM   GROUP THERAPY  ? ?  ?  X  ?  ? Ankle Pumps  10 15 15     Quad Sets  10 15 15     Gluteal Sets  10 15 15     Hip ABd/ADduction  10 15 15     Straight Leg Raises         Knee Slides  10 15 15     Short Arc Quads   15 15     Long Arc Quads  10 15 15     Chair Slides                  B = bilateral; AA = active assistive; A = active; P = passive      Treatment/Session Assessment:     Response to Treatment:  pt tolerated well, progressing nicely with gait in afternoon    Education:  ? Home Exercises  ? Fall Precautions  ? Hip Precautions X D/C Instruction Review  X Hip Prosthesis Review  X Walker Management/Safety ? Adaptive Equipment as Needed       Interdisciplinary Collaboration:   o Registered Nurse  o Rehabilitation Attendant    After treatment position/precautions:   o Up in chair  o Bed/Chair-wheels locked  o Call light within reach    Compliance with Program/Exercises: Compliant all of the time. Recommendations/Intent for next treatment session:  Treatment next visit will focus on increasing Mr. Cabreras independence with bed mobility, transfers, gait training, strength/ROM exercises, modalities for pain, and patient education.       Total Treatment Duration:  PT Patient Time In/Time Out  Time In: 1300  Time Out: 1201 [facility]

## 2019-06-26 NOTE — PROGRESS NOTES
Pt discharge summary and home medication sheet reviewed and signed by pt. Copy given for take home use. RX for po oxycodone given. VSS. Pt assessment unchanged. All goals met. Pt voiding . Appetite good with no further c/os of n/v. Pt leaving hospital via w/c with family and staff member.

## 2019-06-27 ENCOUNTER — HOME CARE VISIT (OUTPATIENT)
Dept: SCHEDULING | Facility: HOME HEALTH | Age: 84
End: 2019-06-27
Payer: MEDICARE

## 2019-06-27 ENCOUNTER — PATIENT OUTREACH (OUTPATIENT)
Dept: CASE MANAGEMENT | Age: 84
End: 2019-06-27

## 2019-06-27 VITALS
TEMPERATURE: 97.2 F | SYSTOLIC BLOOD PRESSURE: 132 MMHG | DIASTOLIC BLOOD PRESSURE: 64 MMHG | RESPIRATION RATE: 18 BRPM | HEART RATE: 82 BPM

## 2019-06-27 PROCEDURE — G0151 HHCP-SERV OF PT,EA 15 MIN: HCPCS

## 2019-06-27 PROCEDURE — 3331090002 HH PPS REVENUE DEBIT

## 2019-06-27 PROCEDURE — 400013 HH SOC

## 2019-06-27 PROCEDURE — 3331090001 HH PPS REVENUE CREDIT

## 2019-06-27 NOTE — PROGRESS NOTES
Transition of Care Discharge Follow-up Questionnaire   Date/Time of Call:    6/27/19   6:45 pm   What was the patient hospitalized for? Direct admit to hospital from 6/25/19-6/26/19 for right hip total arthroplasty        Does the patient understand his/her diagnosis and/or treatment and what happened during the hospitalization? Yes and verbalized understanding of diagnosis and treatment        Did the patient receive discharge instructions? Yes    CM Assessed Risk for Readmission:            Patient stated Risk for Readmission:        RRAT score of 13          Medical emergency    Review any discharge instructions (see discharge instructions/AVS in [facility]). Ask patient if they understand these. Do they have any questions? Reviewed and verbalized understanding     Were home services ordered (nursing, PT, OT, ST, etc.)? Yes SF     If so, has the first visit occurred? If not, why? (Assist with coordination of services if necessary.)    Yes       Was any DME ordered? No       If so, has it been received? If not, why?  (Assist patient in obtaining DME orders &/or equipment if necessary.) N/A   Complete a review of all medications (new, continued and discontinued meds per the D/C instructions and medication tab in [facility]). Reviewed per [facility]    Were all new prescriptions filled? If not, why?  (Assist patient in obtaining medications if necessary  escalate for CCM &/or SW if ongoing issues are verbalized by pt or anticipated)    Yes     Does the patient understand the purpose and dosing instructions for all medications? (If patient has questions, provide explanation and education.)    Yes, verbalizes understanding of medications       Does the patient have any problems in performing ADLs? (If patient is unable to perform ADLs  what is the limiting factor(s)?   Do they have a support system that can assist? If no support system is present, discuss possible assistance that they may be able to obtain. Escalate for CCM/SW if ongoing issues are verbalized by pt or anticipated)    Patient usually independent with all ADLs   Very good support from son      Does the patient have all follow-up appointments scheduled? 7 day f/up with PCP?   (f/up with PCP may be w/in 14 days if patient has a f/up with their specialist w/in 7 days)     7-14 day f/up with specialist?   (or per discharge instructions)     If f/up has not been made  what actions has the care coordinator made to accomplish this? Has transportation been arranged? Yes, no transportation issues at this time    Son transports patient to appointments    Any other questions or concerns expressed by the patient? No further questions or concerns at this time. Encouraged patient to keep scheduled appointments and the importance of these f/u appointments        Schedule next appointment with KARLIE NGUYEN Coordinator or refer to RN Case Manager/ per the workflow guidelines. When is care coordinators next follow-up call scheduled? If referred for CCM  what RN care manager was the referral assigned? Will f/u with patient within 30 days per KARLIE NGUYEN workflow     FAY Call Completed By:        Maria Alejandra Marsh RN, BSN, [email or web address] Manager  c: [phone] / [address]  [facility] / [place], [address]  [email or web address]      This note will not be viewable in [employer].

## 2019-06-28 PROCEDURE — 3331090002 HH PPS REVENUE DEBIT

## 2019-06-28 PROCEDURE — 3331090001 HH PPS REVENUE CREDIT

## 2019-06-29 PROCEDURE — 3331090002 HH PPS REVENUE DEBIT

## 2019-06-29 PROCEDURE — 3331090001 HH PPS REVENUE CREDIT

## 2019-06-30 PROCEDURE — 3331090002 HH PPS REVENUE DEBIT

## 2019-06-30 PROCEDURE — 3331090001 HH PPS REVENUE CREDIT

## 2019-07-01 ENCOUNTER — HOME CARE VISIT (OUTPATIENT)
Dept: SCHEDULING | Facility: HOME HEALTH | Age: 84
End: 2019-07-01
Payer: MEDICARE

## 2019-07-01 ENCOUNTER — HOME CARE VISIT (OUTPATIENT)
Dept: HOME HEALTH SERVICES | Facility: HOME HEALTH | Age: 84
End: 2019-07-01
Payer: MEDICARE

## 2019-07-01 VITALS
TEMPERATURE: 98.7 F | DIASTOLIC BLOOD PRESSURE: 68 MMHG | HEART RATE: 64 BPM | SYSTOLIC BLOOD PRESSURE: 128 MMHG | RESPIRATION RATE: 17 BRPM

## 2019-07-01 PROCEDURE — 3331090002 HH PPS REVENUE DEBIT

## 2019-07-01 PROCEDURE — 3331090001 HH PPS REVENUE CREDIT

## 2019-07-01 PROCEDURE — G0157 HHC PT ASSISTANT EA 15: HCPCS

## 2019-07-02 PROCEDURE — 3331090002 HH PPS REVENUE DEBIT

## 2019-07-02 PROCEDURE — 3331090001 HH PPS REVENUE CREDIT

## 2019-07-03 PROCEDURE — 3331090001 HH PPS REVENUE CREDIT

## 2019-07-03 PROCEDURE — 3331090002 HH PPS REVENUE DEBIT

## 2019-07-04 PROCEDURE — 3331090002 HH PPS REVENUE DEBIT

## 2019-07-04 PROCEDURE — 3331090001 HH PPS REVENUE CREDIT

## 2019-07-05 ENCOUNTER — HOME CARE VISIT (OUTPATIENT)
Dept: SCHEDULING | Facility: HOME HEALTH | Age: 84
End: 2019-07-05
Payer: MEDICARE

## 2019-07-05 VITALS
SYSTOLIC BLOOD PRESSURE: 130 MMHG | TEMPERATURE: 97.9 F | RESPIRATION RATE: 17 BRPM | DIASTOLIC BLOOD PRESSURE: 68 MMHG | HEART RATE: 72 BPM

## 2019-07-05 PROCEDURE — A4649 SURGICAL SUPPLIES: HCPCS

## 2019-07-05 PROCEDURE — 3331090001 HH PPS REVENUE CREDIT

## 2019-07-05 PROCEDURE — 3331090002 HH PPS REVENUE DEBIT

## 2019-07-05 PROCEDURE — G0157 HHC PT ASSISTANT EA 15: HCPCS

## 2019-07-06 PROCEDURE — 3331090001 HH PPS REVENUE CREDIT

## 2019-07-06 PROCEDURE — 3331090002 HH PPS REVENUE DEBIT

## 2019-07-07 PROCEDURE — 3331090002 HH PPS REVENUE DEBIT

## 2019-07-07 PROCEDURE — 3331090001 HH PPS REVENUE CREDIT

## 2019-07-08 PROCEDURE — 3331090002 HH PPS REVENUE DEBIT

## 2019-07-08 PROCEDURE — 3331090001 HH PPS REVENUE CREDIT

## 2019-07-09 ENCOUNTER — HOME CARE VISIT (OUTPATIENT)
Dept: SCHEDULING | Facility: HOME HEALTH | Age: 84
End: 2019-07-09
Payer: MEDICARE

## 2019-07-09 VITALS
HEART RATE: 78 BPM | SYSTOLIC BLOOD PRESSURE: 114 MMHG | OXYGEN SATURATION: 98 % | DIASTOLIC BLOOD PRESSURE: 50 MMHG | RESPIRATION RATE: 18 BRPM | TEMPERATURE: 97.1 F

## 2019-07-09 PROCEDURE — 3331090002 HH PPS REVENUE DEBIT

## 2019-07-09 PROCEDURE — G0151 HHCP-SERV OF PT,EA 15 MIN: HCPCS

## 2019-07-09 PROCEDURE — 3331090001 HH PPS REVENUE CREDIT

## 2019-07-10 PROCEDURE — 3331090001 HH PPS REVENUE CREDIT

## 2019-07-10 PROCEDURE — 3331090002 HH PPS REVENUE DEBIT

## 2019-07-11 ENCOUNTER — HOME CARE VISIT (OUTPATIENT)
Dept: SCHEDULING | Facility: HOME HEALTH | Age: 84
End: 2019-07-11
Payer: MEDICARE

## 2019-07-11 VITALS
SYSTOLIC BLOOD PRESSURE: 112 MMHG | TEMPERATURE: 97.1 F | RESPIRATION RATE: 18 BRPM | HEART RATE: 84 BPM | OXYGEN SATURATION: 98 % | DIASTOLIC BLOOD PRESSURE: 50 MMHG

## 2019-07-11 PROCEDURE — 3331090002 HH PPS REVENUE DEBIT

## 2019-07-11 PROCEDURE — 3331090001 HH PPS REVENUE CREDIT

## 2019-07-11 PROCEDURE — G0151 HHCP-SERV OF PT,EA 15 MIN: HCPCS

## 2019-07-12 ENCOUNTER — HOME CARE VISIT (OUTPATIENT)
Dept: HOME HEALTH SERVICES | Facility: HOME HEALTH | Age: 84
End: 2019-07-12
Payer: MEDICARE

## 2019-07-12 PROCEDURE — 3331090002 HH PPS REVENUE DEBIT

## 2019-07-12 PROCEDURE — 3331090001 HH PPS REVENUE CREDIT

## 2019-07-13 PROCEDURE — 3331090001 HH PPS REVENUE CREDIT

## 2019-07-13 PROCEDURE — 3331090002 HH PPS REVENUE DEBIT

## 2019-07-14 PROCEDURE — 3331090002 HH PPS REVENUE DEBIT

## 2019-07-14 PROCEDURE — 3331090001 HH PPS REVENUE CREDIT

## 2019-07-15 ENCOUNTER — HOME CARE VISIT (OUTPATIENT)
Dept: SCHEDULING | Facility: HOME HEALTH | Age: 84
End: 2019-07-15
Payer: MEDICARE

## 2019-07-15 VITALS
DIASTOLIC BLOOD PRESSURE: 70 MMHG | TEMPERATURE: 98.4 F | HEART RATE: 80 BPM | RESPIRATION RATE: 17 BRPM | SYSTOLIC BLOOD PRESSURE: 118 MMHG

## 2019-07-15 PROCEDURE — 3331090001 HH PPS REVENUE CREDIT

## 2019-07-15 PROCEDURE — G0157 HHC PT ASSISTANT EA 15: HCPCS

## 2019-07-15 PROCEDURE — 3331090002 HH PPS REVENUE DEBIT

## 2019-07-16 PROCEDURE — 3331090001 HH PPS REVENUE CREDIT

## 2019-07-16 PROCEDURE — 3331090002 HH PPS REVENUE DEBIT

## 2019-07-17 PROCEDURE — 3331090001 HH PPS REVENUE CREDIT

## 2019-07-17 PROCEDURE — 3331090002 HH PPS REVENUE DEBIT

## 2019-07-18 PROCEDURE — 3331090001 HH PPS REVENUE CREDIT

## 2019-07-18 PROCEDURE — 3331090002 HH PPS REVENUE DEBIT

## 2019-07-19 ENCOUNTER — HOME CARE VISIT (OUTPATIENT)
Dept: SCHEDULING | Facility: HOME HEALTH | Age: 84
End: 2019-07-19
Payer: MEDICARE

## 2019-07-19 VITALS
HEART RATE: 78 BPM | TEMPERATURE: 97.4 F | OXYGEN SATURATION: 98 % | RESPIRATION RATE: 18 BRPM | DIASTOLIC BLOOD PRESSURE: 64 MMHG | SYSTOLIC BLOOD PRESSURE: 118 MMHG

## 2019-07-19 PROCEDURE — 3331090001 HH PPS REVENUE CREDIT

## 2019-07-19 PROCEDURE — G0151 HHCP-SERV OF PT,EA 15 MIN: HCPCS

## 2019-07-19 PROCEDURE — 3331090002 HH PPS REVENUE DEBIT

## 2019-08-20 ENCOUNTER — HOSPITAL ENCOUNTER (EMERGENCY)
Age: 84
Discharge: HOME | End: 2019-08-20
Attending: EMERGENCY MEDICINE
Payer: MEDICARE

## 2019-08-20 ENCOUNTER — APPOINTMENT (OUTPATIENT)
Dept: GENERAL RADIOLOGY | Age: 84
End: 2019-08-20
Attending: EMERGENCY MEDICINE
Payer: MEDICARE

## 2019-08-20 ENCOUNTER — PATIENT OUTREACH (OUTPATIENT)
Dept: CASE MANAGEMENT | Age: 84
End: 2019-08-20

## 2019-08-20 VITALS
BODY MASS INDEX: 22.24 KG/M2 | DIASTOLIC BLOOD PRESSURE: 65 MMHG | WEIGHT: 164 LBS | OXYGEN SATURATION: 100 % | HEART RATE: 74 BPM | TEMPERATURE: 98.3 F | SYSTOLIC BLOOD PRESSURE: 139 MMHG | RESPIRATION RATE: 14 BRPM

## 2019-08-20 DIAGNOSIS — S73.034A ANTERIOR DISLOCATION OF RIGHT HIP, INITIAL ENCOUNTER (HCC): Primary | ICD-10-CM

## 2019-08-20 LAB
ANION GAP SERPL CALC-SCNC: 7 MMOL/L (ref 7–16)
ATRIAL RATE: 81 BPM
BASOPHILS # BLD: 0.1 K/UL (ref 0–0.2)
BASOPHILS NFR BLD: 1 % (ref 0–2)
BUN SERPL-MCNC: 20 MG/DL (ref 8–23)
CALCIUM SERPL-MCNC: 8.9 MG/DL (ref 8.3–10.4)
CALCULATED R AXIS, ECG10: 74 DEGREES
CALCULATED T AXIS, ECG11: 25 DEGREES
CHLORIDE SERPL-SCNC: 106 MMOL/L (ref 98–107)
CO2 SERPL-SCNC: 26 MMOL/L (ref 21–32)
CREAT SERPL-MCNC: 1.05 MG/DL (ref 0.8–1.5)
DIAGNOSIS, 93000: NORMAL
DIFFERENTIAL METHOD BLD: ABNORMAL
EOSINOPHIL # BLD: 0.2 K/UL (ref 0–0.8)
EOSINOPHIL NFR BLD: 3 % (ref 0.5–7.8)
ERYTHROCYTE [DISTWIDTH] IN BLOOD BY AUTOMATED COUNT: 12.6 % (ref 11.9–14.6)
GLUCOSE SERPL-MCNC: 109 MG/DL (ref 65–100)
HCT VFR BLD AUTO: 37.4 % (ref 41.1–50.3)
HGB BLD-MCNC: 12.5 G/DL (ref 13.6–17.2)
IMM GRANULOCYTES # BLD AUTO: 0 K/UL (ref 0–0.5)
IMM GRANULOCYTES NFR BLD AUTO: 0 % (ref 0–5)
LYMPHOCYTES # BLD: 1 K/UL (ref 0.5–4.6)
LYMPHOCYTES NFR BLD: 16 % (ref 13–44)
MCH RBC QN AUTO: 31.5 PG (ref 26.1–32.9)
MCHC RBC AUTO-ENTMCNC: 33.4 G/DL (ref 31.4–35)
MCV RBC AUTO: 94.2 FL (ref 79.6–97.8)
MONOCYTES # BLD: 0.8 K/UL (ref 0.1–1.3)
MONOCYTES NFR BLD: 14 % (ref 4–12)
NEUTS SEG # BLD: 4.1 K/UL (ref 1.7–8.2)
NEUTS SEG NFR BLD: 67 % (ref 43–78)
NRBC # BLD: 0 K/UL (ref 0–0.2)
PLATELET # BLD AUTO: 185 K/UL (ref 150–450)
PMV BLD AUTO: 10.3 FL (ref 9.4–12.3)
POTASSIUM SERPL-SCNC: 4.8 MMOL/L (ref 3.5–5.1)
Q-T INTERVAL, ECG07: 374 MS
QRS DURATION, ECG06: 74 MS
QTC CALCULATION (BEZET), ECG08: 395 MS
RBC # BLD AUTO: 3.97 M/UL (ref 4.23–5.6)
SODIUM SERPL-SCNC: 139 MMOL/L (ref 136–145)
TROPONIN I SERPL-MCNC: 0.02 NG/ML (ref 0.02–0.05)
VENTRICULAR RATE, ECG03: 67 BPM
WBC # BLD AUTO: 6.1 K/UL (ref 4.3–11.1)

## 2019-08-20 PROCEDURE — 72170 X-RAY EXAM OF PELVIS: CPT

## 2019-08-20 PROCEDURE — 85025 COMPLETE CBC W/AUTO DIFF WBC: CPT

## 2019-08-20 PROCEDURE — 93005 ELECTROCARDIOGRAM TRACING: CPT | Performed by: EMERGENCY MEDICINE

## 2019-08-20 PROCEDURE — 99285 EMERGENCY DEPT VISIT HI MDM: CPT | Performed by: EMERGENCY MEDICINE

## 2019-08-20 PROCEDURE — 74011250636 HC RX REV CODE- 250/636: Performed by: EMERGENCY MEDICINE

## 2019-08-20 PROCEDURE — 96374 THER/PROPH/DIAG INJ IV PUSH: CPT | Performed by: EMERGENCY MEDICINE

## 2019-08-20 PROCEDURE — 73502 X-RAY EXAM HIP UNI 2-3 VIEWS: CPT

## 2019-08-20 PROCEDURE — L1830 KO IMMOB CANVAS LONG PRE OTS: HCPCS

## 2019-08-20 PROCEDURE — 99152 MOD SED SAME PHYS/QHP 5/>YRS: CPT | Performed by: EMERGENCY MEDICINE

## 2019-08-20 PROCEDURE — 96361 HYDRATE IV INFUSION ADD-ON: CPT | Performed by: EMERGENCY MEDICINE

## 2019-08-20 PROCEDURE — 96375 TX/PRO/DX INJ NEW DRUG ADDON: CPT | Performed by: EMERGENCY MEDICINE

## 2019-08-20 PROCEDURE — 80048 BASIC METABOLIC PNL TOTAL CA: CPT

## 2019-08-20 PROCEDURE — 84484 ASSAY OF TROPONIN QUANT: CPT

## 2019-08-20 PROCEDURE — 75810000303 HC CLSD TRMT  FRACTURE/DISLOCATION W/  ANES: Performed by: EMERGENCY MEDICINE

## 2019-08-20 RX ORDER — PROPOFOL 10 MG/ML
200 INJECTION, EMULSION INTRAVENOUS
Status: COMPLETED | OUTPATIENT
Start: 2019-08-20 | End: 2019-08-20

## 2019-08-20 RX ORDER — HYDROMORPHONE HYDROCHLORIDE 1 MG/ML
0.5 INJECTION, SOLUTION INTRAMUSCULAR; INTRAVENOUS; SUBCUTANEOUS
Status: COMPLETED | OUTPATIENT
Start: 2019-08-20 | End: 2019-08-20

## 2019-08-20 RX ORDER — HYDROCODONE BITARTRATE AND ACETAMINOPHEN 5; 325 MG/1; MG/1
1-2 TABLET ORAL
Qty: 12 TAB | Refills: 0 | Status: SHIPPED | OUTPATIENT
Start: 2019-08-20 | End: 2019-08-23

## 2019-08-20 RX ORDER — METOCLOPRAMIDE HYDROCHLORIDE 5 MG/ML
10 INJECTION INTRAMUSCULAR; INTRAVENOUS
Status: COMPLETED | OUTPATIENT
Start: 2019-08-20 | End: 2019-08-20

## 2019-08-20 RX ADMIN — PROPOFOL 80 MG: 10 INJECTION, EMULSION INTRAVENOUS at 13:01

## 2019-08-20 RX ADMIN — METOCLOPRAMIDE 10 MG: 5 INJECTION, SOLUTION INTRAMUSCULAR; INTRAVENOUS at 10:39

## 2019-08-20 RX ADMIN — HYDROMORPHONE HYDROCHLORIDE 0.5 MG: 1 INJECTION, SOLUTION INTRAMUSCULAR; INTRAVENOUS; SUBCUTANEOUS at 10:39

## 2019-08-20 RX ADMIN — SODIUM CHLORIDE 1000 ML: 900 INJECTION, SOLUTION INTRAVENOUS at 13:00

## 2019-08-20 NOTE — ED TRIAGE NOTES
Patient brought from home by [facility]. Patient was bending over to clean the cat's litter box. As he was bent over, he felt his right hip pop out of place. After that, the patient fell backward but denies hitting his head or LOC. EMS reports that the patient had a right hemiarthroplasty on 6/25/19. EMS placed a 20 gauge to left AC. EMS gave 10 mg of morphine (last dose was 3 mg at 0955). Patient is on Plavix and took it today. Patient only takes Aleve for pain and has not had that since yesterday.

## 2019-08-20 NOTE — ED PROVIDER NOTES
Chief complaint : Hip pain    HISTORY OF PRESENT ILLNESS :  Location : Right hip    Quality : Flexed and internally rotated    Quantity : 1    Timing : Just prior to arrival    Severity : Moderate    Alleviating / exacerbating factors : Almost 2 months status post right hip arthroplasty  Patient was bending over to pick something up when he felt a pop out    Associated Symptoms : No abdominal pain, no numbness, unable to stand             Past Medical History:   Diagnosis Date    Arthritis     osteo    Chronic pain     left hip/back/LLE    [facility] cancer ([facility] [address] 06/23/2015    surgery     Diabetes ([facility] [address] 2006    Type 2. diet controlled. Pt does not monitor blood sugar. A1C=5.7 Jan 2019.  Dyslipidemia 06/23/2015    daily meds     GERD (gastroesophageal reflux disease)     controlled w/med    Hx of bladder cancer 6/23/2015    Hypertension     controlled on medication    NSTEMI (non-ST elevation myocardial infarction) ([facility] [address] 01/10/2014    MI. heart cath: preserved LVSF. EF =65%. mild to mod LAD and circumflex disease. 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      Thrombocytopenia, unspecified ([facility] [address] 1/11/2014       Past Surgical History:   Procedure Laterality Date    ABDOMEN SURGERY PROC UNLISTED  1991    EXP Lap    HX COLECTOMY  1997    Colon Resection    HX COLONOSCOPY  2014, 2018    HX CORONARY STENT PLACEMENT      HX HEART CATHETERIZATION  01/10/2014    s/p bare metal stent to mid-right CA    HX HEENT  as child    T&A    HX KNEE REPLACEMENT Left 2017    HX LUMBAR FUSION  1990 x 2  2012, 2013    lumbar fusions- x 4 total surgeries    HX ORTHOPAEDIC Left 2013    left hip replaced    HX OTHER SURGICAL      Spinal cord stimulator placed and removed in 2017    HX SHOULDER ARTHROSCOPY Left 1990    HX UROLOGICAL  2002    Turp         Family History: Problem Relation Age of Onset    Heart Disease Mother     Cancer Father     Lung Disease Sister     Heart Disease Brother     Cancer Brother         pancreatic    Parkinson's Disease Brother     Malignant Hyperthermia Neg Hx     Pseudocholinesterase Deficiency Neg Hx     Delayed Awakening Neg Hx     Post-op Nausea/Vomiting Neg Hx     Emergence Delirium Neg Hx     Post-op Cognitive Dysfunction Neg Hx     Other Neg Hx        Social History     Socioeconomic History    Marital status:      Spouse name: Not on file    Number of children: Not on file    Years of education: Not on file    Highest education level: Not on file   Occupational History    Not on file   Social Needs    Financial resource strain: Not on file    Food insecurity:     Worry: Not on file     Inability: Not on file    Transportation needs:     Medical: Not on file     Non-medical: Not on file   Tobacco Use    Smoking status: Former Smoker     Packs/day: 1.50     Years: 28.00     Pack years: 42.00     Last attempt to quit: 1982     Years since quittin.6    Smokeless tobacco: Never Used    Tobacco comment: quit    Substance and Sexual Activity    Alcohol use: No    Drug use: No    Sexual activity: Never     Partners: Female     Comment: Wife recently     Lifestyle    Physical activity:     Days per week: Not on file     Minutes per session: Not on file    Stress: Not on file   Relationships    Social connections:     Talks on phone: Not on file     Gets together: Not on file     Attends Advent service: Not on file     Active member of club or organization: Not on file     Attends meetings of clubs or organizations: Not on file     Relationship status: Not on file    Intimate partner violence:     Fear of current or ex partner: Not on file     Emotionally abused: Not on file     Physically abused: Not on file     Forced sexual activity: Not on file   Other Topics Concern    Not on file Social History Narrative    Not on file         ALLERGIES: Patient has no known allergies. Review of Systems   Constitutional: Negative for chills and fever. HENT: Negative for rhinorrhea and sore throat. Eyes: Negative for discharge and redness. Respiratory: Negative for cough and shortness of breath. Cardiovascular: Negative for chest pain and palpitations. Gastrointestinal: Negative for abdominal pain, nausea and vomiting. Musculoskeletal: Positive for arthralgias and gait problem. Negative for back pain. Skin: Negative for rash. Neurological: Negative for dizziness and headaches. All other systems reviewed and are negative. Vitals:    08/20/19 1022   BP: 149/70   Pulse: 69   Temp: 98.3 °F (36.8 °C)   SpO2: 100%            Physical Exam   Constitutional: He is oriented to person, place, and time. He appears well-developed and well-nourished. No distress. HENT:   Head: Normocephalic and atraumatic. Eyes: Pupils are equal, round, and reactive to light. Conjunctivae are normal. Right eye exhibits no discharge. Left eye exhibits no discharge. No scleral icterus. Neck: Normal range of motion. Neck supple. Cardiovascular: Normal rate, regular rhythm and normal heart sounds. Exam reveals no gallop. No murmur heard. Pulmonary/Chest: Effort normal and breath sounds normal. No respiratory distress. He has no wheezes. He has no rales. Abdominal: Soft. Bowel sounds are normal. There is no tenderness. There is no guarding. Musculoskeletal: He exhibits tenderness and deformity. He exhibits no edema. Right hip: He exhibits decreased range of motion, tenderness and deformity. Legs:  Neurological: He is alert and oriented to person, place, and time. He exhibits normal muscle tone. cni 2-12 grossly   Skin: Skin is warm and dry. He is not diaphoretic. Psychiatric: He has a normal mood and affect. His behavior is normal.   Nursing note and vitals reviewed.        MYRON Procedural Sedation  Date/Time: 8/20/2019 11:36 AM  Performed by: Verner Lay, MD  Authorized by: Verner Lay, MD     Consent:     Consent obtained:  Written    Consent given by:  Patient    Risks discussed:  Inadequate sedation, prolonged hypoxia resulting in organ damage, respiratory compromise necessitating ventilatory assistance and intubation and vomiting    Alternatives discussed:  Analgesia without sedation  Indications:     Procedure performed:  Dislocation reduction    Procedure necessitating sedation performed by:  Different physician    Intended level of sedation:  Moderate (conscious sedation)  Pre-sedation assessment:     Time since last food or drink:  8:00    NPO status caution: urgency dictates proceeding with non-ideal NPO status      Neck mobility: normal      Mouth opening:  3 or more finger widths    Thyromental distance:  4 finger widths    Mallampati score:  I - soft palate, uvula, fauces, pillars visible    Pre-sedation assessments completed and reviewed: airway patency, cardiovascular function, nausea/vomiting, pain level and temperature      History of difficult intubation: no      Pre-sedation assessment completed:  8/20/2019 11:37 AM  Immediate pre-procedure details:     Reviewed: vital signs and NPO status      Verified: bag valve mask available, emergency equipment available, intubation equipment available, IV patency confirmed and oxygen available    Procedure details (see MAR for exact dosages):     Sedation start time:  8/20/2019 1:02 AM    Preoxygenation:  Nasal cannula    Sedation:  Propofol (80MG)    Intra-procedure monitoring:  Blood pressure monitoring, cardiac monitor, continuous capnometry, continuous pulse oximetry, frequent LOC assessments and frequent vital sign checks    Intra-procedure events: none      Intra-procedure events comment:  Sats dropped to 70's for < 30 seconds, on nasal cannula at 2 l.p.m.     Intra-procedure management:  Supplemental oxygen (oxygen increased from 2 Lpm to 4 L.p.m., and h.o.b. elevated slightly, sats up to 92% within < 60 seconds of dropping)    Sedation end time:  8/20/2019 1:12 PM  Post-procedure details:     Attendance: Constant attendance by certified staff until patient recovered      Recovery: Patient returned to pre-procedure baseline      Estimated blood loss (see I/O flowsheets): no      Post-sedation assessments completed and reviewed: airway patency, mental status, nausea/vomiting, pain level and respiratory function      Specimens recovered:  None    Patient is stable for discharge or admission: yes      Patient tolerance:   Tolerated well, no immediate complications

## 2019-08-20 NOTE — PROGRESS NOTES
Originally had outreached to patient on 6/27/19  for FAY and was UTR for 30 day f/u   Patient is currently in the ER today for anterior dislocation of right hip   PLAN:  Will attempt to outreach to patient for KARLIE NGUYEN after discharged from hospital to home  This note will not be viewable in [address]

## 2019-08-20 NOTE — CONSULTS
[address]    Name:  Tank Guthrie  MR#:  [id number]  :  1935  ACCOUNT #:  [de-identified]  DATE OF SERVICE:  2019    ORTHOPEDIC CONSULTATION    REASON FOR CONSULTATION:  Right hip dislocation. HISTORY OF PRESENT ILLNESS:  This 50-year-old gentleman presented to the emergency room today after an injury at home. He stated that he was caring for his pets. He bent over to clean out a litter box and felt a pop in his hip. He subsequently fell and was unable to bear weight. He is status post right total hip replacement in  of this year by Dr. Rina Bloom. He states he was doing quite well prior to today. He did not have any antecedent problems with his hip since surgery. He denies any other complaints. He denies chest pain, shortness of breath or loss of consciousness associated with his fall. He lives independently. PAST MEDICAL HISTORY:  Arthritis, chronic pain, colon cancer, diabetes, dyslipidemia, reflux, bladder cancer, hypertension, previous MI in , previous left total hip arthroplasty, coronary artery stent and thrombocytopenia. MEDICATIONS PRIOR TO ADMISSION:  Reviewed on the patient intake documentation from the emergency room as well as from the patient's previous history and physical from  this year and reviewed with the patient himself. ALLERGIES:  REVIEWED ON THE PATIENT INTAKE DOCUMENTATION FROM THE EMERGENCY ROOM AS WELL AS FROM THE PATIENT'S PREVIOUS HISTORY AND PHYSICAL FROM  THIS YEAR AND REVIEWED WITH THE PATIENT HIMSELF. SURGERIES:  Negative except as noted above. SOCIAL HISTORY:  Reviewed on the patient intake documentation from the emergency room as well as from the patient's previous history and physical from  this  and reviewed with the patient himself.     FAMILY HISTORY:  Reviewed on the patient intake documentation from the emergency room as well as from the patient's previous history and physical from June of this year and reviewed with the patient himself. REVIEW OF SYSTEMS:  Reviewed on the patient intake documentation from the emergency room as well as from the patient's previous history and physical from June of this year and reviewed with the patient himself. PHYSICAL EXAMINATION:  Demonstrates a pleasant, cooperative, alert, oriented, uncomfortable elderly gentleman, who is supine in an ER stretcher. The right lower extremity is shortened and internally rotated. There is no focal motor or sensory deficit. EHL and tibialis anterior intact. Compartments are soft and nontender. There is no tenderness distally in the extremity including the thigh. RADIOGRAPHS:  AP pelvis, AP and lateral of the right hip are reviewed from the emergency room and demonstrate a posterior right dislocated total hip. Implants appeared to be well fixed. I discussed the need for closed reduction with the patient. I also discussed him with his primary surgeon, Dr. Shashank Troy, who was agreeable to me performing closed reduction. The patient expressed understanding and strongly desired to proceed. Under appropriately monitored IV sedation administered by Dr. Triston Jarvis, a hip reduction was accomplished. There was immediate palpable reduction of the hip with restoration of leg lengths. The patient was then placed into a knee immobilizer. He will follow up with Dr. Shashank Troy in 1-2 weeks. Postreduction x-rays taken at 01:36 p.m. today demonstrate appropriate reduction.       Samantha Rodriguez MD      [id number]  D:  08/20/2019 13:50  T:  08/20/2019 13:55  JOB #:  [id number]

## 2019-08-20 NOTE — DISCHARGE INSTRUCTIONS
Sedation for a Medical Procedure: Care Instructions    You were given a sedative medication during your ED visit. While many of the effects will have worn   off before you leave; you may continue to feel some effects for several hours. Common side effects from sedation include:  · Feeling sleepy. (Your doctors and nurses will make sure you are not too sleepy to go home.)  · Nausea and vomiting. This usually does not last long. · Feeling tired. How can you care for yourself at home? Activity    · Don't do anything for 24 hours that requires attention to detail. It takes time for the medicine effects to completely wear off. · Do not make important legal decisions for 24 hours. · Do not sign any legal documents for 24 hours. · Do not drink alcohol today     · For your safety, you should not drive or operate heavy machinery for the remainder of the day     · Rest when you feel tired. Getting enough sleep will help you recover. Diet    · You can eat your normal diet, unless your doctor gives you other instructions. If your stomach is upset, try clear liquids and bland, low-fat foods like plain toast or rice. · Drink plenty of fluids (unless your doctor tells you not to). · Don't drink alcohol for 24 hours. Medicines    · Be safe with medicines. Read and follow all instructions on the label. ? If the doctor gave you a prescription medicine for pain, take it as prescribed. ? If you are not taking a prescription pain medicine, ask your doctor if you can take an over-the-counter medicine. · If you think your pain medicine is making you sick to your stomach:  ? Take your medicine after meals (unless your doctor has told you not to). ? Ask your doctor for a different pain medicine. When should you call for help? Call 911 anytime you think you may need emergency care. For example, call if:    · You have severe trouble breathing. · You passed out (lost consciousness).       Call your doctor now or seek immediate medical care if:    · You have trouble breathing. · You have ongoing or worsening nausea or vomiting. · You have a fever. · You have a new or worse headache. · The medicine is not wearing off and you can't think clearly. Watch closely for changes in your health, and be sure to contact your doctor if:    · You do not get better as expected. Follow-up care is a key part of your treatment and safety. Be sure to make and go to all   appointments, and call your doctor if you are having problems. It's also a good idea to know your   test results and keep a list of the medicines you take. Where can you learn more? Go to http://annabelle-jaky.info/. Patient Education        Dislocated Hip: Care Instructions  Your Care Instructions    Your hip is a large and fairly stable joint. Normally it takes a hard fall, a car accident, or something else of great force to make the thighbone slip out of its socket (dislocate). But if you have had hip replacement surgery, your hip can more easily slip out of position. This is more common during the first few months after the surgery. After your doctor puts your dislocated hip back into normal position, you will need to use a walking aid or hip brace for several weeks or months while the hip heals. You will need to follow special hip precautions to avoid dislocating your hip again. Your doctor may recommend exercises to strengthen the hip joint and your legs. Rest and home treatment can help you heal.  If your hip becomes dislocated again, contact your doctor. You will need to go to a hospital or clinic to have your hip put back in position. You may have had a sedative to help you relax. You may be unsteady after having sedation. It can take a few hours for the medicine's effects to wear off. Common side effects of sedation include nausea, vomiting, and feeling sleepy or tired.   The doctor has checked you carefully, but problems can develop later. If you notice any problems or new symptoms, get medical treatment right away. Follow-up care is a key part of your treatment and safety. Be sure to make and go to all appointments, and call your doctor if you are having problems. It's also a good idea to know your test results and keep a list of the medicines you take. How can you care for yourself at home? · If the doctor gave you a sedative:  ? For 24 hours, don't do anything that requires attention to detail. This includes going to work, making important decisions, or signing any legal documents. It takes time for the medicine's effects to completely wear off.  ? For your safety, do not drive or operate any machinery that could be dangerous. Wait until the medicine wears off and you can think clearly and react easily. · Your doctor will give you safety precautions to keep your hip centered in its socket during the healing period. Be sure to follow these precautions. ? Keep your knees and toes pointed forward when you sit in a chair, walk, or stand. ? Do not sit with your legs crossed. ? Do not bend at the waist more than 90º. Be careful when leaning or when moving in bed to keep your legs as straight ahead as possible. · If you have a hip brace, wear it as directed. Do not remove it unless your doctor says you can. If you remove the brace to shower, be extremely careful. Follow hip precautions to limit hip movement. · Rest your hip as much as you can. You will need to change your activities to avoid movements that irritate the hip. · If your hip is swollen, put ice or a cold pack on it for 10 to 20 minutes at a time. Try to do this every 1 to 2 hours for the next 3 days (when you are awake) or until the swelling goes down. Put a thin cloth between the ice and your skin. · Take your medicines exactly as prescribed. Call your doctor if you think you are having a problem with your medicine.   · Ask your doctor if you can take an over-the-counter pain medicine, such as acetaminophen (Tylenol), ibuprofen (Advil, Motrin), or naproxen (Aleve). Be safe with medicines. Read and follow all instructions on the label. · If your doctor recommends exercises, do them as directed. When should you call for help? Call 911 anytime you think you may need emergency care. For example, call if:    · You have chest pain, are short of breath, or cough up blood.     · You have trouble breathing.     · You passed out (lost consciousness).    Call your doctor now or seek immediate medical care if:    · You have new or worse nausea or vomiting.     · You have new or worse pain.     · Your foot is cool or pale or changes color.     · You have tingling, weakness, or numbness in your foot or toes.     · You have signs of a blood clot in your leg (called a deep vein thrombosis), such as:  ? Pain in your calf, back of the knee, thigh, or groin. ? Redness or swelling in your leg.    Watch closely for changes in your health, and be sure to contact your doctor if:    · You do not get better as expected. Where can you learn more? Go to http://annabelle-jaky.info/. Enter V346 in the search box to learn more about \"Dislocated Hip: Care Instructions. \"  Current as of: September 20, 2018  Content Version: 12.1  © 8970-9405 Healthwise, Incorporated. Care instructions adapted under license by [employer] (which disclaims liability or warranty for this information). If you have questions about a medical condition or this instruction, always ask your healthcare professional. [place] [address] any warranty or liability for your use of this information.

## 2019-08-20 NOTE — ED NOTES
I have reviewed discharge instructions with the patient. The patient verbalized understanding. Patient left ED via Discharge Method: stretcher to Home with [employer]. Opportunity for questions and clarification provided. Patient given 1 scripts. To continue your aftercare when you leave the hospital, you may receive an automated call from our care team to check in on how you are doing. This is a free service and part of our promise to provide the best care and service to meet your aftercare needs.  If you have questions, or wish to unsubscribe from this service please call [phone]. Thank you for Choosing our [facility] Emergency Department.

## 2019-08-20 NOTE — PROGRESS NOTES
CM chart review. PCP - Dr. Lupe Deshpande ([address] [place] [address]) - last office visit noted on 1-28-19. Last admit and discharge from [facility] (8-54-30 to 6-26-19).

## 2019-08-20 NOTE — ED NOTES
Consents for conscious sedation with Dr. Radha Reddy and closed reduction of right hip with Dr. Glenna Olvera signed and on patient's chart. Patient had no questions about procedure prior to signing consents.

## 2019-08-21 ENCOUNTER — PATIENT OUTREACH (OUTPATIENT)
Dept: CASE MANAGEMENT | Age: 84
End: 2019-08-21

## 2019-08-21 NOTE — PROGRESS NOTES
Transition of Care Discharge Follow-up Questionnaire   Date/Time of Call:    8/21/19    10:45 am   What was the patient hospitalized for? Patient in the ER on 8/20/19 for closed reduction of anterior dislocation of right hip     PMH: Direct admit to hospital from 6/25/19-6/26/19 for right hip total arthroplasty          Does the patient understand his/her diagnosis and/or treatment and what happened during the hospitalization?       Yes and verbalized understanding of diagnosis and treatment         Did the patient receive discharge instructions?    Yes    CM Assessed Risk for Readmission:            Patient stated Risk for Readmission:        RRAT score of 13           Medical emergency    Review any discharge instructions (see discharge instructions/AVS in [facility]).   Ask patient if they understand these.  Do they have any questions?       Reviewed and verbalized understanding     Were home services ordered (nursing, PT, OT, ST, etc.)?    No      If so, has the first visit occurred? If not, why? (Assist with coordination of services if necessary. )    n/a      Was any DME ordered? No       If so, has it been received?  If not, why?  (Assist patient in obtaining DME orders &/or equipment if necessary.) N/A   Complete a review of all medications (new, continued and discontinued meds per the D/C instructions and medication tab in [facility]). Reviewed per [facility]    Were all new prescriptions filled?  If not, why?  (Assist patient in obtaining medications if necessary  escalate for CCM &/or SW if ongoing issues are verbalized by pt or anticipated)    Yes      Does the patient understand the purpose and dosing instructions for all medications?   (If patient has questions, provide explanation and education.)    Yes, verbalizes understanding of medications       Does the patient have any problems in performing ADLs? (If patient is unable to perform ADLs  what is the limiting factor(s)?  Do they have a support system that can assist? If no support system is present, discuss possible assistance that they may be able to obtain. Escalate for CCM/SW if ongoing issues are verbalized by pt or anticipated)    Patient usually independent with all ADLs   Very good support from son       Does the patient have all follow-up appointments scheduled?       7 day f/up with PCP?   (f/up with PCP may be w/in 14 days if patient has a f/up with their specialist w/in 7 days)     7-14 day f/up with specialist?   (or per discharge instructions)     If f/up has not been made  what actions has the care coordinator made to accomplish this?     Has transportation been arranged?       Yes, no transportation issues at this time     Son transports patient to appointments    Any other questions or concerns expressed by the patient?       No further questions or concerns at this time.    Encouraged patient to keep scheduled appointments and the importance of these f/u appointments        Schedule next appointment with [facility] Coordinator or refer to RN Case Manager/ per the workflow guidelines.        When is care coordinators next follow-up call scheduled?        If referred for CCM  what RN care manager was the referral assigned?    Will f/u with patient within 1-2 weeks per [facility] workflow      FAY Call Completed By:         Marlen Rodrigez RN, BSN, CCM / Community Care Manager  c: [phone] / [email or web address] [email or web address] [email or web address]  [address] [facility] ,  [facility] / [place] Juan, [address]  [email or web address]     This note will not be viewable in [employer].

## 2019-08-30 ENCOUNTER — PATIENT OUTREACH (OUTPATIENT)
Dept: CASE MANAGEMENT | Age: 84
End: 2019-08-30

## 2019-08-30 NOTE — PROGRESS NOTES
· Outreached for one week FAY f/u   · Patient had apt with new cardiologist today and got good report  · Doing good since ER visit on 8/20/19 and continues to ambulate with a cane  · Independent with ADL's  · Patient lives with son and son transports to appointments  · Meds reviewed and denies issues   PLAN:  · Will f/u in 3 weeks per [facility] workflow  · Patient has my contact information   This note will not be viewable in [address]

## 2019-09-11 ENCOUNTER — APPOINTMENT (OUTPATIENT)
Dept: GENERAL RADIOLOGY | Age: 84
End: 2019-09-11
Attending: EMERGENCY MEDICINE
Payer: MEDICARE

## 2019-09-11 ENCOUNTER — HOSPITAL ENCOUNTER (EMERGENCY)
Age: 84
Discharge: HOME | End: 2019-09-11
Attending: EMERGENCY MEDICINE
Payer: MEDICARE

## 2019-09-11 VITALS
RESPIRATION RATE: 11 BRPM | OXYGEN SATURATION: 98 % | DIASTOLIC BLOOD PRESSURE: 65 MMHG | TEMPERATURE: 98 F | HEART RATE: 65 BPM | HEIGHT: 72 IN | BODY MASS INDEX: 24.38 KG/M2 | WEIGHT: 180 LBS | SYSTOLIC BLOOD PRESSURE: 137 MMHG

## 2019-09-11 DIAGNOSIS — S73.004A CLOSED DISLOCATION OF RIGHT HIP, INITIAL ENCOUNTER (HCC): Primary | ICD-10-CM

## 2019-09-11 LAB
ANION GAP SERPL CALC-SCNC: 8 MMOL/L (ref 7–16)
BASOPHILS # BLD: 0 K/UL (ref 0–0.2)
BASOPHILS NFR BLD: 1 % (ref 0–2)
BUN SERPL-MCNC: 19 MG/DL (ref 8–23)
CALCIUM SERPL-MCNC: 9.2 MG/DL (ref 8.3–10.4)
CHLORIDE SERPL-SCNC: 107 MMOL/L (ref 98–107)
CO2 SERPL-SCNC: 26 MMOL/L (ref 21–32)
CREAT SERPL-MCNC: 1.01 MG/DL (ref 0.8–1.5)
DIFFERENTIAL METHOD BLD: ABNORMAL
EOSINOPHIL # BLD: 0.2 K/UL (ref 0–0.8)
EOSINOPHIL NFR BLD: 3 % (ref 0.5–7.8)
ERYTHROCYTE [DISTWIDTH] IN BLOOD BY AUTOMATED COUNT: 13 % (ref 11.9–14.6)
GLUCOSE SERPL-MCNC: 114 MG/DL (ref 65–100)
HCT VFR BLD AUTO: 38.6 % (ref 41.1–50.3)
HGB BLD-MCNC: 13.1 G/DL (ref 13.6–17.2)
IMM GRANULOCYTES # BLD AUTO: 0 K/UL (ref 0–0.5)
IMM GRANULOCYTES NFR BLD AUTO: 0 % (ref 0–5)
LYMPHOCYTES # BLD: 0.8 K/UL (ref 0.5–4.6)
LYMPHOCYTES NFR BLD: 14 % (ref 13–44)
MCH RBC QN AUTO: 31.9 PG (ref 26.1–32.9)
MCHC RBC AUTO-ENTMCNC: 33.9 G/DL (ref 31.4–35)
MCV RBC AUTO: 93.9 FL (ref 79.6–97.8)
MONOCYTES # BLD: 0.7 K/UL (ref 0.1–1.3)
MONOCYTES NFR BLD: 11 % (ref 4–12)
NEUTS SEG # BLD: 4.4 K/UL (ref 1.7–8.2)
NEUTS SEG NFR BLD: 72 % (ref 43–78)
NRBC # BLD: 0 K/UL (ref 0–0.2)
PLATELET # BLD AUTO: 174 K/UL (ref 150–450)
PMV BLD AUTO: 9.9 FL (ref 9.4–12.3)
POTASSIUM SERPL-SCNC: 4.2 MMOL/L (ref 3.5–5.1)
RBC # BLD AUTO: 4.11 M/UL (ref 4.23–5.6)
SODIUM SERPL-SCNC: 141 MMOL/L (ref 136–145)
WBC # BLD AUTO: 6.2 K/UL (ref 4.3–11.1)

## 2019-09-11 PROCEDURE — 96375 TX/PRO/DX INJ NEW DRUG ADDON: CPT | Performed by: EMERGENCY MEDICINE

## 2019-09-11 PROCEDURE — L1830 KO IMMOB CANVAS LONG PRE OTS: HCPCS

## 2019-09-11 PROCEDURE — 73502 X-RAY EXAM HIP UNI 2-3 VIEWS: CPT

## 2019-09-11 PROCEDURE — 80048 BASIC METABOLIC PNL TOTAL CA: CPT

## 2019-09-11 PROCEDURE — 94762 N-INVAS EAR/PLS OXIMTRY CONT: CPT | Performed by: EMERGENCY MEDICINE

## 2019-09-11 PROCEDURE — 99285 EMERGENCY DEPT VISIT HI MDM: CPT | Performed by: EMERGENCY MEDICINE

## 2019-09-11 PROCEDURE — 85025 COMPLETE CBC W/AUTO DIFF WBC: CPT

## 2019-09-11 PROCEDURE — 96374 THER/PROPH/DIAG INJ IV PUSH: CPT | Performed by: EMERGENCY MEDICINE

## 2019-09-11 PROCEDURE — 74011250636 HC RX REV CODE- 250/636: Performed by: EMERGENCY MEDICINE

## 2019-09-11 PROCEDURE — 99152 MOD SED SAME PHYS/QHP 5/>YRS: CPT | Performed by: EMERGENCY MEDICINE

## 2019-09-11 PROCEDURE — 75810000301 HC ER LEVEL 1 CLOSED TREATMNT FRACTURE/DISLOCATION: Performed by: EMERGENCY MEDICINE

## 2019-09-11 RX ORDER — HYDROMORPHONE HYDROCHLORIDE 1 MG/ML
0.5 INJECTION, SOLUTION INTRAMUSCULAR; INTRAVENOUS; SUBCUTANEOUS
Status: COMPLETED | OUTPATIENT
Start: 2019-09-11 | End: 2019-09-11

## 2019-09-11 RX ORDER — PROPOFOL 10 MG/ML
0.8 INJECTION, EMULSION INTRAVENOUS
Status: COMPLETED | OUTPATIENT
Start: 2019-09-11 | End: 2019-09-11

## 2019-09-11 RX ORDER — ONDANSETRON 2 MG/ML
4 INJECTION INTRAMUSCULAR; INTRAVENOUS
Status: COMPLETED | OUTPATIENT
Start: 2019-09-11 | End: 2019-09-11

## 2019-09-11 RX ADMIN — ONDANSETRON 4 MG: 2 INJECTION INTRAMUSCULAR; INTRAVENOUS at 09:44

## 2019-09-11 RX ADMIN — PROPOFOL 65.3 MG: 10 INJECTION, EMULSION INTRAVENOUS at 10:16

## 2019-09-11 RX ADMIN — HYDROMORPHONE HYDROCHLORIDE 0.5 MG: 1 INJECTION, SOLUTION INTRAMUSCULAR; INTRAVENOUS; SUBCUTANEOUS at 09:44

## 2019-09-11 NOTE — ED NOTES
I have reviewed discharge instructions with the patient. The patient verbalized understanding. Patient left ED via Discharge Method: stretcher to Home with [facility] ambulance services. Opportunity for questions and clarification provided. Patient given 0 scripts. To continue your aftercare when you leave the hospital, you may receive an automated call from our care team to check in on how you are doing. This is a free service and part of our promise to provide the best care and service to meet your aftercare needs.  If you have questions, or wish to unsubscribe from this service please call [phone]. Thank you for Choosing our [facility] Emergency Department.

## 2019-09-11 NOTE — ED PROVIDER NOTES
81-year-old male status post right hip in June of this year, presents the emergency department complaining of severe right hip pain after attempting to get up off the toilet this morning. Similar episode in August.  Denies paralysis or paresthesias. The history is provided by the patient, a relative and the EMS personnel. Hip Injury    This is a new problem. The problem occurs constantly. The problem has not changed since onset. The pain is present in the right hip. The quality of the pain is described as aching, sharp and constant. The pain is severe. Associated symptoms include limited range of motion and stiffness. Pertinent negatives include no numbness, no tingling, no itching, no back pain and no neck pain. The symptoms are aggravated by palpation and movement. He has tried rest (Fentanyl 75 mcg IV x2 per EMS) for the symptoms. The treatment provided mild relief. There has been no history of extremity trauma. Past Medical History:   Diagnosis Date    Arthritis     osteo    Chronic pain     left hip/back/LLE    Colon cancer ([address] 06/23/2015    surgery     Diabetes ([address] 2006    Type 2. diet controlled. Pt does not monitor blood sugar. A1C=5.7 Jan 2019.  Dyslipidemia 06/23/2015    daily meds     GERD (gastroesophageal reflux disease)     controlled w/med    Hx of bladder cancer 6/23/2015    Hypertension     controlled on medication    NSTEMI (non-ST elevation myocardial infarction) ([address] 01/10/2014    MI. heart cath: preserved LVSF. EF =65%. mild to mod LAD and circumflex disease. 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      Thrombocytopenia, unspecified ([address] 1/11/2014       Past Surgical History:   Procedure Laterality Date    ABDOMEN SURGERY PROC UNLISTED  1991    EXP Lap    HX COLECTOMY  1997    Colon Resection    HX COLONOSCOPY  ,     HX CORONARY STENT PLACEMENT      HX HEART CATHETERIZATION  01/10/2014    s/p bare metal stent to mid-right CA    HX HEENT  as child    T&A    HX KNEE REPLACEMENT Left 2017    HX LUMBAR FUSION  1990 x 2  ,     lumbar fusions- x 4 total surgeries    HX ORTHOPAEDIC Left 2013    left hip replaced    HX OTHER SURGICAL      Spinal cord stimulator placed and removed in     HX SHOULDER ARTHROSCOPY Left     HX UROLOGICAL  2002    Turp         Family History:   Problem Relation Age of Onset    Heart Disease Mother     Cancer Father     Lung Disease Sister     Heart Disease Brother     Cancer Brother         pancreatic    Parkinson's Disease Brother     Malignant Hyperthermia Neg Hx     Pseudocholinesterase Deficiency Neg Hx     Delayed Awakening Neg Hx     Post-op Nausea/Vomiting Neg Hx     Emergence Delirium Neg Hx     Post-op Cognitive Dysfunction Neg Hx     Other Neg Hx        Social History     Socioeconomic History    Marital status:      Spouse name: Not on file    Number of children: Not on file    Years of education: Not on file    Highest education level: Not on file   Occupational History    Not on file   Social Needs    Financial resource strain: Not on file    Food insecurity:     Worry: Not on file     Inability: Not on file    Transportation needs:     Medical: Not on file     Non-medical: Not on file   Tobacco Use    Smoking status: Former Smoker     Packs/day: 1.50     Years: 28.00     Pack years: 42.00     Last attempt to quit: 1982     Years since quittin.7    Smokeless tobacco: Never Used    Tobacco comment: quit    Substance and Sexual Activity    Alcohol use: No    Drug use: No    Sexual activity: Never     Partners: Female     Comment: Wife recently     Lifestyle    Physical activity:     Days per week: Not on file     Minutes per session: Not on file    Stress: Not on file   Relationships    Social connections:     Talks on phone: Not on file     Gets together: Not on file     Attends [employer] service: Not on file     Active member of club or organization: Not on file     Attends meetings of clubs or organizations: Not on file     Relationship status: Not on file    Intimate partner violence:     Fear of current or ex partner: Not on file     Emotionally abused: Not on file     Physically abused: Not on file     Forced sexual activity: Not on file   Other Topics Concern    Not on file   Social History Narrative    Not on file         ALLERGIES: Patient has no known allergies. Review of Systems   Constitutional: Negative for chills and fever. Musculoskeletal: Positive for arthralgias and stiffness. Negative for back pain and neck pain. Skin: Negative for itching. Neurological: Negative for tingling and numbness. All other systems reviewed and are negative. Vitals:    09/11/19 0833   BP: 148/67   Pulse: 65   Resp: 16   Temp: 97.9 °F (36.6 °C)   SpO2: 98%   Weight: 81.6 kg (180 lb)   Height: 6' (1.829 m)            Physical Exam   Constitutional: He is oriented to person, place, and time. He appears well-developed and well-nourished. He appears distressed. HENT:   Head: Normocephalic and atraumatic. Right Ear: External ear normal.   Left Ear: External ear normal.   Mouth/Throat: Oropharynx is clear and moist.   Eyes: Pupils are equal, round, and reactive to light. Conjunctivae and EOM are normal.   Neck: Normal range of motion. Neck supple. No thyromegaly present. Cardiovascular: Normal rate, regular rhythm, normal heart sounds and intact distal pulses. Pulmonary/Chest: Effort normal and breath sounds normal.   Abdominal: Soft. Bowel sounds are normal. There is no tenderness. No hernia. Musculoskeletal: He exhibits tenderness and deformity (Right hip is internally rotated and shortened in full extension). He exhibits no edema.    Neurological: He is alert and oriented to person, place, and time. Skin: Skin is warm and dry. Capillary refill takes less than 2 seconds. Psychiatric: He has a normal mood and affect. Nursing note and vitals reviewed. MDM  Number of Diagnoses or Management Options  Closed dislocation of right hip, initial encounter [facility]): new and requires workup     Amount and/or Complexity of Data Reviewed  Tests in the radiology section of CPT®: ordered and reviewed  Review and summarize past medical records: yes  Discuss the patient with other providers: yes  Independent visualization of images, tracings, or specimens: yes    Risk of Complications, Morbidity, and/or Mortality  Presenting problems: high      ED Course as of Sep 13 1424   Wed Sep 11, 2019   1057 Postreduction films reviewed with Dr. Jairon Neely, recommends knee immobilizer and discharge with follow-up with Dr. Lisandra Barboza.     [BB]      ED Course User Index  [BB] Sneha Benavides MD       PROCEDURAL SEDATION  Date/Time: 9/11/2019 10:07 AM  Performed by: Sneha Benavides MD  Authorized by: Sneha Benavides MD     Consent:     Consent obtained:  Written    Consent given by:  Patient    Risks discussed:  Inadequate sedation, nausea, vomiting, respiratory compromise necessitating ventilatory assistance and intubation and prolonged hypoxia resulting in organ damage    Alternatives discussed:  Analgesia without sedation  Indications:     Procedure performed:  Dislocation reduction    Procedure necessitating sedation performed by:  Different physician    Intended level of sedation:  Moderate (conscious sedation)  Pre-sedation assessment:     Time since last food or drink:  12 hours    ASA classification: class 2 - patient with mild systemic disease      Neck mobility: normal      Mouth opening:  3 or more finger widths    Thyromental distance:  3 finger widths    Mallampati score:  II - soft palate, uvula, fauces visible    Pre-sedation assessments completed and reviewed: airway patency, cardiovascular function, hydration status, mental status, nausea/vomiting, pain level, respiratory function and temperature      History of difficult intubation: no      Pre-sedation assessment completed:  9/11/2019 10:07 AM  Immediate pre-procedure details:     Reassessment: Patient reassessed immediately prior to procedure      Reviewed: vital signs, relevant labs/tests and NPO status      Verified: bag valve mask available, emergency equipment available, intubation equipment available, IV patency confirmed and oxygen available    Procedure details (see MAR for exact dosages):     Sedation start time:  9/11/2019 10:10 AM    Preoxygenation:  Nasal cannula    Sedation:  Propofol    Analgesia:  Hydromorphone    Intra-procedure monitoring:  Blood pressure monitoring, cardiac monitor, continuous capnometry, continuous pulse oximetry, frequent LOC assessments and frequent vital sign checks    Intra-procedure events: respiratory depression      Intra-procedure events comment:  Mild    Intra-procedure management: none. Sedation end time:  9/11/2019 10:33 AM    Total sedation time (minutes):  23  Post-procedure details:     Post-sedation assessment completed:  9/11/2019 10:38 AM    Attendance: Constant attendance by certified staff until patient recovered      Recovery: Patient returned to pre-procedure baseline      Estimated blood loss (see I/O flowsheets): no      Complications:  None    Post-sedation assessments completed and reviewed: airway patency, cardiovascular function, hydration status, mental status, nausea/vomiting, pain level, respiratory function and temperature      Specimens recovered:  None    Patient is stable for discharge or admission: yes      Patient tolerance: Tolerated well, no immediate complications        Dr. Rena Wasserman reviewed the postreduction x-rays and recommended the patient be placed in a knee immobilizer and instructed to follow-up with Dr. Jessica Smith as an outpatient.

## 2019-09-11 NOTE — DISCHARGE INSTRUCTIONS
Patient Education        Dislocated Hip: Care Instructions  Your Care Instructions    Your hip is a large and fairly stable joint. Normally it takes a hard fall, a car accident, or something else of great force to make the thighbone slip out of its socket (dislocate). But if you have had hip replacement surgery, your hip can more easily slip out of position. This is more common during the first few months after the surgery. After your doctor puts your dislocated hip back into normal position, you will need to use a walking aid or hip brace for several weeks or months while the hip heals. You will need to follow special hip precautions to avoid dislocating your hip again. Your doctor may recommend exercises to strengthen the hip joint and your legs. Rest and home treatment can help you heal.  If your hip becomes dislocated again, contact your doctor. You will need to go to a hospital or clinic to have your hip put back in position. You may have had a sedative to help you relax. You may be unsteady after having sedation. It can take a few hours for the medicine's effects to wear off. Common side effects of sedation include nausea, vomiting, and feeling sleepy or tired. The doctor has checked you carefully, but problems can develop later. If you notice any problems or new symptoms, get medical treatment right away. Follow-up care is a key part of your treatment and safety. Be sure to make and go to all appointments, and call your doctor if you are having problems. It's also a good idea to know your test results and keep a list of the medicines you take. How can you care for yourself at home? · If the doctor gave you a sedative:  ? For 24 hours, don't do anything that requires attention to detail. This includes going to work, making important decisions, or signing any legal documents.  It takes time for the medicine's effects to completely wear off.  ? For your safety, do not drive or operate any machinery that could be dangerous. Wait until the medicine wears off and you can think clearly and react easily. · Your doctor will give you safety precautions to keep your hip centered in its socket during the healing period. Be sure to follow these precautions. ? Keep your knees and toes pointed forward when you sit in a chair, walk, or stand. ? Do not sit with your legs crossed. ? Do not bend at the waist more than 90º. Be careful when leaning or when moving in bed to keep your legs as straight ahead as possible. · If you have a hip brace, wear it as directed. Do not remove it unless your doctor says you can. If you remove the brace to shower, be extremely careful. Follow hip precautions to limit hip movement. · Rest your hip as much as you can. You will need to change your activities to avoid movements that irritate the hip. · If your hip is swollen, put ice or a cold pack on it for 10 to 20 minutes at a time. Try to do this every 1 to 2 hours for the next 3 days (when you are awake) or until the swelling goes down. Put a thin cloth between the ice and your skin. · Take your medicines exactly as prescribed. Call your doctor if you think you are having a problem with your medicine. · Ask your doctor if you can take an over-the-counter pain medicine, such as acetaminophen (Tylenol), ibuprofen (Advil, Motrin), or naproxen (Aleve). Be safe with medicines. Read and follow all instructions on the label. · If your doctor recommends exercises, do them as directed. When should you call for help? Call 911 anytime you think you may need emergency care.  For example, call if:    · You have chest pain, are short of breath, or cough up blood.     · You have trouble breathing.     · You passed out (lost consciousness).    Call your doctor now or seek immediate medical care if:    · You have new or worse nausea or vomiting.     · You have new or worse pain.     · Your foot is cool or pale or changes color.     · You have tingling, weakness, or numbness in your foot or toes.     · You have signs of a blood clot in your leg (called a deep vein thrombosis), such as:  ? Pain in your calf, back of the knee, thigh, or groin. ? Redness or swelling in your leg.    Watch closely for changes in your health, and be sure to contact your doctor if:    · You do not get better as expected. Where can you learn more? Go to http://annabelle-jaky.info/. Enter J406 in the search box to learn more about \"Dislocated Hip: Care Instructions. \"  Current as of: September 20, 2018  Content Version: 12.1  © 6381-4360 [employer]. Care instructions adapted under license by [employer] (which disclaims liability or warranty for this information). If you have questions about a medical condition or this instruction, always ask your healthcare professional. [place] [address] any warranty or liability for your use of this information.

## 2019-09-11 NOTE — CONSULTS
[address]    Name:  Alfred Fleming  MR#:  [id number]  :  1935  ACCOUNT #:  [de-identified]  DATE OF SERVICE:  2019    ORTHOPEDIC CONSULTATION    REASON FOR CONSULTATION:  Right hip dislocation. HISTORY OF PRESENT ILLNESS:  The patient is a pleasant 69-year-old gentleman who is status post right total hip arthroplasty performed by Dr. Jody Washington. He was previously seen by me in August after a right hip dislocation. His surgery was performed in  by Dr. Jody Washington. In August, he underwent a closed reduction. Today, he just used the bathroom and when he was getting up, his hip dislocated again. He had immediate pain and inability to bear weight. He was found to have a dislocation upon his presentation to the emergency room. I was consulted regarding his care. He denies any other injury. PAST MEDICAL HISTORY:  Reviewed and noncontributory except as noted above. MEDICATIONS PRIOR TO ADMISSION:  Reviewed and noncontributory except as noted above. ALLERGIES:  REVIEWED AND NONCONTRIBUTORY EXCEPT AS NOTED ABOVE. SURGERIES:  Reviewed and noncontributory except as noted above. SOCIAL HISTORY:  Reviewed and noncontributory except as noted above. FAMILY HISTORY:  Reviewed and noncontributory except as noted above. REVIEW OF SYSTEMS:  Reviewed and noncontributory except as noted above. PHYSICAL EXAMINATION:  Demonstrates a pleasant, cooperative, uncomfortable-appearing gentleman, in no acute distress. He is accompanied by his son. There is shortening and internal rotation of the right hip. EHL is intact. Compartments are soft and nontender. His incision is well-healed. There is no adenopathy or skin changes. LABORATORY DATA:  Radiographs of the right hip were reviewed and demonstrate a posterior dislocation of total hip arthroplasty. No fractures were identified. 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   I have discussed risks, benefits and alternatives of closed reduction under IV sedation with the patient. He expressed understanding and strongly desired to proceed. Please see the procedure note. PROCEDURE NOTE:  Under appropriate monitoring, propofol IV sedation was administered. An appropriate level of anesthesia was accomplished and the hip was reduced using normal traction and rotation. There was a palpable clinical reduction and restoration of leg length and alignment. Postreduction x-rays then demonstrated an appropriate reduction. The patient tolerated the procedure well without significant hypoxia. PLAN:  The patient will be discharged in a knee immobilizer. He will follow up with Dr. Red Rey. He may require a revision arthroplasty. I will let him discuss that with his surgeon. Today's findings and encounter were communicated to a member of Dr. Red Rey' team today.       Cait Brush MD      [id number]  D:  09/11/2019 11:01  T:  09/11/2019 11:08  JOB #:  [id number]

## 2019-09-11 NOTE — ED NOTES
Verbal report from Joceline Hurley RN for continuation of care. Care assumed at this time. Pt alert and oriented, VS stable at this time.

## 2019-09-11 NOTE — ED TRIAGE NOTES
Pt arrives via EMS from home. Pt complains of right hip pain after rising from toilet. He denies fall. Shortening rotation to right leg. PMS intact. EMS administered 75mcg fentanyl IV x 2 doses.

## 2019-09-23 ENCOUNTER — PATIENT OUTREACH (OUTPATIENT)
Dept: CASE MANAGEMENT | Age: 84
End: 2019-09-23

## 2019-09-23 NOTE — PROGRESS NOTES
· Outreached for FAY f/u   · Patient had another ER visit on 9/11/19 for hip pain and closed dislocation of right hip and continues to ambulate with a cane and has knee immobilizer  · Patient is now scheduled for a revision arthroplasty on 10/3/19  · Independent with ADL's  · Patient lives with son and son transports to appointments  · Meds reviewed and denies issues   PLAN:  · Will f/u after 10/3/19 surgery for KARLIE NGUYEN   · Patient has my contact information   This note will not be viewable in [address]

## 2019-09-25 ENCOUNTER — HOSPITAL ENCOUNTER (OUTPATIENT)
Dept: SURGERY | Age: 84
Discharge: HOME | End: 2019-09-25
Attending: ORTHOPAEDIC SURGERY
Payer: MEDICARE

## 2019-09-25 VITALS
BODY MASS INDEX: 23.3 KG/M2 | SYSTOLIC BLOOD PRESSURE: 96 MMHG | TEMPERATURE: 97.5 F | HEART RATE: 64 BPM | DIASTOLIC BLOOD PRESSURE: 57 MMHG | WEIGHT: 172 LBS | OXYGEN SATURATION: 99 % | HEIGHT: 72 IN | RESPIRATION RATE: 18 BRPM

## 2019-09-25 PROBLEM — Z00.00 MEDICARE ANNUAL WELLNESS VISIT, SUBSEQUENT: Status: RESOLVED | Noted: 2017-01-30 | Resolved: 2019-09-25

## 2019-09-25 LAB
ANION GAP SERPL CALC-SCNC: 5 MMOL/L (ref 7–16)
APTT PPP: 32.7 SEC (ref 24.7–39.8)
BACTERIA SPEC CULT: ABNORMAL
BASOPHILS # BLD: 0 K/UL (ref 0–0.2)
BASOPHILS NFR BLD: 1 % (ref 0–2)
BUN SERPL-MCNC: 21 MG/DL (ref 8–23)
CALCIUM SERPL-MCNC: 9.5 MG/DL (ref 8.3–10.4)
CHLORIDE SERPL-SCNC: 103 MMOL/L (ref 98–107)
CO2 SERPL-SCNC: 30 MMOL/L (ref 21–32)
CREAT SERPL-MCNC: 0.92 MG/DL (ref 0.8–1.5)
DIFFERENTIAL METHOD BLD: ABNORMAL
EOSINOPHIL # BLD: 0.1 K/UL (ref 0–0.8)
EOSINOPHIL NFR BLD: 2 % (ref 0.5–7.8)
ERYTHROCYTE [DISTWIDTH] IN BLOOD BY AUTOMATED COUNT: 12.6 % (ref 11.9–14.6)
GLUCOSE SERPL-MCNC: 91 MG/DL (ref 65–100)
HCT VFR BLD AUTO: 38.7 % (ref 41.1–50.3)
HGB BLD-MCNC: 12.7 G/DL (ref 13.6–17.2)
IMM GRANULOCYTES # BLD AUTO: 0 K/UL (ref 0–0.5)
IMM GRANULOCYTES NFR BLD AUTO: 1 % (ref 0–5)
INR PPP: 1.1
LYMPHOCYTES # BLD: 1 K/UL (ref 0.5–4.6)
LYMPHOCYTES NFR BLD: 16 % (ref 13–44)
MCH RBC QN AUTO: 30.8 PG (ref 26.1–32.9)
MCHC RBC AUTO-ENTMCNC: 32.8 G/DL (ref 31.4–35)
MCV RBC AUTO: 93.9 FL (ref 79.6–97.8)
MONOCYTES # BLD: 0.7 K/UL (ref 0.1–1.3)
MONOCYTES NFR BLD: 11 % (ref 4–12)
NEUTS SEG # BLD: 4.6 K/UL (ref 1.7–8.2)
NEUTS SEG NFR BLD: 70 % (ref 43–78)
NRBC # BLD: 0 K/UL (ref 0–0.2)
PLATELET # BLD AUTO: 188 K/UL (ref 150–450)
PMV BLD AUTO: 9.8 FL (ref 9.4–12.3)
POTASSIUM SERPL-SCNC: 4.4 MMOL/L (ref 3.5–5.1)
PROTHROMBIN TIME: 14.6 SEC (ref 11.7–14.5)
RBC # BLD AUTO: 4.12 M/UL (ref 4.23–5.6)
SERVICE CMNT-IMP: ABNORMAL
SODIUM SERPL-SCNC: 138 MMOL/L (ref 136–145)
WBC # BLD AUTO: 6.5 K/UL (ref 4.3–11.1)

## 2019-09-25 PROCEDURE — 85610 PROTHROMBIN TIME: CPT

## 2019-09-25 PROCEDURE — 85730 THROMBOPLASTIN TIME PARTIAL: CPT

## 2019-09-25 PROCEDURE — 87641 MR-STAPH DNA AMP PROBE: CPT

## 2019-09-25 PROCEDURE — 85025 COMPLETE CBC W/AUTO DIFF WBC: CPT

## 2019-09-25 PROCEDURE — 80048 BASIC METABOLIC PNL TOTAL CA: CPT

## 2019-09-25 RX ORDER — LISINOPRIL 5 MG/1
5 TABLET ORAL
COMMUNITY

## 2019-09-25 RX ORDER — DEXLANSOPRAZOLE 60 MG/1
CAPSULE, DELAYED RELEASE ORAL
Status: ON HOLD | COMMUNITY
End: 2019-10-03

## 2019-09-25 RX ORDER — CHOLECALCIFEROL (VITAMIN D3) 125 MCG
CAPSULE ORAL
COMMUNITY
End: 2019-10-04

## 2019-09-25 NOTE — PERIOP NOTES
Recent Results (from the past 12 hour(s))   CBC WITH AUTOMATED DIFF    Collection Time: 09/25/19 12:06 PM   Result Value Ref Range    WBC 6.5 4.3 - 11.1 K/uL    RBC 4.12 (L) 4.23 - 5.6 M/uL    HGB 12.7 (L) 13.6 - 17.2 g/dL    HCT 38.7 (L) 41.1 - 50.3 %    MCV 93.9 79.6 - 97.8 FL    MCH 30.8 26.1 - 32.9 PG    MCHC 32.8 31.4 - 35.0 g/dL    RDW 12.6 11.9 - 14.6 %    PLATELET 613 475 - 311 K/uL    MPV 9.8 9.4 - 12.3 FL    ABSOLUTE NRBC 0.00 0.0 - 0.2 K/uL    DF AUTOMATED      NEUTROPHILS 70 43 - 78 %    LYMPHOCYTES 16 13 - 44 %    MONOCYTES 11 4.0 - 12.0 %    EOSINOPHILS 2 0.5 - 7.8 %    BASOPHILS 1 0.0 - 2.0 %    IMMATURE GRANULOCYTES 1 0.0 - 5.0 %    ABS. NEUTROPHILS 4.6 1.7 - 8.2 K/UL    ABS. LYMPHOCYTES 1.0 0.5 - 4.6 K/UL    ABS. MONOCYTES 0.7 0.1 - 1.3 K/UL    ABS. EOSINOPHILS 0.1 0.0 - 0.8 K/UL    ABS. BASOPHILS 0.0 0.0 - 0.2 K/UL    ABS. IMM.  GRANS. 0.0 0.0 - 0.5 K/UL   METABOLIC PANEL, BASIC    Collection Time: 09/25/19 12:06 PM   Result Value Ref Range    Sodium 138 136 - 145 mmol/L    Potassium 4.4 3.5 - 5.1 mmol/L    Chloride 103 98 - 107 mmol/L    CO2 30 21 - 32 mmol/L    Anion gap 5 (L) 7 - 16 mmol/L    Glucose 91 65 - 100 mg/dL    BUN 21 8 - 23 MG/DL    Creatinine 0.92 0.8 - 1.5 MG/DL    GFR est AA >60 >60 ml/min/1.73m2    GFR est non-AA >60 >60 ml/min/1.73m2    Calcium 9.5 8.3 - 10.4 MG/DL   PROTHROMBIN TIME + INR    Collection Time: 09/25/19 12:06 PM   Result Value Ref Range    Prothrombin time 14.6 (H) 11.7 - 14.5 sec    INR 1.1     PTT    Collection Time: 09/25/19 12:06 PM   Result Value Ref Range    aPTT 32.7 24.7 - 39.8 SEC

## 2019-09-25 NOTE — PERIOP NOTES
Patient verified name and . Order for consent found in EHR and matches case posting; patient verified. Type 3 surgery, PAT walk-in joint assessment complete. Labs per surgeon: cbc, bmp, pt/inr, ptt; results pending. T&S DOS; order signed and held in EHR. Labs per anesthesia protocol: All required lab work included in surgeon's orders. EKG: completed 19; results within anesthesia limits. Tracing placed on chart if needed for anesthesia reference. Cardiology note (19), Stress (3/23/18), and cath (1/15/14) located in EHR if needed for anesthesia reference. Patient has hx of lumbar fusion--lumbar imaging (14) located in EHR if needed for anesthesia reference. Patient has incentive spirometer from previous joint camp. Patient received verbal and written instructions on incentive spirometer use. Patient instructed to bring incentive spirometer to the hospital on the DOS. Patient verbalized understanding. MRSA/MSSA swab collected; pharmacy to review and dose antibiotic as appropriate. Hospital approved surgical skin cleanser and instructions to return bottle on DOS given per hospital policy. Patient provided with handouts including Guide to Surgery, Pain Management, Hand Hygiene, Blood Transfusion Education, and [facility] Brochure. Patient answered medical/surgical history questions at their best of ability. All prior to admission medications documented in [facility]. Original medication prescription bottle NOT visualized during patient appointment. Patient instructed to hold all vitamins/supplements/herbals 7 days prior to surgery and NSAIDS 5 days prior to surgery. Prescription medications to hold: Plavix 7 days prior to surgery per anesthesia protocol. Patient instructed to continue daily 81 mg ASA per anesthesia protocol. Clearance request to hold Plavix 7 days prior to surgery faxed to [address] [facility]. Charge nurse to follow up. Patient instructed to continue previous medications as prescribed prior to surgery and to take the following medications the day of surgery according to anesthesia guidelines with a small sip of water: 81 mg ASA and Finasteride. Patient instructed to bring non-formulary Dexilant to the hospital on the DOS, in the original bottle, and give to nurse. Patient teach back successful and patient demonstrates knowledge of instruction.

## 2019-09-25 NOTE — PERIOP NOTES
Labs dated 9/25/19 routed via [facility] to patients PCP, Dr. Wilmer Chavarria, per Dr. Drew Dickerson' request.    Anesthesia to review PT results--charge nurse to follow up. All other results, listed below, within anesthesia limits. Recent Results (from the past 12 hour(s))   CBC WITH AUTOMATED DIFF    Collection Time: 09/25/19 12:06 PM   Result Value Ref Range    WBC 6.5 4.3 - 11.1 K/uL    RBC 4.12 (L) 4.23 - 5.6 M/uL    HGB 12.7 (L) 13.6 - 17.2 g/dL    HCT 38.7 (L) 41.1 - 50.3 %    MCV 93.9 79.6 - 97.8 FL    MCH 30.8 26.1 - 32.9 PG    MCHC 32.8 31.4 - 35.0 g/dL    RDW 12.6 11.9 - 14.6 %    PLATELET 059 033 - 349 K/uL    MPV 9.8 9.4 - 12.3 FL    ABSOLUTE NRBC 0.00 0.0 - 0.2 K/uL    DF AUTOMATED      NEUTROPHILS 70 43 - 78 %    LYMPHOCYTES 16 13 - 44 %    MONOCYTES 11 4.0 - 12.0 %    EOSINOPHILS 2 0.5 - 7.8 %    BASOPHILS 1 0.0 - 2.0 %    IMMATURE GRANULOCYTES 1 0.0 - 5.0 %    ABS. NEUTROPHILS 4.6 1.7 - 8.2 K/UL    ABS. LYMPHOCYTES 1.0 0.5 - 4.6 K/UL    ABS. MONOCYTES 0.7 0.1 - 1.3 K/UL    ABS. EOSINOPHILS 0.1 0.0 - 0.8 K/UL    ABS. BASOPHILS 0.0 0.0 - 0.2 K/UL    ABS. IMM.  GRANS. 0.0 0.0 - 0.5 K/UL   METABOLIC PANEL, BASIC    Collection Time: 09/25/19 12:06 PM   Result Value Ref Range    Sodium 138 136 - 145 mmol/L    Potassium 4.4 3.5 - 5.1 mmol/L    Chloride 103 98 - 107 mmol/L    CO2 30 21 - 32 mmol/L    Anion gap 5 (L) 7 - 16 mmol/L    Glucose 91 65 - 100 mg/dL    BUN 21 8 - 23 MG/DL    Creatinine 0.92 0.8 - 1.5 MG/DL    GFR est AA >60 >60 ml/min/1.73m2    GFR est non-AA >60 >60 ml/min/1.73m2    Calcium 9.5 8.3 - 10.4 MG/DL   PROTHROMBIN TIME + INR    Collection Time: 09/25/19 12:06 PM   Result Value Ref Range    Prothrombin time 14.6 (H) 11.7 - 14.5 sec    INR 1.1     PTT    Collection Time: 09/25/19 12:06 PM   Result Value Ref Range    aPTT 32.7 24.7 - 39.8 SEC

## 2019-09-26 NOTE — PERIOP NOTES
Telephone encounter found in EMR today from cardiologist states:    Low to moderate risk  OK to hold plavix 5-7 days prior to surgery, but continue low dose ASA thru procedure and then resume plavix when OK with ortho MD's      Electronically signed by Michael Reyes MD at 09/26/19 1309       Pt was given written and verbal instructions (during PAT joint assessment) to hold Plavix x7 days and for last dose to be 9/25/19 . Chart filed.

## 2019-09-30 NOTE — ADVANCED PRACTICE NURSE
Total Joint Surgery Preoperative Chart Review      Patient ID:  Thao Meyers  [id number]  7 y.o.  1935  Surgeon: Dr. Red Rey  Date of Surgery: 10/3/2019  Procedure: Total Right Hip Arthroplasty  Primary Care Physician: Tay Mercado [phone]  Specialty Physician(s):      Subjective:   Thao Meyers is a 81 y.o. WHITE OR  male who presents for preoperative evaluation for Total Right Hip arthroplasty. This is a preoperative chart review note based on data collected by the nurse at the surgical Pre-Assessment visit. Past Medical History:   Diagnosis Date    Arthritis     osteo    Chronic pain     left hip/back/LLE    Colon cancer ([address] 06/23/2015    surgery     Diabetes ([address] 2006    Type 2. diet controlled. Pt does not monitor blood sugar. A1C=5.7 Jan 2019.  Dyslipidemia 06/23/2015    daily meds     GERD (gastroesophageal reflux disease)     controlled w/med    Hx of bladder cancer 6/23/2015    Hypertension     controlled on medication    NSTEMI (non-ST elevation myocardial infarction) ([address] 01/10/2014    MI. heart cath: preserved LVSF. EF =65%. mild to mod LAD and circumflex disease. [facility]. 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      Thrombocytopenia, unspecified ([address] 1/11/2014      Past Surgical History:   Procedure Laterality Date    ABDOMEN SURGERY PROC UNLISTED  1991    EXP Lap    HX COLECTOMY  1997    Colon Resection    HX COLONOSCOPY  2014, 2018    HX CORONARY STENT PLACEMENT      HX HEART CATHETERIZATION  01/10/2014    s/p bare metal stent to mid-right CA    HX HEENT  as child    T&A    HX HIP REPLACEMENT Right 06/2019    HX KNEE REPLACEMENT Left 2017    HX LUMBAR FUSION  1990 x 2  2012, 2013    lumbar fusions- x 4 total surgeries    HX ORTHOPAEDIC Left 2013 left hip replaced    HX OTHER SURGICAL      Spinal cord stimulator placed and removed in 2017    HX SHOULDER ARTHROSCOPY Left     HX UROLOGICAL  2002    Turp     Family History   Problem Relation Age of Onset    Heart Disease Mother     Cancer Father     Lung Disease Sister     Heart Disease Brother     Cancer Brother         pancreatic    Parkinson's Disease Brother     Malignant Hyperthermia Neg Hx     Pseudocholinesterase Deficiency Neg Hx     Delayed Awakening Neg Hx     Post-op Nausea/Vomiting Neg Hx     Emergence Delirium Neg Hx     Post-op Cognitive Dysfunction Neg Hx     Other Neg Hx       Social History     Tobacco Use    Smoking status: Former Smoker     Packs/day: 1.50     Years: 28.00     Pack years: 42.00     Last attempt to quit: 1982     Years since quittin.7    Smokeless tobacco: Never Used    Tobacco comment: quit    Substance Use Topics    Alcohol use: No       Prior to Admission medications    Medication Sig Start Date End Date Taking? Authorizing Provider   dexlansoprazole (DEXILANT) 60 mg CpDB capsule (delayed release) Take  by mouth nightly. Non-formulary medication. Patient instructed to bring medication to the hospital on the DOS, in the original bottle, and give to nurse. Yes Provider, Historical   lisinopril (PRINIVIL, ZESTRIL) 5 mg tablet Take  by mouth nightly. Yes Provider, Historical   naproxen sodium (ALEVE) 220 mg cap Take  by mouth every six to eight (6-8) hours as needed. Yes Provider, Historical   clopidogrel (PLAVIX) 75 mg tab Take 1 Tab by mouth daily. Patient taking differently: Take 75 mg by mouth daily. Instructed to hold 7 days prior to surgery per anesthesia protocol. Last dose 19. 19  Yes Baljinder Stanley MD   atorvastatin (LIPITOR) 40 mg tablet Take 1 Tab by mouth daily. Patient taking differently: Take 40 mg by mouth nightly.  19  Yes Baljinder Stanley MD   finasteride (PROSCAR) 5 mg tablet Take 1 Tab by mouth daily. 1/28/19  Yes Colton River MD   metoprolol succinate (TOPROL-XL) 25 mg XL tablet Take 1 Tab by mouth nightly. 1/28/19  Yes Colton River MD   aspirin delayed-release 81 mg tablet Take 81 mg by mouth daily. Yes Provider, Historical   cholecalciferol, vitamin D3, (VITAMIN D-3) 2,000 unit Tab Take 2,000 Int'l Units by mouth nightly. Yes Provider, Historical   raNITIdine (ZANTAC) 150 mg tablet Take 1 Tab by mouth two (2) times a day. Patient taking differently: Take 150 mg by mouth two (2) times a day. Patient reports no longer taking 1/28/19   Colton River MD     No Known Allergies       Objective:     Physical Exam:   No data found. ECG:    EKG Results     None          Data Review:   Labs:     Results for Darrick Drake (MRN [id number]) as of 9/30/2019 10:00   Ref. Range 6/26/2019 03:11   Hemoglobin A1c, (calculated) Latest Units: % 5.6   Results for Darrick Drake (MRN [id number]) as of 9/30/2019 10:00   Ref.  Range 9/11/2019 09:05   Sodium Latest Ref Range: 136 - 145 mmol/L 141   Potassium Latest Ref Range: 3.5 - 5.1 mmol/L 4.2   Chloride Latest Ref Range: 98 - 107 mmol/L 107   CO2 Latest Ref Range: 21 - 32 mmol/L 26   Anion gap Latest Ref Range: 7 - 16 mmol/L 8   Glucose Latest Ref Range: 65 - 100 mg/dL 114 (H)   BUN Latest Ref Range: 8 - 23 MG/DL 19   Creatinine Latest Ref Range: 0.8 - 1.5 MG/DL 1.01   Calcium Latest Ref Range: 8.3 - 10.4 MG/DL 9.2   GFR est non-AA Latest Ref Range: >60 ml/min/1.73m2 >60   GFR est AA Latest Ref Range: >60 ml/min/1.73m2 >60       Problem List:  )  Patient Active Problem List   Diagnosis Code    Essential hypertension I10    Arthritis M19.90    GERD (gastroesophageal reflux disease) K21.9    Unspecified adverse effect of anesthesia T41.45XA    Spinal stenosis, lumbar region, 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    Scoliosis (and kyphoscoliosis), idiopathic M41.20    Status post lumbar spinal fusion Z98.1    Osteoarthritis of left hip M16.12    BPH (benign prostatic hyperplasia) N40.0    HLD (hyperlipidemia) E78.5    Former smoker Z87.891    H/O chest pain Z87.898    History of normal resting EKG [id number]    History of colon cancer Z85.038    History of bladder cancer Z85.51    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.649    History of non-ST elevation myocardial infarction (NSTEMI) I25.2    Thrombocytopenia, unspecified (HCC) D69.6    Dyslipidemia E78.5    Hx of bladder cancer Z85.51    Chronic pain G89.29    Lumbar radiculopathy M54.16    Chronic pain of left knee M25.562, G89.29    Idiopathic peripheral neuropathy G60.9    Coronary atherosclerosis of native coronary vessel I25.10    S/P PTCA (percutaneous transluminal coronary angioplasty) Z98.61    Atherosclerosis of native coronary artery of native heart without angina pectoris I25.10    Type 2 diabetes mellitus without complication, without long-term current use of insulin (HCC) E11.9    Mixed hyperlipidemia E78.2    Arthritis of knee, left M17.12    S/P total knee arthroplasty Z96.659    CHERYL (acute kidney injury) [address] [address] [facility] N17.9    Hypomagnesemia E83.42    Chest pain at rest R07.9    Atherosclerosis of native coronary artery of native heart with angina pectoris (HCC) I25.119    Right leg pain M79.604    Arthritis of right hip M16.11       Total Joint Surgery Pre-Assessment Recommendations:           Recommend continuous saturation monitoring hours of sleep, during hospitalization.       Signed By: CARROLL Padilla    September 30, 2019

## 2019-09-30 NOTE — H&P
[id number] [facility]  History and Physical Exam    Patient ID:  Swati Nicole  [id number]    89 y.o.  1935    Today: September 30, 2019    Vitals Signs: Reviewed as noted in medical record. Allergies: No Known Allergies    CC: Right hip pain and instability    HPI:  Pt complains of right hip pain with recurrent prosthetic hip dislocation. Relevant PMH:   Past Medical History:   Diagnosis Date    Arthritis     osteo    Chronic pain     left hip/back/LLE    Colon cancer ([facility] 06/23/2015    surgery     Diabetes ([facility] 2006    Type 2. diet controlled. Pt does not monitor blood sugar. A1C=5.7 Jan 2019.  Dyslipidemia 06/23/2015    daily meds     GERD (gastroesophageal reflux disease)     controlled w/med    Hx of bladder cancer 6/23/2015    Hypertension     controlled on medication    NSTEMI (non-ST elevation myocardial infarction) ([facility] 01/10/2014    MI. heart cath: preserved LVSF. EF =65%. mild to mod LAD and circumflex disease. Followed by [facility]. 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  Thrombocytopenia, unspecified ([facility] 1/11/2014       Objective:                    HEENT: NC/AT                   Lungs:  clear                   Heart:   rrr                   Abdomen: soft                   Extremities:  Pain with rom of the lower extremity    Radiographs: reveal stable appearing right CATRINA    Assessment: Dislocation of internal right hip prosthesis, initial encounter ([facility] [T84.020A]  Status post total replacement of right hip [Z96.641]    Plan:  Proceed with scheduled Procedure(s) (LRB):  RIGHT HIP ARTHROPLASTY TOTAL REVISION / Anna Nava (Right) . The patient has failed conservative treatment activity modifications, restrictions, etc,.   Due to the amount of pain the patient is experiencing and recurrent dislocation, we will proceed with scheduled procedure.       Signed By: OFELIA Erwin  September 30, 2019

## 2019-10-02 ENCOUNTER — ANESTHESIA EVENT (OUTPATIENT)
Dept: SURGERY | Age: 84
DRG: 468 | End: 2019-10-02
Payer: MEDICARE

## 2019-10-03 ENCOUNTER — HOME HEALTH ADMISSION (OUTPATIENT)
Dept: HOME HEALTH SERVICES | Facility: HOME HEALTH | Age: 84
End: 2019-10-03
Payer: MEDICARE

## 2019-10-03 ENCOUNTER — ANESTHESIA (OUTPATIENT)
Dept: SURGERY | Age: 84
DRG: 468 | End: 2019-10-03
Payer: MEDICARE

## 2019-10-03 ENCOUNTER — APPOINTMENT (OUTPATIENT)
Dept: GENERAL RADIOLOGY | Age: 84
DRG: 468 | End: 2019-10-03
Attending: PHYSICIAN ASSISTANT
Payer: MEDICARE

## 2019-10-03 ENCOUNTER — HOSPITAL ENCOUNTER (INPATIENT)
Age: 84
LOS: 1 days | Discharge: HOME HEALTH | DRG: 468 | End: 2019-10-04
Attending: ORTHOPAEDIC SURGERY | Admitting: ORTHOPAEDIC SURGERY
Payer: MEDICARE

## 2019-10-03 DIAGNOSIS — T84.010A FAILURE OF RIGHT TOTAL HIP ARTHROPLASTY, INITIAL ENCOUNTER (HCC): Primary | ICD-10-CM

## 2019-10-03 PROBLEM — Z96.649 FAILED TOTAL HIP ARTHROPLASTY (HCC): Status: ACTIVE | Noted: 2019-10-03

## 2019-10-03 PROBLEM — T84.018A FAILED TOTAL HIP ARTHROPLASTY (HCC): Status: ACTIVE | Noted: 2019-10-03

## 2019-10-03 LAB
ABO + RH BLD: NORMAL
BLOOD GROUP ANTIBODIES SERPL: NORMAL
GLUCOSE BLD STRIP.AUTO-MCNC: 89 MG/DL (ref 65–100)
HGB BLD-MCNC: 11.1 G/DL (ref 13.6–17.2)
INR BLD: 1.1 (ref 0.9–1.2)
INR PPP: 1.2
PROTHROMBIN TIME: 15.4 SEC (ref 11.7–14.5)
PT BLD: 13.2 SECS (ref 9.6–11.6)
SPECIMEN EXP DATE BLD: NORMAL

## 2019-10-03 PROCEDURE — 72170 X-RAY EXAM OF PELVIS: CPT

## 2019-10-03 PROCEDURE — 74011250636 HC RX REV CODE- 250/636: Performed by: ORTHOPAEDIC SURGERY

## 2019-10-03 PROCEDURE — 77030040361 HC SLV COMPR DVT MDII -B

## 2019-10-03 PROCEDURE — 87075 CULTR BACTERIA EXCEPT BLOOD: CPT

## 2019-10-03 PROCEDURE — 74011250637 HC RX REV CODE- 250/637: Performed by: PHYSICIAN ASSISTANT

## 2019-10-03 PROCEDURE — 87205 SMEAR GRAM STAIN: CPT

## 2019-10-03 PROCEDURE — 77030006790 HC BLD SAW OSC ZIMM -B: Performed by: ORTHOPAEDIC SURGERY

## 2019-10-03 PROCEDURE — 76060000034 HC ANESTHESIA 1.5 TO 2 HR: Performed by: ORTHOPAEDIC SURGERY

## 2019-10-03 PROCEDURE — 77030029883 HC RETRV SUT ARTHSCP HOFFE BEAT -B: Performed by: ORTHOPAEDIC SURGERY

## 2019-10-03 PROCEDURE — 77030034479 HC ADH SKN CLSR PRINEO J&J -B: Performed by: ORTHOPAEDIC SURGERY

## 2019-10-03 PROCEDURE — 86900 BLOOD TYPING SEROLOGIC ABO: CPT

## 2019-10-03 PROCEDURE — 0SP90JZ REMOVAL OF SYNTHETIC SUBSTITUTE FROM RIGHT HIP JOINT, OPEN APPROACH: ICD-10-PCS | Performed by: ORTHOPAEDIC SURGERY

## 2019-10-03 PROCEDURE — C1776 JOINT DEVICE (IMPLANTABLE): HCPCS | Performed by: ORTHOPAEDIC SURGERY

## 2019-10-03 PROCEDURE — 76010000162 HC OR TIME 1.5 TO 2 HR INTENSV-TIER 1: Performed by: ORTHOPAEDIC SURGERY

## 2019-10-03 PROCEDURE — 77030018836 HC SOL IRR NACL ICUM -A: Performed by: ORTHOPAEDIC SURGERY

## 2019-10-03 PROCEDURE — 76210000016 HC OR PH I REC 1 TO 1.5 HR: Performed by: ORTHOPAEDIC SURGERY

## 2019-10-03 PROCEDURE — 85610 PROTHROMBIN TIME: CPT

## 2019-10-03 PROCEDURE — 74011250636 HC RX REV CODE- 250/636

## 2019-10-03 PROCEDURE — 77030018547 HC SUT ETHBND1 J&J -B: Performed by: ORTHOPAEDIC SURGERY

## 2019-10-03 PROCEDURE — 94760 N-INVAS EAR/PLS OXIMETRY 1: CPT

## 2019-10-03 PROCEDURE — 74011000258 HC RX REV CODE- 258: Performed by: ORTHOPAEDIC SURGERY

## 2019-10-03 PROCEDURE — 85018 HEMOGLOBIN: CPT

## 2019-10-03 PROCEDURE — 74011000250 HC RX REV CODE- 250

## 2019-10-03 PROCEDURE — 0SR90JA REPLACEMENT OF RIGHT HIP JOINT WITH SYNTHETIC SUBSTITUTE, UNCEMENTED, OPEN APPROACH: ICD-10-PCS | Performed by: ORTHOPAEDIC SURGERY

## 2019-10-03 PROCEDURE — 74011250637 HC RX REV CODE- 250/637: Performed by: ANESTHESIOLOGY

## 2019-10-03 PROCEDURE — 77030002966 HC SUT PDS J&J -A: Performed by: ORTHOPAEDIC SURGERY

## 2019-10-03 PROCEDURE — 77030003665 HC NDL SPN BBMI -A: Performed by: ANESTHESIOLOGY

## 2019-10-03 PROCEDURE — 36415 COLL VENOUS BLD VENIPUNCTURE: CPT

## 2019-10-03 PROCEDURE — 82962 GLUCOSE BLOOD TEST: CPT

## 2019-10-03 PROCEDURE — 77030020269 HC MISC IMPL: Performed by: ORTHOPAEDIC SURGERY

## 2019-10-03 PROCEDURE — 74011250636 HC RX REV CODE- 250/636: Performed by: ANESTHESIOLOGY

## 2019-10-03 PROCEDURE — 74011000250 HC RX REV CODE- 250: Performed by: ORTHOPAEDIC SURGERY

## 2019-10-03 PROCEDURE — 77030034849: Performed by: ORTHOPAEDIC SURGERY

## 2019-10-03 PROCEDURE — 74011250636 HC RX REV CODE- 250/636: Performed by: PHYSICIAN ASSISTANT

## 2019-10-03 PROCEDURE — 77030019940 HC BLNKT HYPOTHRM STRY -B: Performed by: ANESTHESIOLOGY

## 2019-10-03 PROCEDURE — 77030020263 HC SOL INJ SOD CL0.9% LFCR 1000ML

## 2019-10-03 PROCEDURE — 77030007880 HC KT SPN EPDRL BBMI -B: Performed by: ANESTHESIOLOGY

## 2019-10-03 PROCEDURE — 77030002933 HC SUT MCRYL J&J -A: Performed by: ORTHOPAEDIC SURGERY

## 2019-10-03 PROCEDURE — 65270000029 HC RM PRIVATE

## 2019-10-03 DEVICE — IMPLANTABLE DEVICE: Type: IMPLANTABLE DEVICE | Site: HIP | Status: FUNCTIONAL

## 2019-10-03 RX ORDER — OXYCODONE HYDROCHLORIDE 5 MG/1
10 TABLET ORAL
Status: DISCONTINUED | OUTPATIENT
Start: 2019-10-03 | End: 2019-10-04 | Stop reason: HOSPADM

## 2019-10-03 RX ORDER — ACETAMINOPHEN 10 MG/ML
1000 INJECTION, SOLUTION INTRAVENOUS ONCE
Status: COMPLETED | OUTPATIENT
Start: 2019-10-03 | End: 2019-10-03

## 2019-10-03 RX ORDER — SODIUM CHLORIDE, SODIUM LACTATE, POTASSIUM CHLORIDE, CALCIUM CHLORIDE 600; 310; 30; 20 MG/100ML; MG/100ML; MG/100ML; MG/100ML
75 INJECTION, SOLUTION INTRAVENOUS CONTINUOUS
Status: DISCONTINUED | OUTPATIENT
Start: 2019-10-03 | End: 2019-10-03 | Stop reason: HOSPADM

## 2019-10-03 RX ORDER — PROPOFOL 10 MG/ML
INJECTION, EMULSION INTRAVENOUS
Status: DISCONTINUED | OUTPATIENT
Start: 2019-10-03 | End: 2019-10-03 | Stop reason: HOSPADM

## 2019-10-03 RX ORDER — SODIUM CHLORIDE 0.9 % (FLUSH) 0.9 %
5-40 SYRINGE (ML) INJECTION AS NEEDED
Status: DISCONTINUED | OUTPATIENT
Start: 2019-10-03 | End: 2019-10-04 | Stop reason: HOSPADM

## 2019-10-03 RX ORDER — FAMOTIDINE 20 MG/1
20 TABLET, FILM COATED ORAL 2 TIMES DAILY
Status: DISCONTINUED | OUTPATIENT
Start: 2019-10-03 | End: 2019-10-04 | Stop reason: HOSPADM

## 2019-10-03 RX ORDER — ROPIVACAINE HYDROCHLORIDE 2 MG/ML
INJECTION, SOLUTION EPIDURAL; INFILTRATION; PERINEURAL AS NEEDED
Status: DISCONTINUED | OUTPATIENT
Start: 2019-10-03 | End: 2019-10-03 | Stop reason: HOSPADM

## 2019-10-03 RX ORDER — KETOROLAC TROMETHAMINE 30 MG/ML
INJECTION, SOLUTION INTRAMUSCULAR; INTRAVENOUS AS NEEDED
Status: DISCONTINUED | OUTPATIENT
Start: 2019-10-03 | End: 2019-10-03 | Stop reason: HOSPADM

## 2019-10-03 RX ORDER — SODIUM CHLORIDE 0.9 % (FLUSH) 0.9 %
5-40 SYRINGE (ML) INJECTION EVERY 8 HOURS
Status: DISCONTINUED | OUTPATIENT
Start: 2019-10-03 | End: 2019-10-04 | Stop reason: HOSPADM

## 2019-10-03 RX ORDER — CEFAZOLIN SODIUM/WATER 2 G/20 ML
2 SYRINGE (ML) INTRAVENOUS EVERY 8 HOURS
Status: DISCONTINUED | OUTPATIENT
Start: 2019-10-03 | End: 2019-10-04 | Stop reason: HOSPADM

## 2019-10-03 RX ORDER — FENTANYL CITRATE 50 UG/ML
INJECTION, SOLUTION INTRAMUSCULAR; INTRAVENOUS AS NEEDED
Status: DISCONTINUED | OUTPATIENT
Start: 2019-10-03 | End: 2019-10-03 | Stop reason: HOSPADM

## 2019-10-03 RX ORDER — OXYCODONE HYDROCHLORIDE 5 MG/1
10 TABLET ORAL
Status: DISCONTINUED | OUTPATIENT
Start: 2019-10-03 | End: 2019-10-03 | Stop reason: HOSPADM

## 2019-10-03 RX ORDER — TRANEXAMIC ACID 100 MG/ML
INJECTION, SOLUTION INTRAVENOUS AS NEEDED
Status: DISCONTINUED | OUTPATIENT
Start: 2019-10-03 | End: 2019-10-03 | Stop reason: HOSPADM

## 2019-10-03 RX ORDER — FENTANYL CITRATE 50 UG/ML
100 INJECTION, SOLUTION INTRAMUSCULAR; INTRAVENOUS ONCE
Status: DISCONTINUED | OUTPATIENT
Start: 2019-10-03 | End: 2019-10-03 | Stop reason: HOSPADM

## 2019-10-03 RX ORDER — EPHEDRINE SULFATE 50 MG/ML
INJECTION, SOLUTION INTRAVENOUS AS NEEDED
Status: DISCONTINUED | OUTPATIENT
Start: 2019-10-03 | End: 2019-10-03 | Stop reason: HOSPADM

## 2019-10-03 RX ORDER — HYDROMORPHONE HYDROCHLORIDE 2 MG/ML
0.5 INJECTION, SOLUTION INTRAMUSCULAR; INTRAVENOUS; SUBCUTANEOUS
Status: DISCONTINUED | OUTPATIENT
Start: 2019-10-03 | End: 2019-10-03 | Stop reason: HOSPADM

## 2019-10-03 RX ORDER — ONDANSETRON 2 MG/ML
INJECTION INTRAMUSCULAR; INTRAVENOUS AS NEEDED
Status: DISCONTINUED | OUTPATIENT
Start: 2019-10-03 | End: 2019-10-03 | Stop reason: HOSPADM

## 2019-10-03 RX ORDER — FAMOTIDINE 20 MG/1
20 TABLET, FILM COATED ORAL ONCE
Status: COMPLETED | OUTPATIENT
Start: 2019-10-03 | End: 2019-10-03

## 2019-10-03 RX ORDER — METOPROLOL SUCCINATE 25 MG/1
25 TABLET, EXTENDED RELEASE ORAL
Status: DISCONTINUED | OUTPATIENT
Start: 2019-10-03 | End: 2019-10-04 | Stop reason: HOSPADM

## 2019-10-03 RX ORDER — LISINOPRIL 5 MG/1
5 TABLET ORAL
Status: DISCONTINUED | OUTPATIENT
Start: 2019-10-03 | End: 2019-10-04 | Stop reason: HOSPADM

## 2019-10-03 RX ORDER — BUPIVACAINE HYDROCHLORIDE 7.5 MG/ML
INJECTION, SOLUTION INTRASPINAL AS NEEDED
Status: DISCONTINUED | OUTPATIENT
Start: 2019-10-03 | End: 2019-10-03 | Stop reason: HOSPADM

## 2019-10-03 RX ORDER — HYDROMORPHONE HYDROCHLORIDE 1 MG/ML
1 INJECTION, SOLUTION INTRAMUSCULAR; INTRAVENOUS; SUBCUTANEOUS
Status: DISCONTINUED | OUTPATIENT
Start: 2019-10-03 | End: 2019-10-04 | Stop reason: HOSPADM

## 2019-10-03 RX ORDER — CELECOXIB 200 MG/1
200 CAPSULE ORAL EVERY 12 HOURS
Status: DISCONTINUED | OUTPATIENT
Start: 2019-10-03 | End: 2019-10-04 | Stop reason: HOSPADM

## 2019-10-03 RX ORDER — CELECOXIB 200 MG/1
200 CAPSULE ORAL
Status: COMPLETED | OUTPATIENT
Start: 2019-10-03 | End: 2019-10-03

## 2019-10-03 RX ORDER — AMOXICILLIN 250 MG
2 CAPSULE ORAL DAILY
Status: DISCONTINUED | OUTPATIENT
Start: 2019-10-04 | End: 2019-10-04 | Stop reason: HOSPADM

## 2019-10-03 RX ORDER — BACITRACIN 50000 [IU]/1
INJECTION, POWDER, FOR SOLUTION INTRAMUSCULAR AS NEEDED
Status: DISCONTINUED | OUTPATIENT
Start: 2019-10-03 | End: 2019-10-03 | Stop reason: HOSPADM

## 2019-10-03 RX ORDER — MIDAZOLAM HYDROCHLORIDE 1 MG/ML
2 INJECTION, SOLUTION INTRAMUSCULAR; INTRAVENOUS ONCE
Status: DISCONTINUED | OUTPATIENT
Start: 2019-10-03 | End: 2019-10-03 | Stop reason: HOSPADM

## 2019-10-03 RX ORDER — PROPOFOL 10 MG/ML
INJECTION, EMULSION INTRAVENOUS AS NEEDED
Status: DISCONTINUED | OUTPATIENT
Start: 2019-10-03 | End: 2019-10-03 | Stop reason: HOSPADM

## 2019-10-03 RX ORDER — FINASTERIDE 5 MG/1
5 TABLET, FILM COATED ORAL DAILY
Status: DISCONTINUED | OUTPATIENT
Start: 2019-10-04 | End: 2019-10-04 | Stop reason: HOSPADM

## 2019-10-03 RX ORDER — DEXAMETHASONE SODIUM PHOSPHATE 100 MG/10ML
10 INJECTION INTRAMUSCULAR; INTRAVENOUS ONCE
Status: DISCONTINUED | OUTPATIENT
Start: 2019-10-04 | End: 2019-10-04 | Stop reason: HOSPADM

## 2019-10-03 RX ORDER — NALOXONE HYDROCHLORIDE 0.4 MG/ML
.2-.4 INJECTION, SOLUTION INTRAMUSCULAR; INTRAVENOUS; SUBCUTANEOUS
Status: DISCONTINUED | OUTPATIENT
Start: 2019-10-03 | End: 2019-10-04 | Stop reason: HOSPADM

## 2019-10-03 RX ORDER — ACETAMINOPHEN 500 MG
1000 TABLET ORAL EVERY 6 HOURS
Status: DISCONTINUED | OUTPATIENT
Start: 2019-10-04 | End: 2019-10-04 | Stop reason: HOSPADM

## 2019-10-03 RX ORDER — ONDANSETRON 4 MG/1
4 TABLET, ORALLY DISINTEGRATING ORAL
Status: DISCONTINUED | OUTPATIENT
Start: 2019-10-03 | End: 2019-10-04 | Stop reason: HOSPADM

## 2019-10-03 RX ORDER — OXYCODONE HYDROCHLORIDE 5 MG/1
5 TABLET ORAL
Status: DISCONTINUED | OUTPATIENT
Start: 2019-10-03 | End: 2019-10-03 | Stop reason: HOSPADM

## 2019-10-03 RX ORDER — SODIUM CHLORIDE 9 MG/ML
100 INJECTION, SOLUTION INTRAVENOUS CONTINUOUS
Status: DISCONTINUED | OUTPATIENT
Start: 2019-10-03 | End: 2019-10-04 | Stop reason: HOSPADM

## 2019-10-03 RX ORDER — ASPIRIN 81 MG/1
81 TABLET ORAL EVERY 12 HOURS
Status: DISCONTINUED | OUTPATIENT
Start: 2019-10-03 | End: 2019-10-04 | Stop reason: HOSPADM

## 2019-10-03 RX ORDER — DIPHENHYDRAMINE HCL 25 MG
25 CAPSULE ORAL
Status: DISCONTINUED | OUTPATIENT
Start: 2019-10-03 | End: 2019-10-04 | Stop reason: HOSPADM

## 2019-10-03 RX ORDER — CEFAZOLIN SODIUM 1 G/3ML
INJECTION, POWDER, FOR SOLUTION INTRAMUSCULAR; INTRAVENOUS AS NEEDED
Status: DISCONTINUED | OUTPATIENT
Start: 2019-10-03 | End: 2019-10-03 | Stop reason: HOSPADM

## 2019-10-03 RX ORDER — VANCOMYCIN HYDROCHLORIDE 1 G/20ML
INJECTION, POWDER, LYOPHILIZED, FOR SOLUTION INTRAVENOUS AS NEEDED
Status: DISCONTINUED | OUTPATIENT
Start: 2019-10-03 | End: 2019-10-03 | Stop reason: HOSPADM

## 2019-10-03 RX ORDER — LIDOCAINE HYDROCHLORIDE 10 MG/ML
0.1 INJECTION INFILTRATION; PERINEURAL AS NEEDED
Status: DISCONTINUED | OUTPATIENT
Start: 2019-10-03 | End: 2019-10-03 | Stop reason: HOSPADM

## 2019-10-03 RX ORDER — DEXAMETHASONE SODIUM PHOSPHATE 4 MG/ML
INJECTION, SOLUTION INTRA-ARTICULAR; INTRALESIONAL; INTRAMUSCULAR; INTRAVENOUS; SOFT TISSUE AS NEEDED
Status: DISCONTINUED | OUTPATIENT
Start: 2019-10-03 | End: 2019-10-03 | Stop reason: HOSPADM

## 2019-10-03 RX ORDER — CEFAZOLIN SODIUM/WATER 2 G/20 ML
2 SYRINGE (ML) INTRAVENOUS ONCE
Status: DISCONTINUED | OUTPATIENT
Start: 2019-10-03 | End: 2019-10-03

## 2019-10-03 RX ADMIN — BUPIVACAINE HYDROCHLORIDE 10.5 ML: 7.5 INJECTION, SOLUTION INTRASPINAL at 14:06

## 2019-10-03 RX ADMIN — TRANEXAMIC ACID 1000 MG: 100 INJECTION, SOLUTION INTRAVENOUS at 14:15

## 2019-10-03 RX ADMIN — SODIUM CHLORIDE, SODIUM LACTATE, POTASSIUM CHLORIDE, AND CALCIUM CHLORIDE: 600; 310; 30; 20 INJECTION, SOLUTION INTRAVENOUS at 13:59

## 2019-10-03 RX ADMIN — CEFAZOLIN SODIUM 2 G: 1 INJECTION, POWDER, FOR SOLUTION INTRAMUSCULAR; INTRAVENOUS at 14:05

## 2019-10-03 RX ADMIN — Medication 2 G: at 23:43

## 2019-10-03 RX ADMIN — FAMOTIDINE 20 MG: 20 TABLET ORAL at 17:29

## 2019-10-03 RX ADMIN — FENTANYL CITRATE 25 MCG: 50 INJECTION, SOLUTION INTRAMUSCULAR; INTRAVENOUS at 14:09

## 2019-10-03 RX ADMIN — SODIUM CHLORIDE 100 ML/HR: 900 INJECTION, SOLUTION INTRAVENOUS at 17:26

## 2019-10-03 RX ADMIN — SODIUM CHLORIDE, SODIUM LACTATE, POTASSIUM CHLORIDE, AND CALCIUM CHLORIDE 75 ML/HR: 600; 310; 30; 20 INJECTION, SOLUTION INTRAVENOUS at 11:30

## 2019-10-03 RX ADMIN — PROPOFOL 50 MCG/KG/MIN: 10 INJECTION, EMULSION INTRAVENOUS at 14:16

## 2019-10-03 RX ADMIN — FAMOTIDINE 20 MG: 20 TABLET, FILM COATED ORAL at 11:30

## 2019-10-03 RX ADMIN — CELECOXIB 200 MG: 200 CAPSULE ORAL at 11:30

## 2019-10-03 RX ADMIN — DEXAMETHASONE SODIUM PHOSPHATE 10 MG: 4 INJECTION, SOLUTION INTRA-ARTICULAR; INTRALESIONAL; INTRAMUSCULAR; INTRAVENOUS; SOFT TISSUE at 14:23

## 2019-10-03 RX ADMIN — FENTANYL CITRATE 50 MCG: 50 INJECTION, SOLUTION INTRAMUSCULAR; INTRAVENOUS at 14:05

## 2019-10-03 RX ADMIN — ASPIRIN 81 MG: 81 TABLET, COATED ORAL at 21:08

## 2019-10-03 RX ADMIN — EPHEDRINE SULFATE 10 MG: 50 INJECTION, SOLUTION INTRAVENOUS at 14:31

## 2019-10-03 RX ADMIN — OXYCODONE HYDROCHLORIDE 10 MG: 5 TABLET ORAL at 21:07

## 2019-10-03 RX ADMIN — EPHEDRINE SULFATE 10 MG: 50 INJECTION, SOLUTION INTRAVENOUS at 14:26

## 2019-10-03 RX ADMIN — EPHEDRINE SULFATE 15 MG: 50 INJECTION, SOLUTION INTRAVENOUS at 14:19

## 2019-10-03 RX ADMIN — LISINOPRIL 5 MG: 5 TABLET ORAL at 21:08

## 2019-10-03 RX ADMIN — PROPOFOL 20 MG: 10 INJECTION, EMULSION INTRAVENOUS at 14:16

## 2019-10-03 RX ADMIN — METOPROLOL SUCCINATE 25 MG: 25 TABLET, EXTENDED RELEASE ORAL at 21:08

## 2019-10-03 RX ADMIN — Medication 3 AMPULE: at 11:31

## 2019-10-03 RX ADMIN — CELECOXIB 200 MG: 200 CAPSULE ORAL at 21:08

## 2019-10-03 RX ADMIN — ACETAMINOPHEN 1000 MG: 500 TABLET, FILM COATED ORAL at 23:43

## 2019-10-03 RX ADMIN — FENTANYL CITRATE 25 MCG: 50 INJECTION, SOLUTION INTRAMUSCULAR; INTRAVENOUS at 14:15

## 2019-10-03 RX ADMIN — SODIUM CHLORIDE, SODIUM LACTATE, POTASSIUM CHLORIDE, AND CALCIUM CHLORIDE: 600; 310; 30; 20 INJECTION, SOLUTION INTRAVENOUS at 14:43

## 2019-10-03 RX ADMIN — ACETAMINOPHEN 1000 MG: 10 INJECTION, SOLUTION INTRAVENOUS at 17:29

## 2019-10-03 RX ADMIN — ONDANSETRON 4 MG: 2 INJECTION INTRAMUSCULAR; INTRAVENOUS at 14:23

## 2019-10-03 RX ADMIN — Medication 5 ML: at 17:26

## 2019-10-03 NOTE — ANESTHESIA PROCEDURE NOTES
Spinal Block    Performed by: Conchis Caballero MD  Authorized by: Conchis Caballero MD     Pre-procedure:   Indications: primary anesthetic  Preanesthetic Checklist: patient identified, risks and benefits discussed, anesthesia consent, patient being monitored and timeout performed    Timeout Time: 14:04          Spinal Block:   Patient Position:  Seated  Prep Region:  Lumbar  Prep: Betadine      Location:  L3-4  Technique:  Single shot    Local Dose (mL):  3    Needle:   Needle Type:  Quincke  Needle Gauge:  25 G  Attempts:  1      Events: CSF confirmed, no blood with aspiration and no paresthesia        Assessment:  Insertion:  Uncomplicated  Patient tolerance:  Patient tolerated the procedure well with no immediate complications

## 2019-10-03 NOTE — PROGRESS NOTES
10/03/19 1657   Oxygen Therapy   O2 Sat (%) 100 %   Pulse via Oximetry 69 beats per minute   O2 Device Room air   O2 Flow Rate (L/min) 0 l/min   Incentive Spirometry Treatment   Actual Volume (ml) 2750 ml   Number of Attempts 1    No shortness of breath or distress noted. BS are clear b/l.    [facility] notes reviewed- continuous sat # 10 ordered HS

## 2019-10-03 NOTE — OP NOTES
Revision Total Hip Procedure Note    Huong Granados,  [id number],  1935    Pre-operative Diagnosis:  Dislocation right Total Hip Arthroplasty  Post-operative Diagnosis: SAB    Procedure: Revision right Total Hip Arthroplasty . Location: [employer]. Assisting: OFELIA Gee     Anesthesia: Spinal     EBL: 300cc    Implants:  Belkys    Procedure: Revision of CATRINA to Belkys Constrained Liner    The complexity of total joint surgery requires the use of a skilled first assistant for positioning, retraction and assistance in closure. Huong Granados was brought to the operating room and positioned on the operating table. Anesthesia was administered. A time out identifying the patient, procedure, operative side and surgeon was administered and charted by the OR Nurse. Huong Granados was positioned on their  right lateral decubitus position. The limb was prepped and draped in the usual sterile fashion. The original posterior approach incision was utilized to expose the hip. The incision was carried through the subcutaneous tissue and underlying fascia with hemostasis obtained using the bovie cauterization. A Charnley retractor was inserted. The short external rotators were divided at their insertion. The sciatic nerve was palpated and identified. Next, a T-shaped capsular incision was accomplished and the total hip implants dislocated. Cultures were obtained. The femoral head was disassociated from the femoral stem. The trunnion was inspected and noted to be stable without corrosion. It was mobilized anterior and superior. The acetabulum was exposed and removed using the acetabular revision osteotomes. The acetabulum was then reamed to accommodate a 60 size multi-holed acetabular shell. The shell was impacted with appropriate tilt and anteversion. 2 screws were placed. The [place] constrained liner was placed.        The femoral head was openned to equilibrate the limb length and obtain stability. Next, the permanent  8.5 28 mm femoral head was impacted into place. Meek Rodrigues's hip was reduced and stability was as mentioned above. Copious irrigation was accomplished about the surgical site. The sciatic nerve was palpated and noted to be intact. The capsule was repaired with a #1 PDS and the external rotators were reattached with a #5 Mersilene. A lavage of diluted betadine solution of 17.5 ml Betadine in 500 of 0.9% Normal Saline was allowed to soak in the wound for 3 minutes after implanting of the prosthesis. The wound was irrigated with Saline again before closure. Prior to the final skin closure, full strength betadine was applied to the skin surrounding the skin incision. The operative hip was injected prior to closure for post operative pain management. A #1 PDS suture was used to re-approximate the fascia. Monocryl sutures were used to approximate the subcutaneous layers. Skin staples were applied in an occlusive fashion to reapproximate the skin edges and a sterile bandage was placed. The patient was then rolled to a supine position. The sponge and needle counts were correct. The patient tolerated the procedure without difficulty and left the operating room in satisfactory condition. Implants:   Implant Name Type Inv.  Item Serial No.  Lot No. LRB No. Used   Trident II Tritanium Multihole Acetabular Shell   [id number]  [id number] Right 1   Low Profile Hex Screw   [id number]  6UW Right 1   INSERT ACET CONSTRN 0DEG 28 G --  - [id number]  INSERT ACET CONSTRN 0DEG 28 G --  [id number] [employer] [facility] [id number] Right 1   HEAD FEM CER TS 28MM +8.5 -- BIOLOX DELTA - [id number]  HEAD FEM CER TS 28MM +8.5 -- BIOLOX DELTA [id number] [facility] [id number] Right 1     Signed By: Piyush Luo MD

## 2019-10-03 NOTE — PERIOP NOTES
Teach back method used in review of Hibiclens usage preop/postop, TB screening, pain management goals, falls precautions and use of Nozin for prevention of staph infections.     Incentive spirometer reviewed AND LOCATED IN THE CAR

## 2019-10-03 NOTE — ANESTHESIA POSTPROCEDURE EVALUATION
Procedure(s):  RIGHT HIP ARTHROPLASTY TOTAL REVISION. spinal    Anesthesia Post Evaluation      Multimodal analgesia: multimodal analgesia used between 6 hours prior to anesthesia start to PACU discharge  Patient location during evaluation: PACU  Patient participation: complete - patient participated  Level of consciousness: awake  Pain management: adequate  Airway patency: patent  Anesthetic complications: no  Cardiovascular status: acceptable and hemodynamically stable  Respiratory status: acceptable  Hydration status: acceptable  Comments: Acceptable for discharge from PACU.   Post anesthesia nausea and vomiting:  none      Vitals Value Taken Time   /55 10/3/2019  4:05 PM   Temp 36.4 °C (97.5 °F) 10/3/2019  3:35 PM   Pulse 66 10/3/2019  4:05 PM   Resp 16 10/3/2019  4:05 PM   SpO2 97 % 10/3/2019  4:05 PM

## 2019-10-03 NOTE — PROGRESS NOTES
TRANSFER - IN REPORT:    Verbal report received from Fabien Rowe rn(name) on Miguel Ruiz  being received from [facility]) for routine progression of care      Report consisted of patients Situation, Background, Assessment and   Recommendations(SBAR). Information from the following report(s) SBAR, Kardex, Procedure Summary, Intake/Output, MAR and Recent Results was reviewed with the receiving nurse. Opportunity for questions and clarification was provided. Assessment completed upon patients arrival to unit and care assumed.

## 2019-10-03 NOTE — H&P
The patient has unstable right total hip replacement. The patient was seen and examined and there are no changes to the patient's orthopedic condition. They have tried conservative treatment for this condition; including antiinflammatories and lifestyle modifications and have failed. The necessity for right total hip revision arthroplasty is still present, and the H&P from the office is still current. The patient will be admitted today forProcedure(s) (LRB):  RIGHT HIP ARTHROPLASTY TOTAL REVISION / Becky Mora (Right) .

## 2019-10-03 NOTE — ANESTHESIA PREPROCEDURE EVALUATION
Anesthetic History     Increased risk of difficult airway          Review of Systems / Medical History  Patient summary reviewed and pertinent labs reviewed    Pulmonary  Within defined limits              Comments: >20 years of tobacco abuse. None currently   Neuro/Psych   Within defined limits           Cardiovascular    Hypertension          CAD and cardiac stents (2014 BMS - Plavix held x 10d, remains on bASA)    Exercise tolerance: <4 METS  Comments: DIEGO off plavix 7 days--on 81 asa   GI/Hepatic/Renal     GERD (Asymptomatic): well controlled           Endo/Other    Diabetes: well controlled, type 2    Arthritis     Other Findings              Physical Exam    Airway  Mallampati: III  TM Distance: 4 - 6 cm  Neck ROM: normal range of motion   Mouth opening: Normal    Comments: Large front, capped teeth Cardiovascular    Rhythm: regular  Rate: normal         Dental    Dentition: Caps/crowns and Implants     Pulmonary  Breath sounds clear to auscultation               Abdominal  GI exam deferred       Other Findings            Anesthetic Plan    ASA: 3  Anesthesia type: spinal            Anesthetic plan and risks discussed with: Patient      Discussed general. Plan for LMA if convert to general. Multiple lower back procedures.  Post DIEGO after last knee replacement

## 2019-10-04 VITALS
DIASTOLIC BLOOD PRESSURE: 52 MMHG | SYSTOLIC BLOOD PRESSURE: 92 MMHG | HEART RATE: 73 BPM | TEMPERATURE: 97.8 F | WEIGHT: 172 LBS | RESPIRATION RATE: 16 BRPM | HEIGHT: 72 IN | BODY MASS INDEX: 23.3 KG/M2 | OXYGEN SATURATION: 98 %

## 2019-10-04 LAB
EST. AVERAGE GLUCOSE BLD GHB EST-MCNC: 108 MG/DL
HBA1C MFR BLD: 5.4 %
HGB BLD-MCNC: 10 G/DL (ref 13.6–17.2)

## 2019-10-04 PROCEDURE — 74011250636 HC RX REV CODE- 250/636: Performed by: PHYSICIAN ASSISTANT

## 2019-10-04 PROCEDURE — 94760 N-INVAS EAR/PLS OXIMETRY 1: CPT

## 2019-10-04 PROCEDURE — 97150 GROUP THERAPEUTIC PROCEDURES: CPT

## 2019-10-04 PROCEDURE — 97535 SELF CARE MNGMENT TRAINING: CPT

## 2019-10-04 PROCEDURE — 97116 GAIT TRAINING THERAPY: CPT

## 2019-10-04 PROCEDURE — 97110 THERAPEUTIC EXERCISES: CPT

## 2019-10-04 PROCEDURE — 97161 PT EVAL LOW COMPLEX 20 MIN: CPT

## 2019-10-04 PROCEDURE — 74011250637 HC RX REV CODE- 250/637: Performed by: PHYSICIAN ASSISTANT

## 2019-10-04 PROCEDURE — 97165 OT EVAL LOW COMPLEX 30 MIN: CPT

## 2019-10-04 PROCEDURE — 85018 HEMOGLOBIN: CPT

## 2019-10-04 PROCEDURE — 83036 HEMOGLOBIN GLYCOSYLATED A1C: CPT

## 2019-10-04 PROCEDURE — 36415 COLL VENOUS BLD VENIPUNCTURE: CPT

## 2019-10-04 RX ORDER — OXYCODONE HYDROCHLORIDE 10 MG/1
10 TABLET ORAL
Qty: 50 TAB | Refills: 0 | Status: SHIPPED | OUTPATIENT
Start: 2019-10-04 | End: 2019-10-07

## 2019-10-04 RX ADMIN — Medication 2 G: at 09:20

## 2019-10-04 RX ADMIN — ACETAMINOPHEN 1000 MG: 500 TABLET, FILM COATED ORAL at 09:20

## 2019-10-04 RX ADMIN — CELECOXIB 200 MG: 200 CAPSULE ORAL at 09:19

## 2019-10-04 RX ADMIN — FINASTERIDE 5 MG: 5 TABLET, FILM COATED ORAL at 09:18

## 2019-10-04 RX ADMIN — Medication 1 AMPULE: at 09:18

## 2019-10-04 RX ADMIN — SENNOSIDES AND DOCUSATE SODIUM 2 TABLET: 8.6; 5 TABLET ORAL at 09:19

## 2019-10-04 RX ADMIN — ASPIRIN 81 MG: 81 TABLET, COATED ORAL at 09:19

## 2019-10-04 RX ADMIN — Medication 5 ML: at 09:26

## 2019-10-04 RX ADMIN — FAMOTIDINE 20 MG: 20 TABLET ORAL at 09:20

## 2019-10-04 NOTE — PROGRESS NOTES
Problem: Self Care Deficits Care Plan (Adult)  Goal: *Acute Goals and Plan of Care (Insert Text)  Description  GOALS:   DISCHARGE GOALS (in preparation for going home/rehab):  3 days  1. Mr. Vernell Dias will perform one lower body dressing activity with minimal assistance with adaptive equipment to demonstrate improved functional mobility and safety. 2.  Mr. Vernell Dias will perform one lower body bathing activity with minimal  assistance with adaptive equipment to demonstrate improved functional mobility and safety. 3.  Mr. Vernell Dias will perform toileting/toilet transfer with contact guard assistance with adaptive equipment to demonstrate improved functional mobility and safety. 4.  Mr. Vernell Dias will perform shower transfer with contact guard assistance with adaptive equipment to demonstrate improved functional mobility and safety. 5.  Mr. Vernell Dias will state CATRINA precautions with two verbal cues to demonstrate improved functional mobility and safety. Outcome: Resolved/Met       JOINT CAMP OCCUPATIONAL THERAPY CATRINA: Initial Assessment, Daily Note, Discharge, Treatment Day: Day of Assessment and AM 10/4/2019  INPATIENT: Hospital Day: 2  [place] / [place] / Product Type: Medicare /      NAME/AGE/GENDER: Kanu Urbano is a 81 y.o. 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 Vernell Dias is s/p right CATRINA revision and presents with decreased weight bearing on right LE and decreased independence with functional mobility and activities of daily living.   Patient completed shower and dressing as charted below in ADL grid and is ambulating with rolling walker and supervision assist.  Patient has met 5/5 goals and plans to return home with good family support. Family able to provide patient with appropriate level of assistance at this time. OT reviewed hip precautions throughout session. Patient instructed to call for assistance when needing to get up from recliner and all needs in reach. Patient verbalized understanding of call light. This section established at most recent assessment   PROBLEM LIST (Impairments causing functional limitations):  Decreased Strength  Decreased ADL/Functional Activities  Decreased Transfer Abilities  Increased Pain  Increased Fatigue  Decreased Flexibility/Joint Mobility  Decreased Knowledge of Precautions   INTERVENTIONS PLANNED: (Benefits and precautions of occupational therapy have been discussed with the patient.)  Activities of daily living training  Adaptive equipment training  Balance training  Clothing management  Donning&doffing training  Theraputic activity     TREATMENT PLAN: Frequency/Duration: Follow patient 1 time to address above goals. Rehabilitation Potential For Stated Goals: Good     RECOMMENDED REHABILITATION/EQUIPMENT: (at time of discharge pending progress): Continue Skilled Therapy. OCCUPATIONAL PROFILE AND HISTORY:   History of Present Injury/Illness (Reason for Referral): Pt presents this date s/p (right) CATRINA. Revision   Past Medical History/Comorbidities:   Mr. 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   Mr. Pippa Carolina  has a past surgical history that includes pr abdomen surgery proc unlisted (1991); hx lumbar fusion (1990 x 2  2012, 2013); hx urological (2002); hx heent (as child); hx orthopaedic (Left, 2013); hx colectomy (1997); hx colonoscopy (2014, 2018); hx heart catheterization (01/10/2014); hx coronary stent placement; hx knee replacement (Left, 2017); hx shoulder arthroscopy (Left, 1990); hx other surgical; and hx hip replacement (Right, 06/2019). Social History/Living Environment:   Home Environment: Private residence  One/Two Story Residence: One story  Living Alone: No  Support Systems: Child(linda)  Patient Expects to be Discharged to[de-identified] Private residence  Current DME Used/Available at Home: [place], straight, Com[employer], bedside, Walker, rollator, Walker, rolling  Tub or Shower Type: Shower  Prior Level of Function/Work/Activity:  Independent      Number of Personal Factors/Comorbidities that affect the Plan of Care: Brief history (0):  LOW COMPLEXITY   ASSESSMENT OF OCCUPATIONAL PERFORMANCE[de-identified]   Most Recent Physical Functioning:   Balance  Sitting: Intact  Standing: Intact       Gross Assessment: Yes  Gross Assessment  AROM: Within functional limits(except right lower extremity s/p CATRINA revision)  Strength: Within functional limits(except right lower extremity s/p CATRINA revision)            Coordination  Fine Motor Skills-Upper: Left Intact; Right Intact  Gross Motor Skills-Upper: Left Intact; Right Intact         Mental Status  Neurologic State: Alert  Orientation Level: Oriented X4  Cognition: Appropriate decision making  Perception: Appears intact  Perseveration: No perseveration noted  Safety/Judgement: Awareness of environment                Basic ADLs (From Assessment) Complex ADLs (From Assessment)   Basic ADL  Feeding: Independent  Oral Facial Hygiene/Grooming: Supervision(standing at sink)  Bathing: Supervision(shower chair)  Type of Bath: Chlorhexidine (CHG), Full, Shower  Upper Body Dressing: Supervision  Lower Body Dressing: Supervision(verbal cues)  Toileting: Supervision(elevated seat)     Grooming/Bathing/Dressing Activities of Daily Living     Cognitive Retraining  Safety/Judgement: Awareness of environment                 Functional Transfers  Bathroom Mobility: Supervision/set up  Toilet Transfer : Supervision(elevated seat)  Shower Transfer: Supervision(shower chair)     Bed/Mat Mobility  Supine to Sit: Stand-by assistance  Sit to Supine: (stayed up in chair)  Sit to Stand: Supervision  Stand to Sit: Supervision  Bed to Chair: Supervision         Physical Skills Involved:  Range of Motion  Balance  Strength Cognitive Skills Affected (resulting in the inability to perform in a timely and safe manner):  none  Psychosocial Skills Affected:  Environmental Adaptation   Number of elements that affect the Plan of Care: 3-5:  MODERATE COMPLEXITY   CLINICAL DECISION MAKIN[address] [place] [id number] AM-PAC 6 Clicks   Daily Activity Inpatient Short Form  How much help from another person does the patient currently need. .. Total A Lot A Little None   1. Putting on and taking off regular lower body clothing? ? 1   ? 2   ? 3   ? 4   2. Bathing (including washing, rinsing, drying)? ? 1   ? 2   ? 3   ? 4   3. Toileting, which includes using toilet, bedpan or urinal?   ? 1   ? 2   ? 3   ? 4   4. Putting on and taking off regular upper body clothing? ? 1   ? 2   ? 3   ? 4   5. Taking care of personal grooming such as brushing teeth? ? 1   ? 2   ? 3   ? 4   6. Eating meals? ? 1   ? 2   ? 3   ? 4   © 2007, Trustees of [address] [place], [place] [id number], under license to [employer]. All rights reserved     Score:  Initial: 24 Most Recent: X (Date: -- )    Interpretation of Tool:  Represents activities that are increasingly more difficult (i.e. Bed mobility, Transfers, Gait).      Use of outcome tool(s) and clinical judgement create a POC that gives a: LOW COMPLEXITY            TREATMENT:   (In addition to Assessment/Re-Assessment sessions the following treatments were rendered)     Pre-treatment Symptoms/Complaints:  pt without complaint of pain  Pain: Initial:      Post Session:  0     Self Care: (40 min): Procedure(s) (per grid) utilized to improve and/or restore self-care/home management as related to dressing, bathing, toileting and grooming. Required minimal verbal and   cueing to facilitate activities of daily living skills. OT evaluation completed   Treatment/Session Assessment:     Response to Treatment:  up to shower tolerated well. Education:  ? Home Exercises  ? Fall Precautions  ? Hip Precautions ? Going Home Video  ? Knee/Hip Prosthesis Review  ? Walker Management/Safety ? Adaptive Equipment as Needed       Interdisciplinary Collaboration:   Physical Therapist  Occupational Therapist  Registered Nurse    After treatment position/precautions:   Up in chair  Bed/Chair-wheels locked  Call light within reach  RN notified     Compliance with Program/Exercises: Compliant all of the time. Recommendations/Intent for next treatment session:  Pt doing well all goals met and will do well at home with support from family. Patient will be discharged home with home health PT. No further Occupational Therapy warranted, will discharge Occupational Therapy services.        Total Treatment Duration:  OT Patient Time In/Time Out  Time In: 1030  Time Out: [address] [facility]

## 2019-10-04 NOTE — PROGRESS NOTES
Follow up from initial assessment    SOB evaluated:None noted    Breath Sounds: Clear  IS patient effort: Excellent  Patient achieved: 2800  Ml/sec    Patient resting comfortably, denies any discomfort at this time. Patient placed on continuous sat monitor # 10  HS per protocol. Monitor history deleted prior to placing on patient.  Patient working on IS

## 2019-10-04 NOTE — PROGRESS NOTES
Problem: Mobility Impaired (Adult and Pediatric)  Goal: *Acute Goals and Plan of Care (Insert Text)  Description  GOALS (1-4 days):  (1.)Mr. Tatyana Dumont will move from supine to sit and sit to supine  in bed with SUPERVISION. (2.)Mr. Tatyana Dumont will transfer from bed to chair and chair to bed with SUPERVISION using the least restrictive device. (3.)Mr. Tatyana Dumont will ambulate with SUPERVISION for 400 feet with the least restrictive device. (4.)Mr. Tatyana Dumont will ambulate up/down 3 steps with bilateral  railing with STAND BY ASSIST with no device. (5.)Mr. Tatyana Dumont will state/observe CATRINA precautions with 0 verbal cues. ________________________________________________________________________________________________     Outcome: Progressing Towards Goal     PHYSICAL THERAPY JOINT CAMP CATRINA: Initial Assessment, Treatment Day: Day of Assessment and AM 10/4/2019  INPATIENT: Hospital Day: 2  [place] / [place] / Product Type: Medicare /      NAME/AGE/GENDER: Catracho Peace is a 81 y.o. 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 Tatyana Dumont presents with decreased strength and range of motion right lower extremity and with decreased independence with functional mobility s/p right CATRNIA revision. He will benefit from skilled PT interventions to maximize independence with functional mobility and CATRINA exercises. He did well with assessment and walked in the chawla. In chair worked on [employer] exercises with verbal cues. Stayed up in chair with ice by hip and needs in reach.  Hope to progress at next therapy session. This section established at most recent assessment   PROBLEM LIST (Impairments causing functional limitations):  Decreased Strength  Decreased ADL/Functional Activities  Decreased Transfer Abilities  Decreased Ambulation Ability/Technique  Increased Pain  Decreased Flexibility/Joint Mobility  Edema/Girth  Decreased [place] with Home Exercise Program   INTERVENTIONS PLANNED: (Benefits and precautions of physical therapy have been discussed with the patient.)  Cold  bed mobility  gait training  home exercise program (HEP)  Range of Motion: active/assisted/passive  Therapeutic Activities  therapeutic exercise/strengthening  transfer training  Group Therapy     TREATMENT PLAN: Frequency/Duration: Follow patient BID for duration of hospital stay to address above goals. Rehabilitation Potential For Stated Goals: Good     RECOMMENDED REHABILITATION/EQUIPMENT: (at time of discharge pending progress): Continue Skilled Therapy and Home Health: Physical Therapy. HISTORY:   History of Present Injury/Illness (Reason for Referral):  Pt s/p right CATRINA revision on 10/3/19  Past Medical History/Comorbidities:   Mr. 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   Mr. Bri Mederos  has a past surgical history that includes pr abdomen surgery proc unlisted (1991); hx lumbar fusion (1990 x 2  2012, 2013); hx urological (2002); hx heent (as child); hx orthopaedic (Left, 2013); hx colectomy (1997); hx colonoscopy (2014, 2018); hx heart catheterization (01/10/2014); hx coronary stent placement; hx knee replacement (Left, 2017); hx shoulder arthroscopy (Left, 1990); hx other surgical; and hx hip replacement (Right, 06/2019). Social History/Living Environment:   Home Environment: Private residence  One/Two Story Residence: One story  Living Alone: No  Support Systems: Child(linda)  Patient Expects to be Discharged to[de-identified] Private residence  Current DME Used/Available at Home: Dima Alosa, straight, Commode, bedside, Walker, rollator, Walker, rolling  Tub or Shower Type: Shower  Prior Level of Function/Work/Activity:  Pt living at home, independent with gait and ADLs without assistive devices   Number of Personal Factors/Comorbidities that affect the Plan of Care: 0: LOW COMPLEXITY   EXAMINATION:   Most Recent Physical Functioning:   Gross Assessment: Yes  Gross Assessment  AROM: Within functional limits(except right lower extremity s/p CATRINA revision)  Strength: Within functional limits(except right lower extremity s/p CATRINA revision)                     Bed Mobility  Supine to Sit: Stand-by assistance  Sit to Supine: (stayed up in chair)    Transfers  Sit to Stand: Stand-by assistance  Stand to Sit: Stand-by assistance    Balance  Sitting: Intact  Standing: Intact; With support    Posture  Posture (WDL): Within defined limits         Weight Bearing Status  Right Side Weight Bearing: As tolerated  Distance (ft): 250 Feet (ft)  Ambulation - Level of Assistance: Stand-by assistance  Assistive Device: Walker, rolling  Speed/Mary: Delayed  Step Length: Left shortened  Stance: Right decreased  Gait Abnormalities: Antalgic  Interventions: Safety awareness training;Verbal cues     Braces/Orthotics: none    Right Hip Cold  Type: Cold/ice packs  Patient Position: Sitting      Body Structures Involved:  Joints  Muscles Body Functions Affected: Movement Related Activities and Participation Affected:   Mobility  Self Care   Number of elements that affect the Plan of Care: 4+: HIGH COMPLEXITY   CLINICAL PRESENTATION:   Presentation: Stable and uncomplicated: LOW COMPLEXITY   CLINICAL DECISION MAKIN[address] [place] [address] AM-PAC 6 Clicks   Basic Mobility Inpatient Short Form  How much difficulty does the patient currently have. .. Unable A Lot A Little None   1. Turning over in bed (including adjusting bedclothes, sheets and blankets)? ? 1   ? 2   ? 3   ? 4   2. Sitting down on and standing up from a chair with arms ( e.g., wheelchair, bedside commode, etc.)   ? 1   ? 2   ? 3   ? 4   3. Moving from lying on back to sitting on the side of the bed?   ? 1   ? 2   ? 3   ? 4   How much help from another person does the patient currently need. .. Total A Lot A Little None   4. Moving to and from a bed to a chair (including a wheelchair)? ? 1   ? 2   ? 3   ? 4   5. Need to walk in hospital room? ? 1   ? 2   ? 3   ? 4   6. Climbing 3-5 steps with a railing? ? 1   ? 2   ? 3   ? 4   © , Trustees of [address] [place] [address], under license to [employer]. All rights reserved     Score:  Initial: 21 Most Recent: X (Date: -- )    Interpretation of Tool:  Represents activities that are increasingly more difficult (i.e. Bed mobility, Transfers, Gait). Medical Necessity:     Patient is expected to demonstrate progress in strength, range of motion and functional technique   to decrease assistance required with functional mobility and CATRINA management   . Reason for Services/Other Comments:  Patient continues to require skilled intervention due to inability to complete functional mobility and CATRINA exercises independently   .    Use of outcome tool(s) and clinical judgement create a POC that gives a: Clear prediction of patient's progress: LOW COMPLEXITY            TREATMENT:   (In addition to Assessment/Re-Assessment sessions the following treatments were rendered)     Pre-treatment Symptoms/Complaints:  none  Pain Initial: no reports of pain     Post Session:  no reports of pain   Assessment: 15  Therapeutic Exercise: (10 Minutes):  Exercises per grid below to improve mobility and strength. Required minimal verbal and manual cues to promote proper body alignment and promote proper body posture. Progressed repetitions as indicated. Date:  10/4/19 Date:   Date:     ACTIVITY/EXERCISE AM PM AM PM AM PM   GROUP THERAPY  ?  ?  ?  ?  ?  ? Ankle Pumps 10        Quad Sets 10        Gluteal Sets 10        Hip ABd/ADduction 10        Straight Leg Raises         Knee Slides 10        Short Arc Quads 10        Long Arc Quads 10        Chair Slides                  B = bilateral; AA = active assistive; A = active; P = passive      Treatment/Session Assessment:     Response to Treatment:  pt tolerated well, hopes to go home today. Education:  ? Home Exercises  ? Fall Precautions  ? Hip Precautions ? D/C Instruction Review  ? Hip Prosthesis Review  ? Walker Management/Safety ? Adaptive Equipment as Needed       Interdisciplinary Collaboration:   Registered Nurse    After treatment position/precautions:   Up in chair  Bed/Chair-wheels locked  Call light within reach    Compliance with Program/Exercises: Compliant all of the time, Will assess as treatment progresses. Recommendations/Intent for next treatment session:  Treatment next visit will focus on increasing Mr. Cabreras independence with bed mobility, transfers, gait training, strength/ROM exercises, modalities for pain, and patient education.       Total Treatment Duration:  PT Patient Time In/Time Out  Time In: 0850  Time Out: [address] [facility] [address], PT

## 2019-10-04 NOTE — PROGRESS NOTES
Problem: Mobility Impaired (Adult and Pediatric)  Goal: *Acute Goals and Plan of Care (Insert Text)  Description  GOALS (1-4 days):  (1.)Mr. Faye Green will move from supine to sit and sit to supine  in bed with SUPERVISION. (2.)Mr. Faye Green will transfer from bed to chair and chair to bed with SUPERVISION using the least restrictive device. (3.)Mr. Faye Green will ambulate with SUPERVISION for 400 feet with the least restrictive device. (4.)Mr. Faye Green will ambulate up/down 3 steps with bilateral  railing with STAND BY ASSIST with no device. (5.)Mr. Faye Green will state/observe CATRINA precautions with 0 verbal cues. ________________________________________________________________________________________________     Outcome: Progressing Towards Goal     PHYSICAL THERAPY JOINT CAMP CATRINA: Daily Note, Treatment Day: Day of Assessment and PM 10/4/2019  INPATIENT: Hospital Day: 2  [place] / Product Type: Medicare /      NAME/AGE/GENDER: Mihai Short is a 81 y.o. male   PRIMARY DIAGNOSIS:  Dislocation of internal right hip prosthesis, [facility] [T84.020A]  Status post total replacement of right hip [Z96.641]   Procedure(s) and Anesthesia Type:     * RIGHT HIP ARTHROPLASTY TOTAL REVISION - Spinal (Right)  ICD-10: Treatment Diagnosis:    · Pain in Right Hip (M25.551)  · Stiffness of Right Hip, Not elsewhere classified (M25.651)  · Difficulty in walking, Not elsewhere classified (R26.2)      ASSESSMENT:     Mr. Faye Green presents up in chair on contact and ready for therapy. Worked on gait training in the chawla. Doing well with reciprocal gait pattern. In gym worked on [employer] exercises with verbal cues progressing with repetitions. Practiced stairs with verbal cues. Then walked back to room, stayed up in chair with needs in reach. Pt hopes to go home today. HHPT for follow up.      This section established at most recent assessment   PROBLEM LIST (Impairments causing functional limitations):  1. Decreased Strength  2. Decreased ADL/Functional Activities  3. Decreased Transfer Abilities  4. Decreased Ambulation Ability/Technique  5. Increased Pain  6. Decreased Flexibility/Joint Mobility  7. Edema/Girth  8. Decreased [place] with Home Exercise Program   INTERVENTIONS PLANNED: (Benefits and precautions of physical therapy have been discussed with the patient.)  1. Cold  2. bed mobility  3. gait training  4. home exercise program (HEP)  5. Range of Motion: active/assisted/passive  6. Therapeutic Activities  7. therapeutic exercise/strengthening  8. transfer training  9. Group Therapy     TREATMENT PLAN: Frequency/Duration: Follow patient BID for duration of hospital stay to address above goals. Rehabilitation Potential For Stated Goals: Good     RECOMMENDED REHABILITATION/EQUIPMENT: (at time of discharge pending progress): Continue Skilled Therapy and Home Health: Physical Therapy. HISTORY:   History of Present Injury/Illness (Reason for Referral):  Pt s/p right CATRINA revision on 10/3/19  Past Medical History/Comorbidities:   Mr. 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   Mr. Candice Olivas  has a past surgical history that includes pr abdomen surgery proc unlisted (1991); hx lumbar fusion (1990 x 2  2012, 2013); hx urological (2002); hx heent (as child); hx orthopaedic (Left, 2013); hx colectomy (1997); hx colonoscopy (2014, 2018); hx heart catheterization (01/10/2014); hx coronary stent placement; hx knee replacement (Left, 2017); hx shoulder arthroscopy (Left, 1990); hx other surgical; and hx hip replacement (Right, 06/2019). Social History/Living Environment:   Home Environment: Private residence  One/Two Story Residence: One story  Living Alone: No  Support Systems: Child(linda)  Patient Expects to be Discharged to[de-identified] Private residence  Current DME Used/Available at Home: Baljit Moons, straight, Commode, bedside, Walker, rollator, Walker, rolling  Tub or Shower Type: Shower  Prior Level of Function/Work/Activity:  Pt living at home, independent with gait and ADLs without assistive devices   Number of Personal Factors/Comorbidities that affect the Plan of Care: 0: LOW COMPLEXITY   EXAMINATION:   Most Recent Physical Functioning:   Gross Assessment: Yes  Gross Assessment  AROM: Within functional limits(except right lower extremity s/p CATRINA revision)  Strength: Within functional limits(except right lower extremity s/p CATRINA revision)                     Bed Mobility  Supine to Sit: Stand-by assistance  Sit to Supine: (stayed up in chair)    Transfers  Sit to Stand: Stand-by assistance  Stand to Sit: Stand-by assistance    Balance  Sitting: Intact  Standing: Intact    Posture  Posture (WDL): Within defined limits    Gait Training: Yes    Weight Bearing Status  Right Side Weight Bearing: As tolerated  Distance (ft): 180 Feet (ft)(and another 110 feet)  Ambulation - Level of Assistance: Stand-by assistance  Assistive Device: Walker, rolling  Speed/Mary: Delayed  Step Length: Left shortened  Stance: Right decreased  Gait Abnormalities: Antalgic  Number of Stairs Trained: 3  Stairs - Level of Assistance: Stand-by assistance;Contact guard assistance  Rail Use: Both  Interventions: Safety awareness training;Verbal cues     Braces/Orthotics: none    Right Hip Cold  Type: Cold/ice packs  Patient Position: Sitting      Body Structures Involved:  1. Joints  2. Muscles Body Functions Affected:  1.  Movement Related Activities and Participation Affected:  1. Mobility  2. Self Care   Number of elements that affect the Plan of Care: 4+: HIGH COMPLEXITY   CLINICAL PRESENTATION:   Presentation: Stable and uncomplicated: LOW COMPLEXITY   CLINICAL DECISION MAKIN[address] [place] [address] AM-PAC 6 Clicks   Basic Mobility Inpatient Short Form  How much difficulty does the patient currently have. .. Unable A Lot A Little None   1. Turning over in bed (including adjusting bedclothes, sheets and blankets)? ? 1   ? 2   ? 3   ? 4   2. Sitting down on and standing up from a chair with arms ( e.g., wheelchair, bedside commode, etc.)   ? 1   ? 2   ? 3   ? 4   3. Moving from lying on back to sitting on the side of the bed?   ? 1   ? 2   ? 3   ? 4   How much help from another person does the patient currently need. .. Total A Lot A Little None   4. Moving to and from a bed to a chair (including a wheelchair)? ? 1   ? 2   ? 3   ? 4   5. Need to walk in hospital room? ? 1   ? 2   ? 3   ? 4   6. Climbing 3-5 steps with a railing? ? 1   ? 2   ? 3   ? 4   © , Trustees of [address] [place] [address], under license to [employer]. All rights reserved     Score:  Initial: 21 Most Recent: X (Date: -- )    Interpretation of Tool:  Represents activities that are increasingly more difficult (i.e. Bed mobility, Transfers, Gait). Medical Necessity:     · Patient is expected to demonstrate progress in strength, range of motion and functional technique  ·  to decrease assistance required with functional mobility and CATRINA management   · .  Reason for Services/Other Comments:  · Patient continues to require skilled intervention due to inability to complete functional mobility and CATRINA exercises independently   · .    Use of outcome tool(s) and clinical judgement create a POC that gives a: Clear prediction of patient's progress: LOW COMPLEXITY            TREATMENT:   (In addition to Assessment/Re-Assessment sessions the following treatments were rendered)     Pre-treatment Symptoms/Complaints:  none  Pain Initial: no reports of pain     Post Session:  no reports of pain     Therapeutic Exercise: (45 Minutes(group)):  Exercises per grid below to improve mobility and strength. Required minimal verbal and manual cues to promote proper body alignment and promote proper body posture. Progressed repetitions as indicated. Gait Training (15 Minutes):  Gait training to improve and/or restore physical functioning as related to mobility and strength. Ambulated 180 Feet (ft)(and another 110 feet) with Stand-by assistance using a Walker, rolling and minimal Safety awareness training;Verbal cues related to their stance phase, stride length and heel strike to promote proper body alignment and promote proper body posture. Instruction in performance of walker use and gait sequencing to correct stance phase and stride length. Date:  10/4/19 Date:   Date:     ACTIVITY/EXERCISE AM PM AM PM AM PM   GROUP THERAPY  ? X  ?  ?  ?  ? Ankle Pumps 10 15       Quad Sets 10 15       Gluteal Sets 10 15       Hip ABd/ADduction 10 15       Straight Leg Raises         Knee Slides 10 15       Short Arc Quads 10 15       Long Arc Quads 10 15       Chair Slides                  B = bilateral; AA = active assistive; A = active; P = passive      Treatment/Session Assessment:     Response to Treatment:  pt tolerated well, eager to go home    Education:  ? Home Exercises  ? Fall Precautions  ? Hip Precautions X D/C Instruction Review  X Hip Prosthesis Review  X Walker Management/Safety ? Adaptive Equipment as Needed       Interdisciplinary Collaboration:   o Registered Nurse  o Rehabilitation Attendant    After treatment position/precautions:   o Up in chair  o Bed/Chair-wheels locked  o Call light within reach    Compliance with Program/Exercises: Compliant all of the time, Will assess as treatment progresses.     Recommendations/Intent for next treatment session:  Treatment next visit will focus on increasing Mr. Rodrigues's independence with bed mobility, transfers, gait training, strength/ROM exercises, modalities for pain, and patient education.       Total Treatment Duration:  PT Patient Time In/Time Out  Time In: 1300  Time Out: [address],

## 2019-10-04 NOTE — PROGRESS NOTES
Spiritual Care Visit, initial visit. Visited with patient at bedside. Prayed for patient's healing and health. Visit by Williams Jerome, Staff .  Jonathan, Dago.[email or web address]., B.A.

## 2019-10-04 NOTE — DISCHARGE INSTRUCTIONS
[facility]   Patient Discharge Instructions    Lizeth Gamino / [id number] : 1935    Admitted 10/3/2019 Discharged: 10/4/2019     IF YOU HAVE ANY PROBLEMS ONCE YOU ARE AT HOME CALL THE FOLLOWING NUMBERS:   Main office number: [phone]    Take Home Medications     · It is important that you take the medication exactly as they are prescribed. · Keep your medication in the bottles provided by the pharmacist and keep a list of the medication names, dosages, and times to be taken in your wallet. · Do not take other medications without consulting your doctor. What to do at [address] your prehospital diet. If you have excessive nausea or vomitting call your doctor's office     Home Physical Therapy is arranged. Use rolling walker when walking. Patients who have had a joint replacement should not drive until you are seen for your follow up appointment by Dr. Sunshine Mata. When to Call    - Call if you have a temperature greater then 101  - Unable to keep food down  - Loose control of your bladder or bowel function  - Are unable to bear any weight   - Need a pain medication refill       DISCHARGE SUMMARY from Nurse    The following personal items collected during your admission are returned to you:   Dental Appliance: Dental Appliances: With patient, Uppers  Vision: Visual Aid: Glasses  Hearing Aid:    Jewelry: Jewelry: None  Clothing: Clothing: Other (comment)  Other Valuables: Other Valuables: None  Valuables sent to safe:      PATIENT INSTRUCTIONS:    After general anesthesia or intravenous sedation, for 24 hours or while taking prescription Narcotics:  · Limit your activities  · Do not drive and operate hazardous machinery  · Do not make important personal or business decisions  · Do  not drink alcoholic beverages  · If you have not urinated within 8 hours after discharge, please contact your surgeon on call.     Report the following to your surgeon:  · Excessive pain, swelling, redness or odor of or around the surgical area  · Temperature over 101  · Nausea and vomiting lasting longer than 4 hours or if unable to take medications  · Any signs of decreased circulation or nerve impairment to extremity: change in color, persistent  numbness, tingling, coldness or increase pain  · Any questions, call office @ [phone]      Keep scheduled follow up appointment. If need to change, call office @ [phone]. *  Please give a list of your current medications to your Primary Care Provider. *  Please update this list whenever your medications are discontinued, doses are      changed, or new medications (including over-the-counter products) are added. *  Please carry medication information at all times in case of emergency situations. Patient Education        Hip Replacement Surgery (Posterior): What to Expect at Home  Your Recovery  Hip replacement surgery replaces the worn parts of your hip joint. When you leave the hospital, you will probably be walking with crutches or a walker. You may be able to climb a few stairs and get in and out of bed and chairs. But you will need someone to help you at home for the next few weeks or until you have more energy and can move around better. If you need more extensive rehab, you may go to a specialized rehab center for more treatment. You will go home with a bandage and stitches, staples, tissue glue, or tape strips. You can remove the bandage when your doctor tells you to. If you have stitches or staples, your doctor will remove them 10 days to 3 weeks after your surgery. Glue or tape strips will fall off on their own over time. You may still have some mild pain, and the area may be swollen for 3 to 4 months after surgery. Your doctor will give you medicine for the pain.   You will continue the rehabilitation program (rehab) you started in the hospital. The better you do with your rehab exercises, the sooner you will get your strength and movement back. Most people are able to return to work 4 weeks to 4 months after surgery. This care sheet gives you a general idea about how long it will take for you to recover. But each person recovers at a different pace. Follow the steps below to get better as quickly as possible. How can you care for yourself at home? Activity    · Your doctor may not want your affected leg to cross the center of your body toward the other leg. If so, your therapist may suggest these ideas:  ? Do not cross your legs. ? Be very careful as you get in or out of bed or a car, so your leg does not cross that imaginary line in the middle of your body.     · Rest when you feel tired. You may take a nap, but do not stay in bed all day.     · Work with your physical therapist to learn the best way to exercise. You will probably have to use crutches or a walker for at least 4 to 6 weeks.     · Your doctor may advise you to stay away from activities that put stress on the joint. This includes sports such as tennis, football, and jogging.     · Try not to sit for too long at one time. You will feel less stiff if you take a short walk about every hour. When you sit, use chairs with arms, and do not sit in low chairs.     · Do not bend over more than 90 degrees (like the angle in a letter \"L\").     · Sleep on your back with your legs slightly apart or on your side with a pillow between your knees for about 6 weeks or as your doctor tells you. Do not sleep on your stomach or affected leg.     · Ask your doctor when you can drive again.     · Most people are able to return to work 4 weeks to 4 months after surgery.     · Ask your doctor when it is okay for you to have sex. Diet    · By the time you leave the hospital, you will probably be eating your normal diet. If your stomach is upset, try bland, low-fat foods like plain rice, broiled chicken, toast, and yogurt. Your doctor may recommend that you take iron and vitamin supplements.   · Drink plenty of fluids (unless your doctor tells you not to).   · Eat healthy foods, and watch your portion sizes. Try to stay at your ideal weight. Too much weight puts more stress on your new hip joint.     · You may notice that your bowel movements are not regular right after your surgery. This is common. Try to avoid constipation and straining with bowel movements. You may want to take a fiber supplement every day. If you have not had a bowel movement after a couple of days, ask your doctor about taking a mild laxative. Medicines    · Your doctor will tell you if and when you can restart your medicines. He or she will also give you instructions about taking any new medicines.     · If you take blood thinners, such as warfarin (Coumadin), clopidogrel (Plavix), or aspirin, be sure to talk to your doctor. He or she will tell you if and when to start taking those medicines again. Make sure that you understand exactly what your doctor wants you to do.     · Your doctor may give you a blood-thinning medicine to prevent blood clots. If you take a blood thinner, be sure you get instructions about how to take your medicine safely. Blood thinners can cause serious bleeding problems. This medicine could be in pill form or as a shot (injection). If a shot is necessary, your doctor will tell you how to do this.     · Be safe with medicines. Take pain medicines exactly as directed. ? If the doctor gave you a prescription medicine for pain, take it as prescribed. ? If you are not taking a prescription pain medicine, ask your doctor if you can take an over-the-counter medicine.     · If you think your pain medicine is making you sick to your stomach:  ? Take your medicine after meals (unless your doctor has told you not to). ? Ask your doctor for a different pain medicine.     · If your doctor prescribed antibiotics, take them as directed. Do not stop taking them just because you feel better.  You need to take the full course of antibiotics. Incision care    · If your doctor told you how to care for your cut (incision), follow your doctor's instructions. You will have a dressing over the cut. A dressing helps the incision heal and protects it. Your doctor will tell you how to take care of this.     · If you did not get instructions, follow this general advice:  ? If you have strips of tape on the cut the doctor made, leave the tape on for a week or until it falls off.  ? If you have stitches or staples, your doctor will tell you when to come back to have them removed. ? If you have skin adhesive on the cut, leave it on until it falls off. Skin adhesive is also called glue or liquid stitches. ? Change the bandage every day. ? Wash the area daily with warm water, and pat it dry. Don't use hydrogen peroxide or alcohol. They can slow healing. ? You may cover the area with a gauze bandage if it oozes fluid or rubs against clothing. ? You may shower 24 to 48 hours after surgery. Pat the incision dry. Don't swim or take a bath for the first 2 weeks, or until your doctor tells you it is okay. Exercise    · Your rehab program will include a number of exercises to do. Always do them as your therapist tells you. Ice and elevation    · For pain, put ice or a cold pack on the area for 10 to 20 minutes at a time. Put a thin cloth between the ice and your skin.     · Your ankle may swell for about 3 months. Prop up your ankle when you ice it or anytime you sit or lie down. Try to keep it above the level of your heart. This will help reduce swelling. Other instructions   Continue to wear your support stockings as your doctor says. These help to prevent blood clots. The length of time that you will have to wear them depends on your activity level and the amount of swelling you have. Most people wear these stockings for 4 to 6 weeks after surgery.   Preventing falls is also very important.  To prevent falls:    · Arrange furniture so that you will not trip on it.     · Get rid of throw rugs, and move electrical cords out of the way.     · Walk only in areas with plenty of light.     · Put grab bars in showers and bathtubs.     · Avoid icy or snowy sidewalks.     · Wear shoes with sturdy, flat soles. Follow-up care is a key part of your treatment and safety. Be sure to make and go to all appointments, and call your doctor if you are having problems. It's also a good idea to know your test results and keep a list of the medicines you take. When should you call for help? Call 911 anytime you think you may need emergency care. For example, call if:    · You passed out (lost consciousness).     · You have severe trouble breathing.     · You have sudden chest pain and shortness of breath, or you cough up blood.    Call your doctor now or seek immediate medical care if:    · You have signs that your hip may be dislocated, including:  ? Severe pain and not being able to stand. ? A crooked leg that looks like your hip is out of position. ? Not being able to bend or straighten your leg.     · Your leg or foot is cool or pale or changes color.     · You cannot feel or move your leg.     · You have signs of a blood clot, such as:  ? Pain in your calf, back of the knee, thigh, or groin. ? Redness and swelling in your leg or groin.     · Your incision comes open and begins to bleed, or the bleeding increases.     · You feel like your heart is racing or beating irregularly.     · You have signs of infection, such as:  ? Increased pain, swelling, warmth, or redness. ? Red streaks leading from the incision. ? Pus draining from the incision. ? A fever.    Watch closely for changes in your health, and be sure to contact your doctor if:    · You do not have a bowel movement after taking a laxative.     · You do not get better as expected. Where can you learn more? Go to http://annabelle-jaky.info/.   Enter O321 in the search box to learn more about \"Hip Replacement Surgery (Posterior): What to Expect at Home. \"  Current as of: June 26, 2019  Content Version: 12.2  © 5034-4670 [employer]. Care instructions adapted under license by [employer] (which disclaims liability or warranty for this information). If you have questions about a medical condition or this instruction, always ask your healthcare professional. [address] any warranty or liability for your use of this information. These are general instructions for a healthy lifestyle:    No smoking/ No tobacco products/ Avoid exposure to second hand smoke    Surgeon General's Warning:  Quitting smoking now greatly reduces serious risk to your health. Obesity, smoking, and sedentary lifestyle greatly increases your risk for illness    A healthy diet, regular physical exercise & weight monitoring are important for maintaining a healthy lifestyle    You may be retaining fluid if you have a history of heart failure or if you experience any of the following symptoms:  Weight gain of 3 pounds or more overnight or 5 pounds in a week, increased swelling in our hands or feet or shortness of breath while lying flat in bed. Please call your doctor as soon as you notice any of these symptoms; do not wait until your next office visit. Recognize signs and symptoms of STROKE:    F-face looks uneven    A-arms unable to move or move even    S-speech slurred or non-existent    T-time-call 911 as soon as signs and symptoms begin-DO NOT go       Back to bed or wait to see if you get better-TIME IS BRAIN. The discharge information has been reviewed with the patient. The patient verbalized understanding. Information obtained by :  I understand that if any problems occur once I am at home I am to contact my physician. I understand and acknowledge receipt of the instructions indicated above. Physician's or R.N.'s Signature                                                                  Date/Time                                                                                                                                              Patient or Representative Signature                                                          Date/Time

## 2019-10-04 NOTE — PROGRESS NOTES
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]  Failed total hip arthroplasty ([facility] [T84.018A, Z96.649]  LAB:    Recent Results (from the past 24 hour(s))   POC PT/INR    Collection Time: 10/03/19 11:22 AM   Result Value Ref Range    Prothrombin time (POC) 13.2 (H) 9.6 - 11.6 SECS    INR (POC) 1.1 0.9 - 1.2     TYPE & SCREEN    Collection Time: 10/03/19 11:29 AM   Result Value Ref Range    Crossmatch Expiration 10/06/2019     ABO/Rh(D) Jannie Border POSITIVE     Antibody screen NEG    GLUCOSE, POC    Collection Time: 10/03/19 11:36 AM   Result Value Ref Range    Glucose (POC) 89 65 - 100 mg/dL   CULTURE, WOUND W GRAM STAIN    Collection Time: 10/03/19  2:26 PM   Result Value Ref Range    Special Requests: RIGHT  OPENING1        GRAM STAIN PENDING     Culture result:        NO GROWTH AFTER SHORT PERIOD OF INCUBATION. FURTHER RESULTS TO FOLLOW AFTER OVERNIGHT INCUBATION. CULTURE, WOUND Daniel Prost STAIN    Collection Time: 10/03/19  2:39 PM   Result Value Ref Range    Special Requests: RIGHT  ACETABULUM        GRAM STAIN PENDING     Culture result:        NO GROWTH AFTER SHORT PERIOD OF INCUBATION. FURTHER RESULTS TO FOLLOW AFTER OVERNIGHT INCUBATION.    PROTHROMBIN TIME + INR    Collection Time: 10/03/19  7:34 PM   Result Value Ref Range    Prothrombin time 15.4 (H) 11.7 - 14.5 sec    INR 1.2     HEMOGLOBIN    Collection Time: 10/03/19  7:34 PM   Result Value Ref Range    HGB 11.1 (L) 13.6 - 17.2 g/dL   HEMOGLOBIN    Collection Time: 10/04/19  6:05 AM   Result Value Ref Range    HGB 10.0 (L) 13.6 - 17.2 g/dL     Vital Signs:    Patient Vitals for the past 8 hrs:   BP Temp Pulse Resp SpO2   10/04/19 0715 96/50 97.6 °F (36.4 °C) 70 16 97 %   10/04/19 0449 97/56 97.5 °F (36.4 °C) 82 16 97 %   10/04/19 0112 112/63 97.6 °F (36.4 °C) 81 16 96 %     Temp (24hrs), Av.5 °F (36.4 °C), Min:97.4 °F (36.3 °C), Max:97.8 °F (36.6 °C)    Pain Control:   Pain Assessment  Pain Scale 1: Numeric (0 - 10)  Pain Intensity 1: 2  Pain Onset 1: LAST MO  Pain Location 1: Hip  Pain Orientation 1: Right  Pain Description 1: Aching  Pain Intervention(s) 1: Medication (see MAR)  Subjective: Doing well, normal recovery experienced.      Objective:  No Acute Distress, Alert and Oriented, Neurovascular exam is normal       Assessment:   Patient Active Problem List   Diagnosis Code    Essential hypertension I10    Arthritis M19.90    GERD (gastroesophageal reflux disease) K21.9    Unspecified adverse effect of anesthesia T41.45XA    Spinal stenosis, lumbar region, 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    Scoliosis (and kyphoscoliosis), idiopathic M41.20    Status post lumbar spinal fusion Z98.1    Osteoarthritis of left hip M16.12    BPH (benign prostatic hyperplasia) N40.0    HLD (hyperlipidemia) E78.5    Former smoker Z87.891    H/O chest pain Z87.898    History of normal resting EKG [id number]    History of colon cancer Z85.038    History of bladder cancer Z85.51    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.649    History of non-ST elevation myocardial infarction (NSTEMI) I25.2    Thrombocytopenia, unspecified (HCC) D69.6    Dyslipidemia E78.5    Hx of bladder cancer Z85.51    Chronic pain G89.29    Lumbar radiculopathy M54.16    Chronic pain of left knee M25.562, G89.29    Idiopathic peripheral neuropathy G60.9    Coronary atherosclerosis of native coronary vessel I25.10    S/P PTCA (percutaneous transluminal coronary angioplasty) Z98.61    Atherosclerosis of native coronary artery of native heart without angina pectoris I25.10    Type 2 diabetes mellitus without complication, without long-term current use of insulin (HCC) E11.9    Mixed hyperlipidemia E78.2    Arthritis of knee, left M17.12    S/P total knee arthroplasty Z96.659    CHERYL (acute kidney injury) ([facility] N17.9    Hypomagnesemia E83.42    Chest pain at rest R07.9    Atherosclerosis of native coronary artery of native heart with angina pectoris ([facility]) I25.119    Right leg pain M79.604    Arthritis of right hip M16.11    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    Signed By: Erinn Jha MD

## 2019-10-05 ENCOUNTER — HOME CARE VISIT (OUTPATIENT)
Dept: SCHEDULING | Facility: HOME HEALTH | Age: 84
End: 2019-10-05
Payer: MEDICARE

## 2019-10-05 VITALS
SYSTOLIC BLOOD PRESSURE: 110 MMHG | TEMPERATURE: 97.6 F | DIASTOLIC BLOOD PRESSURE: 50 MMHG | RESPIRATION RATE: 20 BRPM | HEART RATE: 80 BPM

## 2019-10-05 PROCEDURE — 400013 HH SOC

## 2019-10-05 PROCEDURE — 3331090001 HH PPS REVENUE CREDIT

## 2019-10-05 PROCEDURE — G0151 HHCP-SERV OF PT,EA 15 MIN: HCPCS

## 2019-10-05 PROCEDURE — 3331090002 HH PPS REVENUE DEBIT

## 2019-10-05 NOTE — PROGRESS NOTES
Care Management Interventions  PCP Verified by CM: Yes  Mode of Transport at Discharge: Self  Transition of Care Consult (CM Consult): [address]: Yes  Discharge Durable Medical Equipment: No  Physical Therapy Consult: Yes  Occupational Therapy Consult: Yes  Current Support Network: Own Home  Confirm Follow Up Transport: Family  Plan discussed with Pt/Family/Caregiver: Yes  Freedom of Choice Offered: Yes  Discharge Location  Discharge Placement: Home with home health    Patient is a 81y.o. year old male admitted for Right CATRINA . Patient plans to return home on discharge. Order received to arrange home health. Patient without preference towards agency. Referral sent to [facility]. Patient denies any equipment needs as he has a walker and raised toilet seat. Will follow until discharge.

## 2019-10-05 NOTE — PROGRESS NOTES
[address]  Face to Face Encounter    Patients Name: Huong Granados    YOB: 1935    Ordering Physician:  Jesika Gregory    Primary Diagnosis: Dislocation of internal right hip prosthesis, initial encounter 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 1. I certify that the patient needs intermittent care as follows: physical therapy: gait/stair training    2. I certify that this patient is homebound, that is: 1) patient requires the use of a walker device, special transportation, or assistance of another to leave the home; or 2) patient's condition makes leaving the home medically contraindicated; and 3) patient has a normal inability to leave the home and leaving the home requires considerable and taxing effort. Patient may leave the home for infrequent and short duration for medical reasons, and occasional absences for non-medical reasons. Homebound status is due to the following functional limitations: Patient's ambulation limited secondary to severe pain and requires the use of an assistive device and the assistance of a caregiver for safe completion. Patient with strength and ROM deficits limiting ambulation endurance requiring the use of an assistive device and the assistance of a caregiver. Patient deemed temporarily homebound secondary to increased risk for infection when leaving home and going out into the community. 3. I certify that this patient is under my care and that I, or a nurse practitioner or [address] [place] [address], or clinical nurse specialist, or certified nurse midwife, working with me, had a Face-to-Face Encounter that meets the physician Face-to-Face Encounter requirements.   The following are the clinical findings from the Face-to-Face encounter that support the need for skilled services and is a summary of the encounter: see hospital chart        LANA Garibay  10/4/2019      THE FOLLOWING TO BE COMPLETED BY THE COMMUNITY PHYSICIAN:    I concur with the findings described above from the F2F encounter that this patient is homebound and in need of a skilled service.     Certifying Physician: _____________________________________      Printed Certifying Physician Name: _____________________________________    Date: _________________

## 2019-10-06 LAB
BACTERIA SPEC CULT: NORMAL
BACTERIA SPEC CULT: NORMAL
GRAM STN SPEC: NORMAL
SERVICE CMNT-IMP: NORMAL
SERVICE CMNT-IMP: NORMAL

## 2019-10-06 PROCEDURE — 3331090002 HH PPS REVENUE DEBIT

## 2019-10-06 PROCEDURE — 3331090001 HH PPS REVENUE CREDIT

## 2019-10-07 ENCOUNTER — PATIENT OUTREACH (OUTPATIENT)
Dept: CASE MANAGEMENT | Age: 84
End: 2019-10-07

## 2019-10-07 PROCEDURE — 3331090001 HH PPS REVENUE CREDIT

## 2019-10-07 PROCEDURE — 3331090002 HH PPS REVENUE DEBIT

## 2019-10-07 NOTE — PROGRESS NOTES
Transition of Care Discharge Follow-up Questionnaire   Date/Time of Call:    10/7/19    10:45 am   What was the patient hospitalized for? 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       Yes and verbalized understanding of diagnosis and treatment         Did the patient receive discharge instructions?    Yes    CM Assessed Risk for Readmission:            Patient stated Risk for Readmission:        RRAT score of 17           Medical emergency    Review any discharge instructions (see discharge instructions/AVS in [facility]).   Ask patient if they understand these.  Do they have any questions?       Reviewed and verbalized understanding     Were home services ordered (nursing, PT, OT, ST, etc.)?    Yes       If so, has the first visit occurred? If not, why? (Assist with coordination of services if necessary. )    Yes     Was any DME ordered? No       If so, has it been received?  If not, why?  (Assist patient in obtaining DME orders &/or equipment if necessary.) N/A   Complete a review of all medications (new, continued and discontinued meds per the D/C instructions and [facility]  Reviewed per connect [facility]    Were all new prescriptions filled?  If not, why?  (Assist patient in obtaining medications if necessary  escalate for CCM &/or SW if ongoing issues are verbalized by pt or anticipated)    Yes      Does the patient understand the purpose and dosing instructions for all medications?  (If patient has questions, provide explanation and education.)    Yes, verbalizes understanding of medications       Does the patient have any problems in performing ADLs? (If patient is unable to perform ADLs  what is the limiting factor(s)?  Do they have a support system that can assist? If no support system is present, discuss possible assistance that they may be able to obtain. Escalate for CCM/SW if ongoing issues are verbalized by pt or anticipated)    Patient usually independent with all ADLs   Very good support from son       Does the patient have all follow-up appointments scheduled?       7 day f/up with PCP?   (f/up with PCP may be w/in 14 days if patient has a f/up with their specialist w/in 7 days)     7-14 day f/up with specialist?   (or per discharge instructions)     If f/up has not been made  what actions has the care coordinator made to accomplish this?     Has transportation been arranged?       Yes, no transportation issues at this time     Son transports patient to appointments    Any other questions or concerns expressed by the patient?       No further questions or concerns at this time.    Encouraged patient to keep scheduled appointments and the importance of these f/u appointments        Schedule next appointment with KARLIE NGUYEN Coordinator or refer to RN Case Manager/ per the workflow guidelines.        When is care coordinators next follow-up call scheduled?        If referred for CCM  what RN care manager was the referral assigned?    Will f/u with patient within 1-2 weeks per KARLIE NGUYEN workflow      FAY Call Completed By:         Twana Baumgarten RN, BSN, CCM / Community Care Manager  c: [phone] / [email or web address] Sherrie Roberson [email or web address]  [address] ,  [address] [place], [address] [facility]  [email or web address] [email or web address]"     This note will not be viewable in [employer].

## 2019-10-08 PROCEDURE — 3331090001 HH PPS REVENUE CREDIT

## 2019-10-08 PROCEDURE — 3331090002 HH PPS REVENUE DEBIT

## 2019-10-09 ENCOUNTER — HOME CARE VISIT (OUTPATIENT)
Dept: SCHEDULING | Facility: HOME HEALTH | Age: 84
End: 2019-10-09
Payer: MEDICARE

## 2019-10-09 VITALS
HEART RATE: 78 BPM | DIASTOLIC BLOOD PRESSURE: 60 MMHG | RESPIRATION RATE: 16 BRPM | TEMPERATURE: 97.8 F | SYSTOLIC BLOOD PRESSURE: 125 MMHG

## 2019-10-09 PROCEDURE — 3331090002 HH PPS REVENUE DEBIT

## 2019-10-09 PROCEDURE — 3331090001 HH PPS REVENUE CREDIT

## 2019-10-09 PROCEDURE — G0157 HHC PT ASSISTANT EA 15: HCPCS

## 2019-10-10 PROCEDURE — 3331090002 HH PPS REVENUE DEBIT

## 2019-10-10 PROCEDURE — 3331090001 HH PPS REVENUE CREDIT

## 2019-10-11 ENCOUNTER — HOME CARE VISIT (OUTPATIENT)
Dept: SCHEDULING | Facility: HOME HEALTH | Age: 84
End: 2019-10-11
Payer: MEDICARE

## 2019-10-11 PROCEDURE — G0157 HHC PT ASSISTANT EA 15: HCPCS

## 2019-10-11 PROCEDURE — 3331090002 HH PPS REVENUE DEBIT

## 2019-10-11 PROCEDURE — 3331090001 HH PPS REVENUE CREDIT

## 2019-10-12 PROCEDURE — 3331090002 HH PPS REVENUE DEBIT

## 2019-10-12 PROCEDURE — 3331090001 HH PPS REVENUE CREDIT

## 2019-10-13 VITALS
HEART RATE: 70 BPM | RESPIRATION RATE: 16 BRPM | TEMPERATURE: 97.8 F | DIASTOLIC BLOOD PRESSURE: 55 MMHG | SYSTOLIC BLOOD PRESSURE: 110 MMHG

## 2019-10-13 PROCEDURE — 3331090002 HH PPS REVENUE DEBIT

## 2019-10-13 PROCEDURE — 3331090001 HH PPS REVENUE CREDIT

## 2019-10-14 ENCOUNTER — HOME CARE VISIT (OUTPATIENT)
Dept: SCHEDULING | Facility: HOME HEALTH | Age: 84
End: 2019-10-14
Payer: MEDICARE

## 2019-10-14 PROCEDURE — 3331090002 HH PPS REVENUE DEBIT

## 2019-10-14 PROCEDURE — 3331090001 HH PPS REVENUE CREDIT

## 2019-10-14 PROCEDURE — G0157 HHC PT ASSISTANT EA 15: HCPCS

## 2019-10-15 ENCOUNTER — PATIENT OUTREACH (OUTPATIENT)
Dept: CASE MANAGEMENT | Age: 84
End: 2019-10-15

## 2019-10-15 VITALS
TEMPERATURE: 97.3 F | HEART RATE: 76 BPM | RESPIRATION RATE: 18 BRPM | SYSTOLIC BLOOD PRESSURE: 122 MMHG | DIASTOLIC BLOOD PRESSURE: 56 MMHG

## 2019-10-15 PROCEDURE — 3331090001 HH PPS REVENUE CREDIT

## 2019-10-15 PROCEDURE — 3331090002 HH PPS REVENUE DEBIT

## 2019-10-15 NOTE — PROGRESS NOTES
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

## 2019-10-16 PROCEDURE — 3331090002 HH PPS REVENUE DEBIT

## 2019-10-16 PROCEDURE — 3331090001 HH PPS REVENUE CREDIT

## 2019-10-17 ENCOUNTER — HOME CARE VISIT (OUTPATIENT)
Dept: SCHEDULING | Facility: HOME HEALTH | Age: 84
End: 2019-10-17
Payer: MEDICARE

## 2019-10-17 VITALS
TEMPERATURE: 97.2 F | DIASTOLIC BLOOD PRESSURE: 66 MMHG | RESPIRATION RATE: 18 BRPM | SYSTOLIC BLOOD PRESSURE: 118 MMHG | HEART RATE: 72 BPM

## 2019-10-17 PROCEDURE — 3331090002 HH PPS REVENUE DEBIT

## 2019-10-17 PROCEDURE — G0157 HHC PT ASSISTANT EA 15: HCPCS

## 2019-10-17 PROCEDURE — 3331090001 HH PPS REVENUE CREDIT

## 2019-10-18 PROCEDURE — 3331090002 HH PPS REVENUE DEBIT

## 2019-10-18 PROCEDURE — 3331090001 HH PPS REVENUE CREDIT

## 2019-10-19 PROCEDURE — 3331090001 HH PPS REVENUE CREDIT

## 2019-10-19 PROCEDURE — 3331090002 HH PPS REVENUE DEBIT

## 2019-10-20 PROCEDURE — 3331090001 HH PPS REVENUE CREDIT

## 2019-10-20 PROCEDURE — 3331090002 HH PPS REVENUE DEBIT

## 2019-10-21 ENCOUNTER — HOME CARE VISIT (OUTPATIENT)
Dept: HOME HEALTH SERVICES | Facility: HOME HEALTH | Age: 84
End: 2019-10-21
Payer: MEDICARE

## 2019-10-21 ENCOUNTER — HOME CARE VISIT (OUTPATIENT)
Dept: SCHEDULING | Facility: HOME HEALTH | Age: 84
End: 2019-10-21
Payer: MEDICARE

## 2019-10-21 VITALS
TEMPERATURE: 97 F | RESPIRATION RATE: 18 BRPM | HEART RATE: 74 BPM | SYSTOLIC BLOOD PRESSURE: 124 MMHG | DIASTOLIC BLOOD PRESSURE: 64 MMHG

## 2019-10-21 PROCEDURE — G0157 HHC PT ASSISTANT EA 15: HCPCS

## 2019-10-21 PROCEDURE — 3331090002 HH PPS REVENUE DEBIT

## 2019-10-21 PROCEDURE — 3331090001 HH PPS REVENUE CREDIT

## 2019-10-22 PROCEDURE — 3331090001 HH PPS REVENUE CREDIT

## 2019-10-22 PROCEDURE — 3331090002 HH PPS REVENUE DEBIT

## 2019-10-23 ENCOUNTER — HOME CARE VISIT (OUTPATIENT)
Dept: SCHEDULING | Facility: HOME HEALTH | Age: 84
End: 2019-10-23
Payer: MEDICARE

## 2019-10-23 VITALS
DIASTOLIC BLOOD PRESSURE: 68 MMHG | SYSTOLIC BLOOD PRESSURE: 122 MMHG | HEART RATE: 74 BPM | RESPIRATION RATE: 18 BRPM | TEMPERATURE: 97 F

## 2019-10-23 PROCEDURE — 3331090002 HH PPS REVENUE DEBIT

## 2019-10-23 PROCEDURE — 3331090001 HH PPS REVENUE CREDIT

## 2019-10-23 PROCEDURE — G0157 HHC PT ASSISTANT EA 15: HCPCS

## 2019-10-24 PROCEDURE — 3331090001 HH PPS REVENUE CREDIT

## 2019-10-24 PROCEDURE — 3331090002 HH PPS REVENUE DEBIT

## 2019-10-25 PROCEDURE — 3331090002 HH PPS REVENUE DEBIT

## 2019-10-25 PROCEDURE — 3331090001 HH PPS REVENUE CREDIT

## 2019-10-26 PROCEDURE — 3331090002 HH PPS REVENUE DEBIT

## 2019-10-26 PROCEDURE — 3331090001 HH PPS REVENUE CREDIT

## 2019-10-27 PROCEDURE — 3331090002 HH PPS REVENUE DEBIT

## 2019-10-27 PROCEDURE — 3331090001 HH PPS REVENUE CREDIT

## 2019-10-28 PROCEDURE — 3331090001 HH PPS REVENUE CREDIT

## 2019-10-28 PROCEDURE — 3331090002 HH PPS REVENUE DEBIT

## 2019-10-29 ENCOUNTER — HOME CARE VISIT (OUTPATIENT)
Dept: SCHEDULING | Facility: HOME HEALTH | Age: 84
End: 2019-10-29
Payer: MEDICARE

## 2019-10-29 VITALS
TEMPERATURE: 97.4 F | DIASTOLIC BLOOD PRESSURE: 64 MMHG | SYSTOLIC BLOOD PRESSURE: 122 MMHG | RESPIRATION RATE: 18 BRPM | HEART RATE: 60 BPM

## 2019-10-29 PROCEDURE — 3331090002 HH PPS REVENUE DEBIT

## 2019-10-29 PROCEDURE — 3331090001 HH PPS REVENUE CREDIT

## 2019-10-29 PROCEDURE — G0151 HHCP-SERV OF PT,EA 15 MIN: HCPCS

## 2019-11-05 ENCOUNTER — PATIENT OUTREACH (OUTPATIENT)
Dept: CASE MANAGEMENT | Age: 84
End: 2019-11-05

## 2019-11-05 NOTE — PROGRESS NOTES
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

## 2020-12-02 ENCOUNTER — HOSPITAL ENCOUNTER (OUTPATIENT)
Dept: LAB | Age: 85
Discharge: HOME | End: 2020-12-02

## 2020-12-02 PROCEDURE — 88305 TISSUE EXAM BY PATHOLOGIST: CPT

## 2020-12-11 NOTE — ANESTHESIA PROCEDURE NOTES
Patient aware and voiced understanding, no concerns voiced at this time. Peripheral Block    Start time: 5/16/2017 8:10 AM  End time: 5/16/2017 8:12 AM  Performed by: Adrianne Merritt  Authorized by: Adrianne Merritt       Pre-procedure: Indications: at surgeon's request and post-op pain management    Preanesthetic Checklist: patient identified, risks and benefits discussed, site marked, timeout performed, anesthesia consent given and patient being monitored    Timeout Time: 08:09          Block Type:   Block Type:   Adductor canal  Laterality:  Left  Monitoring:  Standard ASA monitoring, continuous pulse ox, frequent vital sign checks, heart rate, oxygen and responsive to questions  Injection Technique:  Single shot  Procedures: ultrasound guided    Patient Position: supine  Prep: chlorhexidine    Location:  Mid thigh  Needle Type:  Stimuplex  Needle Gauge:  22 G  Needle Localization:  Ultrasound guidance  Medication Injected:  0.2%  Adds:  Epi 1:200K  Volume (mL):  20    Assessment:    Injection Assessment:  Incremental injection every 5 mL, local visualized surrounding nerve on ultrasound, negative aspiration for blood, no intravascular symptoms, no paresthesia and ultrasound image on chart  Patient tolerance:  Patient tolerated the procedure well with no immediate complications

## 2021-01-16 ENCOUNTER — HOSPITAL ENCOUNTER (EMERGENCY)
Age: 86
Discharge: HOME | End: 2021-01-16
Attending: EMERGENCY MEDICINE
Payer: OTHER MISCELLANEOUS

## 2021-01-16 VITALS
RESPIRATION RATE: 16 BRPM | TEMPERATURE: 98.4 F | HEART RATE: 68 BPM | HEIGHT: 72 IN | BODY MASS INDEX: 25.06 KG/M2 | DIASTOLIC BLOOD PRESSURE: 52 MMHG | WEIGHT: 185 LBS | SYSTOLIC BLOOD PRESSURE: 116 MMHG | OXYGEN SATURATION: 97 %
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
## 2021-01-16 DIAGNOSIS — S51.012A SKIN TEAR OF LEFT ELBOW WITHOUT COMPLICATION, INITIAL ENCOUNTER: ICD-10-CM

## 2021-01-16 PROCEDURE — 90715 TDAP VACCINE 7 YRS/> IM: CPT | Performed by: NURSE PRACTITIONER

## 2021-01-16 PROCEDURE — 99283 EMERGENCY DEPT VISIT LOW MDM: CPT

## 2021-01-16 PROCEDURE — 74011250636 HC RX REV CODE- 250/636: Performed by: NURSE PRACTITIONER

## 2021-01-16 PROCEDURE — 90471 IMMUNIZATION ADMIN: CPT

## 2021-01-16 RX ADMIN — TETANUS TOXOID, REDUCED DIPHTHERIA TOXOID AND ACELLULAR PERTUSSIS VACCINE, ADSORBED 0.5 ML: 5; 2.5; 8; 8; 2.5 SUSPENSION INTRAMUSCULAR at 11:57

## 2021-01-16 NOTE — ED NOTES
I have reviewed discharge instructions with the patient. The patient verbalized understanding. Patient left ED via Discharge Method: ambulatory to Home with himself. Opportunity for questions and clarification provided. Patient given 0 scripts. To continue your aftercare when you leave the hospital, you may receive an automated call from our care team to check in on how you are doing. This is a free service and part of our promise to provide the best care and service to meet your aftercare needs.  If you have questions, or wish to unsubscribe from this service please call [phone]. Thank you for Choosing our [facility] [employer] [facility] Emergency Department.

## 2021-01-16 NOTE — DISCHARGE INSTRUCTIONS
Leave your dressing in place for the next 24 hours.   Follow up with your primary care provider for a wound check in 1 week. Sooner if symptoms fail to improve.   Return to the emergency department as needed.

## 2021-01-16 NOTE — ED NOTES
Pt tripped over concrete parking spot marker, tearing skin on left elbow. Pt denies any head injuries, loss of consciousness or any other pains.

## 2021-01-16 NOTE — ED PROVIDER NOTES
Patient states he tripped over a concrete curb just prior to arrival. He landed on his left elbow. He denies hitting his head. He has a skin tear to his left elbow. He is on a blood thinner. The history is provided by the patient. Fall  The accident occurred less than 1 hour ago. The fall occurred while walking. He fell from a height of ground level. He landed on carpet. The volume of blood lost was minimal. The point of impact was the left elbow. The pain is present in the left elbow. The patient is experiencing no pain. He was ambulatory at the scene. There was no entrapment after the fall. There was no drug use involved in the accident. There was no alcohol use involved in the accident. Pertinent negatives include no fever and no numbness. The risk factors include being elderly. He has tried nothing for the symptoms. It is unknown when the patient last had a tetanus shot. Past Medical History:   Diagnosis Date    Arthritis     osteo    Chronic pain     left hip/back/LLE    Colon cancer ([address] 06/23/2015    surgery     Diabetes ([address] 2006    Type 2. diet controlled. Pt does not monitor blood sugar. A1C=5.7 Jan 2019.  Dyslipidemia 06/23/2015    daily meds     GERD (gastroesophageal reflux disease)     controlled w/med    Hx of bladder cancer 6/23/2015    Hypertension     controlled on medication    NSTEMI (non-ST elevation myocardial infarction) ([address] 01/10/2014    MI. heart cath: preserved LVSF. EF =65%. mild to mod LAD and circumflex disease. Followed by [address] [facility]. 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      Thrombocytopenia, unspecified ([address] 1/11/2014       Past Surgical History:   Procedure Laterality Date    ABDOMEN SURGERY PROC UNLISTED  1991    EXP Lap    HX COLECTOMY  1997    Colon Resection    HX COLONOSCOPY ,     HX CORONARY STENT PLACEMENT      HX HEART CATHETERIZATION  01/10/2014    s/p bare metal stent to mid-right CA    HX HEENT  as child    T&A    HX HIP REPLACEMENT Right 2019    HX KNEE REPLACEMENT Left 2017    HX LUMBAR FUSION  1990 x 2  ,     lumbar fusions- x 4 total surgeries    HX ORTHOPAEDIC Left     left hip replaced    HX OTHER SURGICAL      Spinal cord stimulator placed and removed in     HX SHOULDER ARTHROSCOPY Left     HX UROLOGICAL      Turp         Family History:   Problem Relation Age of Onset    Heart Disease Mother     Cancer Father     Lung Disease Sister     Heart Disease Brother     Cancer Brother         pancreatic    Parkinson's Disease Brother     Malignant Hyperthermia Neg Hx     Pseudocholinesterase Deficiency Neg Hx     Delayed Awakening Neg Hx     Post-op Nausea/Vomiting Neg Hx     Emergence Delirium Neg Hx     Post-op Cognitive Dysfunction Neg Hx     Other Neg Hx        Social History     Socioeconomic History    Marital status:      Spouse name: Not on file    Number of children: Not on file    Years of education: Not on file    Highest education level: Not on file   Occupational History    Not on file   Social Needs    Financial resource strain: Not on file    Food insecurity     Worry: Not on file     Inability: Not on file    Transportation needs     Medical: Not on file     Non-medical: Not on file   Tobacco Use    Smoking status: Former Smoker     Packs/day: 1.50     Years: 28.00     Pack years: 42.00     Quit date: 1982     Years since quittin.0    Smokeless tobacco: Never Used    Tobacco comment: quit    Substance and Sexual Activity    Alcohol use: No    Drug use: No    Sexual activity: Never     Partners: Female     Comment: Wife recently  9-25   Lifestyle    Physical activity     Days per week: Not on file     Minutes per session: Not on file    Stress: Not on file Relationships    Social connections     Talks on phone: Not on file     Gets together: Not on file     Attends [employer] service: Not on file     Active member of club or organization: Not on file     Attends meetings of clubs or organizations: Not on file     Relationship status: Not on file    Intimate partner violence     Fear of current or ex partner: Not on file     Emotionally abused: Not on file     Physically abused: Not on file     Forced sexual activity: Not on file   Other Topics Concern    Not on file   Social History Narrative    Not on file         ALLERGIES: Patient has no known allergies. Review of Systems   Constitutional: Negative for chills and fever. Musculoskeletal: Negative for arthralgias and joint swelling. Skin: Positive for wound. Neurological: Negative for numbness. Vitals:    01/16/21 1058   BP: (!) 112/54   Pulse: 68   Resp: 16   Temp: 98.5 °F (36.9 °C)   SpO2: 96%   Weight: 83.9 kg (185 lb)   Height: 6' (1.829 m)            Physical Exam  Vitals signs and nursing note reviewed. Constitutional:       Appearance: Normal appearance. HENT:      Head: Normocephalic and atraumatic. Nose: Nose normal.      Mouth/Throat:      Mouth: Mucous membranes are moist.   Eyes:      Pupils: Pupils are equal, round, and reactive to light. Neck:      Musculoskeletal: Normal range of motion and neck supple. Cardiovascular:      Rate and Rhythm: Normal rate and regular rhythm. Pulses: Normal pulses. Radial pulses are 2+ on the left side. Pulmonary:      Effort: Pulmonary effort is normal.      Breath sounds: Normal breath sounds. Musculoskeletal:         General: Signs of injury present. No swelling or tenderness. Left elbow: He exhibits normal range of motion and no swelling. No tenderness found. Arms:    Skin:     General: Skin is warm and dry. Findings: Signs of injury present. Neurological:      General: No focal deficit present. Mental Status: He is alert and oriented to person, place, and time. Psychiatric:         Mood and Affect: Mood normal.         Behavior: Behavior normal.        Area was cleaned with saline and dressed with xeroform, 4x4s and ace wrap. MDM  Number of Diagnoses or Management Options  Fall, initial encounter  Skin tear of left elbow without complication, initial encounter  Diagnosis management comments: Wound does not require suturing. Dressing was applied and tetanus shot ordered. No radiology studies were indicated at this time.      Patient Progress  Patient progress: stable         Procedures

## 2021-01-16 NOTE — ED TRIAGE NOTES
Patient with trip and fall this morning over curb, advises wound to left arm, bandaids in place. No loss of consciousness during event and denies hitting head. Mask on during triage. Patient denies any pain.

## 2021-08-03 PROBLEM — I25.10 CORONARY ATHEROSCLEROSIS OF NATIVE CORONARY VESSEL: Status: RESOLVED | Noted: 2017-04-27 | Resolved: 2021-08-03

## 2021-10-26 ENCOUNTER — APPOINTMENT (OUTPATIENT)
Dept: GENERAL RADIOLOGY | Age: 86
End: 2021-10-26
Attending: PHYSICIAN ASSISTANT

## 2021-10-26 ENCOUNTER — HOSPITAL ENCOUNTER (EMERGENCY)
Age: 86
Discharge: HOME | End: 2021-10-26
Attending: EMERGENCY MEDICINE

## 2021-10-26 VITALS
BODY MASS INDEX: 24.38 KG/M2 | SYSTOLIC BLOOD PRESSURE: 170 MMHG | TEMPERATURE: 97.7 F | HEIGHT: 72 IN | RESPIRATION RATE: 16 BRPM | DIASTOLIC BLOOD PRESSURE: 76 MMHG | WEIGHT: 180 LBS | OXYGEN SATURATION: 98 % | HEART RATE: 66 BPM

## 2021-10-26 DIAGNOSIS — S90.31XA CONTUSION OF RIGHT FOOT, INITIAL ENCOUNTER: Primary | ICD-10-CM

## 2021-10-26 PROCEDURE — 73630 X-RAY EXAM OF FOOT: CPT

## 2021-10-26 PROCEDURE — 99282 EMERGENCY DEPT VISIT SF MDM: CPT

## 2021-10-26 NOTE — ED PROVIDER NOTES
Patient was coming back from his run at work when he opened the truck and one of the parts fell out and onto the top of his right foot. He states has been painful ever since. He states it hurts to bear weight. He did drive here. There are no open wounds. No other injuries or complaints. The history is provided by the patient. Foot Pain   This is a new problem. The current episode started 1 to 2 hours ago. The problem occurs constantly. The problem has not changed since onset. The pain is present in the right foot. The pain is at a severity of 5/10. The pain is moderate. Associated symptoms include limited range of motion and stiffness. Pertinent negatives include no numbness, no tingling, no itching, no back pain and no neck pain. The symptoms are aggravated by movement. He has tried nothing for the symptoms. There has been a history of trauma. Past Medical History:   Diagnosis Date    Arthritis     osteo    Chronic pain     left hip/back/LLE    Colon cancer ([address] 06/23/2015    surgery     Diabetes ([address] 2006    Type 2. diet controlled. Pt does not monitor blood sugar. A1C=5.7 Jan 2019.  Dyslipidemia 06/23/2015    daily meds     GERD (gastroesophageal reflux disease)     controlled w/med    Hx of bladder cancer 6/23/2015    Hypertension     controlled on medication    NSTEMI (non-ST elevation myocardial infarction) ([address] 01/10/2014    MI. heart cath: preserved LVSF. EF =65%. mild to mod LAD and circumflex disease. [facility]. 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      Thrombocytopenia, unspecified ([address] 1/11/2014       Past Surgical History:   Procedure Laterality Date    HX COLONOSCOPY  2014, 2018    HX CORONARY STENT PLACEMENT      HX HEART CATHETERIZATION  01/10/2014    s/p bare metal stent to mid-right CA  HX HEENT  as child    T&A    HX HIP REPLACEMENT Right 2019    HX KNEE REPLACEMENT Left 2017    HX LUMBAR FUSION  1990 x 2  ,     lumbar fusions- x 4 total surgeries    HX ORTHOPAEDIC Left 2013    left hip replaced    HX OTHER SURGICAL      Spinal cord stimulator placed and removed in     HX SHOULDER ARTHROSCOPY Left     HX TOTAL COLECTOMY      Colon Resection    HX UROLOGICAL  2002    Turp    [place] ABDOMEN SURGERY PROC UNLISTED      EXP Lap         Family History:   Problem Relation Age of Onset    Heart Disease Mother     Cancer Father     Lung Disease Sister     Heart Disease Brother     Cancer Brother         pancreatic    Parkinson's Disease Brother     Malignant Hyperthermia Neg Hx     Pseudocholinesterase Deficiency Neg Hx     Delayed Awakening Neg Hx     Post-op Nausea/Vomiting Neg Hx     Emergence Delirium Neg Hx     Post-op Cognitive Dysfunction Neg Hx     Other Neg Hx        Social History     Socioeconomic History    Marital status:      Spouse name: Not on file    Number of children: Not on file    Years of education: Not on file    Highest education level: Not on file   Occupational History    Not on file   Tobacco Use    Smoking status: Former Smoker     Packs/day: 1.50     Years: 28.00     Pack years: 42.00     Quit date: 1982     Years since quittin.8    Smokeless tobacco: Never Used    Tobacco comment: quit    Substance and Sexual Activity    Alcohol use: No    Drug use: No    Sexual activity: Never     Partners: Female     Comment: Wife recently     Other Topics Concern    Not on file   Social History Narrative    Not on file     Social Determinants of Health     Financial Resource Strain:     Difficulty of Paying Living Expenses:    Food Insecurity:     Worried About Running Out of Food in the Last Year:     [address] in the Last Year:    Transportation Needs:     Lack of Transportation (Medical):  Lack of Transportation (Non-Medical):    Physical Activity:     Days of Exercise per Week:     Minutes of Exercise per Session:    Stress:     Feeling of Stress :    Social Connections:     Frequency of Communication with Friends and Family:     Frequency of Social Gatherings with Friends and Family:     Attends Voodoo Services:     Active Member of Clubs or Organizations:     Attends Club or Organization Meetings:     Marital Status:    Intimate Partner Violence:     Fear of Current or Ex-Partner:     Emotionally Abused:     Physically Abused:     Sexually Abused: ALLERGIES: Patient has no known allergies. Review of Systems   Constitutional: Negative. HENT: Negative. Eyes: Negative. Respiratory: Negative. Cardiovascular: Negative. Gastrointestinal: Negative. Genitourinary: Negative. Musculoskeletal: Positive for stiffness. Negative for back pain and neck pain. Right foot pain   Skin: Negative. Negative for itching. Neurological: Negative. Negative for tingling and numbness. Psychiatric/Behavioral: Negative. All other systems reviewed and are negative. Vitals:    10/26/21 1739   BP: (!) 170/76   Pulse: 66   Resp: 16   Temp: 97.7 °F (36.5 °C)   SpO2: 98%   Weight: 81.6 kg (180 lb)   Height: 6' (1.829 m)            Physical Exam  Vitals and nursing note reviewed. Constitutional:       Appearance: He is well-developed. HENT:      Head: Normocephalic and atraumatic. Right Ear: External ear normal.      Left Ear: External ear normal.      Nose: Nose normal.   Eyes:      Conjunctiva/sclera: Conjunctivae normal.      Pupils: Pupils are equal, round, and reactive to light. Cardiovascular:      Rate and Rhythm: Normal rate and regular rhythm. Pulses: Normal pulses. Pulmonary:      Effort: Pulmonary effort is normal.   Abdominal:      General: Abdomen is flat. Musculoskeletal:         General: Tenderness and signs of injury present. Cervical back: Normal range of motion and neck supple. Right lower leg: No edema. Left lower leg: No edema. Feet:    Skin:     General: Skin is warm and dry. Capillary Refill: Capillary refill takes less than 2 seconds. Neurological:      General: No focal deficit present. Mental Status: He is alert and oriented to person, place, and time. Mental status is at baseline. Deep Tendon Reflexes: Reflexes are normal and symmetric. Psychiatric:         Mood and Affect: Mood normal.         Behavior: Behavior normal.         Thought Content: Thought content normal.         Judgment: Judgment normal.          MDM  Number of Diagnoses or Management Options     Amount and/or Complexity of Data Reviewed  Tests in the radiology section of CPT®: ordered    Risk of Complications, Morbidity, and/or Mortality  Presenting problems: moderate  Diagnostic procedures: moderate  Management options: moderate    Patient Progress  Patient progress: stable         Procedures    The patient was observed in the ED. Results Reviewed:  XR FOOT RT MIN 3 V   Final Result   No acute osseous abnormality. Patient with a contusion to his foot. He should rest, ice, elevate, avoid painful activities, use over-the-counter Tylenol as directed if needed for pain and return to the ED if worsening in any way. He is stable for discharge and ambulatory with his cane out of the ED at this time. I will refer him to work well if he needs work restrictions. I discussed the results of all labs, procedures, radiographs, and treatments with the patient and available family. Treatment plan is agreed upon and the patient is ready for discharge. All voiced understanding of the discharge plan and medication instructions or changes as appropriate. Questions about treatment in the ED were answered. All were encouraged to return should symptoms worsen or new problems develop.

## 2021-10-26 NOTE — ED NOTES
I have reviewed discharge instructions with the patient. The patient verbalized understanding. Patient left ED via Discharge Method: ambulatory to Home with (family/friend). Opportunity for questions and clarification provided. Patient given 0 scripts. To continue your aftercare when you leave the hospital, you may receive an automated call from our care team to check in on how you are doing. This is a free service and part of our promise to provide the best care and service to meet your aftercare needs.  If you have questions, or wish to unsubscribe from this service please call [phone]. Thank you for Choosing our [facility] Emergency Department.

## 2021-10-26 NOTE — DISCHARGE INSTRUCTIONS
Use cane as needed for help with ambulation. Rest, ice, elevate and avoid painful activities. Return to the ED if worsening. Follow-up with orthopedics for further evaluation.

## 2021-10-26 NOTE — ED TRIAGE NOTES
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 Finished work and then came in. Masked.

## 2021-11-09 NOTE — H&P (VIEW-ONLY)
Orthopaedic Hand Surgery Note    Name: Doris Valentin  Age: 81 y.o. YOB: 1935  Gender: male  MRN: [id number]    CC: New patient referred for hand numbness    HPI: Patient is a 81 y.o. male right hand dominant with a chief complaint of Bilateral hand numbness and tingling in the median nerve distribution. The symptoms have been going on for 1 year. The patient does complain of night wakening and increased symptoms with driving. Evaluation to date has included NCS. Treatment to date has included braces. The current symptoms are rated a 9/10 and interfere with sleep. ROS/Meds/PSH/PMH/FH/SH: I personally reviewed the patients standard intake form. Pertinents are discussed in the HPI    Physical Examination:  General: Awake and alert. HEENT: Normocephalic, atraumatic  CV/Pulm: Breathing even and unlabored  Skin: No obvious rashes noted. Lymphatic: No obvious evidence of lymphedema or lymphadenopathy    Musculoskeletal:     Examination of the Right upper extremity demonstrates Decreased sensation to light touch in the median distribution, normal sensation in ulnar and radial distribution, positive carpal tunnel compression testing and Phalen testing, cap refill < 5 seconds in all fingers. Inspection reveals no thenar atrophy. Negative Tinel and elbow flexion compression test of the cubital tunnel, negative Tinel over Guyon's canal. Sensation to light touch in the ulnar 2 digits is normal with no intrinsic atrophy/weakness. No tenderness to palpation or masses noted in the forearm. Imaging / Electrodiagnostic Tests:     Nerve conduction study was reviewed and independently interpreted, this demonstrates bilateral carpal tunnel syndrome with sensory latency not recorded on the right and 5.3 on the left, there is superimposed neuropathy likely from the patient's preexisting diabetes    Assessment:   1. Right carpal tunnel syndrome    2. Left carpal tunnel syndrome          Plan:   We discussed the diagnosis and different treatment options. We discussed observation, EMG/NCV, night splinting, cortisone injections and surgical decompression of the carpal canal and the risks and benefits of all were clearly outlined. We discussed the fact that carpal tunnel syndrome is a progressive disease and the vast majority of patients will eventually require surgery to prevent progression including permanent numbness and weakness that can impair hand function in its most severe stage. After discussing in detail the patient elects to proceed with right carpal tunnel release, we had a long discussion regarding outcomes in patients with diabetes and severe carpal tunnel syndrome, he understands that there is a high possibility he will not regain full sensation in the fingers, he understands this and wishes to proceed, he also understands that there is a very good probability that his night symptoms will completely resolve after surgery, he also understands of some degree of peripheral neuropathy which is probably from diabetes and that we will not change. Patient voiced accordance and understanding of the information provided and the formulated plan. All questions were answered to the patient's satisfaction during the encounter. 4 This is a chronic illness/condition with exacerbation and progression  Treatment at this time: surgery   Patient understands risks and benefits of RIGHT CARPAL TUNNEL RELEASE including but not limited to nerve injury, vessel injury, infection, failure to achieve desired results and possible need for additional surgery. Patient understands and wishes to proceed with surgery.      On Exam:   The patient is alert and oriented; ;   Lung auscultation is clear bilaterally   Heart has RRR without murmurs      Shahram Mcgovern MD  Orthopaedic Surgery  11/09/21  8:28 AM

## 2021-11-28 ENCOUNTER — ANESTHESIA EVENT (OUTPATIENT)
Dept: SURGERY | Age: 86
End: 2021-11-28
Payer: MEDICARE

## 2021-11-28 DIAGNOSIS — G56.01 RIGHT CARPAL TUNNEL SYNDROME: Primary | ICD-10-CM

## 2021-11-28 RX ORDER — TRAMADOL HYDROCHLORIDE 50 MG/1
50 TABLET ORAL
Qty: 28 TABLET | Refills: 0 | Status: SHIPPED | OUTPATIENT
Start: 2021-11-28 | End: 2021-12-05

## 2021-11-29 ENCOUNTER — HOSPITAL ENCOUNTER (OUTPATIENT)
Age: 86
Setting detail: OUTPATIENT SURGERY
Discharge: HOME | End: 2021-11-29
Attending: ORTHOPAEDIC SURGERY | Admitting: ORTHOPAEDIC SURGERY
Payer: MEDICARE

## 2021-11-29 ENCOUNTER — ANESTHESIA (OUTPATIENT)
Dept: SURGERY | Age: 86
End: 2021-11-29
Payer: MEDICARE

## 2021-11-29 VITALS
TEMPERATURE: 98.2 F | HEART RATE: 59 BPM | SYSTOLIC BLOOD PRESSURE: 128 MMHG | RESPIRATION RATE: 20 BRPM | OXYGEN SATURATION: 96 % | DIASTOLIC BLOOD PRESSURE: 68 MMHG

## 2021-11-29 LAB
GLUCOSE BLD STRIP.AUTO-MCNC: 103 MG/DL (ref 65–100)
SERVICE CMNT-IMP: ABNORMAL

## 2021-11-29 PROCEDURE — 82962 GLUCOSE BLOOD TEST: CPT

## 2021-11-29 PROCEDURE — 76210000020 HC REC RM PH II FIRST 0.5 HR: Performed by: ORTHOPAEDIC SURGERY

## 2021-11-29 PROCEDURE — 77030006586 HC BLD ARTHSC BVR BD -A: Performed by: ORTHOPAEDIC SURGERY

## 2021-11-29 PROCEDURE — 74011250636 HC RX REV CODE- 250/636: Performed by: ANESTHESIOLOGY

## 2021-11-29 PROCEDURE — 76010000154 HC OR TIME FIRST 0.5 HR: Performed by: ORTHOPAEDIC SURGERY

## 2021-11-29 PROCEDURE — 64721 CARPAL TUNNEL SURGERY: CPT | Performed by: ORTHOPAEDIC SURGERY

## 2021-11-29 PROCEDURE — 74011250637 HC RX REV CODE- 250/637: Performed by: ANESTHESIOLOGY

## 2021-11-29 PROCEDURE — 2709999900 HC NON-CHARGEABLE SUPPLY: Performed by: ORTHOPAEDIC SURGERY

## 2021-11-29 PROCEDURE — 74011250636 HC RX REV CODE- 250/636: Performed by: NURSE ANESTHETIST, CERTIFIED REGISTERED

## 2021-11-29 PROCEDURE — 77030002986 HC SUT PROL J&J -A: Performed by: ORTHOPAEDIC SURGERY

## 2021-11-29 PROCEDURE — 74011000250 HC RX REV CODE- 250: Performed by: NURSE ANESTHETIST, CERTIFIED REGISTERED

## 2021-11-29 PROCEDURE — 76060000032 HC ANESTHESIA 0.5 TO 1 HR: Performed by: ORTHOPAEDIC SURGERY

## 2021-11-29 PROCEDURE — 77030000032 HC CUF TRNQT ZIMM -B: Performed by: ORTHOPAEDIC SURGERY

## 2021-11-29 PROCEDURE — 74011000250 HC RX REV CODE- 250: Performed by: ORTHOPAEDIC SURGERY

## 2021-11-29 PROCEDURE — 76210000063 HC OR PH I REC FIRST 0.5 HR: Performed by: ORTHOPAEDIC SURGERY

## 2021-11-29 PROCEDURE — 74011250636 HC RX REV CODE- 250/636: Performed by: NURSE PRACTITIONER

## 2021-11-29 RX ORDER — FENTANYL CITRATE 50 UG/ML
INJECTION, SOLUTION INTRAMUSCULAR; INTRAVENOUS AS NEEDED
Status: DISCONTINUED | OUTPATIENT
Start: 2021-11-29 | End: 2021-11-29 | Stop reason: HOSPADM

## 2021-11-29 RX ORDER — NALOXONE HYDROCHLORIDE 0.4 MG/ML
0.04 INJECTION, SOLUTION INTRAMUSCULAR; INTRAVENOUS; SUBCUTANEOUS
Status: DISCONTINUED | OUTPATIENT
Start: 2021-11-29 | End: 2021-11-29 | Stop reason: HOSPADM

## 2021-11-29 RX ORDER — PROPOFOL 10 MG/ML
INJECTION, EMULSION INTRAVENOUS
Status: DISCONTINUED | OUTPATIENT
Start: 2021-11-29 | End: 2021-11-29 | Stop reason: HOSPADM

## 2021-11-29 RX ORDER — GUAIFENESIN 100 MG/5ML
81 LIQUID (ML) ORAL
Status: COMPLETED | OUTPATIENT
Start: 2021-11-29 | End: 2021-11-29

## 2021-11-29 RX ORDER — HYDROMORPHONE HYDROCHLORIDE 2 MG/ML
0.5 INJECTION, SOLUTION INTRAMUSCULAR; INTRAVENOUS; SUBCUTANEOUS
Status: DISCONTINUED | OUTPATIENT
Start: 2021-11-29 | End: 2021-11-29 | Stop reason: HOSPADM

## 2021-11-29 RX ORDER — LIDOCAINE HYDROCHLORIDE 5 MG/ML
INJECTION, SOLUTION INFILTRATION; INTRAVENOUS AS NEEDED
Status: DISCONTINUED | OUTPATIENT
Start: 2021-11-29 | End: 2021-11-29 | Stop reason: HOSPADM

## 2021-11-29 RX ORDER — BUPIVACAINE HYDROCHLORIDE 2.5 MG/ML
INJECTION, SOLUTION EPIDURAL; INFILTRATION; INTRACAUDAL AS NEEDED
Status: DISCONTINUED | OUTPATIENT
Start: 2021-11-29 | End: 2021-11-29 | Stop reason: HOSPADM

## 2021-11-29 RX ORDER — SODIUM CHLORIDE, SODIUM LACTATE, POTASSIUM CHLORIDE, CALCIUM CHLORIDE 600; 310; 30; 20 MG/100ML; MG/100ML; MG/100ML; MG/100ML
100 INJECTION, SOLUTION INTRAVENOUS CONTINUOUS
Status: DISCONTINUED | OUTPATIENT
Start: 2021-11-29 | End: 2021-11-29 | Stop reason: HOSPADM

## 2021-11-29 RX ORDER — SODIUM CHLORIDE 0.9 % (FLUSH) 0.9 %
5-40 SYRINGE (ML) INJECTION EVERY 8 HOURS
Status: DISCONTINUED | OUTPATIENT
Start: 2021-11-29 | End: 2021-11-29 | Stop reason: HOSPADM

## 2021-11-29 RX ORDER — LIDOCAINE HYDROCHLORIDE 10 MG/ML
INJECTION INFILTRATION; PERINEURAL AS NEEDED
Status: DISCONTINUED | OUTPATIENT
Start: 2021-11-29 | End: 2021-11-29 | Stop reason: HOSPADM

## 2021-11-29 RX ORDER — SODIUM CHLORIDE 0.9 % (FLUSH) 0.9 %
5-40 SYRINGE (ML) INJECTION AS NEEDED
Status: DISCONTINUED | OUTPATIENT
Start: 2021-11-29 | End: 2021-11-29 | Stop reason: HOSPADM

## 2021-11-29 RX ORDER — LIDOCAINE HYDROCHLORIDE 10 MG/ML
0.1 INJECTION INFILTRATION; PERINEURAL AS NEEDED
Status: DISCONTINUED | OUTPATIENT
Start: 2021-11-29 | End: 2021-11-29 | Stop reason: HOSPADM

## 2021-11-29 RX ORDER — CEFAZOLIN SODIUM/WATER 2 G/20 ML
2 SYRINGE (ML) INTRAVENOUS ONCE
Status: COMPLETED | OUTPATIENT
Start: 2021-11-29 | End: 2021-11-29

## 2021-11-29 RX ORDER — MIDAZOLAM HYDROCHLORIDE 1 MG/ML
2 INJECTION, SOLUTION INTRAMUSCULAR; INTRAVENOUS ONCE
Status: DISCONTINUED | OUTPATIENT
Start: 2021-11-29 | End: 2021-11-29 | Stop reason: HOSPADM

## 2021-11-29 RX ORDER — FENTANYL CITRATE 50 UG/ML
100 INJECTION, SOLUTION INTRAMUSCULAR; INTRAVENOUS ONCE
Status: DISCONTINUED | OUTPATIENT
Start: 2021-11-29 | End: 2021-11-29 | Stop reason: HOSPADM

## 2021-11-29 RX ORDER — OXYCODONE HYDROCHLORIDE 5 MG/1
5 TABLET ORAL
Status: DISCONTINUED | OUTPATIENT
Start: 2021-11-29 | End: 2021-11-29 | Stop reason: HOSPADM

## 2021-11-29 RX ORDER — MIDAZOLAM HYDROCHLORIDE 1 MG/ML
2 INJECTION, SOLUTION INTRAMUSCULAR; INTRAVENOUS
Status: DISCONTINUED | OUTPATIENT
Start: 2021-11-29 | End: 2021-11-29 | Stop reason: HOSPADM

## 2021-11-29 RX ADMIN — PROPOFOL 50 MCG/KG/MIN: 10 INJECTION, EMULSION INTRAVENOUS at 07:58

## 2021-11-29 RX ADMIN — CEFAZOLIN 2 G: 1 INJECTION, POWDER, FOR SOLUTION INTRAVENOUS at 07:53

## 2021-11-29 RX ADMIN — FENTANYL CITRATE 50 MCG: 50 INJECTION INTRAMUSCULAR; INTRAVENOUS at 07:53

## 2021-11-29 RX ADMIN — SODIUM CHLORIDE, SODIUM LACTATE, POTASSIUM CHLORIDE, AND CALCIUM CHLORIDE 100 ML/HR: 600; 310; 30; 20 INJECTION, SOLUTION INTRAVENOUS at 07:09

## 2021-11-29 RX ADMIN — LIDOCAINE HYDROCHLORIDE 40 ML: 5 INJECTION, SOLUTION INFILTRATION at 07:55

## 2021-11-29 RX ADMIN — ASPIRIN 81 MG: 81 TABLET, CHEWABLE ORAL at 07:35

## 2021-11-29 NOTE — INTERVAL H&P NOTE
H&P Update:  Leonard Fan was seen and examined. History and physical has been reviewed. The patient has been examined.  There have been no significant clinical changes since the completion of the originally dated History and Physical.    Mesfin Santiago MD  Orthopaedic Surgery  11/29/21  7:08 AM

## 2021-11-29 NOTE — PERIOP NOTES
PACU DISCHARGE NOTE    Patient's son voiced understanding of discharge instructions. Vital signs stable, pain well controlled, alert and oriented times three or at baseline, follow up per surgeon, no anesthetic complications.

## 2021-11-29 NOTE — ANESTHESIA PREPROCEDURE EVALUATION
Anesthetic History               Review of Systems / Medical History  Patient summary reviewed and pertinent labs reviewed    Pulmonary  Within defined limits              Comments: >20 years of tobacco abuse. None currently   Neuro/Psych   Within defined limits          Comments: neuropathy Cardiovascular    Hypertension        Dysrhythmias (1st degree AVB)   Past MI (NSTEMI 2014), CAD and cardiac stents (2014)    Exercise tolerance: <4 METS  Comments: Stress test 2018:   SPECT Perfusion Imaging: Abnormal due to infarction with leidy-infarct   ischemia. Defect appears primarily fixed with only mild worsening with   stress. LV Systolic Function is  normal. Inferior wall motion abnormality. Risk Assessment:  Low Risk Scan.     GI/Hepatic/Renal     GERD (Asymptomatic): well controlled           Endo/Other    Diabetes: well controlled, type 2    Arthritis and cancer (colon, bladder)     Other Findings            Physical Exam    Airway  Mallampati: II  TM Distance: 4 - 6 cm  Neck ROM: normal range of motion   Mouth opening: Normal    Comments: Large front, capped teeth Cardiovascular  Regular rate and rhythm,  S1 and S2 normal,  no murmur, click, rub, or gallop  Rhythm: regular  Rate: normal         Dental    Dentition: Implants, Caps/crowns and Full upper dentures     Pulmonary  Breath sounds clear to auscultation               Abdominal  GI exam deferred       Other Findings            Anesthetic Plan    ASA: 3  Anesthesia type: total IV anesthesia - Kirti block          Induction: Intravenous  Anesthetic plan and risks discussed with: Patient

## 2021-11-29 NOTE — ANESTHESIA POSTPROCEDURE EVALUATION
Procedure(s):  RIGHT  CARPAL TUNNEL RELEASE.    total IV anesthesia    Anesthesia Post Evaluation        Patient location during evaluation: PACU  Patient participation: complete - patient participated  Level of consciousness: awake  Pain management: satisfactory to patient  Airway patency: patent  Anesthetic complications: no  Cardiovascular status: hemodynamically stable  Respiratory status: spontaneous ventilation  Hydration status: euvolemic  Post anesthesia nausea and vomiting:  none      INITIAL Post-op Vital signs:   Vitals Value Taken Time   /70 11/29/21 0835   Temp 36.5 °C (97.7 °F) 11/29/21 0821   Pulse 62 11/29/21 0836   Resp 16 11/29/21 0830   SpO2 96 % 11/29/21 0836   Vitals shown include unvalidated device data.

## 2021-11-29 NOTE — DISCHARGE INSTRUCTIONS
Postoperative  Instructions:      Weightbearing or Lifting:  Limit  weight  lifting  to  less  than  1  pound  (coffee  mug)  for  the  first  2  weeks  after  surgery. Dressing  instructions:    Keep  your  dressing  and/or  splint  clean  and  dry  at  all  times. You  can  remove  your  dressing  on  post-operative  day  #5  and  change  with  a  dry/sterile  dressing  or  Band-Aids  as  needed  thereafter. Showering  Instructions:  May  shower  But keep surgical dressing clean and dry until removed as explained above. After dressing is removed, you may allow soapy water to run through the incision during showers but do not scrub. After each shower, pat dry and apply a dry dressing. Do  not  soak  your  Incision in still water or bathtub  for  3  weeks  after  surgery. If  the  incision  gets  wet otherwise,  pat  dry  and  do  not  scrub  the  incision. Do  not  apply  cream  or  lotion  to  incision      Pain  Control:  - You  have  been  given  a  prescription  to  be  taken  as  directed  for  post-operative  pain  control. In  addition,  elevate  the  operative  extremity  above  the  heart  at  all  times  to  prevent  swelling  and  throbbing  pain. - If you develop constipation while taking narcotic pain medications (Norco, Hydrocodone, Percocet, Oxycodone, Dilaudid, Hydromorphone) take  over-the-counter  Colace,  100mg  by  mouth  twice  a  Day. - Nausea  is  a  common  side  effect  of  many  pain  medications. You  will  want  to  eat something  before  taking  your  pain  medicine  to  help  prevent  Nausea. - If  you  are  taking  a  prescription  pain  medication  that  contains  acetaminophen,  we  recommend  that  you  do  not  take  additional  over  the  counter  acetaminophen  (Tylenol®).       Other  pain  relieving  options:   - Using  a  cold  pack  to  ice  the  affected  area  a  few  times  a  day  (15  to  20  minutes  at  a  time)  can  help  to  relieve pain,  reduce  swelling  and  bruising.      - Elevation  of  the  affected  area  can  also  help  to  reduce  pain  and  swelling. Did  you  receive  a  nerve  Block? A  nerve  block  can  provide  pain  relief  for  one  hour  to  two  days  after  your  surgery. As  long  as  the  nerve  block  is  working,  you  will  experience  little  or  no  sensation  in  the  area  the  surgeon  operated  on. As  the  nerve  block  wears  off,  you  will  begin  to  experience  pain  or  discomfort. It  is  very  important  that  you  begin  taking  your  prescribed  pain  medication  before  the  nerve  block  fully  wears  off. The first sign that the nerve block is wearing off is tingling in your fingers. Treating  your  pain  at  the  first  sign  of  the  block  wearing  off  will  ensure  your  pain  is  better  controlled  and  more  tolerable  when  full-sensation  returns. Do  not  wait  until  the  pain  is  intolerable,  as  the  medicine  will  be  less  effective. It  is  better  to  treat  pain  in  advance  than  to  try  and  catch  up. General  Anesthesia or Sedation:      If  you  did  not  receive  a  nerve  block  during  your  surgery,  you  will  need  to  start  taking  your  pain  medication  shortly  after  your  surgery  and  should  continue  to  do  so  as  prescribed  by  your  surgeon. Please  call  [phone]  with any concern and ask to speak with Yesica Loya. Concerning problems include:      -  Excessive  redness  of  the  incisions      -  Drainage  for  more  than  2  Days after surgery or any foul smelling drainage  -  Fever  of  more  than  101.5  F      Please  call  [phone]  if  you  do  not  receive  or  are  unsure  of  your  first  follow-up  appointment. You  should  see  the  doctor  10-14  days  after  your  Surgery. Thank you for choosing me and [address] [place] [address] for your care.  I will go above and beyond to ensure you receive the best care possible. Talha Whitlock MD, PhD      Kajal Melissa Call your doctor if pain is NOT relieved by medication.   Excessive bleeding that does not stop after holding pressure over the area  · Temperature of 101 degrees F or above  · Excessive redness, swelling or bruising, and/ or green or yellow, smelly discharge from incision      TYPICAL SIDE EFFECTS OF PAIN MEDICATION:     Constipation: Drink lots of fluids. Over the counter stool softener if needed.    Nausea: Take pain medication with food. Call your doctor with persistent nausea. ACTIVITY  · As tolerated and as directed by your doctor. · Bathe or shower as directed by your doctor. DIET  · Day of surgery: Clear liquids until no nausea or vomiting; small portion, light diet [place] foods (ex: baked chicken, plain rice, grits, scrambled eggs, toast). Nothing greasy, fried or spicy today. · Advance to regular diet on second day, unless your doctor orders otherwise. · If nausea and vomiting continues, call your doctor. PAIN  · Take pain medication as directed by your doctor. · DO NOT take aspirin or blood thinners unless directed by your doctor. AFTER ANESTHESIA   · For the first 24 hours: DO NOT Drive, Drink alcoholic beverages, or Make important decisions. · Be aware of dizziness following anesthesia and while taking pain medication. CONTRACEPTIVES  During your procedure you potentially received medication(s) which may reduce the effectiveness of contraceptives. Please consider other forms of contraception for 1 month following your procedure if you are currently using contraceptives as your primary form of birth control. In addition to this, it is recommended you continue your contraceptive as prescribed, unless otherwise instructed by your physician.         PATIENT INSTRUCTIONS:    After general anesthesia or intravenous sedation, for 24 hours or while taking prescription Narcotics:  · Limit your activities  · Do not drive and operate hazardous machinery  · Do not make important personal or business decisions  · Do  not drink alcoholic beverages  · If you have not urinated within 8 hours after discharge, please contact your surgeon on call. *  Please give a list of your current medications to your Primary Care Provider. *  Please update this list whenever your medications are discontinued, doses are      changed, or new medications (including over-the-counter products) are added. *  Please carry medication information at all times in case of emergency situations. Preventing Infection at Home  We care about preventing infection and avoiding the spread of germs - not only when you are in the hospital but also when you return home. When you return home from the hospital, its important to take the following steps to help prevent infection and avoid spreading germs that could infect you and others. Ask everyone in your home to follow these guidelines, too. Clean Your Hands  · Clean your hands whenever your hands are visibly dirty, before you eat, before or after touching your mouth, nose or eyes, and before preparing food. Clean them after contact with body fluids, using the restroom, touching animals or changing diapers. · When washing hands, wet them with warm water and work up a lather. Rub hands for at least 15 seconds, then rinse them and pat them dry with a clean towel or paper towel. · When using hand sanitizers, it should take about 15 seconds to rub your hands dry. If not, you probably didnt apply enough . Cover Your Sneeze or Cough  Germs are released into the air whenever you sneeze or cough. To prevent the spread of infection:  · Turn away from other people before coughing or sneezing. · Cover your mouth or nose with a tissue when you cough or sneeze. Put the tissue in the trash.   · If you dont have a tissue, cough or sneeze into your upper sleeve, not your hands. · Always clean your hands after coughing or sneezing. Care for Wounds  Your skin is your bodys first line of defense against germs, but an open wound leaves an easy way for germs to enter your body. To prevent infection:  · Clean your hands before and after changing wound dressings, and wear gloves to change dressings if recommended by your doctor. · Take special care with IV lines or other devices inserted into the body. If you must touch them, clean your hands first.  · Follow any specific instructions from your doctor to care for your wounds. Contact your doctor if you experience any signs of infection, such as fever or increased redness at the surgical or wound site. Keep a Clean Home  · Clean or wipe commonly touched hard surfaces like door handles, sinks, tabletops, phones and TV remotes. · Use products labeled disinfectant to kill harmful bacteria and viruses. · Use a clean cloth or paper towel to clean and dry surfaces. Wiping surfaces with a dirty dishcloth, sponge or towel will only spread germs. · Never share toothbrushes, barker, drinking glasses, utensils, razor blades, face cloths or bath towels to avoid spreading germs. · Be sure that the linens that you sleep on are clean. · Keep pets away from wounds and wash your hands after touching pets, their toys or bedding. We care about you and your health. Remember, preventing infections is a team effort between you, your family, friends and health care providers. These are general instructions for a healthy lifestyle:    No smoking/ No tobacco products/ Avoid exposure to second hand smoke  Surgeon General's Warning:  Quitting smoking now greatly reduces serious risk to your health.   Obesity, smoking, and sedentary lifestyle greatly increases your risk for illness  A healthy diet, regular physical exercise & weight monitoring are important for maintaining a healthy lifestyle  You may be retaining fluid if you have a history of heart failure or if you experience any of the following symptoms:  Weight gain of 3 pounds or more overnight or 5 pounds in a week, increased swelling in our hands or feet or shortness of breath while lying flat in bed. Please call your doctor as soon as you notice any of these symptoms; do not wait until your next office visit. Recognize signs and symptoms of STROKE:  F-face looks uneven  A-arms unable to move or move unevenly  S-speech slurred or non-existent  T-time-call 911 as soon as signs and symptoms begin-DO NOT go       Back to bed or wait to see if you get better-TIME IS BRAIN. Learning About Coronavirus [phone])  Coronavirus [phone]): Overview  What is coronavirus (COVID-19)? The coronavirus disease (COVID-19) is caused by a virus. It is an illness that was first found in [place], [place], in December 2019. It has since spread worldwide. The virus can cause fever, cough, and trouble breathing. In severe cases, it can cause pneumonia and make it hard to breathe without help. It can cause death. Coronaviruses are a large group of viruses. They cause the common cold. They also cause more serious illnesses like [place] respiratory syndrome (MERS) and severe acute respiratory syndrome (SARS). COVID-19 is caused by a novel coronavirus. That means it's a new type that has not been seen in people before. This virus spreads person-to-person through droplets from coughing and sneezing. It can also spread when you are close to someone who is infected. And it can spread when you touch something that has the virus on it, such as a doorknob or a tabletop. What can you do to protect yourself from coronavirus (COVID-19)? The best way to protect yourself from getting sick is to:  · Avoid areas where there is an outbreak. · Avoid contact with people who may be infected. · Wash your hands often with soap or alcohol-based hand sanitizers.   · Avoid crowds and try to stay at least 6 feet away from other people. · Wash your hands often, especially after you cough or sneeze. Use soap and water, and scrub for at least 20 seconds. If soap and water aren't available, use an alcohol-based hand . · Avoid touching your mouth, nose, and eyes. What can you do to avoid spreading the virus to others? To help avoid spreading the virus to others:  · Cover your mouth with a tissue when you cough or sneeze. Then throw the tissue in the trash. · Use a disinfectant to clean things that you touch often. · Wear a cloth face cover if you have to go to public areas. · Stay home if you are sick or have been exposed to the virus. Don't go to school, work, or public areas. And don't use public transportation, ride-shares, or taxis unless you have no choice. · If you are sick:  ? Leave your home only if you need to get medical care. But call the doctor's office first so they know you're coming. And wear a face cover. ? Wear the face cover whenever you're around other people. It can help stop the spread of the virus when you cough or sneeze. ? Clean and disinfect your home every day. Use household  and disinfectant wipes or sprays. Take special care to clean things that you grab with your hands. These include doorknobs, remote controls, phones, and handles on your refrigerator and microwave. And don't forget countertops, tabletops, bathrooms, and computer keyboards. When to call for help  [id number] anytime you think you may need emergency care. For example, call if:  · You have severe trouble breathing. (You can't talk at all.)  · You have constant chest pain or pressure. · You are severely dizzy or lightheaded. · You are confused or can't think clearly. · Your face and lips have a blue color. · You pass out (lose consciousness) or are very hard to wake up. Call your doctor now if you develop symptoms such as:  · Shortness of breath. · Fever. · Cough.   If you need to get care, call ahead to the doctor's office for instructions before you go. Make sure you wear a face cover to prevent exposing other people to the virus. Where can you get the latest information? The following health organizations are tracking and studying this virus. Their websites contain the most up-to-date information. [place] also learn what to do if you think you may have been exposed to the virus. · U.S. Centers for Disease Control and Prevention (CDC): The CDC provides updated news about the disease and travel advice. The website also tells you how to prevent the spread of infection. www.cdc.gov  · World Health Organization [facility]): WHO offers information about the virus outbreaks. WHO also has travel advice. www.who.int  Current as of: May 8, 2020               Content Version: 12.5  © 2006-2020 Health[employer]. Care instructions adapted under license by [email or web address] (which disclaims liability or warranty for this information). If you have questions about a medical condition or this instruction, always ask your healthcare professional. [address] any warranty or liability for your use of this information.

## 2021-11-30 NOTE — OP NOTES
Hand Surgery Operative Note      Ranjeet Trotter   81 y.o.   male      Pre-op diagnosis: Right Carpal Tunnel syndrome  Post op diagnosis: same      Procedure: Right Carpal Tunnel release cpt 74971      Surgeon: Bud Fox MD, PhD      Anesthesia: [place] block      Tourniquet time:   Total Tourniquet Time Documented:  Forearm (Right) - 18 minutes  Total: Forearm (Right) - 18 minutes        Procedure indications: Patient with radial digit numbness recalcitrant to conservative measures with positive documentation of NCV findings consistent with carpal tunnel syndrome. After Thorough discussion, the patient decided to proceed with surgical management. We discussed in detail surgical risks including scar, pain, bleeding, infection, anesthetic risks, neurovascular injury, need for further surgery,  weakness, stiffness, risk of death and potential risk of other unforseen complication. Procedure description:      Patient was placed in the supine position and after appropriate time-out and side, site and procedure confirmed. The incision was made in the palm in line with the radial border of the ring finger under loupe magnification and palmar fascia was incised longitudinally. Blunt retraction used to identify the transverse carpal ligament, which was incised, visualizing the median nerve beneath, which was protected with a slotted freer. The remaining ligament was released with a [place] meniscal blade under direct visualization. The ligament was released in its entirety, and visualized at its most proximal and distal extent. Wound was irrigated, tourniquet released and hemostasis was obtained with bipolar cautery. Skin edges were infiltrated with . 25% Bupivacaine. Wound then closed with 4-0 prolene, glue and sterile dressing applied. Disposition: To PACU with no complications and follow up per routine.   Patient is instructed to remove dressings in five days and other precautions include avoidance of heavy and repetitive lifting for 2 weeks, when an appointment for follow up and suture removal will take place.      Loida Nelson MD  11/30/21  7:40 AM

## 2022-03-18 PROBLEM — M79.604 RIGHT LEG PAIN: Status: ACTIVE | Noted: 2019-02-27

## 2022-03-18 PROBLEM — I25.10 ATHEROSCLEROSIS OF NATIVE CORONARY ARTERY OF NATIVE HEART WITHOUT ANGINA PECTORIS: Status: ACTIVE | Noted: 2017-05-03

## 2022-03-19 PROBLEM — E83.42 HYPOMAGNESEMIA: Status: ACTIVE | Noted: 2017-06-17

## 2022-03-19 PROBLEM — I25.119 ATHEROSCLEROSIS OF NATIVE CORONARY ARTERY OF NATIVE HEART WITH ANGINA PECTORIS (HCC): Status: ACTIVE | Noted: 2018-08-21

## 2022-03-19 PROBLEM — M17.12 ARTHRITIS OF KNEE, LEFT: Status: ACTIVE | Noted: 2017-05-16

## 2022-03-19 PROBLEM — R07.9 CHEST PAIN AT REST: Status: ACTIVE | Noted: 2018-02-28

## 2022-03-19 PROBLEM — N17.9 AKI (ACUTE KIDNEY INJURY) (HCC): Status: ACTIVE | Noted: 2017-06-17

## 2022-03-19 PROBLEM — E78.2 MIXED HYPERLIPIDEMIA: Status: ACTIVE | Noted: 2017-05-03

## 2022-03-19 PROBLEM — E11.9 TYPE 2 DIABETES MELLITUS WITHOUT COMPLICATION, WITHOUT LONG-TERM CURRENT USE OF INSULIN (HCC): Status: ACTIVE | Noted: 2017-05-03

## 2022-03-19 PROBLEM — G60.9 IDIOPATHIC PERIPHERAL NEUROPATHY: Status: ACTIVE | Noted: 2017-01-30

## 2022-03-19 PROBLEM — Z96.659 S/P TOTAL KNEE ARTHROPLASTY: Status: ACTIVE | Noted: 2017-05-16

## 2022-03-19 PROBLEM — M16.11 ARTHRITIS OF RIGHT HIP: Status: ACTIVE | Noted: 2019-06-25

## 2022-03-19 PROBLEM — G56.01 RIGHT CARPAL TUNNEL SYNDROME: Status: ACTIVE | Noted: 2021-11-28

## 2022-03-20 PROBLEM — T84.018A FAILED TOTAL HIP ARTHROPLASTY (HCC): Status: ACTIVE | Noted: 2019-10-03

## 2022-03-20 PROBLEM — Z96.649 FAILED TOTAL HIP ARTHROPLASTY (HCC): Status: ACTIVE | Noted: 2019-10-03

## 2022-03-20 PROBLEM — Z98.61 S/P PTCA (PERCUTANEOUS TRANSLUMINAL CORONARY ANGIOPLASTY): Status: ACTIVE | Noted: 2017-04-27

## 2022-06-16 ENCOUNTER — PROCEDURE VISIT (OUTPATIENT)
Dept: UROLOGY | Age: 87
End: 2022-06-16
Payer: MEDICARE

## 2022-06-16 ENCOUNTER — TELEPHONE (OUTPATIENT)
Dept: UROLOGY | Age: 87
End: 2022-06-16

## 2022-06-16 DIAGNOSIS — Z85.51 HX OF BLADDER CANCER: ICD-10-CM

## 2022-06-16 DIAGNOSIS — C67.8 MALIGNANT NEOPLASM OF OVERLAPPING SITES OF BLADDER (HCC): Primary | ICD-10-CM

## 2022-06-16 LAB
BILIRUBIN, URINE, POC: NEGATIVE
BLOOD URINE, POC: NEGATIVE
GLUCOSE URINE, POC: NEGATIVE
KETONES, URINE, POC: NEGATIVE
LEUKOCYTE ESTERASE, URINE, POC: NEGATIVE
NITRITE, URINE, POC: NEGATIVE
PH, URINE, POC: 6 (ref 4.6–8)
PROTEIN,URINE, POC: NEGATIVE
SPECIFIC GRAVITY, URINE, POC: 1.02 (ref 1–1.03)
URINALYSIS CLARITY, POC: NORMAL
URINALYSIS COLOR, POC: NORMAL
UROBILINOGEN, POC: NORMAL

## 2022-06-16 PROCEDURE — 81003 URINALYSIS AUTO W/O SCOPE: CPT | Performed by: UROLOGY

## 2022-06-16 PROCEDURE — 52000 CYSTOURETHROSCOPY: CPT | Performed by: UROLOGY

## 2022-06-16 NOTE — PROGRESS NOTES
[facility]  [address]   [address]  [facility], [address] [facility]  [phone]    Candido Granda  : 1935         HPI   81 y.o., male followed previously by Dr. Angelica Bowens underwent TURBT of TCCB in [facility].  Pathology is unavailable for review. Roshni Marcus underwent yearly negative surveillance cystoscopy by Dr. Laverne Bell until '07. Roshni Marcus states another unknown Urologist close to [facility] then did yearly cystoscopy until approx. '13.      He denies gross hematuria or voiding issue.  He did have a UTI in early . Past Medical History:   Diagnosis Date    Arthritis     osteo    Bladder cancer ([address]     Chronic pain     left hip/back/LLE    Colon cancer ([address] 2015    surgery     Diabetes ([address]     Type 2. diet controlled. Pt does not monitor blood sugar.  Dyslipidemia 2015    daily meds     GERD (gastroesophageal reflux disease)     controlled w/med prn    Hx of bladder cancer 2015    Hypertension     controlled on medication    NSTEMI (non-ST elevation myocardial infarction) ([address] 01/10/2014    MI. heart cath: preserved LVSF. EF =65%. mild to mod LAD and circumflex disease. Followed by [facility].  Osteoarthritis of left hip 2013    S/P prosthetic total arthroplasty of the hip 2014    Stented coronary artery 01/10/2014    mid right coronary artery - Medtronic bare metal stent.      Thrombocytopenia, unspecified ([address] 2014     Past Surgical History:   Procedure Laterality Date    CARDIAC CATHETERIZATION  01/10/2014    s/p bare metal stent to mid-right CA    COLONOSCOPY  ,     CORONARY ANGIOPLASTY WITH STENT PLACEMENT      HEENT  as child    T&A    LUMBAR FUSION  1990 x 2  ,     lumbar fusions- x 4 total surgeries    ORTHOPEDIC SURGERY Left 2013    left hip replaced    OTHER SURGICAL HISTORY      Spinal cord stimulator placed and removed in 2017   [id number] SHOULDER ARTHROSCOPY Left     TOTAL COLECTOMY      Colon Resection    TOTAL HIP ARTHROPLASTY Right 2019    TOTAL KNEE ARTHROPLASTY Left 2017    UROLOGICAL SURGERY  2002    Turp     Current Outpatient Medications   Medication Sig Dispense Refill    aspirin 81 MG EC tablet Take 81 mg by mouth daily      atorvastatin (LIPITOR) 40 MG tablet Take 40 mg by mouth daily      Cholecalciferol 50 MCG (2000) TABS Take 1 tablet by mouth daily      clopidogrel (PLAVIX) 75 MG tablet Take 75 mg by mouth daily      famotidine (PEPCID) 20 MG tablet Take 20 mg by mouth 2 times daily      finasteride (PROSCAR) 5 MG tablet Take 5 mg by mouth daily      lisinopril (PRINIVIL;ZESTRIL) 5 MG tablet Take 5 mg by mouth      metoprolol succinate (TOPROL XL) 25 MG extended release tablet every evening       No current facility-administered medications for this visit. No Known Allergies  Social History     Socioeconomic History    Marital status: Single     Spouse name: Not on file    Number of children: Not on file    Years of education: Not on file    Highest education level: Not on file   Occupational History    Not on file   Tobacco Use    Smoking status: Former Smoker     Packs/day: 1.50     Quit date: 1982     Years since quittin.4    Smokeless tobacco: Never Used    Tobacco comment: Quit smoking: quit    Substance and Sexual Activity    Alcohol use: No    Drug use: No    Sexual activity: Not on file     Comment: Wife recently  9   Other Topics Concern    Not on file   Social History Narrative    Not on file     Social Determinants of Health     Financial Resource Strain:     Difficulty of Paying Living Expenses: Not on file   Food Insecurity:     Worried About [address] in the Last Year: Not on file    [address] in the Last Year: Not on file   Transportation Needs:     Lack of Transportation (Medical):  Not on file    Lack of Transportation (Non-Medical): Not on file   Physical Activity:     Days of Exercise per Week: Not on file    Minutes of Exercise per Session: Not on file   Stress:     Feeling of Stress : Not on file   Social Connections:     Frequency of Communication with Friends and Family: Not on file    Frequency of Social Gatherings with Friends and Family: Not on file    Attends Jew Services: Not on file    Active Member of [address] [place] [address] or Organizations: Not on file    Attends Club or Organization Meetings: Not on file    Marital Status: Not on file   Intimate Partner Violence:     Fear of Current or Ex-Partner: Not on file    Emotionally Abused: Not on file    Physically Abused: Not on file    Sexually Abused: Not on file   Housing Stability:     Unable to Pay for Housing in the Last Year: Not on file    Number of [place] in the Last Year: Not on file    Unstable Housing in the Last Year: Not on file     Family History   Problem Relation Age of Onset    Lung Disease Sister     Cancer Father     Heart Disease Mother     Heart Disease Brother     Cancer Brother         pancreatic    Other Neg Hx     Parkinson's Disease Brother     Emergence Delirium Neg Hx     Post-op Nausea/Vomiting Neg Hx     Delayed Awakening Neg Hx     Pseudochol. Deficiency Neg Hx     Malig Hypertherm Neg Hx     Post-op Cognitive Dysfunction Neg Hx        There were no vitals taken for this visit.     UA - Dipstick  Results for orders placed or performed in visit on 06/16/22   AMB POC URINALYSIS DIP STICK AUTO W/O MICRO   Result Value Ref Range    Color (UA POC)      Clarity (UA POC)      Glucose, Urine, POC Negative Negative    Bilirubin, Urine, POC Negative Negative    KETONES, Urine, POC Negative Negative    Specific Gravity, Urine, POC 1.025 1.001 - 1.035    Blood (UA POC) Negative Negative    pH, Urine, POC 6.0 4.6 - 8.0    Protein, Urine, POC Negative Negative    Urobilinogen, POC 0.2 mg/dL     Nitrite, Urine, POC Negative Negative    Leukocyte Esterase, Urine, POC Negative Negative       UA - Micro  WBC - 0  RBC - 0  Bacteria - 0  Epith - 0          Cystoscopy Procedure:     Procedure Room # 1  Scope ID: Dispos  Assistant: MARCELLE    Procedure in Detail:  After local anesthesia was administered, [employer] prepped and draped in the usual sterile fashion. A flexible cystoscope was used.      Findings:  Urethra: Normal without strictures  Prostate: moderately obstructed from lateral lobe enlargement, 2.5  Bladder neck: open  Bladder mucosa: Intact, well healed scar lateral to L UO  Trebeculation: 1  Diverticula: none  Ureteral orifices: normal  Stent(s):None     Estimated Blood Loss:  Minimal to none    Assessment and Plan    ICD-10-CM    1. Malignant neoplasm of overlapping sites of bladder (HCC)  C67.8 CYSTOURETHROSCOPY     AMB POC URINALYSIS DIP STICK AUTO W/O MICRO   2. Hx of bladder cancer  Z85.51      Orders Placed This Encounter   Procedures    CYSTOURETHROSCOPY    AMB POC URINALYSIS DIP STICK AUTO W/O MICRO     Pt. has no sign of recurrence and will return in 12 months for next surveillance cystoscopy.

## 2022-10-23 ASSESSMENT — ENCOUNTER SYMPTOMS
ABDOMINAL PAIN: 0
COUGH: 0
DIARRHEA: 0
SORE THROAT: 0
ABDOMINAL DISTENTION: 0
SHORTNESS OF BREATH: 0
PHOTOPHOBIA: 0
CONSTIPATION: 0

## 2022-10-23 NOTE — PROGRESS NOTES
[facility] CARDIOLOGY  [address], [address]  PHONE: [phone]        NAME:  Gerry Manzo  : 1935  MRN: [id number]     PCP:  Gonzalez Esteban MD      SUBJECTIVE:   Gerry Manzo is a 81 y.o. male seen for a follow up visit regarding the following:     Chief Complaint   Patient presents with    Hypertension       HPI:   Formerly a patient of Dr. Jonah Couch, with prior RCA stent in setting of NSTEMI, with nuclear stress in 2018 showing mild inferior HK with EF 60% with small inferior scar/carolyn-infarct ischemia. Doing  well since last visit without interval angina, CHF, palpitations, edema, presyncope or syncope. Dizziness at our last visit completely resolved with increasing daily hydration and decreasing Toprol-XL to 12.5 mg daily, which he will continue. Vitals  otherwise controlled and tolerating meds well. Staying active without any significant limitations but using a cane intermittently to ambulate. 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 2022 labs excellent, see  Care Everywhere labs below. He has intermittent GERD symptoms that resolved after taking a chewable antacid, denies any worsening accelerating angina symptoms. Offered nuclear stress test but he refuses given adverse reaction to Peggi Hazy in the past.  Emphasized calling me early with any accelerating exertional symptoms at which time we will proceed with catheterization. Called in sublingual nitroglycerin and verified compliance with medications noted below. Past Medical History, Past Surgical History, Family history, Social History, and Medications were all reviewed with the patient today and updated as necessary.      Current Outpatient Medications   Medication Sig Dispense Refill    aspirin 81 MG EC tablet Take 81 mg by mouth daily      atorvastatin (LIPITOR) 40 MG tablet Take 40 mg by mouth daily      Cholecalciferol 50 MCG (2000 UT) TABS Take 1 tablet by mouth daily      famotidine (PEPCID) 20 MG tablet Take 20 mg by mouth 2 times daily      finasteride (PROSCAR) 5 MG tablet Take 5 mg by mouth daily      lisinopril (PRINIVIL;ZESTRIL) 5 MG tablet Take 5 mg by mouth      metoprolol succinate (TOPROL XL) 25 MG extended release tablet every evening (Patient not taking: Reported on 10/25/2022)       No current facility-administered medications for this visit. No Known Allergies    Patient Active Problem List    Diagnosis Date Noted    Chest discomfort 10/25/2022     Priority: Medium    Right carpal tunnel syndrome 11/28/2021    Failed total hip arthroplasty ([facility] 10/03/2019    Arthritis of right hip 06/25/2019    Right leg pain 02/27/2019    Atherosclerosis of native coronary artery of native heart with angina pectoris ([facility] 08/21/2018    Chest pain at rest 02/28/2018    Hypomagnesemia 06/17/2017    ROLANDA (acute kidney injury) ([facility] 06/17/2017    S/P total knee arthroplasty 05/16/2017    Arthritis of knee, left 05/16/2017    Atherosclerosis of native coronary artery of native heart without angina pectoris 05/03/2017    Type 2 diabetes mellitus without complication, without long-term current use of insulin ([facility] 05/03/2017    Mixed hyperlipidemia 05/03/2017    S/P PTCA (percutaneous transluminal coronary angioplasty) 04/27/2017     Overview Note:     BMS to RCA 1/2014.         Idiopathic peripheral neuropathy 01/30/2017    Essential hypertension      Overview Note:     controlled on medication        Chronic pain      Overview Note:     left hip/back/LLE        Chronic pain of left knee 10/27/2016    Lumbar radiculopathy 09/01/2016    Hx of bladder cancer 06/23/2015    Dyslipidemia 06/23/2015    Thrombocytopenia, unspecified ([facility] 01/11/2014    History of non-ST elevation myocardial infarction (NSTEMI) 01/10/2014    GERD (gastroesophageal reflux disease)      Overview Note:     on medication-controlled with nexium 1 x D        S/P prosthetic total arthroplasty of the hip 2014    Former smoker 2013     Overview Note:     >20 years        BPH (benign prostatic hyperplasia) 2013    Osteoarthritis of left hip 2013    History of colon cancer 2013    History of bladder cancer 2013    H/O chest pain 2012     Overview Note:     s/p back surgery. troponins negative. CT-angio chest negative. CP   resolved. Spinal stenosis, lumbar region, with neurogenic claudication 2012    Status post lumbar spinal fusion 2012    Arthritis      Overview Note:     DDD            Past Surgical History:   Procedure Laterality Date    CARDIAC CATHETERIZATION  01/10/2014    s/p bare metal stent to mid-right CA    COLONOSCOPY  ,     [address]  as child    T&A    [address] 1 x 2  ,     lumbar fusions- x 4 total surgeries    ORTHOPEDIC SURGERY Left 2013    left hip replaced    OTHER SURGICAL HISTORY      Spinal cord stimulator placed and removed in 2017    [place]    Colon Resection    TOTAL HIP ARTHROPLASTY Right 2019    TOTAL KNEE ARTHROPLASTY Left 2017    UROLOGICAL SURGERY  2002    Turp       Family History   Problem Relation Age of Onset    Lung Disease Sister     Cancer Father     Heart Disease Mother     Heart Disease Brother     Cancer Brother         pancreatic    Other Neg Hx     Parkinson's Disease Brother     Emergence Delirium Neg Hx     Post-op Nausea/Vomiting Neg Hx     Delayed Awakening Neg Hx     Pseudochol.  Deficiency Neg Hx     Malig Hypertherm Neg Hx     Post-op Cognitive Dysfunction Neg Hx         Social History     Tobacco Use    Smoking status: Former     Packs/day: 1.50     Types: Cigarettes     Quit date: 1982     Years since quittin.8    Smokeless tobacco: Never    Tobacco comments:     Quit smoking: quit  Substance Use Topics    Alcohol use: No       ROS:    Review of Systems   Constitutional:  Negative for appetite change, chills, diaphoresis and fatigue. HENT:  Negative for congestion, mouth sores, nosebleeds, sore throat and tinnitus. Eyes:  Negative for photophobia and visual disturbance. Respiratory:  Negative for cough and shortness of breath. Cardiovascular:  Negative for chest pain, palpitations and leg swelling. Gastrointestinal:  Negative for abdominal distention, abdominal pain, constipation and diarrhea. Heartburn   Endocrine: Negative for cold intolerance, heat intolerance, polydipsia and polyuria. Genitourinary:  Negative for dysuria and hematuria. Musculoskeletal:  Negative for arthralgias, joint swelling and myalgias. Skin:  Negative for rash. Allergic/Immunologic: Negative for environmental allergies and food allergies. Neurological:  Negative for dizziness, seizures, syncope and light-headedness. Hematological:  Negative for adenopathy. Does not bruise/bleed easily. Psychiatric/Behavioral:  Negative for agitation, behavioral problems, dysphoric mood and hallucinations. The patient is not nervous/anxious. PHYSICAL EXAM:     Vitals:    10/25/22 0834   BP: (!) 112/58   Pulse: 64   Weight: 178 lb 12.8 oz (81.1 kg)   Height: 6' (1.829 m)      Wt Readings from Last 3 Encounters:   10/25/22 178 lb 12.8 oz (81.1 kg)   04/21/22 183 lb (83 kg)   11/22/21 182 lb (82.6 kg)      BP Readings from Last 3 Encounters:   10/25/22 (!) 112/58   04/21/22 110/60   10/18/21 108/60        Physical Exam  Constitutional:       Appearance: Normal appearance. He is normal weight. HENT:      Head: Normocephalic and atraumatic. Nose: Nose normal.      Mouth/Throat:      Mouth: Mucous membranes are moist.      Pharynx: Oropharynx is clear. Eyes:      Extraocular Movements: Extraocular movements intact. Pupils: Pupils are equal, round, and reactive to light.    Neck: Vascular: No carotid bruit or JVD. Cardiovascular:      Rate and Rhythm: Normal rate and regular rhythm. Heart sounds: No murmur heard. No friction rub. No gallop. Pulmonary:      Effort: Pulmonary effort is normal.      Breath sounds: Normal breath sounds. No wheezing or rhonchi. Abdominal:      General: Abdomen is flat. Bowel sounds are normal. There is no distension. Palpations: Abdomen is soft. Tenderness: There is no abdominal tenderness. Musculoskeletal:         General: No swelling. Normal range of motion. Cervical back: Normal range of motion and neck supple. No tenderness. Skin:     General: Skin is warm and dry. Neurological:      General: No focal deficit present. Mental Status: He is alert and oriented to person, place, and time. Mental status is at baseline. Psychiatric:         Mood and Affect: Mood normal.         Behavior: Behavior normal.        Medical problems and test results were reviewed with the patient today. No results found for: CHOL  No results found for: TRIG  No results found for: HDL  No results found for: LDLCHOLESTEROL, LDLCALC  No results found for: LABVLDL, VLDL  No results found for: [facility]     Lab Results   Component Value Date/Time     09/25/2019 12:06 PM    K 4.4 09/25/2019 12:06 PM     09/25/2019 12:06 PM    CO2 30 09/25/2019 12:06 PM    BUN 21 09/25/2019 12:06 PM    CREATININE 0.92 09/25/2019 12:06 PM    GLUCOSE 91 09/25/2019 12:06 PM    CALCIUM 9.5 09/25/2019 12:06 PM         No results for input(s): WBC, HGB, HCT, MCV, PLT in the last 720 hours. Lab Results   Component Value Date    LABA1C 5.4 10/04/2019     Lab Results   Component Value Date     10/04/2019        No results found for: BNP     No results found for: TSHFT4, TSH     No results found for any visits on 10/25/22. ASSESSMENT and PLAN     Diagnoses and all orders for this visit:      1.  History of non-ST elevation myocardial infarction (NSTEMI)- stable, no recent angina or CHF, continue meds - Lifelong low-dose aspirin and  statin recommended. 2. Dyslipidemia- excellent, compliant with statin, labs per PCP this past January 2022      3. S/P PTCA (percutaneous transluminal coronary angioplasty)- RCA in past, clinically asymptomatic, continue meds      4. Type 2 diabetes mellitus without complication, without long-term current use of insulin ([facility]- stable, per PCP      5. Essential hypertension- well controlled today, see below. 6. Gastroesophageal reflux disease- stable, continue meds per PCP direction. Call me with worsening GERD despite compliance with medications, especially if exertional in nature, at which time we will proceed with definitive catheterization given his good functional status at 81years of age. 7. Bradycardia - mild dizziness resolved now -  stay well hydrated, decreased toprol XL to 12.5mg QHS - if BP still <120 with symptoms in near future, cut lisinopril in half at that time. Return in about 6 months (around 4/25/2023).          Sherrell Valencia MD  10/25/2022  8:45 AM

## 2022-10-25 ENCOUNTER — OFFICE VISIT (OUTPATIENT)
Dept: CARDIOLOGY CLINIC | Age: 87
End: 2022-10-25
Payer: MEDICARE

## 2022-10-25 VITALS
DIASTOLIC BLOOD PRESSURE: 58 MMHG | WEIGHT: 178.8 LBS | HEIGHT: 72 IN | HEART RATE: 64 BPM | BODY MASS INDEX: 24.22 KG/M2 | SYSTOLIC BLOOD PRESSURE: 112 MMHG

## 2022-10-25 DIAGNOSIS — I10 ESSENTIAL HYPERTENSION: ICD-10-CM

## 2022-10-25 DIAGNOSIS — I25.10 ATHEROSCLEROSIS OF NATIVE CORONARY ARTERY OF NATIVE HEART WITHOUT ANGINA PECTORIS: Primary | ICD-10-CM

## 2022-10-25 DIAGNOSIS — I25.119 ATHEROSCLEROSIS OF NATIVE CORONARY ARTERY OF NATIVE HEART WITH ANGINA PECTORIS (HCC): ICD-10-CM

## 2022-10-25 DIAGNOSIS — E78.5 DYSLIPIDEMIA: ICD-10-CM

## 2022-10-25 DIAGNOSIS — Z98.61 S/P PTCA (PERCUTANEOUS TRANSLUMINAL CORONARY ANGIOPLASTY): ICD-10-CM

## 2022-10-25 DIAGNOSIS — E11.9 TYPE 2 DIABETES MELLITUS WITHOUT COMPLICATION, WITHOUT LONG-TERM CURRENT USE OF INSULIN (HCC): ICD-10-CM

## 2022-10-25 DIAGNOSIS — K21.9 GASTROESOPHAGEAL REFLUX DISEASE WITHOUT ESOPHAGITIS: ICD-10-CM

## 2022-10-25 DIAGNOSIS — R07.89 CHEST DISCOMFORT: ICD-10-CM

## 2022-10-25 PROCEDURE — G8427 DOCREV CUR MEDS BY ELIG CLIN: HCPCS | Performed by: INTERNAL MEDICINE

## 2022-10-25 PROCEDURE — G8420 CALC BMI NORM PARAMETERS: HCPCS | Performed by: INTERNAL MEDICINE

## 2022-10-25 PROCEDURE — G8484 FLU IMMUNIZE NO ADMIN: HCPCS | Performed by: INTERNAL MEDICINE

## 2022-10-25 PROCEDURE — 1036F TOBACCO NON-USER: CPT | Performed by: INTERNAL MEDICINE

## 2022-10-25 PROCEDURE — 1123F ACP DISCUSS/DSCN MKR DOCD: CPT | Performed by: INTERNAL MEDICINE

## 2022-10-25 PROCEDURE — 99214 OFFICE O/P EST MOD 30 MIN: CPT | Performed by: INTERNAL MEDICINE

## 2022-10-25 RX ORDER — NITROGLYCERIN 0.4 MG/1
0.4 TABLET SUBLINGUAL EVERY 5 MIN PRN
Qty: 25 TABLET | Refills: 0 | Status: SHIPPED | OUTPATIENT
Start: 2022-10-25

## 2023-03-02 ENCOUNTER — APPOINTMENT (RX ONLY)
Dept: URBAN - METROPOLITAN AREA CLINIC 330 | Facility: CLINIC | Age: 88
Setting detail: DERMATOLOGY
End: 2023-03-02

## 2023-03-02 DIAGNOSIS — L57.0 ACTINIC KERATOSIS: ICD-10-CM

## 2023-03-02 DIAGNOSIS — L82.1 OTHER SEBORRHEIC KERATOSIS: ICD-10-CM

## 2023-03-02 PROCEDURE — 99203 OFFICE O/P NEW LOW 30 MIN: CPT

## 2023-03-02 PROCEDURE — ? PRESCRIPTION

## 2023-03-02 PROCEDURE — ? DEFER

## 2023-03-02 PROCEDURE — ? COUNSELING

## 2023-03-02 RX ORDER — FLUOROURACIL 5 MG/G
CREAM TOPICAL
Qty: 40 | Refills: 1 | Status: ERX | COMMUNITY
Start: 2023-03-02

## 2023-03-02 RX ADMIN — FLUOROURACIL: 5 CREAM TOPICAL at 00:00

## 2023-03-02 ASSESSMENT — LOCATION SIMPLE DESCRIPTION DERM
LOCATION SIMPLE: LEFT CHEEK
LOCATION SIMPLE: SCALP
LOCATION SIMPLE: SCALP
LOCATION SIMPLE: RIGHT CHEEK
LOCATION SIMPLE: LEFT CHEEK

## 2023-03-02 ASSESSMENT — LOCATION DETAILED DESCRIPTION DERM
LOCATION DETAILED: LEFT SUPERIOR PARIETAL SCALP
LOCATION DETAILED: LEFT SUPERIOR PARIETAL SCALP
LOCATION DETAILED: LEFT CENTRAL MALAR CHEEK
LOCATION DETAILED: LEFT LATERAL BUCCAL CHEEK
LOCATION DETAILED: LEFT INFERIOR CENTRAL MALAR CHEEK
LOCATION DETAILED: RIGHT CENTRAL MALAR CHEEK
LOCATION DETAILED: LEFT INFERIOR CENTRAL MALAR CHEEK
LOCATION DETAILED: LEFT LATERAL BUCCAL CHEEK

## 2023-03-02 ASSESSMENT — LOCATION ZONE DERM
LOCATION ZONE: SCALP
LOCATION ZONE: FACE
LOCATION ZONE: SCALP
LOCATION ZONE: FACE

## 2023-03-02 NOTE — PROCEDURE: DEFER
Size Of Lesion In Cm (Optional): 0
Introduction Text (Please End With A Colon): The following procedure was deferred:
Instructions (Optional): Pt declines treatment at this time due to age. Discussed risk of malignant transformation
Detail Level: Zone

## 2023-03-24 ENCOUNTER — TELEPHONE (OUTPATIENT)
Dept: CARDIOLOGY CLINIC | Age: 88
End: 2023-03-24

## 2023-03-24 NOTE — TELEPHONE ENCOUNTER
Patient called and said his chest on the left side hurts when he lays down. He said this morning when he turned onto his right side it felt as though something shifted. Patient said he is not in pain at th moment. Please call to advise.

## 2023-03-24 NOTE — TELEPHONE ENCOUNTER
S/w patient. C/o sharp pain in his chest primarily at night when turning on to his right OR left side. States he has had these symptoms for the past 3 nights & it seems to be worsening. He said he thinks it worsens when moving and switching positions. He also c/o CHEST PRESSURE during the day. He said the daytime pressure is different from what he has at night. No c/o of SOB. He took 1 NTG 2 nights ago but states it didn't seem to help the pain. He also took a couple Tylenol as well & didn't get any relief.      Pt has h/o NSTEMI 2018     Current meds:  ASA 81mg daily  Metoprolol Succinate 12.5mg daily  Lisinopril 5mg daily  NTG PRN  Atorvastatin 40mg daily    No vitals available

## 2023-03-24 NOTE — TELEPHONE ENCOUNTER
I agree, sounds musculoskeletal.  If worsens to the point that he is having shortness of breath or pain significantly worsens over the weekend is to go to the ER. Keep routine follow-up otherwise. Try to avoid movements that exacerbate the pain over the weekend. Okay to take Tylenol as needed.

## 2023-03-24 NOTE — TELEPHONE ENCOUNTER
Pt notified of Dr. Geraldine Gan' response. He agrees to follow Dr Geraldine Gan' recommendations & go to ER over the weekend for worsening symptoms.

## 2023-03-30 ENCOUNTER — APPOINTMENT (RX ONLY)
Dept: URBAN - METROPOLITAN AREA CLINIC 330 | Facility: CLINIC | Age: 88
Setting detail: DERMATOLOGY
End: 2023-03-30

## 2023-03-30 DIAGNOSIS — L57.0 ACTINIC KERATOSIS: ICD-10-CM

## 2023-03-30 PROCEDURE — 99212 OFFICE O/P EST SF 10 MIN: CPT

## 2023-03-30 PROCEDURE — ? ADDITIONAL NOTES

## 2023-03-30 PROCEDURE — ? DEFER

## 2023-03-30 PROCEDURE — ? FULL BODY SKIN EXAM - DECLINED

## 2023-03-30 PROCEDURE — ? COUNSELING

## 2023-03-30 ASSESSMENT — LOCATION DETAILED DESCRIPTION DERM: LOCATION DETAILED: LEFT SUPERIOR PARIETAL SCALP

## 2023-03-30 ASSESSMENT — LOCATION SIMPLE DESCRIPTION DERM: LOCATION SIMPLE: SCALP

## 2023-03-30 ASSESSMENT — LOCATION ZONE DERM: LOCATION ZONE: SCALP

## 2023-03-30 NOTE — PROCEDURE: ADDITIONAL NOTES
Render Risk Assessment In Note?: no
Additional Notes: Patient used the Fluorouracil bid x 2 weeks on a few spots on his scalp. Discussed him using the cream on his forehead, cheeks and nose for his precancers. Discussed treatment with ln2.
Detail Level: Simple

## 2023-03-30 NOTE — PROCEDURE: MIPS QUALITY
Quality 110: Preventive Care And Screening: Influenza Immunization: Influenza Immunization Administered during Influenza season
Quality 226: Preventive Care And Screening: Tobacco Use: Screening And Cessation Intervention: Patient screened for tobacco use and is an ex/non-smoker
Quality 130: Documentation Of Current Medications In The Medical Record: Current Medications Documented
Quality 47: Advance Care Plan: Advance care planning not documented, reason not otherwise specified.
Quality 402: Tobacco Use And Help With Quitting Among Adolescents: Patient screened for tobacco and never smoked
Detail Level: Detailed
Quality 431: Preventive Care And Screening: Unhealthy Alcohol Use - Screening: Patient not identified as an unhealthy alcohol user when screened for unhealthy alcohol use using a systematic screening method

## 2023-03-30 NOTE — PROCEDURE: DEFER
X Size Of Lesion In Cm (Optional): 0
Detail Level: Detailed
Introduction Text (Please End With A Colon): The following procedure was deferred:
Instructions (Optional): Due to age and comorbidities

## 2023-04-30 ASSESSMENT — ENCOUNTER SYMPTOMS
SORE THROAT: 0
ABDOMINAL PAIN: 0
ABDOMINAL DISTENTION: 0
SHORTNESS OF BREATH: 0
CONSTIPATION: 0
COUGH: 0
DIARRHEA: 0
PHOTOPHOBIA: 0

## 2023-05-03 ENCOUNTER — OFFICE VISIT (OUTPATIENT)
Dept: CARDIOLOGY CLINIC | Age: 88
End: 2023-05-03
Payer: MEDICARE

## 2023-05-03 VITALS
BODY MASS INDEX: 24.26 KG/M2 | HEIGHT: 72 IN | SYSTOLIC BLOOD PRESSURE: 120 MMHG | DIASTOLIC BLOOD PRESSURE: 66 MMHG | WEIGHT: 179.1 LBS | HEART RATE: 63 BPM

## 2023-05-03 DIAGNOSIS — I25.10 ATHEROSCLEROSIS OF NATIVE CORONARY ARTERY OF NATIVE HEART WITHOUT ANGINA PECTORIS: Primary | ICD-10-CM

## 2023-05-03 DIAGNOSIS — K21.9 GASTROESOPHAGEAL REFLUX DISEASE WITHOUT ESOPHAGITIS: ICD-10-CM

## 2023-05-03 DIAGNOSIS — E78.5 DYSLIPIDEMIA: ICD-10-CM

## 2023-05-03 DIAGNOSIS — I10 ESSENTIAL HYPERTENSION: ICD-10-CM

## 2023-05-03 DIAGNOSIS — E11.9 TYPE 2 DIABETES MELLITUS WITHOUT COMPLICATION, WITHOUT LONG-TERM CURRENT USE OF INSULIN (HCC): ICD-10-CM

## 2023-05-03 DIAGNOSIS — Z98.61 S/P PTCA (PERCUTANEOUS TRANSLUMINAL CORONARY ANGIOPLASTY): ICD-10-CM

## 2023-05-03 PROCEDURE — 93000 ELECTROCARDIOGRAM COMPLETE: CPT | Performed by: INTERNAL MEDICINE

## 2023-05-03 PROCEDURE — G8420 CALC BMI NORM PARAMETERS: HCPCS | Performed by: INTERNAL MEDICINE

## 2023-05-03 PROCEDURE — G8427 DOCREV CUR MEDS BY ELIG CLIN: HCPCS | Performed by: INTERNAL MEDICINE

## 2023-05-03 PROCEDURE — 99214 OFFICE O/P EST MOD 30 MIN: CPT | Performed by: INTERNAL MEDICINE

## 2023-05-03 PROCEDURE — 1036F TOBACCO NON-USER: CPT | Performed by: INTERNAL MEDICINE

## 2023-05-03 PROCEDURE — 1123F ACP DISCUSS/DSCN MKR DOCD: CPT | Performed by: INTERNAL MEDICINE

## 2023-05-03 RX ORDER — METOPROLOL SUCCINATE 25 MG/1
12.5 TABLET, EXTENDED RELEASE ORAL DAILY
Qty: 30 TABLET | Refills: 11
Start: 2023-05-03

## 2023-06-08 ENCOUNTER — TELEPHONE (OUTPATIENT)
Dept: UROLOGY | Age: 88
End: 2023-06-08

## 2023-07-20 ENCOUNTER — PROCEDURE VISIT (OUTPATIENT)
Dept: UROLOGY | Age: 88
End: 2023-07-20
Payer: MEDICARE

## 2023-07-20 DIAGNOSIS — C67.8 MALIGNANT NEOPLASM OF OVERLAPPING SITES OF BLADDER (HCC): Primary | ICD-10-CM

## 2023-07-20 LAB
BILIRUBIN, URINE, POC: NEGATIVE
BLOOD URINE, POC: NORMAL
GLUCOSE URINE, POC: NEGATIVE
KETONES, URINE, POC: NEGATIVE
LEUKOCYTE ESTERASE, URINE, POC: NEGATIVE
NITRITE, URINE, POC: NEGATIVE
PH, URINE, POC: 7 (ref 4.6–8)
PROTEIN,URINE, POC: NEGATIVE
SPECIFIC GRAVITY, URINE, POC: 1.02 (ref 1–1.03)
URINALYSIS CLARITY, POC: NORMAL
URINALYSIS COLOR, POC: NORMAL
UROBILINOGEN, POC: NORMAL

## 2023-07-20 PROCEDURE — 52000 CYSTOURETHROSCOPY: CPT | Performed by: UROLOGY

## 2023-07-20 PROCEDURE — 81003 URINALYSIS AUTO W/O SCOPE: CPT | Performed by: UROLOGY

## 2023-07-20 NOTE — PROGRESS NOTES
[facility] Urology  [address], [address] [place] [address]  [phone]    Mary Fishman  : 1935         HPI   81 y.o., male followed previously by Dr. Joao Galindo. He underwent TURBT of TCCB in . Pathology is unavailable for review. He underwent yearly negative surveillance cystoscopy by Dr. Joao Galindo until . He states another unknown Urologist close to [facility] then did yearly cystoscopy until approx. . He denies gross hematuria or voiding issue. He did have a UTI in early . Past Medical History:   Diagnosis Date    Arthritis     osteo    Bladder cancer ([address])     Chronic pain     left hip/back/LLE    Colon cancer ([address]) 2015    surgery     Diabetes ([address] [facility])     Type 2. diet controlled. Pt does not monitor blood sugar. Dyslipidemia 2015    daily meds     GERD (gastroesophageal reflux disease)     controlled w/med prn    Hx of bladder cancer 2015    Hypertension     controlled on medication    NSTEMI (non-ST elevation myocardial infarction) ([address]) 01/10/2014    MI. heart cath: preserved LVSF. EF =65%. mild to mod LAD and circumflex disease. Followed by [facility]. Osteoarthritis of left hip 2013    S/P prosthetic total arthroplasty of the hip 2014    Stented coronary artery 01/10/2014    mid right coronary artery - Medtronic bare metal stent.      Thrombocytopenia, unspecified ([address]) 2014     Past Surgical History:   Procedure Laterality Date    CARDIAC CATHETERIZATION  01/10/2014    s/p bare metal stent to mid-right CA    COLONOSCOPY  ,     CORONARY ANGIOPLASTY WITH STENT PLACEMENT      HEENT  as child    T&A    LUMBAR FUSION  1990 x 2  , 2013    lumbar fusions- x 4 total surgeries    ORTHOPEDIC SURGERY Left 2013    left hip replaced    OTHER SURGICAL HISTORY      Spinal cord stimulator placed and removed in 2017    [address] [place] [address]

## 2023-10-23 NOTE — PROGRESS NOTES
I have reviewed discharge instructions with the patient. The patient verbalized understanding. Detail Level: Simple Additional Notes: Will monitor ast level Render Risk Assessment In Note?: no

## 2023-10-28 NOTE — PERIOP NOTES
Documentation request sent to [facility] requesting an okay to hold Plavix x 7 days prior to surgery for spinal anesthesia. PRN visit for oxygen concentrator malfunction. Patient received sitting in chair in living room. Patient stated he called his daughter who walked him through turning on his oxygen canister and applying oxygen tubing. VS WNL and patient states he is eating and voiding appropriately. He received his medications via delivery yesterday and would like to know if they can knock on the door to ensure his knowledge of package as it sat out overnight at the doorstep. This writer stayed with patient until DME company arrived to [employer]. Oxygen concentrator functioning properly during this writer's presence, but patient preferred to swap machines to avoid future malfunction. DME technician opened new portable canister and exchanged empty canister for a full one. Patient encouraged to contact hospice with any further needs.

## 2023-11-05 ASSESSMENT — ENCOUNTER SYMPTOMS
SORE THROAT: 0
PHOTOPHOBIA: 0
CONSTIPATION: 0
ABDOMINAL PAIN: 0
ABDOMINAL DISTENTION: 0
SHORTNESS OF BREATH: 0
COUGH: 0
DIARRHEA: 0

## 2023-11-05 NOTE — PROGRESS NOTES
[facility] CARDIOLOGY  [id number] [place] [address] [facility]  PHONE: [phone]        NAME:  Amairani Agudelo  : 1935  MRN: [id number]     PCP:  Edu Panda MD      SUBJECTIVE:   Amairani Agudelo is a 81 y.o. male seen for a follow up visit regarding the following:     Chief Complaint   Patient presents with    Hypertension    Coronary Artery Disease       HPI:     Formerly a patient of Dr. Fela Jones, with prior RCA stent in setting of NSTEMI, with nuclear stress in 2018 showing mild inferior HK with EF 60% with small inferior scar/carolyn-infarct ischemia. Doing well since last visit without interval angina, CHF, palpitations, edema, presyncope or syncope. Dizziness at our last visit completely resolved with increasing daily hydration and decreasing Toprol-XL to 12.5 mg daily, which he will continue. Vitals otherwise controlled and tolerating meds well. Staying active without any significant limitations but using a cane intermittently to ambulate. Gait still a bit unsteady but no interval falls. 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 He has intermittent GERD symptoms that resolved after taking a chewable antacid, denies any worsening accelerating angina symptoms. Offered nuclear stress test but he refuses given adverse reaction to Norberto Major in the past.  Emphasized calling me early with any accelerating exertional symptoms at which time we will proceed with catheterization. He's been completely asymptomatic since our last visit without any resting or exertional chest discomfort whatsoever. Past Medical History, Past Surgical History, Family history, Social History, and Medications were all reviewed with the patient today and updated as necessary.      Current Outpatient Medications   Medication Sig Dispense Refill    metoprolol succinate (TOPROL XL) 25 MG extended release tablet Take 0.5 tablets by mouth

## 2023-11-06 ENCOUNTER — OFFICE VISIT (OUTPATIENT)
Age: 88
End: 2023-11-06
Payer: MEDICARE

## 2023-11-06 VITALS
WEIGHT: 176.2 LBS | OXYGEN SATURATION: 97 % | SYSTOLIC BLOOD PRESSURE: 132 MMHG | HEIGHT: 72 IN | BODY MASS INDEX: 23.86 KG/M2 | DIASTOLIC BLOOD PRESSURE: 68 MMHG | HEART RATE: 77 BPM

## 2023-11-06 DIAGNOSIS — I25.119 ATHEROSCLEROSIS OF NATIVE CORONARY ARTERY OF NATIVE HEART WITH ANGINA PECTORIS (HCC): ICD-10-CM

## 2023-11-06 DIAGNOSIS — I10 ESSENTIAL HYPERTENSION: Primary | ICD-10-CM

## 2023-11-06 DIAGNOSIS — E78.5 DYSLIPIDEMIA: ICD-10-CM

## 2023-11-06 DIAGNOSIS — K21.9 GASTROESOPHAGEAL REFLUX DISEASE WITHOUT ESOPHAGITIS: ICD-10-CM

## 2023-11-06 DIAGNOSIS — Z98.61 S/P PTCA (PERCUTANEOUS TRANSLUMINAL CORONARY ANGIOPLASTY): ICD-10-CM

## 2023-11-06 DIAGNOSIS — E11.9 TYPE 2 DIABETES MELLITUS WITHOUT COMPLICATION, WITHOUT LONG-TERM CURRENT USE OF INSULIN (HCC): ICD-10-CM

## 2023-11-06 PROCEDURE — 99214 OFFICE O/P EST MOD 30 MIN: CPT | Performed by: INTERNAL MEDICINE

## 2023-11-06 PROCEDURE — G8420 CALC BMI NORM PARAMETERS: HCPCS | Performed by: INTERNAL MEDICINE

## 2023-11-06 PROCEDURE — G8427 DOCREV CUR MEDS BY ELIG CLIN: HCPCS | Performed by: INTERNAL MEDICINE

## 2023-11-06 PROCEDURE — 1123F ACP DISCUSS/DSCN MKR DOCD: CPT | Performed by: INTERNAL MEDICINE

## 2023-11-06 PROCEDURE — G8484 FLU IMMUNIZE NO ADMIN: HCPCS | Performed by: INTERNAL MEDICINE

## 2023-11-06 PROCEDURE — 1036F TOBACCO NON-USER: CPT | Performed by: INTERNAL MEDICINE

## 2023-11-06 RX ORDER — METOPROLOL SUCCINATE 25 MG/1
12.5 TABLET, EXTENDED RELEASE ORAL DAILY
Qty: 30 TABLET | Refills: 11 | Status: SHIPPED | OUTPATIENT
Start: 2023-11-06

## 2024-04-05 ENCOUNTER — OFFICE VISIT (OUTPATIENT)
Dept: ORTHOPEDIC SURGERY | Age: 89
End: 2024-04-05
Payer: MEDICARE

## 2024-04-05 DIAGNOSIS — Z96.641 HISTORY OF REVISION OF TOTAL REPLACEMENT OF RIGHT HIP JOINT: ICD-10-CM

## 2024-04-05 DIAGNOSIS — M47.816 LUMBAR SPONDYLOSIS: ICD-10-CM

## 2024-04-05 DIAGNOSIS — Z96.652 STATUS POST TOTAL LEFT KNEE REPLACEMENT: Primary | ICD-10-CM

## 2024-04-05 PROCEDURE — 1123F ACP DISCUSS/DSCN MKR DOCD: CPT | Performed by: PHYSICIAN ASSISTANT

## 2024-04-05 PROCEDURE — 1036F TOBACCO NON-USER: CPT | Performed by: PHYSICIAN ASSISTANT

## 2024-04-05 PROCEDURE — 99213 OFFICE O/P EST LOW 20 MIN: CPT | Performed by: PHYSICIAN ASSISTANT

## 2024-04-05 PROCEDURE — G8420 CALC BMI NORM PARAMETERS: HCPCS | Performed by: PHYSICIAN ASSISTANT

## 2024-04-05 PROCEDURE — G8428 CUR MEDS NOT DOCUMENT: HCPCS | Performed by: PHYSICIAN ASSISTANT

## 2024-04-05 NOTE — PROGRESS NOTES
Followed by [facility].     Osteoarthritis of left hip 2013    S/P prosthetic total arthroplasty of the hip 2014    Stented coronary artery 01/10/2014    mid right coronary artery - Medtronic bare metal stent.     Thrombocytopenia, unspecified (HCC) 2014     .pmh  Past Surgical History:   Procedure Laterality Date    CARDIAC CATHETERIZATION  01/10/2014    s/p bare metal stent to mid-right CA    COLONOSCOPY  ,     CORONARY ANGIOPLASTY WITH STENT PLACEMENT      HEENT  as child    T&A    LUMBAR FUSION  1990 x 2  ,     lumbar fusions- x 4 total surgeries    ORTHOPEDIC SURGERY Left 2013    left hip replaced    OTHER SURGICAL HISTORY      Spinal cord stimulator placed and removed in 2017    NE UNLISTED PROCEDURE ABDOMEN PERITONEUM & OMENTUM      EXP Lap    SHOULDER ARTHROSCOPY Left     TOTAL COLECTOMY      Colon Resection    TOTAL HIP ARTHROPLASTY Right 2019    TOTAL KNEE ARTHROPLASTY Left 2017    UROLOGICAL SURGERY  2002    Turp     Family History   Problem Relation Age of Onset    Lung Disease Sister     Cancer Father     Heart Disease Mother     Heart Disease Brother     Cancer Brother         pancreatic    Other Neg Hx     Parkinson's Disease Brother     Emergence Delirium Neg Hx     Post-op Nausea/Vomiting Neg Hx     Delayed Awakening Neg Hx     Pseudochol. Deficiency Neg Hx     Malig Hypertherm Neg Hx     Post-op Cognitive Dysfunction Neg Hx      Social History     Socioeconomic History    Marital status: Single     Spouse name: Not on file    Number of children: Not on file    Years of education: Not on file    Highest education level: Not on file   Occupational History    Not on file   Tobacco Use    Smoking status: Former     Current packs/day: 0.00     Types: Cigarettes     Quit date: 1982     Years since quittin.2    Smokeless tobacco: Never    Tobacco comments:     Quit smoking: quit    Substance and Sexual Activity    Alcohol use: No    Drug 
Neg Hx     Malig Hypertherm Neg Hx     Post-op Cognitive Dysfunction Neg Hx      Social History     Socioeconomic History    Marital status: Single     Spouse name: Not on file    Number of children: Not on file    Years of education: Not on file    Highest education level: Not on file   Occupational History    Not on file   Tobacco Use    Smoking status: Former     Current packs/day: 0.00     Types: Cigarettes     Quit date: 1982     Years since quittin.2    Smokeless tobacco: Never    Tobacco comments:     Quit smoking: quit    Substance and Sexual Activity    Alcohol use: No    Drug use: No    Sexual activity: Not on file     Comment: Wife recently  10-18   Other Topics Concern    Not on file   Social History Narrative    Not on file     Social Determinants of Health     Financial Resource Strain: Not on file   Food Insecurity: Not on file   Transportation Needs: Not on file   Physical Activity: Not on file   Stress: Not on file   Social Connections: Not on file   Intimate Partner Violence: Not on file   Housing Stability: Not on file       PHYSICAL EXAMINATION:   The patient appears their stated age and they are in no distress.  The cervical, thoracic and lumbar spine are without compromising deformity.   The upper extremities demonstrate reasonable tone, strength, and function bilaterally.   The lower extremities are as described below.  Circulation is normal with palpable pedal pulses bilaterally and no edema.  Skin of the leg is intact without chronic stasis disease bilaterally.   Sensation is iintact to light tough bilaterally  Gait is noted to be with a slight trendelenburg and antalgia  Limb lengths are equal.  Straight leg test is negative bilaterally  Stability is normal bilaterally.   Muscular strength is intact bilaterally.   There is no lymphadenopathy in the groin or popliteal regions bilaterally.   Knee: Range of motion is 0-120 degrees on the right and 0-120 degrees on the

## 2024-04-18 ENCOUNTER — APPOINTMENT (OUTPATIENT)
Dept: GENERAL RADIOLOGY | Age: 89
End: 2024-04-18
Payer: COMMERCIAL

## 2024-04-18 ENCOUNTER — HOSPITAL ENCOUNTER (EMERGENCY)
Age: 89
Discharge: HOME | End: 2024-04-18
Attending: EMERGENCY MEDICINE
Payer: COMMERCIAL

## 2024-04-18 VITALS
TEMPERATURE: 98.3 F | DIASTOLIC BLOOD PRESSURE: 71 MMHG | SYSTOLIC BLOOD PRESSURE: 143 MMHG | OXYGEN SATURATION: 96 % | HEART RATE: 65 BPM | BODY MASS INDEX: 24.34 KG/M2 | RESPIRATION RATE: 15 BRPM | WEIGHT: 170 LBS | HEIGHT: 70 IN

## 2024-04-18 DIAGNOSIS — S29.011A MUSCLE STRAIN OF CHEST WALL, INITIAL ENCOUNTER: Primary | ICD-10-CM

## 2024-04-18 DIAGNOSIS — S61.012A LACERATION OF LEFT THUMB WITHOUT FOREIGN BODY WITHOUT DAMAGE TO NAIL, INITIAL ENCOUNTER: ICD-10-CM

## 2024-04-18 PROCEDURE — 6360000002 HC RX W HCPCS: Performed by: EMERGENCY MEDICINE

## 2024-04-18 PROCEDURE — 99284 EMERGENCY DEPT VISIT MOD MDM: CPT

## 2024-04-18 PROCEDURE — 71101 X-RAY EXAM UNILAT RIBS/CHEST: CPT

## 2024-04-18 PROCEDURE — 96372 THER/PROPH/DIAG INJ SC/IM: CPT

## 2024-04-18 PROCEDURE — 90471 IMMUNIZATION ADMIN: CPT | Performed by: EMERGENCY MEDICINE

## 2024-04-18 PROCEDURE — 90714 TD VACC NO PRESV 7 YRS+ IM: CPT | Performed by: EMERGENCY MEDICINE

## 2024-04-18 PROCEDURE — 12002 RPR S/N/AX/GEN/TRNK2.6-7.5CM: CPT

## 2024-04-18 RX ORDER — LIDOCAINE HYDROCHLORIDE 10 MG/ML
5 INJECTION, SOLUTION INFILTRATION; PERINEURAL
Status: DISCONTINUED | OUTPATIENT
Start: 2024-04-18 | End: 2024-04-18 | Stop reason: HOSPADM

## 2024-04-18 RX ADMIN — CLOSTRIDIUM TETANI TOXOID ANTIGEN (FORMALDEHYDE INACTIVATED) AND CORYNEBACTERIUM DIPHTHERIAE TOXOID ANTIGEN (FORMALDEHYDE INACTIVATED) 0.5 ML: 5; 2 INJECTION, SUSPENSION INTRAMUSCULAR at 17:04

## 2024-04-18 ASSESSMENT — ENCOUNTER SYMPTOMS
BACK PAIN: 0
VOMITING: 0
ABDOMINAL PAIN: 0
NAUSEA: 0
SHORTNESS OF BREATH: 0
EYE PAIN: 0

## 2024-04-18 ASSESSMENT — PAIN SCALES - GENERAL: PAINLEVEL_OUTOF10: 3

## 2024-04-18 ASSESSMENT — PAIN - FUNCTIONAL ASSESSMENT: PAIN_FUNCTIONAL_ASSESSMENT: 0-10
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
## 2024-04-18 NOTE — ED NOTES
Patient mobility status  with no difficulty. Provider aware     I have reviewed discharge instructions with the patient.  The patient verbalized understanding.    Patient left ED via Discharge Method: wheelchair to Home with Child.    Opportunity for questions and clarification provided.     Patient given 0 scripts.

## 2024-05-04 NOTE — PROGRESS NOTES
Priority: Low    Essential hypertension      Priority: Low     Overview Note:     controlled on medication        Chronic pain      Priority: Low     Overview Note:     left hip/back/LLE        Chronic pain of left knee 10/27/2016     Priority: Low    Lumbar radiculopathy 09/01/2016     Priority: Low    Hx of bladder cancer 06/23/2015     Priority: Low    Dyslipidemia 06/23/2015     Priority: Low    Thrombocytopenia, unspecified (HCC) 01/11/2014     Priority: Low    History of non-ST elevation myocardial infarction (NSTEMI) 01/10/2014     Priority: Low    GERD (gastroesophageal reflux disease)      Priority: Low     Overview Note:     on medication-controlled with nexium 1 x D        S/P prosthetic total arthroplasty of the hip 01/08/2014     Priority: Low    Former smoker 12/16/2013     Priority: Low     Overview Note:     >20 years        BPH (benign prostatic hyperplasia) 12/16/2013     Priority: Low    Osteoarthritis of left hip 12/16/2013     Priority: Low    History of colon cancer 12/16/2013     Priority: Low    History of bladder cancer 12/16/2013     Priority: Low    H/O chest pain 03/01/2012     Priority: Low     Overview Note:     s/p back surgery. troponins negative. CT-angio chest negative. CP   resolved.         Spinal stenosis, lumbar region, with neurogenic claudication 02/28/2012     Priority: Low    Status post lumbar spinal fusion 02/28/2012     Priority: Low    Arthritis      Priority: Low     Overview Note:     DDD            Past Surgical History:   Procedure Laterality Date    CARDIAC CATHETERIZATION  01/10/2014    s/p bare metal stent to mid-right CA    COLONOSCOPY  2014, 2018    CORONARY ANGIOPLASTY WITH STENT PLACEMENT      HEENT  as child    T&A    LUMBAR FUSION  1990 x 2  2012, 2013    lumbar fusions- x 4 total surgeries    ORTHOPEDIC SURGERY Left 2013    left hip replaced    OTHER SURGICAL HISTORY      Spinal cord stimulator placed and removed in 2017    GA UNLISTED PROCEDURE ABDOMEN

## 2024-05-06 ENCOUNTER — OFFICE VISIT (OUTPATIENT)
Age: 89
End: 2024-05-06
Payer: MEDICARE

## 2024-05-06 VITALS
WEIGHT: 177.4 LBS | SYSTOLIC BLOOD PRESSURE: 122 MMHG | HEART RATE: 66 BPM | DIASTOLIC BLOOD PRESSURE: 60 MMHG | HEIGHT: 70 IN | BODY MASS INDEX: 25.4 KG/M2

## 2024-05-06 DIAGNOSIS — E11.9 TYPE 2 DIABETES MELLITUS WITHOUT COMPLICATION, WITHOUT LONG-TERM CURRENT USE OF INSULIN (HCC): ICD-10-CM

## 2024-05-06 DIAGNOSIS — I25.10 ATHEROSCLEROSIS OF NATIVE CORONARY ARTERY OF NATIVE HEART WITHOUT ANGINA PECTORIS: Primary | ICD-10-CM

## 2024-05-06 DIAGNOSIS — E78.5 DYSLIPIDEMIA: ICD-10-CM

## 2024-05-06 DIAGNOSIS — K21.9 GASTROESOPHAGEAL REFLUX DISEASE WITHOUT ESOPHAGITIS: ICD-10-CM

## 2024-05-06 DIAGNOSIS — I10 ESSENTIAL HYPERTENSION: ICD-10-CM

## 2024-05-06 PROCEDURE — 1123F ACP DISCUSS/DSCN MKR DOCD: CPT | Performed by: INTERNAL MEDICINE

## 2024-05-06 PROCEDURE — 93000 ELECTROCARDIOGRAM COMPLETE: CPT | Performed by: INTERNAL MEDICINE

## 2024-05-06 PROCEDURE — 1036F TOBACCO NON-USER: CPT | Performed by: INTERNAL MEDICINE

## 2024-05-06 PROCEDURE — G8427 DOCREV CUR MEDS BY ELIG CLIN: HCPCS | Performed by: INTERNAL MEDICINE

## 2024-05-06 PROCEDURE — 99214 OFFICE O/P EST MOD 30 MIN: CPT | Performed by: INTERNAL MEDICINE

## 2024-05-06 PROCEDURE — G8419 CALC BMI OUT NRM PARAM NOF/U: HCPCS | Performed by: INTERNAL MEDICINE

## 2024-05-06 PROCEDURE — 3044F HG A1C LEVEL LT 7.0%: CPT | Performed by: INTERNAL MEDICINE

## 2024-06-21 ENCOUNTER — TELEPHONE (OUTPATIENT)
Age: 89
End: 2024-06-21

## 2024-06-21 NOTE — TELEPHONE ENCOUNTER
----- Message from Nacho Miranda MD sent at 6/21/2024  1:24 PM EDT -----  Echo looks good. No major abnormalities. Continue current meds and keep follow up visit.

## 2024-10-30 NOTE — PROGRESS NOTES
[facility] CARDIOLOGY  [address] [place] [address], [address]  [place] [address]  PHONE: [phone]        NAME:  Francesco Boone  : 1935  MRN: [id number]     PCP:  Smith Vizcaino Jr., MD      SUBJECTIVE:   Francesco Boone is a 89 y.o. male seen for a follow up visit regarding the following:     Chief Complaint   Patient presents with    6 Month Follow-Up    Hypertension       HPI:     Formerly a patient of Dr. Cordero, with prior RCA stent in setting of NSTEMI, with nuclear stress in 2018 showing mild inferior HK with EF 60% with small inferior scar/carolyn-infarct ischemia.  Doing well since last visit without interval angina, CHF, palpitations, edema, presyncope or syncope.  Dizziness at our last visit completely resolved with increasing daily hydration and decrease in Toprol-XL to 12.5 mg daily, which he will continue.  Vitals otherwise controlled and tolerating meds well.  Staying active without any significant limitations but using a cane intermittently to ambulate. Gait slowly worsening, no falls. He has intermittent GERD symptoms that resolved after taking a chewable antacid, denies any worsening accelerating angina symptoms.  Offered nuclear stress test but he refuses given adverse reaction to Lexiscan in the past.  Emphasized calling me early with any accelerating exertional symptoms at which time we will proceed with catheterization.   He's been completely asymptomatic since our last visit without any resting or exertional chest discomfort whatsoever.        Echocardiogram 2024:  Left Ventricle Normal left ventricular systolic function with a visually estimated EF of 55 - 60%. Left ventricle size is normal. Mildly increased wall thickness. Normal wall motion. Abnormal diastolic function.   Left Atrium Left atrium size is normal.   Right Ventricle Right ventricle size is normal. RV basal diameter is 3.0 cm. RV mid diameter is 2.5 cm. Normal systolic function.   Right

## 2024-11-08 ENCOUNTER — OFFICE VISIT (OUTPATIENT)
Age: 89
End: 2024-11-08

## 2024-11-08 VITALS
BODY MASS INDEX: 25.8 KG/M2 | SYSTOLIC BLOOD PRESSURE: 124 MMHG | WEIGHT: 180.19 LBS | DIASTOLIC BLOOD PRESSURE: 58 MMHG | HEART RATE: 68 BPM | HEIGHT: 70 IN

## 2024-11-08 DIAGNOSIS — I10 ESSENTIAL HYPERTENSION: ICD-10-CM

## 2024-11-08 DIAGNOSIS — E78.5 DYSLIPIDEMIA: ICD-10-CM

## 2024-11-08 DIAGNOSIS — K21.9 GASTROESOPHAGEAL REFLUX DISEASE WITHOUT ESOPHAGITIS: ICD-10-CM

## 2024-11-08 DIAGNOSIS — E11.9 TYPE 2 DIABETES MELLITUS WITHOUT COMPLICATION, WITHOUT LONG-TERM CURRENT USE OF INSULIN (HCC): ICD-10-CM

## 2024-11-08 DIAGNOSIS — I25.10 ATHEROSCLEROSIS OF NATIVE CORONARY ARTERY OF NATIVE HEART WITHOUT ANGINA PECTORIS: Primary | ICD-10-CM

## 2025-01-28 ENCOUNTER — TRANSCRIBE ORDERS (OUTPATIENT)
Dept: SCHEDULING | Age: 89
End: 2025-01-28

## 2025-01-28 DIAGNOSIS — H90.A31 MIXED CONDUCTIVE AND SENSORINEURAL HEARING LOSS OF RIGHT EAR WITH RESTRICTED HEARING OF LEFT EAR: Primary | ICD-10-CM

## 2025-03-04 ENCOUNTER — APPOINTMENT (OUTPATIENT)
Dept: URBAN - METROPOLITAN AREA CLINIC 330 | Facility: CLINIC | Age: 89
Setting detail: DERMATOLOGY
End: 2025-03-04

## 2025-03-04 DIAGNOSIS — L82.1 OTHER SEBORRHEIC KERATOSIS: ICD-10-CM

## 2025-03-04 DIAGNOSIS — L57.0 ACTINIC KERATOSIS: ICD-10-CM

## 2025-03-04 DIAGNOSIS — L81.4 OTHER MELANIN HYPERPIGMENTATION: ICD-10-CM

## 2025-03-04 PROCEDURE — 17000 DESTRUCT PREMALG LESION: CPT

## 2025-03-04 PROCEDURE — ? COUNSELING

## 2025-03-04 PROCEDURE — 17003 DESTRUCT PREMALG LES 2-14: CPT

## 2025-03-04 PROCEDURE — ? LIQUID NITROGEN

## 2025-03-04 PROCEDURE — 99213 OFFICE O/P EST LOW 20 MIN: CPT | Mod: 25

## 2025-03-04 ASSESSMENT — LOCATION SIMPLE DESCRIPTION DERM
LOCATION SIMPLE: SCALP
LOCATION SIMPLE: RIGHT EAR
LOCATION SIMPLE: RIGHT SCALP
LOCATION SIMPLE: LEFT SCALP
LOCATION SIMPLE: RIGHT FOREHEAD

## 2025-03-04 ASSESSMENT — LOCATION DETAILED DESCRIPTION DERM
LOCATION DETAILED: LEFT SUPERIOR PARIETAL SCALP
LOCATION DETAILED: RIGHT CENTRAL FRONTAL SCALP
LOCATION DETAILED: LEFT CENTRAL FRONTAL SCALP
LOCATION DETAILED: RIGHT SUPERIOR FOREHEAD
LOCATION DETAILED: RIGHT SUPERIOR HELIX
LOCATION DETAILED: RIGHT MEDIAL FRONTAL SCALP

## 2025-03-04 ASSESSMENT — LOCATION ZONE DERM
LOCATION ZONE: EAR
LOCATION ZONE: FACE
LOCATION ZONE: SCALP

## 2025-03-04 NOTE — PROCEDURE: LIQUID NITROGEN
Duration Of Freeze Thaw-Cycle (Seconds): 0
Post-Care Instructions: I reviewed with the patient in detail post-care instructions. Patient is to wear sunprotection, and avoid picking at any of the treated lesions. Pt may apply Vaseline to crusted or scabbing areas.
Show Aperture Variable?: Yes
Consent: The patient's consent was obtained including but not limited to risks of crusting, scabbing, blistering, scarring, darker or lighter pigmentary change, recurrence, incomplete removal and infection.
Detail Level: Simple
Render Note In Bullet Format When Appropriate: No

## 2025-03-04 NOTE — PROCEDURE: MIPS QUALITY
Quality 47: Advance Care Plan: Advance care planning not documented, reason not otherwise specified.
Quality 130: Documentation Of Current Medications In The Medical Record: Current Medications Documented
Quality 431: Preventive Care And Screening: Unhealthy Alcohol Use - Screening: Patient not identified as an unhealthy alcohol user when screened for unhealthy alcohol use using a systematic screening method
Quality 226: Preventive Care And Screening: Tobacco Use: Screening And Cessation Intervention: Patient screened for tobacco use and is an ex/non-smoker
Detail Level: Detailed
Quality 402: Tobacco Use And Help With Quitting Among Adolescents: Patient screened for tobacco and never smoked
Quality 110: Preventive Care And Screening: Influenza Immunization: Influenza Immunization Administered during Influenza season

## 2025-05-11 NOTE — PROGRESS NOTES
Pharynx: Oropharynx is clear.   Eyes:      Extraocular Movements: Extraocular movements intact.      Pupils: Pupils are equal, round, and reactive to light.   Neck:      Vascular: No carotid bruit or JVD.   Cardiovascular:      Rate and Rhythm: Normal rate and regular rhythm.      Heart sounds: No murmur (2/6 ELVIRA RUSB) heard.     No friction rub. No gallop.   Pulmonary:      Effort: Pulmonary effort is normal.      Breath sounds: Normal breath sounds. No wheezing or rhonchi.   Abdominal:      General: Abdomen is flat. Bowel sounds are normal. There is no distension.      Palpations: Abdomen is soft.      Tenderness: There is no abdominal tenderness.   Musculoskeletal:         General: No swelling. Normal range of motion.      Cervical back: Normal range of motion and neck supple. No tenderness.   Skin:     General: Skin is warm and dry.   Neurological:      General: No focal deficit present.      Mental Status: He is alert and oriented to person, place, and time. Mental status is at baseline.   Psychiatric:         Mood and Affect: Mood normal.         Behavior: Behavior normal.          Medical problems and test results were reviewed with the patient today.       No results found for: \"CHOL\"  No results found for: \"TRIG\"  No results found for: \"HDL\"  No components found for: \"LDLCHOLESTEROL\", \"LDLCALC\"  No results found for: \"VLDL\"  No results found for: \"CHOLHDLRATIO\"     Lab Results   Component Value Date/Time     09/25/2019 12:06 PM    K 4.4 09/25/2019 12:06 PM     09/25/2019 12:06 PM    CO2 30 09/25/2019 12:06 PM    BUN 21 09/25/2019 12:06 PM    CREATININE 0.92 09/25/2019 12:06 PM    GLUCOSE 91 09/25/2019 12:06 PM    CALCIUM 9.5 09/25/2019 12:06 PM         No results for input(s): \"WBC\", \"HGB\", \"HCT\", \"MCV\", \"PLT\" in the last 720 hours.     Lab Results   Component Value Date    LABA1C 5.5 02/14/2024     Lab Results   Component Value Date    .15 02/14/2024        No results found for: \"BNP\"

## 2025-05-14 ENCOUNTER — OFFICE VISIT (OUTPATIENT)
Age: 89
End: 2025-05-14
Payer: MEDICARE

## 2025-05-14 VITALS
HEART RATE: 70 BPM | BODY MASS INDEX: 26.04 KG/M2 | HEIGHT: 71 IN | DIASTOLIC BLOOD PRESSURE: 55 MMHG | WEIGHT: 186 LBS | SYSTOLIC BLOOD PRESSURE: 118 MMHG

## 2025-05-14 DIAGNOSIS — E78.2 MIXED HYPERLIPIDEMIA: ICD-10-CM

## 2025-05-14 DIAGNOSIS — I25.10 ATHEROSCLEROSIS OF NATIVE CORONARY ARTERY OF NATIVE HEART WITHOUT ANGINA PECTORIS: Primary | ICD-10-CM

## 2025-05-14 DIAGNOSIS — I10 ESSENTIAL HYPERTENSION: ICD-10-CM

## 2025-05-14 DIAGNOSIS — Z98.61 S/P PTCA (PERCUTANEOUS TRANSLUMINAL CORONARY ANGIOPLASTY): ICD-10-CM

## 2025-05-14 DIAGNOSIS — E78.5 DYSLIPIDEMIA: ICD-10-CM

## 2025-05-14 DIAGNOSIS — K21.9 GASTROESOPHAGEAL REFLUX DISEASE WITHOUT ESOPHAGITIS: ICD-10-CM

## 2025-05-14 PROCEDURE — 1159F MED LIST DOCD IN RCRD: CPT | Performed by: INTERNAL MEDICINE

## 2025-05-14 PROCEDURE — 99214 OFFICE O/P EST MOD 30 MIN: CPT | Performed by: INTERNAL MEDICINE

## 2025-05-14 PROCEDURE — G8427 DOCREV CUR MEDS BY ELIG CLIN: HCPCS | Performed by: INTERNAL MEDICINE

## 2025-05-14 PROCEDURE — 93000 ELECTROCARDIOGRAM COMPLETE: CPT | Performed by: INTERNAL MEDICINE

## 2025-05-14 PROCEDURE — 1036F TOBACCO NON-USER: CPT | Performed by: INTERNAL MEDICINE

## 2025-05-14 PROCEDURE — 1123F ACP DISCUSS/DSCN MKR DOCD: CPT | Performed by: INTERNAL MEDICINE

## 2025-05-14 PROCEDURE — G8419 CALC BMI OUT NRM PARAM NOF/U: HCPCS | Performed by: INTERNAL MEDICINE

## 2025-05-14 RX ORDER — METOPROLOL SUCCINATE 25 MG/1
25 TABLET, EXTENDED RELEASE ORAL DAILY
Qty: 90 TABLET | Refills: 3
Start: 2025-05-14

## 2025-05-14 RX ORDER — LANOLIN ALCOHOL/MO/W.PET/CERES
1000 CREAM (GRAM) TOPICAL DAILY
COMMUNITY

## (undated) DEVICE — (D)PREP SKN CHLRAPRP APPL 26ML -- CONVERT TO ITEM 371833

## (undated) DEVICE — TRAY CATH 16F DRN BG LTX -- CONVERT TO ITEM 363158

## (undated) DEVICE — DRAPE, FILM SHEET, 44X65 STERILE: Brand: MEDLINE

## (undated) DEVICE — HANDPIECE SET WITH COAXIAL HIGH FLOW TIP AND SUCTION TUBE: Brand: INTERPULSE

## (undated) DEVICE — PACK PROCEDURE SURG TOT KNEE

## (undated) DEVICE — REM POLYHESIVE ADULT PATIENT RETURN ELECTRODE: Brand: VALLEYLAB

## (undated) DEVICE — 3000CC GUARDIAN II: Brand: GUARDIAN

## (undated) DEVICE — SYSTEM CULT COLL OR TRNSPRT CLR DBL SWAB W/ MOD AERB AMIES

## (undated) DEVICE — DRAPE,U/SHT,SPLIT,FILM,60X84,STERILE: Brand: MEDLINE

## (undated) DEVICE — Z DISCONTINUED USE 2744636  DRESSING AQUACEL 14 IN ALG W3.5XL14IN POLYUR FLM CVR W/ HYDRCOLL

## (undated) DEVICE — T4 HOOD

## (undated) DEVICE — SLIM BODY SKIN STAPLER: Brand: APPOSE ULC

## (undated) DEVICE — Device

## (undated) DEVICE — GOWN,AURORA,FABRIC-REINFORCED,2XL: Brand: MEDLINE

## (undated) DEVICE — TRAY PREP DRY W/ PREM GLV 2 APPL 6 SPNG 2 UNDPD 1 OVERWRAP

## (undated) DEVICE — OSCILLATING TIP SAW CARTRIDGE: Brand: STRYKER PRECISION

## (undated) DEVICE — BIPOLAR SEALER 23-112-1 AQM 6.0: Brand: AQUAMANTYS ®

## (undated) DEVICE — SOLUTION IV 1000ML 0.9% SOD CHL

## (undated) DEVICE — SURGICAL PROCEDURE PACK TOT HIP CDS

## (undated) DEVICE — YANKAUER,BULB TIP,W/O VENT,RIGID,STERILE: Brand: MEDLINE

## (undated) DEVICE — DRESSING,GAUZE,XEROFORM,CURAD,1"X8",ST: Brand: CURAD

## (undated) DEVICE — HANDPIECE SET WITH BONE CLEANING TIP AND SUCTION TUBE: Brand: INTERPULSE

## (undated) DEVICE — NEEDLE HYPO 18GA L1.5IN PNK S STL HUB POLYPR SHLD REG BVL

## (undated) DEVICE — GOWN,REINF,POLY,ECL,PP SLV,XL: Brand: MEDLINE

## (undated) DEVICE — SYR 50ML LR LCK 1ML GRAD NSAF --

## (undated) DEVICE — SYSTEM SKIN CLSR 22CM DERMBND PRINEO

## (undated) DEVICE — SOLUTION IRRIG 1000ML 09% SOD CHL USP PIC PLAS CONTAINER

## (undated) DEVICE — SYRINGE EAR 2OZ ULC SLIMMER TIP FLAT BTM SUCT PWR DISP FOR

## (undated) DEVICE — ZIMMER® STERILE DISPOSABLE TOURNIQUET CUFF WITH PLC, DUAL PORT, SINGLE BLADDER, 18 IN. (46 CM)

## (undated) DEVICE — STOCKINETTE TUBE 6X48 -- MEDICHOICE

## (undated) DEVICE — DRAPE,HIP,W/POUCHES,STERILE: Brand: MEDLINE

## (undated) DEVICE — 3M™ STERI-DRAPE™ INCISE DRAPE, XL 1051: Brand: STERI-DRAPE™

## (undated) DEVICE — SUTURE PDS II SZ 1 L36IN ABSRB VLT L48MM CTX 1/2 CIR Z371T

## (undated) DEVICE — SUTURE PDS II SZ 1 L96IN ABSRB VLT TP-1 L65MM 1/2 CIR Z880G

## (undated) DEVICE — SUTURE PDS II SZ 1 L54IN ABSRB VLT L65MM TP-1 1/2 CIR Z879G

## (undated) DEVICE — RETRIEVER SUT ARTHSCP HOFFEE --

## (undated) DEVICE — AMD ANTIMICROBIAL NON-ADHERENT PAD,0.2% POLYHEXAMETHYLENE BIGUANIDE HCI (PHMB): Brand: TELFA

## (undated) DEVICE — SUTURE VCRL SZ 1 L27IN ABSRB UD L36MM CP-1 1/2 CIR REV CUT J268H

## (undated) DEVICE — BUTTON SWITCH PENCIL BLADE ELECTRODE, HOLSTER: Brand: EDGE

## (undated) DEVICE — JELLY LUBRICATING 10GM PREFIL SYR LUBE

## (undated) DEVICE — 2000CC GUARDIAN II: Brand: GUARDIAN

## (undated) DEVICE — NEEDLE HYPO 21GA L1.5IN INTRAMUSCULAR S STL LATCH BVL UP

## (undated) DEVICE — SUTURE MCRYL SZ 2-0 L27IN ABSRB UD CP-1 1 L36MM 1/2 CIR REV Y266H

## (undated) DEVICE — SKIN STAPLER: Brand: SIGNET

## (undated) DEVICE — SYR LR LCK 1ML GRAD NSAF 30ML --

## (undated) DEVICE — SOLUTION IRRIG 3000ML 0.9% SOD CHL FLX CONT 0797208] ICU MEDICAL INC]

## (undated) DEVICE — DRAPE,HAND,STERILE: Brand: MEDLINE

## (undated) DEVICE — SUTURE PROL SZ 4-0 L18IN NONABSORBABLE BLU L13MM P-3 3/8 8699G

## (undated) DEVICE — 3M™ COBAN™ NL STERILE NON-LATEX SELF-ADHERENT WRAP, 2084S, 4 IN X 5 YD (10 CM X 4,5 M), 18 ROLLS/CASE: Brand: 3M™ COBAN™

## (undated) DEVICE — DISPOSABLE BIPOLAR CODE, 12' (3.66 M): Brand: CONMED

## (undated) DEVICE — DRAPE,TOP,102X53,STERILE: Brand: MEDLINE

## (undated) DEVICE — NEEDLE HYPO 25GA L1.5IN BLU POLYPR HUB S STL REG BVL STR

## (undated) DEVICE — HEWSON SUTURE RETRIEVER: Brand: HEWSON SUTURE RETRIEVER

## (undated) DEVICE — PAD,ABDOMINAL,5"X9",ST,LF,25/BX: Brand: MEDLINE INDUSTRIES, INC.

## (undated) DEVICE — MEDI-VAC YANKAUER SUCTION HANDLE W/BULBOUS TIP: Brand: CARDINAL HEALTH

## (undated) DEVICE — INTENDED FOR TISSUE SEPARATION, AND OTHER PROCEDURES THAT REQUIRE A SHARP SURGICAL BLADE TO PUNCTURE OR CUT.: Brand: BARD-PARKER SAFETY BLADES SIZE 15, STERILE

## (undated) DEVICE — SOLUTION IV 500ML 0.9% SOD CHL FLX CONT

## (undated) DEVICE — FAN SPRAY KIT: Brand: PULSAVAC®

## (undated) DEVICE — BNDG,ELSTC,MATRIX,STRL,2"X5YD,LF,HOOK&LP: Brand: MEDLINE

## (undated) DEVICE — 1840 FOAM BLOCK NEEDLE COUNTER: Brand: DEVON

## (undated) DEVICE — SUTURE ETHBND EXCEL SZ 5 L30IN NONABSORBABLE GRN L40MM V-37 MB66G

## (undated) DEVICE — STRYKER PERFORMANCE SERIES SAGITTAL BLADE: Brand: STRYKER PERFORMANCE SERIES

## (undated) DEVICE — Device: Brand: STABLECUT®

## (undated) DEVICE — SYR 10ML LUER LOK 1/5ML GRAD --

## (undated) DEVICE — AMD ANTIMICROBIAL GAUZE SPONGES,12 PLY USP TYPE VII, 0.2% POLYHEXAMETHYLENE BIGUANIDE HCI (PHMB): Brand: CURITY

## (undated) DEVICE — PREP SKN CHLRAPRP APL 26ML STR --

## (undated) DEVICE — CURETTE BNE CEM 10IN DISP --

## (undated) DEVICE — STERILE HOOK LOCK LATEX FREE ELASTIC BANDAGE 2INX5YD: Brand: HOOK LOCK™

## (undated) DEVICE — BLADE OPHTH DIA4MM MINI MENIS FLAT ARTHRO LOK

## (undated) DEVICE — HAND PACK: Brand: MEDLINE INDUSTRIES, INC.

## (undated) DEVICE — BNDG ELAS ESMARK 4INX12FT LF -- STRL

## (undated) DEVICE — STOCKINETTE TUBE 9X48 -- MEDICHOICE

## (undated) DEVICE — SUT PROL 4-0 18IN P3 BLU --